# Patient Record
Sex: FEMALE | Race: WHITE | NOT HISPANIC OR LATINO | Employment: UNEMPLOYED | URBAN - NONMETROPOLITAN AREA
[De-identification: names, ages, dates, MRNs, and addresses within clinical notes are randomized per-mention and may not be internally consistent; named-entity substitution may affect disease eponyms.]

---

## 2017-10-17 ENCOUNTER — HOSPITAL ENCOUNTER (EMERGENCY)
Facility: HOSPITAL | Age: 77
Discharge: HOME | End: 2017-10-18
Attending: EMERGENCY MEDICINE | Admitting: EMERGENCY MEDICINE

## 2017-10-17 ENCOUNTER — APPOINTMENT (OUTPATIENT)
Dept: GENERAL RADIOLOGY | Facility: HOSPITAL | Age: 77
End: 2017-10-17

## 2017-10-17 DIAGNOSIS — R07.9 CHEST PAIN, UNSPECIFIED TYPE: Primary | ICD-10-CM

## 2017-10-17 DIAGNOSIS — B02.9 HERPES ZOSTER WITHOUT COMPLICATION: ICD-10-CM

## 2017-10-17 DIAGNOSIS — K21.9 GASTROESOPHAGEAL REFLUX DISEASE, ESOPHAGITIS PRESENCE NOT SPECIFIED: ICD-10-CM

## 2017-10-17 LAB
ALBUMIN SERPL-MCNC: 3.7 G/DL (ref 3.4–4.8)
ALBUMIN/GLOB SERPL: 1 G/DL (ref 1.5–2.5)
ALP SERPL-CCNC: 83 U/L (ref 35–104)
ALT SERPL W P-5'-P-CCNC: 27 U/L (ref 10–36)
ANION GAP SERPL CALCULATED.3IONS-SCNC: 9.6 MMOL/L (ref 3.6–11.2)
ANISOCYTOSIS BLD QL: ABNORMAL
APTT PPP: 30.6 SECONDS (ref 23.8–36.1)
AST SERPL-CCNC: 45 U/L (ref 10–30)
BILIRUB SERPL-MCNC: 0.2 MG/DL (ref 0.2–1.8)
BUN BLD-MCNC: 12 MG/DL (ref 7–21)
BUN/CREAT SERPL: 13.2 (ref 7–25)
CALCIUM SPEC-SCNC: 9.3 MG/DL (ref 7.7–10)
CHLORIDE SERPL-SCNC: 104 MMOL/L (ref 99–112)
CK MB SERPL-CCNC: 0.4 NG/ML (ref 0–5)
CK MB SERPL-CCNC: 1.11 NG/ML (ref 0–5)
CK MB SERPL-RTO: 1 % (ref 0–3)
CK MB SERPL-RTO: 2.1 % (ref 0–3)
CK SERPL-CCNC: 40 U/L (ref 24–173)
CK SERPL-CCNC: 54 U/L (ref 24–173)
CO2 SERPL-SCNC: 23.4 MMOL/L (ref 24.3–31.9)
CREAT BLD-MCNC: 0.91 MG/DL (ref 0.43–1.29)
DEPRECATED RDW RBC AUTO: 52 FL (ref 37–54)
EOSINOPHIL # BLD MANUAL: 0.1 10*3/MM3 (ref 0–0.7)
EOSINOPHIL NFR BLD MANUAL: 1 % (ref 0–7)
ERYTHROCYTE [DISTWIDTH] IN BLOOD BY AUTOMATED COUNT: 15.9 % (ref 11.5–14.5)
GFR SERPL CREATININE-BSD FRML MDRD: 60 ML/MIN/1.73
GLOBULIN UR ELPH-MCNC: 3.7 GM/DL
GLUCOSE BLD-MCNC: 170 MG/DL (ref 70–110)
HCT VFR BLD AUTO: 41.2 % (ref 37–47)
HGB BLD-MCNC: 12.6 G/DL (ref 12–16)
HYPOCHROMIA BLD QL: ABNORMAL
INR PPP: 1.04 (ref 0.9–1.1)
LIPASE SERPL-CCNC: 34 U/L (ref 13–60)
LYMPHOCYTES # BLD MANUAL: 6.45 10*3/MM3 (ref 1–3)
LYMPHOCYTES NFR BLD MANUAL: 62 % (ref 16–46)
LYMPHOCYTES NFR BLD MANUAL: 7 % (ref 0–12)
MCH RBC QN AUTO: 27.9 PG (ref 27–33)
MCHC RBC AUTO-ENTMCNC: 30.6 G/DL (ref 33–37)
MCV RBC AUTO: 91.2 FL (ref 80–94)
MONOCYTES # BLD AUTO: 0.73 10*3/MM3 (ref 0.1–0.9)
NEUTROPHILS # BLD AUTO: 3.12 10*3/MM3 (ref 1.4–6.5)
NEUTROPHILS NFR BLD MANUAL: 30 % (ref 40–75)
OSMOLALITY SERPL CALC.SUM OF ELEC: 277.6 MOSM/KG (ref 273–305)
PLAT MORPH BLD: NORMAL
PLATELET # BLD AUTO: 309 10*3/MM3 (ref 130–400)
PMV BLD AUTO: 9.4 FL (ref 6–10)
POTASSIUM BLD-SCNC: 4 MMOL/L (ref 3.5–5.3)
PROT SERPL-MCNC: 7.4 G/DL (ref 6–8)
PROTHROMBIN TIME: 13.8 SECONDS (ref 11–15.4)
RBC # BLD AUTO: 4.52 10*6/MM3 (ref 4.2–5.4)
SODIUM BLD-SCNC: 137 MMOL/L (ref 135–153)
TROPONIN I SERPL-MCNC: <0.006 NG/ML
TROPONIN I SERPL-MCNC: <0.006 NG/ML
WBC NRBC COR # BLD: 10.4 10*3/MM3 (ref 4.5–12.5)

## 2017-10-17 PROCEDURE — 82553 CREATINE MB FRACTION: CPT | Performed by: EMERGENCY MEDICINE

## 2017-10-17 PROCEDURE — 84484 ASSAY OF TROPONIN QUANT: CPT | Performed by: EMERGENCY MEDICINE

## 2017-10-17 PROCEDURE — 99284 EMERGENCY DEPT VISIT MOD MDM: CPT

## 2017-10-17 PROCEDURE — 85610 PROTHROMBIN TIME: CPT | Performed by: EMERGENCY MEDICINE

## 2017-10-17 PROCEDURE — 80053 COMPREHEN METABOLIC PANEL: CPT | Performed by: EMERGENCY MEDICINE

## 2017-10-17 PROCEDURE — 93010 ELECTROCARDIOGRAM REPORT: CPT | Performed by: INTERNAL MEDICINE

## 2017-10-17 PROCEDURE — 85007 BL SMEAR W/DIFF WBC COUNT: CPT | Performed by: EMERGENCY MEDICINE

## 2017-10-17 PROCEDURE — 85730 THROMBOPLASTIN TIME PARTIAL: CPT | Performed by: EMERGENCY MEDICINE

## 2017-10-17 PROCEDURE — 71010 HC CHEST PA OR AP: CPT

## 2017-10-17 PROCEDURE — 93005 ELECTROCARDIOGRAM TRACING: CPT | Performed by: EMERGENCY MEDICINE

## 2017-10-17 PROCEDURE — 83690 ASSAY OF LIPASE: CPT | Performed by: EMERGENCY MEDICINE

## 2017-10-17 PROCEDURE — 71010 XR CHEST 1 VW: CPT | Performed by: RADIOLOGY

## 2017-10-17 PROCEDURE — 82550 ASSAY OF CK (CPK): CPT | Performed by: EMERGENCY MEDICINE

## 2017-10-17 PROCEDURE — 85025 COMPLETE CBC W/AUTO DIFF WBC: CPT | Performed by: EMERGENCY MEDICINE

## 2017-10-17 RX ORDER — SODIUM CHLORIDE 0.9 % (FLUSH) 0.9 %
10 SYRINGE (ML) INJECTION AS NEEDED
Status: DISCONTINUED | OUTPATIENT
Start: 2017-10-17 | End: 2017-10-18 | Stop reason: HOSPADM

## 2017-10-17 RX ORDER — PROMETHAZINE HYDROCHLORIDE 25 MG/ML
12.5 INJECTION, SOLUTION INTRAMUSCULAR; INTRAVENOUS ONCE
Status: DISCONTINUED | OUTPATIENT
Start: 2017-10-17 | End: 2017-10-17

## 2017-10-17 RX ORDER — ASPIRIN 81 MG/1
324 TABLET, CHEWABLE ORAL ONCE
Status: COMPLETED | OUTPATIENT
Start: 2017-10-17 | End: 2017-10-17

## 2017-10-17 RX ADMIN — ASPIRIN 324 MG: 81 TABLET, CHEWABLE ORAL at 20:16

## 2017-10-18 VITALS
OXYGEN SATURATION: 99 % | HEIGHT: 57 IN | WEIGHT: 195 LBS | HEART RATE: 90 BPM | TEMPERATURE: 98 F | SYSTOLIC BLOOD PRESSURE: 110 MMHG | DIASTOLIC BLOOD PRESSURE: 70 MMHG | RESPIRATION RATE: 16 BRPM | BODY MASS INDEX: 42.07 KG/M2

## 2017-10-18 NOTE — ED NOTES
[facility] called for transport back to nursing home.      Mirela Pedersen  10/17/17 3903

## 2017-10-18 NOTE — ED PROVIDER NOTES
Subjective   HPI Comments: Pt brought from NH/Rehab for CP.  C/O left upper abdominal burning radiating under left breast.    Pt had CVA on 8-.  Very confused since.  Left sided deficits, contractures and speech difficulty since CVA.  Has intermittent A-fib    Patient is a 77 y.o. female presenting with chest pain.   History provided by:  Patient, relative and medical records  History limited by:  Dementia  Chest Pain   Pain location:  L chest  Pain quality: burning    Pain radiates to:  Does not radiate  Onset quality:  Unable to specify  Progression:  Partially resolved  Context: not breathing, not drug use, not eating, not intercourse, not lifting, not movement, not raising an arm, not at rest, not stress and not trauma    Relieved by:  Nothing  Worsened by:  Nothing  Ineffective treatments:  None tried  Associated symptoms: abdominal pain, anxiety and nausea    Associated symptoms: no AICD problem, no anorexia, no back pain, no claudication, no cough, no diaphoresis, no dizziness, no fatigue, no fever, no headache, no lower extremity edema, no near-syncope, no orthopnea, no palpitations, no PND, no shortness of breath, no syncope and no vomiting    Risk factors: coronary artery disease, high cholesterol and hypertension    Risk factors: no aortic disease, no birth control, no Renetta-Danlos syndrome, no immobilization, not male, no Marfan's syndrome, not pregnant, no smoking and no surgery        Review of Systems   Constitutional: Negative.  Negative for diaphoresis, fatigue and fever.   HENT: Negative.    Eyes: Negative.    Respiratory: Negative.  Negative for cough and shortness of breath.    Cardiovascular: Positive for chest pain. Negative for palpitations, orthopnea, claudication, syncope, PND and near-syncope.   Gastrointestinal: Positive for abdominal pain and nausea. Negative for abdominal distention, anorexia and vomiting.   Endocrine: Negative.    Genitourinary: Negative.    Musculoskeletal:  Negative.  Negative for back pain.   Skin: Negative.    Allergic/Immunologic: Negative.    Neurological: Negative for dizziness and headaches.   Hematological: Negative.    Psychiatric/Behavioral: Negative.    All other systems reviewed and are negative.      No past medical history on file.    Allergies   Allergen Reactions   • Bactrim [Sulfamethoxazole-Trimethoprim]    • Codeine    • Penicillins        No past surgical history on file.    No family history on file.    Social History     Social History   • Marital status: Single     Spouse name: N/A   • Number of children: N/A   • Years of education: N/A     Social History Main Topics   • Smoking status: Not on file   • Smokeless tobacco: Not on file   • Alcohol use Not on file   • Drug use: Not on file   • Sexual activity: Not on file     Other Topics Concern   • Not on file     Social History Narrative           Objective   Physical Exam   Constitutional: She is oriented to person, place, and time. She appears well-developed and well-nourished. No distress.   HENT:   Head: Normocephalic and atraumatic.   Nose: Nose normal.   Moist mucus membranes  Left sided facial droop   Eyes: Conjunctivae and EOM are normal. Pupils are equal, round, and reactive to light. Right eye exhibits no discharge. Left eye exhibits no discharge. No scleral icterus.   Neck: Neck supple. No tracheal deviation present.   Cardiovascular: Normal rate, regular rhythm, normal heart sounds and intact distal pulses.  Exam reveals no gallop and no friction rub.    No murmur heard.  Pulmonary/Chest: Effort normal. No stridor. No respiratory distress. She has no wheezes. She has no rales. She exhibits no tenderness.   Abdominal: Soft. She exhibits no distension and no mass. There is no tenderness. There is no guarding.   G-tube in place   Neurological: She is alert and oriented to person, place, and time.   Left sided weakness, left facial droop, LUE contracture   Skin: Skin is warm and dry.    Patch of partially crusted over varicella/shingles rash to left breast   Psychiatric: She has a normal mood and affect.   Nursing note and vitals reviewed.      Procedures         ED Course  ED Course   Value Comment By Time   ECG 12 Lead 0 12-lead EKG performed at 1926 hrs.  Interpreted by me at 1930 hrs.  Normal sinus rhythm.  Normal EKG.  Ventricular rate 99.  NM interval 186.  QRS duration 82.  .  QTc 441.  No pathologic blocks.  No sustained ectopy or dysrhythmia.  No acute ischemic ST segment deviation.  No criteria for STEMI. Gilmer Allan MD 10/17 2201    Patient hemodynamically stable.  No EKG or serial cardiac enzyme results consistent with ACS. Gilmer Allan MD 10/17 2310                HEART Score (for prediction of 6-week risk of major adverse cardiac event) reviewed and/or performed as part of the patient evaluation and treatment planning process.  The result associated with this review/performance is: 1     XR Chest 1 View   ED Interpretation   Single portable AP chest.   My read   No apparent acute infiltrate or injury.        Labs Reviewed   COMPREHENSIVE METABOLIC PANEL - Abnormal; Notable for the following:        Result Value    Glucose 170 (*)     CO2 23.4 (*)     AST (SGOT) 45 (*)     eGFR Non  Amer 60 (*)     A/G Ratio 1.0 (*)     All other components within normal limits    Narrative:     The MDRD GFR formula is only valid for adults with stable renal function between ages 18 and 70.   CBC WITH AUTO DIFFERENTIAL - Abnormal; Notable for the following:     MCHC 30.6 (*)     RDW 15.9 (*)     All other components within normal limits   MANUAL DIFFERENTIAL - Abnormal; Notable for the following:     Neutrophil % 30.0 (*)     Lymphocyte % 62.0 (*)     Lymphocytes Absolute 6.45 (*)     All other components within normal limits   PROTIME-INR - Normal    Narrative:     Suggested INR therapeutic range for stable oral anticoagulant therapy:    Low Intensity therapy:    1.5-2.0  Moderate Intensity therapy:   2.0-3.0  High Intensity therapy:   2.5-4.0   APTT - Normal    Narrative:     PTT Heparin Therapeutic Range:  59 - 95 seconds   LIPASE - Normal   CK - Normal   CK MB - Normal   TROPONIN (IN-HOUSE) - Normal    Narrative:     Ultra Troponin I Reference Range:         <=0.039 ng/mL: Negative    0.04-0.779 ng/mL: Indeterminate Range. Suspicious of MI.  Clinical correlation required.       >=0.78  ng/mL: Consistent with myocardial injury.  Clinical correlation required.   OSMOLALITY, CALCULATED - Normal   CKMB INDEX CALCULATION - Normal   CK - Normal   CK MB - Normal   TROPONIN (IN-HOUSE) - Normal    Narrative:     Ultra Troponin I Reference Range:         <=0.039 ng/mL: Negative    0.04-0.779 ng/mL: Indeterminate Range. Suspicious of MI.  Clinical correlation required.       >=0.78  ng/mL: Consistent with myocardial injury.  Clinical correlation required.   CKMB INDEX CALCULATION - Normal   URINALYSIS W/ CULTURE IF INDICATED   CBC AND DIFFERENTIAL    Narrative:     The following orders were created for panel order CBC & Differential.  Procedure                               Abnormality         Status                     ---------                               -----------         ------                     Manual Differential[[id number]]          Abnormal            Final result               Scan Slide[[id number]]                                                                  CBC Auto Differential[[id number]]        Abnormal            Final result                 Please view results for these tests on the individual orders.        Medication List      START taking these medications          lansoprazole 3 mg/mL in sodium bicarbonate injection 8.4%   Take 5 mL by mouth Daily. Administer by G-tube             MDM  Number of Diagnoses or Management Options  Chest pain, unspecified type: new and requires workup  Gastroesophageal reflux disease, esophagitis presence not specified: new  and requires workup     Amount and/or Complexity of Data Reviewed  Clinical lab tests: ordered and reviewed  Tests in the radiology section of CPT®: ordered and reviewed  Decide to obtain previous medical records or to obtain history from someone other than the patient: yes  Obtain history from someone other than the patient: yes  Independent visualization of images, tracings, or specimens: yes    Risk of Complications, Morbidity, and/or Mortality  Presenting problems: high  Diagnostic procedures: high  Management options: moderate    Patient Progress  Patient progress: stable      Final diagnoses:   Chest pain, unspecified type   Gastroesophageal reflux disease, esophagitis presence not specified   Herpes zoster without complication            Gilmer Allan MD  10/17/17 2551       Gilmer Allan MD  10/17/17 3892

## 2017-11-21 ENCOUNTER — LAB REQUISITION (OUTPATIENT)
Dept: LAB | Facility: HOSPITAL | Age: 77
End: 2017-11-21

## 2017-11-21 DIAGNOSIS — I69.322 DYSARTHRIA FOLLOWING CEREBRAL INFARCTION: ICD-10-CM

## 2017-11-21 DIAGNOSIS — R41.841 COGNITIVE COMMUNICATION DEFICIT: ICD-10-CM

## 2017-11-21 DIAGNOSIS — R13.12 DYSPHAGIA, OROPHARYNGEAL PHASE: ICD-10-CM

## 2017-11-21 DIAGNOSIS — D64.9 ANEMIA: ICD-10-CM

## 2017-11-21 DIAGNOSIS — M62.81 MUSCLE WEAKNESS (GENERALIZED): ICD-10-CM

## 2017-11-21 DIAGNOSIS — R26.9 ABNORMALITY OF GAIT AND MOBILITY: ICD-10-CM

## 2017-11-21 DIAGNOSIS — I69.354 HEMIPLEGIA AND HEMIPARESIS FOLLOWING CEREBRAL INFARCTION AFFECTING LEFT NON-DOMINANT SIDE (HCC): ICD-10-CM

## 2017-11-21 DIAGNOSIS — I63.9 CEREBRAL INFARCTION (HCC): ICD-10-CM

## 2017-11-21 PROCEDURE — 88184 FLOWCYTOMETRY/ TC 1 MARKER: CPT

## 2017-11-21 PROCEDURE — 88189 FLOWCYTOMETRY/READ 16 & >: CPT

## 2017-11-21 PROCEDURE — 88185 FLOWCYTOMETRY/TC ADD-ON: CPT

## 2017-11-28 ENCOUNTER — TRANSCRIBE ORDERS (OUTPATIENT)
Dept: ADMINISTRATIVE | Facility: HOSPITAL | Age: 77
End: 2017-11-28

## 2017-11-28 DIAGNOSIS — R13.10 DYSPHAGIA, UNSPECIFIED TYPE: Primary | ICD-10-CM

## 2017-11-28 LAB — LEUK/LYMPH PHENO: NORMAL

## 2017-12-04 ENCOUNTER — HOSPITAL ENCOUNTER (OUTPATIENT)
Dept: GENERAL RADIOLOGY | Facility: HOSPITAL | Age: 77
Discharge: HOME | End: 2017-12-04
Attending: INTERNAL MEDICINE | Admitting: INTERNAL MEDICINE

## 2017-12-04 DIAGNOSIS — R13.10 DYSPHAGIA, UNSPECIFIED TYPE: ICD-10-CM

## 2017-12-04 PROCEDURE — G8997 SWALLOW GOAL STATUS: HCPCS

## 2017-12-04 PROCEDURE — 92611 MOTION FLUOROSCOPY/SWALLOW: CPT

## 2017-12-04 PROCEDURE — G8998 SWALLOW D/C STATUS: HCPCS

## 2017-12-04 PROCEDURE — G8996 SWALLOW CURRENT STATUS: HCPCS

## 2017-12-04 PROCEDURE — 74230 X-RAY XM SWLNG FUNCJ C+: CPT | Performed by: RADIOLOGY

## 2017-12-04 PROCEDURE — 74230 X-RAY XM SWLNG FUNCJ C+: CPT

## 2017-12-04 NOTE — MBS/VFSS/FEES
Outpatient Speech Language Pathology   Adult Swallow Initial Evaluation   Tom   OUTPATIENT MODIFIED BARIUM SWALLOW STUDY     Patient Name: Jeanna Flower  : 1940  MRN: [id number]  Today's Date: 2017       Visit Date: 2017   There is no problem list on file for this patient.       No past medical history on file.     No past surgical history on file.     Jeanna Flower  presents to the radiology suite this am from [facility] to participate in an instrumental MBS to evaluate safety/efficacy of swallowing fnx, determine safest/least restrictive diet. She is accompanied by her son. Pt and family report current modified po diet of puree w/ honey thickened liquids 2/2 s/p CVA w/ h/o oropharyngeal dysphagia. Pt is eager for diet upgrade.      Chest xray is not available for review.     She is observed on ra w/o complications.     Risks and benefits of the procedure are explained w/ verbalizing understanding/agreement to participate. Proceed per protocol.     Ms. Flower is positioned upright and centered in a wheel chair to accept multiple po presentations of solid cracker, puree, honey thick, nectar thick, and thin liquids via spoon, cup and straw, along w/ whole placebo pill in puree. She is able to self feed.     All views are from the lateral plane.     Facial/oral structures reveal a mild-moderate L orofacial asymmetry w/ intact sensation. Mild L  lingual deviation upon protrusion. Oral mucosa are moist, pink and clean. Secretions are clear, thin and well controlled. OROM/ANDREA is mildly-moderately dysarthric w/ delay to imitate oral postures. Gag is not assessed. Volitional cough is adequate in intensity, clear in quality, nonproductive. Vocal quality is adequate in intensity, w/ slightly dysarthric speech,  Intelligible. She is a/a and cooperative to participate, oriented to person, place, and time, follows simple directives, and participates in simple conversational exchanges.      Upon po presentations, adequate bolus anticipation w/ L labial seal weakness. Adequate bolus clearance via spoon bowl, cup rim stability, and suction via straw w/o anterior loss noted.  Bolus formation, manipulation,  and control are mildl weak w/ delayed lingual initiation, increased oral prep time w/ rotary mastication pattern of solids. A-p transit is mildly delayed w/ piecemeal deglutition of solids. Tongue base retraction and linguavelar seal are weak w/ slighlt delay w/ premature spillage to vallecula, minimally over posterior epiglottic wall w/ thins, controlled. No laryngeal penetration or aspiration is evidenced before the swallow.     Pharyngeal swallow is timely w/ mildly weak hyolaryngeal elevation and slightly delayed epiglottic inversion. Pharyngeal contraction is adequate w/o significant residue. Laryngeal penetration of thin liquids via cup and straw presentations occurs during the swallow, clearing upon completion of the swallow w/o significant residue. Compensatory technique of L head turn is effective to fully alleviate laryngeal penetration. She is able to perform this technique independently. No other laryngeal penetration or aspiration evidenced during or after the swallow.     Partial esophageal sweep reveals no mucosal abnormalities. Motility is wfl w/o retrograde flow noted.      Impression: Ms. Flower presents w/a mild-moderate oral dysphagia, mild pharyngeal dysphagia w/ L orofacial dysarthria, increased oral prep time, weak bolus mastication, premature loss of thin liquids w/ laryngeal penetration of thin liquids during the swallow via cup and straw. Compensatory technique of L head turn is effective to fully alleviate laryngeal penetration. She is able to perform this technique independently. No other laryngeal penetration or aspiration evidenced during or after the swallow.     She will benefit from modified po diet of mechanical soft, chopped meats, thin liquids. She is able to  perform L head turn independently. She will benefit from continued formal dysphagia therapy per facility SLP POC.    Recommendations:   1. Mechanical soft diet, chopped meats, thin liquids. L head turn w/ liquids.    2. Meds whole in puree/thins.   3. Universal aspiration precautions.   4. SAGRARIO precautons.     D/w pt and her family  results and recommendations w/ verbal understanding and agreement. Educated on L head turn w/ pt demonstrating carry over of technique independently.     Thank you for allowing me to participate in the care of your patient-  Ivanna Kuo M.S., CCC/SLP    Visit Dx:     ICD-10-CM ICD-9-CM   1. Dysphagia, unspecified type R13.10 787.20             OP SLP Education       12/04/17 1051    Education    Barriers to Learning No barriers identified  -MIKA    Education Provided Described results of evaluation;Patient expressed understanding of evaluation;Family/caregivers expressed understanding of evaluation;Patient demonstrated recommended strategies  -MIKA    Assessed Learning motivation  -MIKA    Learning Motivation Strong  -MIKA    Learning Method Explanation;Demonstration  -MIKA    Teaching Response Verbalized understanding;Demonstrated understanding  -MIKA      User Key  (r) = Recorded By, (t) = Taken By, (c) = Cosigned By    Initials Name Effective Dates    MIKA Ivanna Kuo MS CCC-SLP 02/04/16 -           Time Calculation:        Therapy Charges for Today     Code Description Service Date Service Provider Modifiers Qty    00868200740 HC ST SWALLOWING CURRENT STATUS 12/4/2017 Ivanna Kuo MS CCC-SLP GN, CI 1    48400841122 HC ST SWALLOWING PROJECTED 12/4/2017 Ivanna Kuo MS CCC-SLP GN, CI 1    62755187081 HC ST SWALLOWING DISCHARGE 12/4/2017 Ivanna Kuo MS CCC-SLP GN, CI 1    67046692401 HC ST MOTION FLUORO EVAL SWALLOW 8 12/4/2017 Ivanna Kuo MS CCC-SLP GN 1          SLP G-Codes  SLP NOMS Used?: Yes  Functional Limitations: Swallowing  Swallow Current Status  (): At least 1 percent but less than 20 percent impaired, limited or restricted  Swallow Goal Status (): At least 1 percent but less than 20 percent impaired, limited or restricted  Swallow Discharge Status (): At least 1 percent but less than 20 percent impaired, limited or restricted        Ivanna Kuo MS CCC-SLP  12/4/2017

## 2017-12-06 ENCOUNTER — OFFICE VISIT (OUTPATIENT)
Dept: CARDIOLOGY | Facility: CLINIC | Age: 77
End: 2017-12-06

## 2017-12-06 VITALS — SYSTOLIC BLOOD PRESSURE: 116 MMHG | DIASTOLIC BLOOD PRESSURE: 68 MMHG | HEART RATE: 72 BPM | HEIGHT: 62 IN

## 2017-12-06 DIAGNOSIS — I48.0 PAROXYSMAL ATRIAL FIBRILLATION (HCC): ICD-10-CM

## 2017-12-06 DIAGNOSIS — I48.91 ATRIAL FIBRILLATION, UNSPECIFIED TYPE (HCC): Primary | ICD-10-CM

## 2017-12-06 DIAGNOSIS — E11.9 TYPE 2 DIABETES MELLITUS WITHOUT COMPLICATION, UNSPECIFIED LONG TERM INSULIN USE STATUS: ICD-10-CM

## 2017-12-06 DIAGNOSIS — I10 ESSENTIAL HYPERTENSION: ICD-10-CM

## 2017-12-06 DIAGNOSIS — I63.411 CEREBROVASCULAR ACCIDENT (CVA) DUE TO EMBOLISM OF RIGHT MIDDLE CEREBRAL ARTERY (HCC): ICD-10-CM

## 2017-12-06 PROBLEM — R06.00 DYSPNEA, UNSPECIFIED: Status: ACTIVE | Noted: 2017-12-06

## 2017-12-06 PROBLEM — I63.9 CVA (CEREBRAL VASCULAR ACCIDENT) (HCC): Status: ACTIVE | Noted: 2017-12-06

## 2017-12-06 PROBLEM — R60.9 EDEMA, UNSPECIFIED: Status: ACTIVE | Noted: 2017-12-06

## 2017-12-06 PROCEDURE — 93000 ELECTROCARDIOGRAM COMPLETE: CPT | Performed by: INTERNAL MEDICINE

## 2017-12-06 PROCEDURE — 99203 OFFICE O/P NEW LOW 30 MIN: CPT | Performed by: INTERNAL MEDICINE

## 2017-12-06 RX ORDER — CYCLOSPORINE 0.5 MG/ML
1 EMULSION OPHTHALMIC 2 TIMES DAILY
COMMUNITY

## 2017-12-06 RX ORDER — LOSARTAN POTASSIUM 100 MG/1
100 TABLET ORAL DAILY
COMMUNITY
End: 2019-01-11 | Stop reason: HOSPADM

## 2017-12-06 RX ORDER — DANTROLENE SODIUM 25 MG/1
25 CAPSULE ORAL 4 TIMES DAILY
COMMUNITY
End: 2018-04-30 | Stop reason: ALTCHOICE

## 2017-12-06 RX ORDER — ATORVASTATIN CALCIUM 80 MG/1
80 TABLET, FILM COATED ORAL DAILY
COMMUNITY
End: 2018-04-30 | Stop reason: SDUPTHER

## 2017-12-06 RX ORDER — MELATONIN 200 MCG
3 TABLET ORAL NIGHTLY
COMMUNITY
End: 2018-11-30 | Stop reason: ALTCHOICE

## 2017-12-06 RX ORDER — GLIPIZIDE 5 MG/1
5 TABLET ORAL
COMMUNITY
End: 2018-09-16 | Stop reason: DRUGHIGH

## 2017-12-06 RX ORDER — FAMOTIDINE 40 MG/5ML
20 POWDER, FOR SUSPENSION ORAL 2 TIMES DAILY
COMMUNITY
End: 2018-04-30 | Stop reason: ALTCHOICE

## 2017-12-06 RX ORDER — LANSOPRAZOLE 30 MG/1
30 CAPSULE, DELAYED RELEASE ORAL DAILY
COMMUNITY
End: 2018-04-30 | Stop reason: ALTCHOICE

## 2017-12-06 RX ORDER — ACETAMINOPHEN 500 MG
500 TABLET ORAL EVERY 6 HOURS PRN
COMMUNITY
End: 2018-09-16 | Stop reason: HOSPADM

## 2017-12-06 RX ORDER — MONTELUKAST SODIUM 10 MG/1
10 TABLET ORAL NIGHTLY
COMMUNITY

## 2017-12-06 RX ORDER — ARGININE/ASCORBATE SOD/VITE AC 4.5 G/9.2G
POWDER IN PACKET (EA) ORAL
COMMUNITY
End: 2018-09-16 | Stop reason: HOSPADM

## 2017-12-06 RX ORDER — LORATADINE 10 MG/1
10 TABLET ORAL DAILY
COMMUNITY
End: 2018-09-16 | Stop reason: HOSPADM

## 2017-12-06 RX ORDER — VENLAFAXINE 37.5 MG/1
37.5 TABLET ORAL 2 TIMES DAILY
COMMUNITY
End: 2018-04-30 | Stop reason: ALTCHOICE

## 2017-12-06 RX ORDER — TRAZODONE HYDROCHLORIDE 50 MG/1
50 TABLET ORAL NIGHTLY
COMMUNITY
End: 2019-01-07

## 2017-12-06 RX ORDER — METOPROLOL TARTRATE 50 MG/1
50 TABLET, FILM COATED ORAL 2 TIMES DAILY
COMMUNITY
End: 2019-01-07 | Stop reason: DRUGHIGH

## 2017-12-06 RX ORDER — LEVOTHYROXINE SODIUM 0.1 MG/1
100 TABLET ORAL DAILY
COMMUNITY

## 2017-12-06 RX ORDER — BUDESONIDE 0.5 MG/2ML
0.5 INHALANT ORAL
COMMUNITY
End: 2018-04-30 | Stop reason: ALTCHOICE

## 2017-12-06 NOTE — PROGRESS NOTES
Adela Mathew MD  Jeanna Flower  1940 12/06/2017    Patient Active Problem List   Diagnosis   • Dyspnea, unspecified   • Edema, unspecified   • Atrial fibrillation   • Essential hypertension   • Type 2 diabetes mellitus without complication       Dear Adela Mathew MD:    Subjective     Chief Complaint:Recently dxd atrial fibrillation and recent CVA with left hemiparesis.    History of Present Illness: Ms. Flower presents to office today to establish cardiac care. She was recently diagnosed with atrial fibrillation whe she presented with a stroke with left hemiparesis in August 2017.She was treated at [facility] with intracranial interventional procedure and was started on Elquis. She says her weakness in left arm and left leg is better.She denies any significant palpitations. She is currently in sinus rytthm. She denies any bleeding problems with Eliquis.  She notes having an episode of chest pain in August before her stroke.  She was treated at [facility] at that time  and was given a diagnosis of acid reflux. She has not had any further episodes of chest pain. She is a non smoker.     Cardiac risk factors:diabetes mellitus, hypertension, Sedentary life style and Age and postmenopausal status.    Allergies   Allergen Reactions   • Bactrim [Sulfamethoxazole-Trimethoprim]    • Codeine    • Penicillins    :      Current Outpatient Prescriptions:   •  acetaminophen (TYLENOL) 500 MG tablet, Take 500 mg by mouth Every 6 (Six) Hours As Needed for Mild Pain ., Disp: , Rfl:   •  amantadine (SYMMETREL) 50 MG/5ML solution, Take 50 mg by mouth Every 12 (Twelve) Hours., Disp: , Rfl:   •  apixaban (ELIQUIS) 5 MG tablet tablet, Take 5 mg by mouth 2 (Two) Times a Day., Disp: , Rfl:   •  atorvastatin (LIPITOR) 80 MG tablet, Take 80 mg by mouth Daily., Disp: , Rfl:   •  budesonide (PULMICORT) 0.5 MG/2ML nebulizer solution, Take 0.5 mg by nebulization Daily., Disp: , Rfl:   •  cycloSPORINE (RESTASIS) 0.05  % ophthalmic emulsion, 1 drop 2 (Two) Times a Day., Disp: , Rfl:   •  dantrolene (DANTRIUM) 25 MG capsule, Take 25 mg by mouth 4 (Four) Times a Day., Disp: , Rfl:   •  docusate sodium (COLACE) 50 MG capsule, Take  by mouth 2 (Two) Times a Day., Disp: , Rfl:   •  famotidine (PEPCID) 40 MG/5ML suspension, Take 20 mg by mouth 2 (Two) Times a Day., Disp: , Rfl:   •  fluticasone (FLOVENT DISKUS) 50 MCG/BLIST diskus inhaler, Inhale 1 puff 2 (Two) Times a Day., Disp: , Rfl:   •  fluticasone-salmeterol (ADVAIR DISKUS) 250-50 MCG/DOSE DISKUS, Inhale 2 (Two) Times a Day., Disp: , Rfl:   •  glipiZIDE (GLUCOTROL) 5 MG tablet, Take 5 mg by mouth 2 (Two) Times a Day Before Meals., Disp: , Rfl:   •  lansoprazole (PREVACID) 30 MG capsule, Take 30 mg by mouth Daily., Disp: , Rfl:   •  levothyroxine (SYNTHROID, LEVOTHROID) 100 MCG tablet, Take 100 mcg by mouth Daily., Disp: , Rfl:   •  loratadine (CLARITIN) 10 MG tablet, Take 10 mg by mouth Daily., Disp: , Rfl:   •  losartan (COZAAR) 100 MG tablet, Take 100 mg by mouth Daily., Disp: , Rfl:   •  Melatonin 3 MG tablet, Take  by mouth., Disp: , Rfl:   •  metoprolol tartrate (LOPRESSOR) 50 MG tablet, Take 50 mg by mouth 2 (Two) Times a Day., Disp: , Rfl:   •  montelukast (SINGULAIR) 10 MG tablet, Take 10 mg by mouth Every Night., Disp: , Rfl:   •  Nutritional Supplements (ARGINAID) pack, Take  by mouth., Disp: , Rfl:   •  traZODone (DESYREL) 50 MG tablet, Take 50 mg by mouth Every Night., Disp: , Rfl:   •  venlafaxine (EFFEXOR) 37.5 MG tablet, Take 37.5 mg by mouth 2 (Two) Times a Day., Disp: , Rfl:     Past Medical History:   Diagnosis Date   • Diabetes mellitus    • Heart disease    • Hyperlipidemia    • Hypertension      Past Surgical History:   Procedure Laterality Date   • APPENDECTOMY     • BLADDER SURGERY     • CARDIAC CATHETERIZATION     • CHOLECYSTECTOMY       Family History   Problem Relation Age of Onset   • No Known Problems Mother    • No Known Problems Father      Social  "History   Substance Use Topics   • Smoking status: Never Smoker   • Smokeless tobacco: Never Used   • Alcohol use No       Review of Systems   Constitution: Negative for chills, fever, weakness, malaise/fatigue, weight gain and weight loss.   HENT: Negative for congestion and nosebleeds.    Cardiovascular: Negative for chest pain, irregular heartbeat, leg swelling and orthopnea.   Respiratory: Positive for cough and shortness of breath. Negative for hemoptysis.    Hematologic/Lymphatic: Bruises/bleeds easily.   Musculoskeletal: Positive for back pain, joint pain, joint swelling, muscle cramps, muscle weakness, myalgias and stiffness.   Gastrointestinal: Negative for abdominal pain, change in bowel habit, hematemesis, melena and vomiting.   Genitourinary: Positive for frequency. Negative for dysuria and hematuria.   Neurological: Positive for dizziness. Negative for focal weakness, headaches and light-headedness.   Psychiatric/Behavioral: Positive for depression and memory loss. The patient has insomnia and is nervous/anxious.        Objective   Blood pressure 116/68, pulse 72, height 157.5 cm (62\").  There is no height or weight on file to calculate BMI.        Physical Exam   Constitutional: She is oriented to person, place, and time. She appears well-developed and well-nourished.   Sitting on wheelchair   HENT:   Head: Normocephalic.   Feeding tube in place    Facial palsy   Eyes: Conjunctivae and EOM are normal.   Neck: Normal range of motion. Neck supple. No JVD present. No tracheal deviation present. No thyromegaly present.   Cardiovascular: Normal rate, regular rhythm, S1 normal and S2 normal.  Exam reveals no gallop, no S3, no S4 and no friction rub.    No murmur heard.  Pulses:       Dorsalis pedis pulses are 1+ on the right side, and 1+ on the left side.        Posterior tibial pulses are 2+ on the right side, and 2+ on the left side.   Pulmonary/Chest: Breath sounds normal. No respiratory distress. She has " no wheezes. She has no rales.   Abdominal: Soft. Bowel sounds are normal. She exhibits no distension and no mass. There is no tenderness.   Musculoskeletal: She exhibits no edema.   Weakness in left upper and lower extremities.   Neurological: She is alert and oriented to person, place, and time. No cranial nerve deficit.   Left hemiparesis and left facial palsy.   Skin: Skin is warm and dry.   Psychiatric: She has a normal mood and affect.       Lab Results   Component Value Date     10/17/2017    K 4.0 10/17/2017     10/17/2017    CO2 23.4 (L) 10/17/2017    BUN 12 10/17/2017    CREATININE 0.91 10/17/2017    GLUCOSE 170 (H) 10/17/2017    CALCIUM 9.3 10/17/2017    AST 45 (H) 10/17/2017    ALT 27 10/17/2017    ALKPHOS 83 10/17/2017    LABIL2 1.0 (L) 10/17/2017     Lab Results   Component Value Date    CKTOTAL 40 10/17/2017     Lab Results   Component Value Date    WBC 10.40 10/17/2017    HGB 12.6 10/17/2017    HCT 41.2 10/17/2017     10/17/2017     Lab Results   Component Value Date    INR 1.04 10/17/2017     No results found for: MG  Lab Results   Component Value Date    CHLPL 124 03/24/2014    TRIG 192 (H) 03/24/2014    HDL 41 (L) 03/24/2014    LDL 45 03/24/2014      No results found for: BNP      ECG 12 Lead  Date/Time: 12/6/2017 12:58 PM  Performed by: SUE LUIS  Authorized by: SUE LUIS   Rhythm: sinus rhythm  Conduction: conduction normal  ST Segments: ST segments normal  Clinical impression: normal ECG            Assessment/Plan   Diagnoses and all orders for this visit:     1. Paroxysmal atrial fibrillation Atrial fibrillation, unspecified type  2. History of CVA with left hemiparesis   3. Essential hypertension, controlled  4. Type 2 diabetes mellitus without complication, unspecified long term insulin use status     Recommendations:    Recommended for her to continue Eliquis and advised her to watch for any bleeding issues and let us know.   Return in about 3 months (around  3/6/2018).    As always, I appreciate very much the opportunity to participate in the cardiovascular care of your patients.      With Best Regards,    Joe Weinstein MD, [facility]    Dragon disclaimer:  Much of this encounter note is an electronic transcription/translation of spoken language to printed text. The electronic translation of spoken language may permit erroneous, or at times, nonsensical words or phrases to be inadvertently transcribed; Although I have reviewed the note for such errors, some may still exist.

## 2018-04-30 ENCOUNTER — OFFICE VISIT (OUTPATIENT)
Dept: CARDIOLOGY | Facility: CLINIC | Age: 78
End: 2018-04-30

## 2018-04-30 VITALS
SYSTOLIC BLOOD PRESSURE: 122 MMHG | HEIGHT: 57 IN | DIASTOLIC BLOOD PRESSURE: 63 MMHG | BODY MASS INDEX: 40.99 KG/M2 | WEIGHT: 190 LBS | HEART RATE: 66 BPM

## 2018-04-30 DIAGNOSIS — I48.0 PAROXYSMAL ATRIAL FIBRILLATION (HCC): Primary | ICD-10-CM

## 2018-04-30 DIAGNOSIS — E11.9 TYPE 2 DIABETES MELLITUS WITHOUT COMPLICATION, WITHOUT LONG-TERM CURRENT USE OF INSULIN (HCC): ICD-10-CM

## 2018-04-30 DIAGNOSIS — I63.411 CEREBROVASCULAR ACCIDENT (CVA) DUE TO EMBOLISM OF RIGHT MIDDLE CEREBRAL ARTERY (HCC): ICD-10-CM

## 2018-04-30 PROCEDURE — 99213 OFFICE O/P EST LOW 20 MIN: CPT | Performed by: INTERNAL MEDICINE

## 2018-04-30 RX ORDER — ASPIRIN 81 MG/1
81 TABLET, CHEWABLE ORAL DAILY
COMMUNITY
End: 2018-09-16 | Stop reason: DRUGHIGH

## 2018-04-30 RX ORDER — QUETIAPINE FUMARATE 25 MG/1
25 TABLET, FILM COATED ORAL NIGHTLY
COMMUNITY
End: 2019-01-07

## 2018-04-30 RX ORDER — AMANTADINE HYDROCHLORIDE 100 MG/1
100 TABLET ORAL 2 TIMES DAILY
COMMUNITY
End: 2018-09-16 | Stop reason: HOSPADM

## 2018-04-30 RX ORDER — RANITIDINE 300 MG/1
300 CAPSULE ORAL EVERY EVENING
COMMUNITY
End: 2019-01-07

## 2018-04-30 RX ORDER — VENLAFAXINE HYDROCHLORIDE 75 MG/1
75 CAPSULE, EXTENDED RELEASE ORAL NIGHTLY
COMMUNITY
End: 2019-01-07 | Stop reason: DRUGHIGH

## 2018-04-30 RX ORDER — ALBUTEROL SULFATE 2.5 MG/3ML
2.5 SOLUTION RESPIRATORY (INHALATION) EVERY 4 HOURS PRN
COMMUNITY
End: 2018-09-16 | Stop reason: HOSPADM

## 2018-04-30 RX ORDER — ATORVASTATIN CALCIUM 80 MG/1
80 TABLET, FILM COATED ORAL DAILY
Qty: 30 TABLET | Refills: 5 | Status: SHIPPED | OUTPATIENT
Start: 2018-04-30 | End: 2018-09-16 | Stop reason: DRUGHIGH

## 2018-04-30 NOTE — PROGRESS NOTES
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       Dear Adela Mathew MD:    José Miguel Flower is a 77 y.o. female with the problems as listed above, presents    Chief complaint:    History of Present Illness:This Basil is a pleasant 77-year-old  female who has history of atrial fibrillation and had a CVA with left hemiparesis in August 2017 at which time she was treated [facility] with an interventional procedure and has been started on Eliquis.  She has had some improvement in the strength in her left upper and lower extremity since then.  She is here to have her regular cardiology follow-up.  She denies any complaints of significant palpitations.  She denies any bleeding problems with Eliquis.  She has some occasional sharp chest pains across her chest.      Allergies   Allergen Reactions   • Bactrim [Sulfamethoxazole-Trimethoprim]    • Codeine    • Penicillins    :      Current Outpatient Prescriptions:   •  albuterol (PROVENTIL) (2.5 MG/3ML) 0.083% nebulizer solution, Take 2.5 mg by nebulization Every 4 (Four) Hours As Needed for Wheezing., Disp: , Rfl:   •  amantadine (SYMMETREL) 100 MG tablet, Take 100 mg by mouth 2 (Two) Times a Day., Disp: , Rfl:   •  apixaban (ELIQUIS) 5 MG tablet tablet, Take 1 tablet by mouth Every 12 (Twelve) Hours., Disp: 60 tablet, Rfl: 5  •  aspirin 81 MG chewable tablet, Chew 81 mg Daily., Disp: , Rfl:   •  atorvastatin (LIPITOR) 80 MG tablet, Take 1 tablet by mouth Daily., Disp: 30 tablet, Rfl: 5  •  cycloSPORINE (RESTASIS) 0.05 % ophthalmic emulsion, 1 drop 2 (Two) Times a Day., Disp: , Rfl:   •  esomeprazole (nexIUM) 20 MG capsule, Take 20 mg by mouth Every Morning Before  "Breakfast., Disp: , Rfl:   •  fluticasone-salmeterol (ADVAIR DISKUS) 250-50 MCG/DOSE DISKUS, Inhale 2 (Two) Times a Day., Disp: , Rfl:   •  glipiZIDE (GLUCOTROL) 5 MG tablet, Take 5 mg by mouth 2 (Two) Times a Day Before Meals., Disp: , Rfl:   •  levothyroxine (SYNTHROID, LEVOTHROID) 100 MCG tablet, Take 100 mcg by mouth Daily., Disp: , Rfl:   •  loratadine (CLARITIN) 10 MG tablet, Take 10 mg by mouth Daily., Disp: , Rfl:   •  losartan (COZAAR) 100 MG tablet, Take 100 mg by mouth Daily., Disp: , Rfl:   •  Melatonin 3 MG tablet, Take  by mouth., Disp: , Rfl:   •  metoprolol tartrate (LOPRESSOR) 50 MG tablet, Take 50 mg by mouth 2 (Two) Times a Day., Disp: , Rfl:   •  MOMETASONE FUROATE IN, Inhale., Disp: , Rfl:   •  montelukast (SINGULAIR) 10 MG tablet, Take 10 mg by mouth Every Night., Disp: , Rfl:   •  Nutritional Supplements (ARGINAID) pack, Take  by mouth., Disp: , Rfl:   •  QUEtiapine (SEROquel) 25 MG tablet, Take 25 mg by mouth Every Night., Disp: , Rfl:   •  ranitidine (ZANTAC) 300 MG capsule, Take 300 mg by mouth Every Evening., Disp: , Rfl:   •  traZODone (DESYREL) 50 MG tablet, Take 50 mg by mouth Every Night., Disp: , Rfl:   •  venlafaxine XR (EFFEXOR XR) 75 MG 24 hr capsule, Take 75 mg by mouth Daily., Disp: , Rfl:   •  acetaminophen (TYLENOL) 500 MG tablet, Take 500 mg by mouth Every 6 (Six) Hours As Needed for Mild Pain ., Disp: , Rfl:       The following portions of the patient's history were reviewed and updated as appropriate: allergies, current medications, past family history, past medical history, past social history, past surgical history and problem list.    Social History   Substance Use Topics   • Smoking status: Never Smoker   • Smokeless tobacco: Never Used   • Alcohol use No       Review of Systems   Cardiovascular: Negative for leg swelling and palpitations. Chest pain: \"A LITTLE\" POSS COPD.   Neurological: Light-headedness: WITH STANDING.       Objective   Vitals:    04/30/18 1430   BP: " "122/63   BP Location: Right arm   Patient Position: Sitting   Cuff Size: Adult   Pulse: 66   Weight: 86.2 kg (190 lb)   Height: 144.8 cm (57\")     Body mass index is 41.12 kg/m².        Physical Exam   Constitutional: She is oriented to person, place, and time. She appears well-developed and well-nourished.   HENT:   Mouth/Throat: Oropharynx is clear and moist.   Eyes: EOM are normal. Pupils are equal, round, and reactive to light.   Neck: Neck supple. No JVD present. No tracheal deviation present. No thyromegaly present.   Cardiovascular: Normal rate, regular rhythm, S1 normal and S2 normal.  Exam reveals no gallop and no friction rub.    No murmur heard.  Pulmonary/Chest: Effort normal and breath sounds normal.   Abdominal: Soft. Bowel sounds are normal. She exhibits no mass. There is no tenderness.   Musculoskeletal: Normal range of motion. She exhibits no edema.   Lymphadenopathy:     She has no cervical adenopathy.   Neurological: She is alert and oriented to person, place, and time.   Weakness in her left upper and lower extremities with the grade 3/5 strength in the proximal and distal muscle groups.   Skin: Skin is warm and dry. No rash noted.   Psychiatric: She has a normal mood and affect.       Lab Results   Component Value Date     10/17/2017    K 4.0 10/17/2017     10/17/2017    CO2 23.4 (L) 10/17/2017    BUN 12 10/17/2017    CREATININE 0.91 10/17/2017    GLUCOSE 170 (H) 10/17/2017    CALCIUM 9.3 10/17/2017    AST 45 (H) 10/17/2017    ALT 27 10/17/2017    ALKPHOS 83 10/17/2017    LABIL2 1.0 (L) 10/17/2017     Lab Results   Component Value Date    CKTOTAL 40 10/17/2017     Lab Results   Component Value Date    WBC 10.40 10/17/2017    HGB 12.6 10/17/2017    HCT 41.2 10/17/2017     10/17/2017     Lab Results   Component Value Date    INR 1.04 10/17/2017     No results found for: MG  Lab Results   Component Value Date    CHLPL 124 03/24/2014    TRIG 192 (H) 03/24/2014    HDL 41 (L) " 03/24/2014    LDL 45 03/24/2014      No results found for: BNP  Echo   No results found for: ECHOEFEST  Procedures    Assessment/Plan    Diagnosis Plan   1. Paroxysmal atrial fibrillation     2. Cerebrovascular accident (CVA) due to embolism of right middle cerebral artery     3. Type 2 diabetes mellitus without complication, without long-term current use of insulin  Comprehensive Metabolic Panel          Recommendations:  1. Continue with Eliquis and atorvastatin at current doses.  2. Check CMP.  3. Follow-up in about 6 months.    Return in about 6 months (around 10/30/2018).    As always, I appreciate very much the opportunity to participate in the cardiovascular care of your patients.      With Best Regards,    Joe Weinstein MD, [facility]    Dragon disclaimer:  Much of this encounter note is an electronic transcription/translation of spoken language to printed text. The electronic translation of spoken language may permit erroneous, or at times, nonsensical words or phrases to be inadvertently transcribed; Although I have reviewed the note for such errors, some may still exist.

## 2018-09-15 ENCOUNTER — APPOINTMENT (OUTPATIENT)
Dept: GENERAL RADIOLOGY | Facility: HOSPITAL | Age: 78
End: 2018-09-15

## 2018-09-15 ENCOUNTER — HOSPITAL ENCOUNTER (EMERGENCY)
Facility: HOSPITAL | Age: 78
Discharge: ANOTHER HOSPITAL | End: 2018-09-16
Attending: EMERGENCY MEDICINE | Admitting: EMERGENCY MEDICINE

## 2018-09-15 DIAGNOSIS — F03.91 DEMENTIA WITH BEHAVIORAL DISTURBANCE, UNSPECIFIED DEMENTIA TYPE: Primary | ICD-10-CM

## 2018-09-15 LAB
ALBUMIN SERPL-MCNC: 3.8 G/DL (ref 3.4–4.8)
ALBUMIN/GLOB SERPL: 1.4 G/DL (ref 1.5–2.5)
ALP SERPL-CCNC: 73 U/L (ref 35–104)
ALT SERPL W P-5'-P-CCNC: 29 U/L (ref 10–36)
ANION GAP SERPL CALCULATED.3IONS-SCNC: 6.7 MMOL/L (ref 3.6–11.2)
APTT PPP: 33.9 SECONDS (ref 23.8–36.1)
AST SERPL-CCNC: 25 U/L (ref 10–30)
BASOPHILS # BLD AUTO: 0.06 10*3/MM3 (ref 0–0.3)
BASOPHILS NFR BLD AUTO: 0.7 % (ref 0–2)
BILIRUB SERPL-MCNC: 0.2 MG/DL (ref 0.2–1.8)
BILIRUB UR QL STRIP: NEGATIVE
BUN BLD-MCNC: 18 MG/DL (ref 7–21)
BUN/CREAT SERPL: 15.7 (ref 7–25)
CALCIUM SPEC-SCNC: 8.7 MG/DL (ref 7.7–10)
CHLORIDE SERPL-SCNC: 109 MMOL/L (ref 99–112)
CK MB SERPL-CCNC: 6.29 NG/ML (ref 0–5)
CK MB SERPL-RTO: 4.9 % (ref 0–3)
CK SERPL-CCNC: 129 U/L (ref 24–173)
CLARITY UR: CLEAR
CO2 SERPL-SCNC: 25.3 MMOL/L (ref 24.3–31.9)
COLOR UR: YELLOW
CREAT BLD-MCNC: 1.15 MG/DL (ref 0.43–1.29)
CRP SERPL-MCNC: <0.5 MG/DL (ref 0–0.99)
D-LACTATE SERPL-SCNC: 1.9 MMOL/L (ref 0.5–2)
DEPRECATED RDW RBC AUTO: 48.2 FL (ref 37–54)
EOSINOPHIL # BLD AUTO: 0.16 10*3/MM3 (ref 0–0.7)
EOSINOPHIL NFR BLD AUTO: 1.8 % (ref 0–7)
ERYTHROCYTE [DISTWIDTH] IN BLOOD BY AUTOMATED COUNT: 14.7 % (ref 11.5–14.5)
GFR SERPL CREATININE-BSD FRML MDRD: 46 ML/MIN/1.73
GLOBULIN UR ELPH-MCNC: 2.7 GM/DL
GLUCOSE BLD-MCNC: 103 MG/DL (ref 70–110)
GLUCOSE UR STRIP-MCNC: NEGATIVE MG/DL
HCT VFR BLD AUTO: 41 % (ref 37–47)
HGB BLD-MCNC: 12.7 G/DL (ref 12–16)
HGB UR QL STRIP.AUTO: NEGATIVE
IMM GRANULOCYTES # BLD: 0.02 10*3/MM3 (ref 0–0.03)
IMM GRANULOCYTES NFR BLD: 0.2 % (ref 0–0.5)
INR PPP: 1.29 (ref 0.9–1.1)
KETONES UR QL STRIP: NEGATIVE
LEUKOCYTE ESTERASE UR QL STRIP.AUTO: NEGATIVE
LYMPHOCYTES # BLD AUTO: 4.68 10*3/MM3 (ref 1–3)
LYMPHOCYTES NFR BLD AUTO: 53.3 % (ref 16–46)
MAGNESIUM SERPL-MCNC: 1.6 MG/DL (ref 1.7–2.6)
MCH RBC QN AUTO: 28.3 PG (ref 27–33)
MCHC RBC AUTO-ENTMCNC: 31 G/DL (ref 33–37)
MCV RBC AUTO: 91.3 FL (ref 80–94)
MONOCYTES # BLD AUTO: 0.83 10*3/MM3 (ref 0.1–0.9)
MONOCYTES NFR BLD AUTO: 9.5 % (ref 0–12)
NEUTROPHILS # BLD AUTO: 3.03 10*3/MM3 (ref 1.4–6.5)
NEUTROPHILS NFR BLD AUTO: 34.5 % (ref 40–75)
NITRITE UR QL STRIP: NEGATIVE
OSMOLALITY SERPL CALC.SUM OF ELEC: 283.4 MOSM/KG (ref 273–305)
PH UR STRIP.AUTO: 5.5 [PH] (ref 5–8)
PLATELET # BLD AUTO: 245 10*3/MM3 (ref 130–400)
PMV BLD AUTO: 9.8 FL (ref 6–10)
POTASSIUM BLD-SCNC: 3.8 MMOL/L (ref 3.5–5.3)
PROT SERPL-MCNC: 6.5 G/DL (ref 6–8)
PROT UR QL STRIP: NEGATIVE
PROTHROMBIN TIME: 16.3 SECONDS (ref 11–15.4)
RBC # BLD AUTO: 4.49 10*6/MM3 (ref 4.2–5.4)
SODIUM BLD-SCNC: 141 MMOL/L (ref 135–153)
SP GR UR STRIP: 1.02 (ref 1–1.03)
T4 SERPL-MCNC: 9.6 MCG/DL (ref 4.5–10.9)
TROPONIN I SERPL-MCNC: <0.006 NG/ML
TSH SERPL DL<=0.05 MIU/L-ACNC: 1.63 MIU/ML (ref 0.55–4.78)
UROBILINOGEN UR QL STRIP: NORMAL
WBC NRBC COR # BLD: 8.78 10*3/MM3 (ref 4.5–12.5)

## 2018-09-15 PROCEDURE — 82607 VITAMIN B-12: CPT | Performed by: EMERGENCY MEDICINE

## 2018-09-15 PROCEDURE — 99285 EMERGENCY DEPT VISIT HI MDM: CPT

## 2018-09-15 PROCEDURE — 82550 ASSAY OF CK (CPK): CPT | Performed by: EMERGENCY MEDICINE

## 2018-09-15 PROCEDURE — 71045 X-RAY EXAM CHEST 1 VIEW: CPT

## 2018-09-15 PROCEDURE — 84443 ASSAY THYROID STIM HORMONE: CPT | Performed by: EMERGENCY MEDICINE

## 2018-09-15 PROCEDURE — 83605 ASSAY OF LACTIC ACID: CPT | Performed by: EMERGENCY MEDICINE

## 2018-09-15 PROCEDURE — 96372 THER/PROPH/DIAG INJ SC/IM: CPT

## 2018-09-15 PROCEDURE — 80053 COMPREHEN METABOLIC PANEL: CPT | Performed by: EMERGENCY MEDICINE

## 2018-09-15 PROCEDURE — 85730 THROMBOPLASTIN TIME PARTIAL: CPT | Performed by: EMERGENCY MEDICINE

## 2018-09-15 PROCEDURE — 82553 CREATINE MB FRACTION: CPT | Performed by: EMERGENCY MEDICINE

## 2018-09-15 PROCEDURE — 25010000002 LORAZEPAM PER 2 MG: Performed by: EMERGENCY MEDICINE

## 2018-09-15 PROCEDURE — 84484 ASSAY OF TROPONIN QUANT: CPT | Performed by: EMERGENCY MEDICINE

## 2018-09-15 PROCEDURE — 85025 COMPLETE CBC W/AUTO DIFF WBC: CPT | Performed by: EMERGENCY MEDICINE

## 2018-09-15 PROCEDURE — 83735 ASSAY OF MAGNESIUM: CPT | Performed by: EMERGENCY MEDICINE

## 2018-09-15 PROCEDURE — 93010 ELECTROCARDIOGRAM REPORT: CPT | Performed by: INTERNAL MEDICINE

## 2018-09-15 PROCEDURE — 83880 ASSAY OF NATRIURETIC PEPTIDE: CPT | Performed by: EMERGENCY MEDICINE

## 2018-09-15 PROCEDURE — 71045 X-RAY EXAM CHEST 1 VIEW: CPT | Performed by: RADIOLOGY

## 2018-09-15 PROCEDURE — 81003 URINALYSIS AUTO W/O SCOPE: CPT | Performed by: EMERGENCY MEDICINE

## 2018-09-15 PROCEDURE — 86140 C-REACTIVE PROTEIN: CPT | Performed by: EMERGENCY MEDICINE

## 2018-09-15 PROCEDURE — 85610 PROTHROMBIN TIME: CPT | Performed by: EMERGENCY MEDICINE

## 2018-09-15 PROCEDURE — 25010000002 HALOPERIDOL LACTATE PER 5 MG: Performed by: EMERGENCY MEDICINE

## 2018-09-15 PROCEDURE — 93005 ELECTROCARDIOGRAM TRACING: CPT | Performed by: EMERGENCY MEDICINE

## 2018-09-15 PROCEDURE — 84436 ASSAY OF TOTAL THYROXINE: CPT | Performed by: EMERGENCY MEDICINE

## 2018-09-15 RX ORDER — HALOPERIDOL 5 MG/ML
10 INJECTION INTRAMUSCULAR ONCE
Status: COMPLETED | OUTPATIENT
Start: 2018-09-15 | End: 2018-09-15

## 2018-09-15 RX ORDER — LORAZEPAM 2 MG/ML
2 INJECTION INTRAMUSCULAR ONCE
Status: COMPLETED | OUTPATIENT
Start: 2018-09-15 | End: 2018-09-15

## 2018-09-15 RX ORDER — HALOPERIDOL 5 MG/ML
2 INJECTION INTRAMUSCULAR ONCE
Status: DISCONTINUED | OUTPATIENT
Start: 2018-09-15 | End: 2018-09-15

## 2018-09-15 RX ORDER — SODIUM CHLORIDE 0.9 % (FLUSH) 0.9 %
10 SYRINGE (ML) INJECTION AS NEEDED
Status: DISCONTINUED | OUTPATIENT
Start: 2018-09-15 | End: 2018-09-15

## 2018-09-15 RX ORDER — SODIUM CHLORIDE 9 MG/ML
125 INJECTION, SOLUTION INTRAVENOUS CONTINUOUS
Status: DISCONTINUED | OUTPATIENT
Start: 2018-09-15 | End: 2018-09-15

## 2018-09-15 RX ADMIN — LORAZEPAM 2 MG: 2 INJECTION INTRAMUSCULAR; INTRAVENOUS at 22:09

## 2018-09-15 RX ADMIN — HALOPERIDOL LACTATE 10 MG: 5 INJECTION, SOLUTION INTRAMUSCULAR at 21:07
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
## 2018-09-16 VITALS
TEMPERATURE: 97.2 F | HEIGHT: 62 IN | HEART RATE: 88 BPM | DIASTOLIC BLOOD PRESSURE: 64 MMHG | WEIGHT: 190 LBS | RESPIRATION RATE: 18 BRPM | BODY MASS INDEX: 34.96 KG/M2 | OXYGEN SATURATION: 99 % | SYSTOLIC BLOOD PRESSURE: 132 MMHG

## 2018-09-16 LAB
6-ACETYL MORPHINE: NEGATIVE
AMPHET+METHAMPHET UR QL: NEGATIVE
BARBITURATES UR QL SCN: NEGATIVE
BENZODIAZ UR QL SCN: NEGATIVE
BNP SERPL-MCNC: 113 PG/ML (ref 0–100)
BUPRENORPHINE SERPL-MCNC: NEGATIVE NG/ML
CANNABINOIDS SERPL QL: NEGATIVE
COCAINE UR QL: NEGATIVE
METHADONE UR QL SCN: NEGATIVE
OPIATES UR QL: NEGATIVE
OXYCODONE UR QL SCN: NEGATIVE
PCP UR QL SCN: NEGATIVE
VIT B12 BLD-MCNC: 281 PG/ML (ref 211–911)

## 2018-09-16 PROCEDURE — 80307 DRUG TEST PRSMV CHEM ANLYZR: CPT | Performed by: EMERGENCY MEDICINE

## 2018-09-16 PROCEDURE — 25010000002 ZIPRASIDONE MESYLATE PER 10 MG: Performed by: EMERGENCY MEDICINE

## 2018-09-16 PROCEDURE — 96372 THER/PROPH/DIAG INJ SC/IM: CPT

## 2018-09-16 RX ORDER — GLIPIZIDE 5 MG/1
5 TABLET, FILM COATED, EXTENDED RELEASE ORAL DAILY
COMMUNITY
End: 2019-01-07 | Stop reason: DRUGHIGH

## 2018-09-16 RX ORDER — SIMETHICONE 80 MG
80 TABLET,CHEWABLE ORAL EVERY 6 HOURS PRN
COMMUNITY
End: 2018-11-30 | Stop reason: ALTCHOICE

## 2018-09-16 RX ORDER — WATER 1000 ML/1000ML
INJECTION, SOLUTION INTRAVENOUS
Status: DISCONTINUED
Start: 2018-09-16 | End: 2018-09-16 | Stop reason: HOSPADM

## 2018-09-16 RX ORDER — ASPIRIN 81 MG/1
81 TABLET ORAL DAILY
COMMUNITY
End: 2019-01-07

## 2018-09-16 RX ORDER — DOCUSATE SODIUM 100 MG/1
100 CAPSULE, LIQUID FILLED ORAL
COMMUNITY
End: 2018-11-30 | Stop reason: ALTCHOICE

## 2018-09-16 RX ORDER — ZIPRASIDONE MESYLATE 20 MG/ML
INJECTION, POWDER, LYOPHILIZED, FOR SOLUTION INTRAMUSCULAR
Status: DISCONTINUED
Start: 2018-09-16 | End: 2018-09-16 | Stop reason: HOSPADM

## 2018-09-16 RX ORDER — ALBUTEROL SULFATE 90 UG/1
2 AEROSOL, METERED RESPIRATORY (INHALATION) EVERY 4 HOURS PRN
COMMUNITY
End: 2019-01-07

## 2018-09-16 RX ORDER — ACETAMINOPHEN 500 MG
1000 TABLET ORAL ONCE
Status: COMPLETED | OUTPATIENT
Start: 2018-09-16 | End: 2018-09-16

## 2018-09-16 RX ORDER — ATORVASTATIN CALCIUM 80 MG/1
80 TABLET, FILM COATED ORAL NIGHTLY
COMMUNITY
End: 2019-01-07 | Stop reason: DRUGHIGH

## 2018-09-16 RX ORDER — FLUTICASONE PROPIONATE 50 MCG
1 SPRAY, SUSPENSION (ML) NASAL DAILY
COMMUNITY
End: 2018-11-30 | Stop reason: ALTCHOICE

## 2018-09-16 RX ADMIN — ACETAMINOPHEN 1000 MG: 500 TABLET, FILM COATED ORAL at 01:19

## 2018-09-16 RX ADMIN — WATER 10 MG: 1 INJECTION INTRAMUSCULAR; INTRAVENOUS; SUBCUTANEOUS at 00:35

## 2018-09-16 NOTE — NURSING NOTE
Patient has been accepted to [facility] by Dr Carrero. [facility] notified and gives 1 hour pta for . Emtala and transport permissions signed by POA. Care transferred to alton HOOPER at this time.

## 2018-09-16 NOTE — NURSING NOTE
Intake assessment completed. The patient presents accompanied by her POA. Family reports the patient had stroke, then Dx with dementia shortly after. PT pts neurologist (Dr Leslie) has took her off Aricept as he thought it made her hallucinate. Since then the patient has became increasing confused, argumentative, and violent towards her caregivers. She lives with her granddaughter who is POA (Ernestina Chamberlain). She made threats to kill herself because of delusional complaints this evening. Her family would like to find placement as they feel she is dangerous to her self and other in current condition.

## 2018-09-16 NOTE — ED NOTES
Intake nurse at bedside informing family that we were going to attempt to get patient admitted to the geriatric psych facility at [place]. Family agreeable to plan of care.      Katharina Castro, RN  09/16/18 0123
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
## 2018-09-16 NOTE — ED NOTES
Two unsuccessful IV attempts made at this time; lead nurse-Reyna Cook, RUFUS made aware. To bedside to attempt.      Kahtarina Castro RN  09/15/18 2025

## 2018-09-16 NOTE — NURSING NOTE
Spoke to Dr Cosby about this patient. He is agreeable to plan for transfer to [facility]. Spoke to Rupert at this facility. They requested information to review with physician. Additional labs requested that will need resulted before we can get an answer.

## 2018-09-16 NOTE — ED NOTES
Lab present at bedside attempting to obtain ordered lab work     Katharina Castro, RN  09/15/18 0573

## 2018-09-16 NOTE — ED NOTES
After multiple unsuccessful attempts made at obtaining lab work and IV access; lab called, spoke with Carley who states she will send someone up to stick the patient.      Katharina Castro, RUFUS  09/15/18 2040

## 2018-10-31 ENCOUNTER — HOSPITAL ENCOUNTER (OUTPATIENT)
Dept: GENERAL RADIOLOGY | Facility: HOSPITAL | Age: 78
Discharge: HOME | End: 2018-10-31
Admitting: PHYSICIAN ASSISTANT

## 2018-10-31 ENCOUNTER — OFFICE VISIT (OUTPATIENT)
Dept: CARDIOLOGY | Facility: CLINIC | Age: 78
End: 2018-10-31

## 2018-10-31 VITALS
RESPIRATION RATE: 16 BRPM | DIASTOLIC BLOOD PRESSURE: 65 MMHG | HEART RATE: 116 BPM | BODY MASS INDEX: 35.33 KG/M2 | OXYGEN SATURATION: 88 % | HEIGHT: 62 IN | WEIGHT: 192 LBS | SYSTOLIC BLOOD PRESSURE: 134 MMHG

## 2018-10-31 DIAGNOSIS — E11.9 TYPE 2 DIABETES MELLITUS WITHOUT COMPLICATION, WITHOUT LONG-TERM CURRENT USE OF INSULIN (HCC): ICD-10-CM

## 2018-10-31 DIAGNOSIS — I63.411 CEREBROVASCULAR ACCIDENT (CVA) DUE TO EMBOLISM OF RIGHT MIDDLE CEREBRAL ARTERY (HCC): ICD-10-CM

## 2018-10-31 DIAGNOSIS — I10 ESSENTIAL HYPERTENSION: ICD-10-CM

## 2018-10-31 DIAGNOSIS — R06.00 DYSPNEA, UNSPECIFIED TYPE: ICD-10-CM

## 2018-10-31 DIAGNOSIS — I48.0 PAROXYSMAL ATRIAL FIBRILLATION (HCC): ICD-10-CM

## 2018-10-31 DIAGNOSIS — R06.00 DYSPNEA, UNSPECIFIED TYPE: Primary | ICD-10-CM

## 2018-10-31 PROCEDURE — 71046 X-RAY EXAM CHEST 2 VIEWS: CPT

## 2018-10-31 PROCEDURE — 99214 OFFICE O/P EST MOD 30 MIN: CPT | Performed by: PHYSICIAN ASSISTANT

## 2018-10-31 PROCEDURE — 71046 X-RAY EXAM CHEST 2 VIEWS: CPT | Performed by: RADIOLOGY

## 2018-10-31 RX ORDER — DIVALPROEX SODIUM 250 MG/1
TABLET, DELAYED RELEASE ORAL
COMMUNITY
Start: 2018-10-30 | End: 2019-01-07

## 2018-10-31 RX ORDER — RANITIDINE 300 MG/1
TABLET ORAL
COMMUNITY
Start: 2018-10-25 | End: 2019-01-07

## 2018-10-31 RX ORDER — GLIPIZIDE 5 MG/1
TABLET ORAL
COMMUNITY
Start: 2018-10-25 | End: 2018-11-30 | Stop reason: SDUPTHER

## 2018-10-31 RX ORDER — MOMETASONE FUROATE 50 UG/1
SPRAY, METERED NASAL
COMMUNITY
Start: 2018-10-25 | End: 2019-01-07

## 2018-10-31 RX ORDER — AMANTADINE HYDROCHLORIDE 100 MG/1
CAPSULE, GELATIN COATED ORAL
COMMUNITY
Start: 2018-10-25 | End: 2019-01-07

## 2018-11-02 LAB — BNP SERPL-MCNC: 84.6 PG/ML (ref 0–100)

## 2018-11-05 ENCOUNTER — RESULTS ENCOUNTER (OUTPATIENT)
Dept: CARDIOLOGY | Facility: CLINIC | Age: 78
End: 2018-11-05

## 2018-11-05 DIAGNOSIS — R06.00 DYSPNEA, UNSPECIFIED TYPE: ICD-10-CM

## 2018-11-11 ENCOUNTER — APPOINTMENT (OUTPATIENT)
Dept: GENERAL RADIOLOGY | Facility: HOSPITAL | Age: 78
End: 2018-11-11

## 2018-11-11 ENCOUNTER — APPOINTMENT (OUTPATIENT)
Dept: CT IMAGING | Facility: HOSPITAL | Age: 78
End: 2018-11-11

## 2018-11-11 ENCOUNTER — HOSPITAL ENCOUNTER (EMERGENCY)
Facility: HOSPITAL | Age: 78
Discharge: HOME | End: 2018-11-11
Attending: EMERGENCY MEDICINE | Admitting: EMERGENCY MEDICINE

## 2018-11-11 VITALS
HEIGHT: 57 IN | WEIGHT: 195 LBS | SYSTOLIC BLOOD PRESSURE: 109 MMHG | RESPIRATION RATE: 18 BRPM | HEART RATE: 75 BPM | DIASTOLIC BLOOD PRESSURE: 51 MMHG | TEMPERATURE: 98.4 F | BODY MASS INDEX: 42.07 KG/M2 | OXYGEN SATURATION: 92 %
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
## 2018-11-11 DIAGNOSIS — S70.02XA CONTUSION OF LEFT HIP, INITIAL ENCOUNTER: ICD-10-CM

## 2018-11-11 PROCEDURE — 72125 CT NECK SPINE W/O DYE: CPT | Performed by: RADIOLOGY

## 2018-11-11 PROCEDURE — 73030 X-RAY EXAM OF SHOULDER: CPT | Performed by: RADIOLOGY

## 2018-11-11 PROCEDURE — 73502 X-RAY EXAM HIP UNI 2-3 VIEWS: CPT

## 2018-11-11 PROCEDURE — 70450 CT HEAD/BRAIN W/O DYE: CPT | Performed by: RADIOLOGY

## 2018-11-11 PROCEDURE — 99283 EMERGENCY DEPT VISIT LOW MDM: CPT

## 2018-11-11 PROCEDURE — 73502 X-RAY EXAM HIP UNI 2-3 VIEWS: CPT | Performed by: RADIOLOGY

## 2018-11-11 PROCEDURE — 93010 ELECTROCARDIOGRAM REPORT: CPT | Performed by: INTERNAL MEDICINE

## 2018-11-11 PROCEDURE — 93005 ELECTROCARDIOGRAM TRACING: CPT | Performed by: EMERGENCY MEDICINE

## 2018-11-11 PROCEDURE — 70450 CT HEAD/BRAIN W/O DYE: CPT

## 2018-11-11 PROCEDURE — 73030 X-RAY EXAM OF SHOULDER: CPT

## 2018-11-11 PROCEDURE — 72125 CT NECK SPINE W/O DYE: CPT

## 2018-11-11 RX ORDER — HYDROCODONE BITARTRATE AND ACETAMINOPHEN 5; 325 MG/1; MG/1
1 TABLET ORAL ONCE
Status: COMPLETED | OUTPATIENT
Start: 2018-11-11 | End: 2018-11-11

## 2018-11-11 RX ADMIN — HYDROCODONE BITARTRATE AND ACETAMINOPHEN 1 TABLET: 5; 325 TABLET ORAL at 19:46

## 2018-11-12 NOTE — ED PROVIDER NOTES
Subjective   78-year-old female presents to the ED today after a fall.  The fall occurred about 24 hours ago.  She was sitting at her kitchen table and was only half way in a chair.  She states she reached down until 4 to pick something up and fell out of the chair.  She did hit the left side of her head and has been complaining of left shoulder and left hip pain.  She has been ambulatory since the fall.  She denies any loss of consciousness.  She is on chronic anticoagulation.        History provided by:  Patient  Fall   Mechanism of injury: fall    Injury location:  Head/neck, shoulder/arm and pelvis  Shoulder/arm injury location:  L shoulder  Pelvic injury location:  L hip  Incident location:  Home  Time since incident:  1 day  Arrived directly from scene: no    Fall:     Fall occurred: from a chair.    Impact surface:  Hard floor  Suspicion of alcohol use: no    Suspicion of drug use: no    Prior to arrival data:     Patient ambulatory at scene: yes      Blood loss:  None    Responsiveness at scene:  Alert    Orientation at scene:  Person, place, situation and time    Loss of consciousness: no      Amnesic to event: no    Associated symptoms: headaches    Associated symptoms: no abdominal pain, no back pain, no blindness, no chest pain, no difficulty breathing, no hearing loss, no loss of consciousness, no nausea, no neck pain, no seizures and no vomiting    Risk factors: anticoagulation therapy        Review of Systems   Constitutional: Negative.    HENT: Negative for hearing loss.    Eyes: Negative.  Negative for blindness.   Respiratory: Negative.    Cardiovascular: Negative for chest pain.   Gastrointestinal: Negative for abdominal pain, nausea and vomiting.   Genitourinary: Negative.    Musculoskeletal: Positive for arthralgias. Negative for back pain, joint swelling and neck pain.   Skin: Negative.    Neurological: Positive for headaches. Negative for seizures and loss of consciousness.    Psychiatric/Behavioral: Negative.    All other systems reviewed and are negative.      Past Medical History:   Diagnosis Date   • Diabetes mellitus (CMS/HCC)    • Heart disease    • Hyperlipidemia    • Hypertension    • Stroke (CMS/HCC)        Allergies   Allergen Reactions   • Bactrim [Sulfamethoxazole-Trimethoprim]    • Codeine    • Penicillins        Past Surgical History:   Procedure Laterality Date   • APPENDECTOMY     • BLADDER SURGERY     • CARDIAC CATHETERIZATION     • CHOLECYSTECTOMY         Family History   Problem Relation Age of Onset   • No Known Problems Mother    • No Known Problems Father        Social History     Socioeconomic History   • Marital status: Single     Spouse name: Not on file   • Number of children: Not on file   • Years of education: Not on file   • Highest education level: Not on file   Tobacco Use   • Smoking status: Never Smoker   • Smokeless tobacco: Never Used   Substance and Sexual Activity   • Alcohol use: No   • Drug use: No   • Sexual activity: Defer           Objective   Physical Exam   Constitutional: She is oriented to person, place, and time. She appears well-developed and well-nourished. No distress.   HENT:   Head: Normocephalic and atraumatic.   Right Ear: External ear normal.   Left Ear: External ear normal.   Nose: Nose normal.   Mouth/Throat: Oropharynx is clear and moist.   No scalp contusion/hematoma seen   Eyes: Conjunctivae and EOM are normal. Pupils are equal, round, and reactive to light.   Neck: Normal range of motion. Neck supple. Spinous process tenderness (mild to lower cervical spine) present.   Cardiovascular: Normal rate, regular rhythm, normal heart sounds and intact distal pulses.   Pulmonary/Chest: Effort normal and breath sounds normal. No stridor. No respiratory distress. She exhibits no tenderness.   Abdominal: Soft. Bowel sounds are normal. There is no tenderness.   Musculoskeletal: Normal range of motion. She exhibits tenderness. She exhibits  no deformity.   Tender to left lateral hip with palpation, has full ROM with no difficulty.  Mildly tender to left anterior shoulder, no difficulty with movement.   Neurological: She is alert and oriented to person, place, and time.   Skin: Skin is warm and dry. Capillary refill takes less than 2 seconds.   Psychiatric: She has a normal mood and affect. Her behavior is normal. Judgment and thought content normal.   Nursing note and vitals reviewed.      Procedures           ED Course  ED Course as of Nov 11 1950   Sun Nov 11, 2018 1937 CT head and cervical spine show no acute abnormalities, no acute findings on x-rays.    Plan is to discharge patient home at this time.  She will follow up outpatient as needed.  [AH]      ED Course User Index  [AH] Saadia Kessler PA                  [facility]  Number of Diagnoses or Management Options  Contusion of left hip, initial encounter:   Fall in home, initial encounter:      Amount and/or Complexity of Data Reviewed  Tests in the radiology section of CPT®: reviewed  Tests in the medicine section of CPT®: reviewed    Patient Progress  Patient progress: improved        Final diagnoses:   Fall in home, initial encounter   Contusion of left hip, initial encounter            Saadia Kessler PA  11/11/18 1955

## 2018-11-13 DIAGNOSIS — G47.39 OTHER SLEEP APNEA: Primary | ICD-10-CM

## 2018-11-14 DIAGNOSIS — R06.00 DYSPNEA, UNSPECIFIED TYPE: ICD-10-CM

## 2018-11-21 DIAGNOSIS — G47.39 OTHER SLEEP APNEA: Primary | ICD-10-CM

## 2018-11-30 ENCOUNTER — OFFICE VISIT (OUTPATIENT)
Dept: CARDIOLOGY | Facility: CLINIC | Age: 78
End: 2018-11-30

## 2018-11-30 VITALS
HEIGHT: 62 IN | DIASTOLIC BLOOD PRESSURE: 64 MMHG | WEIGHT: 186 LBS | RESPIRATION RATE: 16 BRPM | BODY MASS INDEX: 34.23 KG/M2 | HEART RATE: 74 BPM | SYSTOLIC BLOOD PRESSURE: 132 MMHG

## 2018-11-30 DIAGNOSIS — I63.411 CEREBROVASCULAR ACCIDENT (CVA) DUE TO EMBOLISM OF RIGHT MIDDLE CEREBRAL ARTERY (HCC): ICD-10-CM

## 2018-11-30 DIAGNOSIS — G47.39 OTHER SLEEP APNEA: ICD-10-CM

## 2018-11-30 DIAGNOSIS — E11.9 TYPE 2 DIABETES MELLITUS WITHOUT COMPLICATION, WITHOUT LONG-TERM CURRENT USE OF INSULIN (HCC): ICD-10-CM

## 2018-11-30 DIAGNOSIS — I10 ESSENTIAL HYPERTENSION: ICD-10-CM

## 2018-11-30 DIAGNOSIS — I48.0 PAROXYSMAL ATRIAL FIBRILLATION (HCC): Primary | ICD-10-CM

## 2018-11-30 PROCEDURE — 99213 OFFICE O/P EST LOW 20 MIN: CPT | Performed by: PHYSICIAN ASSISTANT

## 2018-11-30 RX ORDER — LORATADINE 10 MG/1
10 TABLET ORAL DAILY
COMMUNITY
End: 2019-01-11 | Stop reason: HOSPADM

## 2018-11-30 RX ORDER — DOCUSATE SODIUM 250 MG
250 CAPSULE ORAL DAILY
COMMUNITY
End: 2019-01-07 | Stop reason: DRUGHIGH

## 2018-11-30 NOTE — PROGRESS NOTES
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   • Other sleep apnea       Dear Adela Mathew MD:    Subjective       History of Present Illness:    Chief Complaint   Patient presents with   • Follow-up     med therapy, chest x ray, pulse ox, overnight   • Chest Pain     \"few\"   • Med Management     call pharmacy       Jeanna Flower is a pleasant 78 y.o. female with a past medical history significant for atrial fibrillation and had a CVA with left hemiparesis in August 2017 at which time she was treated [facility] with an interventional procedure and has been started on Eliquis.  Patient comes in for routine cardiology follow-up.    Patient states since starting her oxygen at night she does feel she is breathing slightly better.  Her son who is with her in the room today does state she often tries to take her oxygen off at night and is difficult to place her back on her.    She does still complain of the same chest pain she had at last visit which was a sharp pain that radiates from her old left breast  to right breast.  Her son states that she's been complaining of this for over a year and both her and she does not wish to have a stress test done at this time.        Allergies   Allergen Reactions   • Bactrim [Sulfamethoxazole-Trimethoprim]    • Codeine    • Penicillins    :      Current Outpatient Medications:   •  albuterol (PROVENTIL HFA;VENTOLIN HFA) 108 (90 Base) MCG/ACT inhaler, Inhale 2 puffs Every 4 (Four) Hours As Needed for Wheezing., Disp: , Rfl:   •  Albuterol Sulfate (PROAIR HFA IN), Inhale., Disp: , Rfl:   •  amantadine (SYMMETREL) 100 MG capsule, , Disp: , Rfl:   •  apixaban (ELIQUIS) 5 MG tablet " tablet, Take 1 tablet by mouth Every 12 (Twelve) Hours., Disp: 60 tablet, Rfl: 5  •  aspirin 81 MG EC tablet, Take 81 mg by mouth Daily., Disp: , Rfl:   •  atorvastatin (LIPITOR) 80 MG tablet, Take 80 mg by mouth Every Night., Disp: , Rfl:   •  cycloSPORINE (RESTASIS) 0.05 % ophthalmic emulsion, 1 drop 2 (Two) Times a Day., Disp: , Rfl:   •  divalproex (DEPAKOTE) 250 MG DR tablet, , Disp: , Rfl:   •  docusate sodium (STOOL SOFTENER) 250 MG capsule, Take 250 mg by mouth Daily., Disp: , Rfl:   •  esomeprazole (nexIUM) 20 MG capsule, Take 20 mg by mouth Every Night., Disp: , Rfl:   •  fluticasone-salmeterol (ADVAIR DISKUS) 250-50 MCG/DOSE DISKUS, Inhale 1 puff 2 (Two) Times a Day., Disp: , Rfl:   •  glipiZIDE (GLUCOTROL XL) 5 MG ER tablet, Take 5 mg by mouth Daily., Disp: , Rfl:   •  levothyroxine (SYNTHROID, LEVOTHROID) 100 MCG tablet, Take 100 mcg by mouth Daily., Disp: , Rfl:   •  loratadine (CLARITIN) 10 MG tablet, Take 10 mg by mouth Daily., Disp: , Rfl:   •  losartan (COZAAR) 100 MG tablet, Take 100 mg by mouth Daily., Disp: , Rfl:   •  metoprolol tartrate (LOPRESSOR) 50 MG tablet, Take 50 mg by mouth 2 (Two) Times a Day., Disp: , Rfl:   •  mometasone (NASONEX) 50 MCG/ACT nasal spray, , Disp: , Rfl:   •  montelukast (SINGULAIR) 10 MG tablet, Take 10 mg by mouth Every Night., Disp: , Rfl:   •  QUEtiapine (SEROquel) 25 MG tablet, Take 25 mg by mouth Every Night., Disp: , Rfl:   •  ranitidine (ZANTAC) 300 MG capsule, Take 300 mg by mouth Every Evening., Disp: , Rfl:   •  raNITIdine (ZANTAC) 300 MG tablet, , Disp: , Rfl:   •  traZODone (DESYREL) 50 MG tablet, Take 50 mg by mouth Every Night., Disp: , Rfl:   •  venlafaxine XR (EFFEXOR XR) 75 MG 24 hr capsule, Take 75 mg by mouth Every Night., Disp: , Rfl:       The following portions of the patient's history were reviewed and updated as appropriate: allergies, current medications, past family history, past medical history, past social history, past surgical history  "and problem list.    Social History     Tobacco Use   • Smoking status: Never Smoker   • Smokeless tobacco: Never Used   Substance Use Topics   • Alcohol use: No   • Drug use: No       Review of Systems   Constitution: Negative for weakness and malaise/fatigue.   Cardiovascular: Positive for chest pain. Negative for dyspnea on exertion, irregular heartbeat, leg swelling and palpitations.   Respiratory: Negative for cough and shortness of breath.    Hematologic/Lymphatic: Negative for bleeding problem. Does not bruise/bleed easily.   Gastrointestinal: Negative for nausea and vomiting.       Objective   Vitals:    11/30/18 1153   BP: 132/64   Pulse: 74   Resp: 16   Weight: 84.4 kg (186 lb)   Height: 157.5 cm (62\")     Body mass index is 34.02 kg/m².        Physical Exam   Constitutional: She is oriented to person, place, and time. She appears well-developed and well-nourished. No distress.   HENT:   Head: Normocephalic and atraumatic.   Cardiovascular: Normal rate, regular rhythm, normal heart sounds and intact distal pulses.   Pulmonary/Chest: Effort normal and breath sounds normal. No respiratory distress.   Musculoskeletal: She exhibits no edema.   Left arm weakness 2/2 CVA   Neurological: She is alert and oriented to person, place, and time.   Skin: She is not diaphoretic.       Lab Results   Component Value Date     09/15/2018    K 3.8 09/15/2018     09/15/2018    CO2 25.3 09/15/2018    BUN 18 09/15/2018    CREATININE 1.15 09/15/2018    GLUCOSE 103 09/15/2018    CALCIUM 8.7 09/15/2018    AST 25 09/15/2018    ALT 29 09/15/2018    ALKPHOS 73 09/15/2018    LABIL2 1.5 03/24/2014     Lab Results   Component Value Date    CKTOTAL 129 09/15/2018     Lab Results   Component Value Date    WBC 8.78 09/15/2018    HGB 12.7 09/15/2018    HCT 41.0 09/15/2018     09/15/2018     Lab Results   Component Value Date    INR 1.29 (H) 09/15/2018    INR 1.04 10/17/2017     Lab Results   Component Value Date    MG 1.6 " (L) 09/15/2018     Lab Results   Component Value Date    TSH 1.626 09/15/2018    CHLPL 124 03/24/2014    TRIG 192 (H) 03/24/2014    HDL 41 (L) 03/24/2014    LDL 45 03/24/2014      Lab Results   Component Value Date    BNP 84.6 11/01/2018       During this visit the following were done:  Labs Reviewed [x]    Labs Ordered []    Radiology Reports Reviewed [x]    Radiology Ordered []    PCP Records Reviewed []    Referring Provider Records Reviewed []    ER Records Reviewed []    Hospital Records Reviewed []    History Obtained From Family []    Radiology Images Reviewed []    Other Reviewed []    Records Requested []       Procedures      Assessment/Plan    Diagnosis Plan   1. Paroxysmal atrial fibrillation (CMS/HCC)     2. 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 Essential hypertension     4. Other sleep apnea     5. Type 2 diabetes mellitus without complication, without long-term current use of insulin (CMS/[facility])                  Recommendations:  1. For the patient's chest pain due to for the possibility of having a Lexiscan sestamibi study to rule out possible underlying ischemia however both her and her son think we can hold off on this for now as she has been complaining of this chronically and has not changed gotten worse.  There also unsure on if the stress test is abnormal if they would want to proceed further with a left heart catheterization.  2. Did encourage her to try to wear the oxygen more night her oxygen is better today in the office is 97% when I checked it myself.  3. Continue Eliquis patient denies any bleeding problems, aspirin, Lipitor, metoprolol.  4. Follow-up in 3 months.    Return in about 3 months (around 2/28/2019).    As always, I appreciate very much the opportunity to participate in the cardiovascular care of your patients.      With Best Regards,    DOUG Leigh disclaimer:  Much of this encounter note is an  electronic transcription/translation of spoken language to printed text. The electronic translation of spoken language may permit erroneous, or at times, nonsensical words or phrases to be inadvertently transcribed; Although I have reviewed the note for such errors, some may still exist.

## 2018-12-05 ENCOUNTER — APPOINTMENT (OUTPATIENT)
Dept: GENERAL RADIOLOGY | Facility: HOSPITAL | Age: 78
End: 2018-12-05

## 2018-12-05 ENCOUNTER — HOSPITAL ENCOUNTER (EMERGENCY)
Facility: HOSPITAL | Age: 78
Discharge: HOME | End: 2018-12-05
Attending: EMERGENCY MEDICINE | Admitting: EMERGENCY MEDICINE

## 2018-12-05 ENCOUNTER — APPOINTMENT (OUTPATIENT)
Dept: CT IMAGING | Facility: HOSPITAL | Age: 78
End: 2018-12-05

## 2018-12-05 VITALS
SYSTOLIC BLOOD PRESSURE: 126 MMHG | HEIGHT: 60 IN | BODY MASS INDEX: 37.69 KG/M2 | WEIGHT: 192 LBS | RESPIRATION RATE: 20 BRPM | TEMPERATURE: 98 F | HEART RATE: 66 BPM | DIASTOLIC BLOOD PRESSURE: 67 MMHG | OXYGEN SATURATION: 98 %

## 2018-12-05 DIAGNOSIS — E86.0 DEHYDRATION: ICD-10-CM

## 2018-12-05 DIAGNOSIS — N39.0 ACUTE UTI: Primary | ICD-10-CM

## 2018-12-05 LAB
ALBUMIN SERPL-MCNC: 3.2 G/DL (ref 3.4–4.8)
ALBUMIN/GLOB SERPL: 1 G/DL (ref 1.5–2.5)
ALP SERPL-CCNC: 310 U/L (ref 35–104)
ALT SERPL W P-5'-P-CCNC: 121 U/L (ref 10–36)
ANION GAP SERPL CALCULATED.3IONS-SCNC: 6.3 MMOL/L (ref 3.6–11.2)
APTT PPP: 30.7 SECONDS (ref 23.8–36.1)
AST SERPL-CCNC: 79 U/L (ref 10–30)
BACTERIA UR QL AUTO: ABNORMAL /HPF
BASOPHILS # BLD AUTO: 0.03 10*3/MM3 (ref 0–0.3)
BASOPHILS NFR BLD AUTO: 0.3 % (ref 0–2)
BILIRUB SERPL-MCNC: 0.9 MG/DL (ref 0.2–1.8)
BILIRUB UR QL STRIP: ABNORMAL
BNP SERPL-MCNC: 128 PG/ML (ref 0–100)
BUN BLD-MCNC: 24 MG/DL (ref 7–21)
BUN/CREAT SERPL: 25.3 (ref 7–25)
CALCIUM SPEC-SCNC: 8.6 MG/DL (ref 7.7–10)
CHLORIDE SERPL-SCNC: 110 MMOL/L (ref 99–112)
CLARITY UR: ABNORMAL
CO2 SERPL-SCNC: 25.7 MMOL/L (ref 24.3–31.9)
COLOR UR: ABNORMAL
CREAT BLD-MCNC: 0.95 MG/DL (ref 0.43–1.29)
DEPRECATED RDW RBC AUTO: 55.8 FL (ref 37–54)
EOSINOPHIL # BLD AUTO: 0.1 10*3/MM3 (ref 0–0.7)
EOSINOPHIL NFR BLD AUTO: 1.1 % (ref 0–7)
ERYTHROCYTE [DISTWIDTH] IN BLOOD BY AUTOMATED COUNT: 17.7 % (ref 11.5–14.5)
GFR SERPL CREATININE-BSD FRML MDRD: 57 ML/MIN/1.73
GLOBULIN UR ELPH-MCNC: 3.3 GM/DL
GLUCOSE BLD-MCNC: 93 MG/DL (ref 70–110)
GLUCOSE BLDC GLUCOMTR-MCNC: 76 MG/DL (ref 70–130)
GLUCOSE UR STRIP-MCNC: NEGATIVE MG/DL
HCT VFR BLD AUTO: 37.7 % (ref 37–47)
HGB BLD-MCNC: 11.8 G/DL (ref 12–16)
HGB UR QL STRIP.AUTO: ABNORMAL
HYALINE CASTS UR QL AUTO: ABNORMAL /LPF
IMM GRANULOCYTES # BLD: 0.04 10*3/MM3 (ref 0–0.03)
IMM GRANULOCYTES NFR BLD: 0.4 % (ref 0–0.5)
INR PPP: 1.45 (ref 0.9–1.1)
KETONES UR QL STRIP: NEGATIVE
LEUKOCYTE ESTERASE UR QL STRIP.AUTO: ABNORMAL
LIPASE SERPL-CCNC: 81 U/L (ref 13–60)
LYMPHOCYTES # BLD AUTO: 2.12 10*3/MM3 (ref 1–3)
LYMPHOCYTES NFR BLD AUTO: 22.5 % (ref 16–46)
MAGNESIUM SERPL-MCNC: 1.6 MG/DL (ref 1.7–2.6)
MCH RBC QN AUTO: 27.9 PG (ref 27–33)
MCHC RBC AUTO-ENTMCNC: 31.3 G/DL (ref 33–37)
MCV RBC AUTO: 89.1 FL (ref 80–94)
MONOCYTES # BLD AUTO: 0.71 10*3/MM3 (ref 0.1–0.9)
MONOCYTES NFR BLD AUTO: 7.5 % (ref 0–12)
NEUTROPHILS # BLD AUTO: 6.42 10*3/MM3 (ref 1.4–6.5)
NEUTROPHILS NFR BLD AUTO: 68.2 % (ref 40–75)
NITRITE UR QL STRIP: POSITIVE
OSMOLALITY SERPL CALC.SUM OF ELEC: 286.9 MOSM/KG (ref 273–305)
PH UR STRIP.AUTO: 7 [PH] (ref 5–8)
PLATELET # BLD AUTO: 175 10*3/MM3 (ref 130–400)
PMV BLD AUTO: 10.6 FL (ref 6–10)
POTASSIUM BLD-SCNC: 3.6 MMOL/L (ref 3.5–5.3)
PROT SERPL-MCNC: 6.5 G/DL (ref 6–8)
PROT UR QL STRIP: ABNORMAL
PROTHROMBIN TIME: 17.9 SECONDS (ref 11–15.4)
RBC # BLD AUTO: 4.23 10*6/MM3 (ref 4.2–5.4)
RBC # UR: ABNORMAL /HPF
REF LAB TEST METHOD: ABNORMAL
SODIUM BLD-SCNC: 142 MMOL/L (ref 135–153)
SP GR UR STRIP: 1.02 (ref 1–1.03)
SQUAMOUS #/AREA URNS HPF: ABNORMAL /HPF
TROPONIN I SERPL-MCNC: 0.01 NG/ML
UROBILINOGEN UR QL STRIP: ABNORMAL
VALPROATE SERPL-MCNC: 44.8 MCG/ML (ref 50–100)
WBC NRBC COR # BLD: 9.42 10*3/MM3 (ref 4.5–12.5)
WBC UR QL AUTO: ABNORMAL /HPF

## 2018-12-05 PROCEDURE — 96361 HYDRATE IV INFUSION ADD-ON: CPT

## 2018-12-05 PROCEDURE — 96365 THER/PROPH/DIAG IV INF INIT: CPT

## 2018-12-05 PROCEDURE — 71045 X-RAY EXAM CHEST 1 VIEW: CPT | Performed by: RADIOLOGY

## 2018-12-05 PROCEDURE — 99285 EMERGENCY DEPT VISIT HI MDM: CPT

## 2018-12-05 PROCEDURE — 25010000002 CEFTRIAXONE: Performed by: EMERGENCY MEDICINE

## 2018-12-05 PROCEDURE — 80053 COMPREHEN METABOLIC PANEL: CPT | Performed by: EMERGENCY MEDICINE

## 2018-12-05 PROCEDURE — 83690 ASSAY OF LIPASE: CPT | Performed by: EMERGENCY MEDICINE

## 2018-12-05 PROCEDURE — 83735 ASSAY OF MAGNESIUM: CPT | Performed by: EMERGENCY MEDICINE

## 2018-12-05 PROCEDURE — 93010 ELECTROCARDIOGRAM REPORT: CPT | Performed by: INTERNAL MEDICINE

## 2018-12-05 PROCEDURE — 85730 THROMBOPLASTIN TIME PARTIAL: CPT | Performed by: EMERGENCY MEDICINE

## 2018-12-05 PROCEDURE — 85610 PROTHROMBIN TIME: CPT | Performed by: EMERGENCY MEDICINE

## 2018-12-05 PROCEDURE — 70450 CT HEAD/BRAIN W/O DYE: CPT | Performed by: RADIOLOGY

## 2018-12-05 PROCEDURE — 71045 X-RAY EXAM CHEST 1 VIEW: CPT

## 2018-12-05 PROCEDURE — 84484 ASSAY OF TROPONIN QUANT: CPT | Performed by: EMERGENCY MEDICINE

## 2018-12-05 PROCEDURE — 82962 GLUCOSE BLOOD TEST: CPT

## 2018-12-05 PROCEDURE — 80164 ASSAY DIPROPYLACETIC ACD TOT: CPT | Performed by: EMERGENCY MEDICINE

## 2018-12-05 PROCEDURE — 85025 COMPLETE CBC W/AUTO DIFF WBC: CPT | Performed by: EMERGENCY MEDICINE

## 2018-12-05 PROCEDURE — 70450 CT HEAD/BRAIN W/O DYE: CPT

## 2018-12-05 PROCEDURE — 93005 ELECTROCARDIOGRAM TRACING: CPT | Performed by: EMERGENCY MEDICINE

## 2018-12-05 PROCEDURE — 81001 URINALYSIS AUTO W/SCOPE: CPT | Performed by: EMERGENCY MEDICINE

## 2018-12-05 PROCEDURE — 83880 ASSAY OF NATRIURETIC PEPTIDE: CPT | Performed by: EMERGENCY MEDICINE

## 2018-12-05 RX ORDER — CEPHALEXIN 250 MG/1
250 CAPSULE ORAL 3 TIMES DAILY
Qty: 21 CAPSULE | Refills: 0 | Status: SHIPPED | OUTPATIENT
Start: 2018-12-05 | End: 2019-01-07

## 2018-12-05 RX ORDER — SODIUM CHLORIDE 0.9 % (FLUSH) 0.9 %
10 SYRINGE (ML) INJECTION AS NEEDED
Status: DISCONTINUED | OUTPATIENT
Start: 2018-12-05 | End: 2018-12-06 | Stop reason: HOSPADM

## 2018-12-05 RX ADMIN — SODIUM CHLORIDE 1000 ML: 9 INJECTION, SOLUTION INTRAVENOUS at 19:53

## 2018-12-05 RX ADMIN — CEFTRIAXONE 1 G: 1 INJECTION, POWDER, FOR SOLUTION INTRAMUSCULAR; INTRAVENOUS at 22:00

## 2018-12-05 NOTE — ED NOTES
Patient family reports patient had a stroke x 1 year ago, and states she was doing very well and then about 2 weeks ago she started declining. Patient daughter states she was up walking and eating and doing well 2 weeks ago and has since became bed fast, cannot eat or drink anything, and continues to get confused. Daughter also reports bed sores on patient bottom, and they have been told to wait for home health to come evaluate. Daughter states she wants her mother to be evaluated for nursing home placement, but cannot wait for long.   Patient resting quietly on stretcher with no complaints. Pt AA No respiratory distress noted. Respirations even and unlabored. Pt denies any needs at this time. Skin PWD. Pt family at bedside. Will continue to monitor and follow plan of care. Bed rails up x 2, bed in lowest position, call light within reach.      Radha Mcdermott, RN  12/05/18 [id number]

## 2018-12-06 NOTE — ED PROVIDER NOTES
Subjective   Patient brought to ER for altered mental status and generalized weakness.        Altered Mental Status   Presenting symptoms: behavior changes and lethargy    Severity:  Moderate  Most recent episode:  Yesterday  Episode history:  Multiple  Progression:  Worsening  Chronicity:  Recurrent  Context: not taking medications as prescribed, recent change in medication and recent illness    Associated symptoms: weakness        Review of Systems   Constitutional: Positive for activity change and fatigue.   HENT: Negative.    Eyes: Negative.    Respiratory: Negative.    Cardiovascular: Negative.    Gastrointestinal: Negative.    Endocrine: Negative.    Genitourinary: Positive for dysuria.   Musculoskeletal: Negative.    Allergic/Immunologic: Negative.    Neurological: Positive for weakness.   Hematological: Negative.        Past Medical History:   Diagnosis Date   • Diabetes mellitus (CMS/HCC)    • Heart disease    • Hyperlipidemia    • Hypertension    • Stroke (CMS/HCC)        Allergies   Allergen Reactions   • Bactrim [Sulfamethoxazole-Trimethoprim]    • Codeine    • Penicillins        Past Surgical History:   Procedure Laterality Date   • APPENDECTOMY     • BLADDER SURGERY     • CARDIAC CATHETERIZATION     • CHOLECYSTECTOMY         Family History   Problem Relation Age of Onset   • Heart disease Mother    • Heart attack Mother    • Heart disease Father    • Heart attack Father    • Heart attack Brother    • Heart attack Brother        Social History     Socioeconomic History   • Marital status: Single     Spouse name: Not on file   • Number of children: Not on file   • Years of education: Not on file   • Highest education level: Not on file   Tobacco Use   • Smoking status: Never Smoker   • Smokeless tobacco: Never Used   Substance and Sexual Activity   • Alcohol use: No   • Drug use: No   • Sexual activity: Defer           Objective   Physical Exam   Constitutional: She appears well-developed and  well-nourished. She appears lethargic.   HENT:   Head: Normocephalic.   Mouth/Throat: Oropharynx is clear and moist.   Eyes: EOM are normal. Pupils are equal, round, and reactive to light.   Neck: Normal range of motion.   Cardiovascular: Normal rate.   Pulmonary/Chest: Effort normal.   Abdominal: Soft.   Musculoskeletal: Normal range of motion.   Neurological: She appears lethargic.   Weakness left side, consistent with and resulting from old CVA   Skin: Skin is warm.   Psychiatric: She has a normal mood and affect.   Nursing note and vitals reviewed.      Procedures           ED Course                  MDM      Final diagnoses:   Acute UTI   Dehydration            Jere Schmitz MD  12/06/18 [id number]

## 2019-01-06 ENCOUNTER — HOSPITAL ENCOUNTER (INPATIENT)
Facility: HOSPITAL | Age: 79
LOS: 4 days | Discharge: NURSING, REHAB OR LONG-TERM CARE | End: 2019-01-11
Attending: EMERGENCY MEDICINE | Admitting: INTERNAL MEDICINE

## 2019-01-06 ENCOUNTER — APPOINTMENT (OUTPATIENT)
Dept: CT IMAGING | Facility: HOSPITAL | Age: 79
End: 2019-01-06

## 2019-01-06 DIAGNOSIS — A41.9 SEPSIS, DUE TO UNSPECIFIED ORGANISM: Primary | ICD-10-CM

## 2019-01-06 DIAGNOSIS — N39.0 URINARY TRACT INFECTION WITHOUT HEMATURIA, SITE UNSPECIFIED: ICD-10-CM

## 2019-01-06 DIAGNOSIS — K11.21 ACUTE PAROTITIS: ICD-10-CM

## 2019-01-06 LAB
ALBUMIN SERPL-MCNC: 3.6 G/DL (ref 3.4–4.8)
ALBUMIN/GLOB SERPL: 1.1 G/DL (ref 1.5–2.5)
ALP SERPL-CCNC: 99 U/L (ref 35–104)
ALT SERPL W P-5'-P-CCNC: 32 U/L (ref 10–36)
ANION GAP SERPL CALCULATED.3IONS-SCNC: 13.2 MMOL/L (ref 3.6–11.2)
APTT PPP: 27.1 SECONDS (ref 23.8–36.1)
AST SERPL-CCNC: 29 U/L (ref 10–30)
BACTERIA UR QL AUTO: ABNORMAL /HPF
BASOPHILS # BLD AUTO: 0.06 10*3/MM3 (ref 0–0.3)
BASOPHILS NFR BLD AUTO: 0.3 % (ref 0–2)
BILIRUB SERPL-MCNC: 0.8 MG/DL (ref 0.2–1.8)
BILIRUB UR QL STRIP: ABNORMAL
BUN BLD-MCNC: 25 MG/DL (ref 7–21)
BUN/CREAT SERPL: 21.2 (ref 7–25)
CALCIUM SPEC-SCNC: 8.9 MG/DL (ref 7.7–10)
CHLORIDE SERPL-SCNC: 103 MMOL/L (ref 99–112)
CLARITY UR: ABNORMAL
CO2 SERPL-SCNC: 20.8 MMOL/L (ref 24.3–31.9)
COLOR UR: YELLOW
CREAT BLD-MCNC: 1.18 MG/DL (ref 0.43–1.29)
CRP SERPL-MCNC: 9.85 MG/DL (ref 0–0.99)
D-LACTATE SERPL-SCNC: 2.5 MMOL/L (ref 0.5–2)
DEPRECATED RDW RBC AUTO: 62.5 FL (ref 37–54)
EOSINOPHIL # BLD AUTO: 0.05 10*3/MM3 (ref 0–0.7)
EOSINOPHIL NFR BLD AUTO: 0.3 % (ref 0–7)
ERYTHROCYTE [DISTWIDTH] IN BLOOD BY AUTOMATED COUNT: 18.8 % (ref 11.5–14.5)
GFR SERPL CREATININE-BSD FRML MDRD: 44 ML/MIN/1.73
GLOBULIN UR ELPH-MCNC: 3.3 GM/DL
GLUCOSE BLD-MCNC: 157 MG/DL (ref 70–110)
GLUCOSE UR STRIP-MCNC: NEGATIVE MG/DL
HCT VFR BLD AUTO: 43.9 % (ref 37–47)
HGB BLD-MCNC: 13.2 G/DL (ref 12–16)
HGB UR QL STRIP.AUTO: ABNORMAL
HYALINE CASTS UR QL AUTO: ABNORMAL /LPF
IMM GRANULOCYTES # BLD AUTO: 0.07 10*3/MM3 (ref 0–0.03)
IMM GRANULOCYTES NFR BLD AUTO: 0.4 % (ref 0–0.5)
INR PPP: 1.36 (ref 0.9–1.1)
KETONES UR QL STRIP: NEGATIVE
LEUKOCYTE ESTERASE UR QL STRIP.AUTO: ABNORMAL
LYMPHOCYTES # BLD AUTO: 3.92 10*3/MM3 (ref 1–3)
LYMPHOCYTES NFR BLD AUTO: 21.6 % (ref 16–46)
MCH RBC QN AUTO: 28 PG (ref 27–33)
MCHC RBC AUTO-ENTMCNC: 30.1 G/DL (ref 33–37)
MCV RBC AUTO: 93 FL (ref 80–94)
MONOCYTES # BLD AUTO: 1.7 10*3/MM3 (ref 0.1–0.9)
MONOCYTES NFR BLD AUTO: 9.4 % (ref 0–12)
NEUTROPHILS # BLD AUTO: 12.37 10*3/MM3 (ref 1.4–6.5)
NEUTROPHILS NFR BLD AUTO: 68 % (ref 40–75)
NITRITE UR QL STRIP: POSITIVE
OSMOLALITY SERPL CALC.SUM OF ELEC: 281.5 MOSM/KG (ref 273–305)
PH UR STRIP.AUTO: 7 [PH] (ref 5–8)
PLATELET # BLD AUTO: 238 10*3/MM3 (ref 130–400)
PMV BLD AUTO: 10.7 FL (ref 6–10)
POTASSIUM BLD-SCNC: 4.6 MMOL/L (ref 3.5–5.3)
PROT SERPL-MCNC: 6.9 G/DL (ref 6–8)
PROT UR QL STRIP: ABNORMAL
PROTHROMBIN TIME: 17 SECONDS (ref 11–15.4)
RBC # BLD AUTO: 4.72 10*6/MM3 (ref 4.2–5.4)
RBC # UR: ABNORMAL /HPF
REF LAB TEST METHOD: ABNORMAL
SODIUM BLD-SCNC: 137 MMOL/L (ref 135–153)
SP GR UR STRIP: 1.02 (ref 1–1.03)
SQUAMOUS #/AREA URNS HPF: ABNORMAL /HPF
UROBILINOGEN UR QL STRIP: ABNORMAL
VALPROATE SERPL-MCNC: 45.7 MCG/ML (ref 50–100)
WBC NRBC COR # BLD: 18.17 10*3/MM3 (ref 4.5–12.5)
WBC UR QL AUTO: ABNORMAL /HPF

## 2019-01-06 PROCEDURE — 85610 PROTHROMBIN TIME: CPT | Performed by: EMERGENCY MEDICINE

## 2019-01-06 PROCEDURE — 87186 SC STD MICRODIL/AGAR DIL: CPT | Performed by: EMERGENCY MEDICINE

## 2019-01-06 PROCEDURE — 87077 CULTURE AEROBIC IDENTIFY: CPT | Performed by: EMERGENCY MEDICINE

## 2019-01-06 PROCEDURE — 87150 DNA/RNA AMPLIFIED PROBE: CPT | Performed by: EMERGENCY MEDICINE

## 2019-01-06 PROCEDURE — 99285 EMERGENCY DEPT VISIT HI MDM: CPT

## 2019-01-06 PROCEDURE — 86140 C-REACTIVE PROTEIN: CPT | Performed by: EMERGENCY MEDICINE

## 2019-01-06 PROCEDURE — 80053 COMPREHEN METABOLIC PANEL: CPT | Performed by: EMERGENCY MEDICINE

## 2019-01-06 PROCEDURE — 87040 BLOOD CULTURE FOR BACTERIA: CPT | Performed by: EMERGENCY MEDICINE

## 2019-01-06 PROCEDURE — 70491 CT SOFT TISSUE NECK W/DYE: CPT | Performed by: RADIOLOGY

## 2019-01-06 PROCEDURE — 85730 THROMBOPLASTIN TIME PARTIAL: CPT | Performed by: EMERGENCY MEDICINE

## 2019-01-06 PROCEDURE — 70491 CT SOFT TISSUE NECK W/DYE: CPT

## 2019-01-06 PROCEDURE — 80164 ASSAY DIPROPYLACETIC ACD TOT: CPT | Performed by: EMERGENCY MEDICINE

## 2019-01-06 PROCEDURE — 87147 CULTURE TYPE IMMUNOLOGIC: CPT | Performed by: EMERGENCY MEDICINE

## 2019-01-06 PROCEDURE — 85025 COMPLETE CBC W/AUTO DIFF WBC: CPT | Performed by: EMERGENCY MEDICINE

## 2019-01-06 PROCEDURE — 25010000002 VANCOMYCIN 5 G RECONSTITUTED SOLUTION 5,000 MG VIAL: Performed by: EMERGENCY MEDICINE

## 2019-01-06 PROCEDURE — 83605 ASSAY OF LACTIC ACID: CPT | Performed by: EMERGENCY MEDICINE

## 2019-01-06 PROCEDURE — 81001 URINALYSIS AUTO W/SCOPE: CPT | Performed by: EMERGENCY MEDICINE

## 2019-01-06 RX ORDER — SODIUM CHLORIDE 0.9 % (FLUSH) 0.9 %
10 SYRINGE (ML) INJECTION AS NEEDED
Status: DISCONTINUED | OUTPATIENT
Start: 2019-01-06 | End: 2019-01-11 | Stop reason: HOSPADM

## 2019-01-06 RX ORDER — SODIUM CHLORIDE 9 MG/ML
125 INJECTION, SOLUTION INTRAVENOUS CONTINUOUS
Status: DISCONTINUED | OUTPATIENT
Start: 2019-01-06 | End: 2019-01-06

## 2019-01-06 RX ORDER — CLINDAMYCIN PHOSPHATE 600 MG/50ML
600 INJECTION INTRAVENOUS ONCE
Status: COMPLETED | OUTPATIENT
Start: 2019-01-06 | End: 2019-01-06

## 2019-01-06 RX ADMIN — CLINDAMYCIN PHOSPHATE 600 MG: 600 INJECTION, SOLUTION INTRAVENOUS at 22:37

## 2019-01-06 RX ADMIN — SODIUM CHLORIDE 1434 ML: 9 INJECTION, SOLUTION INTRAVENOUS at 23:30

## 2019-01-06 RX ADMIN — SODIUM CHLORIDE 1434 ML: 9 INJECTION, SOLUTION INTRAVENOUS at 22:35

## 2019-01-06 RX ADMIN — SODIUM CHLORIDE 125 ML/HR: 9 INJECTION, SOLUTION INTRAVENOUS at 21:17

## 2019-01-06 RX ADMIN — VANCOMYCIN HYDROCHLORIDE 1750 MG: 5 INJECTION, POWDER, LYOPHILIZED, FOR SOLUTION INTRAVENOUS at 22:36

## 2019-01-07 PROBLEM — A41.9 SEPSIS (HCC): Status: ACTIVE | Noted: 2019-01-07

## 2019-01-07 PROBLEM — R06.00 DYSPNEA, UNSPECIFIED: Status: RESOLVED | Noted: 2017-12-06 | Resolved: 2019-01-07

## 2019-01-07 PROBLEM — R60.9 EDEMA, UNSPECIFIED: Status: RESOLVED | Noted: 2017-12-06 | Resolved: 2019-01-07

## 2019-01-07 LAB
BACTERIA BLD CULT: ABNORMAL
D-LACTATE SERPL-SCNC: 1.2 MMOL/L (ref 0.5–2)
HOLD SPECIMEN: NORMAL

## 2019-01-07 PROCEDURE — 97163 PT EVAL HIGH COMPLEX 45 MIN: CPT

## 2019-01-07 PROCEDURE — 97530 THERAPEUTIC ACTIVITIES: CPT

## 2019-01-07 PROCEDURE — 92610 EVALUATE SWALLOWING FUNCTION: CPT

## 2019-01-07 PROCEDURE — 87077 CULTURE AEROBIC IDENTIFY: CPT | Performed by: INTERNAL MEDICINE

## 2019-01-07 PROCEDURE — 25010000002 IOPAMIDOL 61 % SOLUTION: Performed by: EMERGENCY MEDICINE

## 2019-01-07 PROCEDURE — 25010000002 VANCOMYCIN 5 G RECONSTITUTED SOLUTION 5,000 MG VIAL

## 2019-01-07 PROCEDURE — 94640 AIRWAY INHALATION TREATMENT: CPT

## 2019-01-07 PROCEDURE — 94799 UNLISTED PULMONARY SVC/PX: CPT

## 2019-01-07 PROCEDURE — 87086 URINE CULTURE/COLONY COUNT: CPT | Performed by: INTERNAL MEDICINE

## 2019-01-07 PROCEDURE — 93010 ELECTROCARDIOGRAM REPORT: CPT | Performed by: INTERNAL MEDICINE

## 2019-01-07 PROCEDURE — 83605 ASSAY OF LACTIC ACID: CPT | Performed by: EMERGENCY MEDICINE

## 2019-01-07 PROCEDURE — 97167 OT EVAL HIGH COMPLEX 60 MIN: CPT

## 2019-01-07 PROCEDURE — 87186 SC STD MICRODIL/AGAR DIL: CPT | Performed by: INTERNAL MEDICINE

## 2019-01-07 PROCEDURE — 86735 MUMPS ANTIBODY: CPT | Performed by: EMERGENCY MEDICINE

## 2019-01-07 PROCEDURE — 99223 1ST HOSP IP/OBS HIGH 75: CPT | Performed by: INTERNAL MEDICINE

## 2019-01-07 PROCEDURE — 93005 ELECTROCARDIOGRAM TRACING: CPT | Performed by: INTERNAL MEDICINE

## 2019-01-07 RX ORDER — ALBUTEROL SULFATE 90 UG/1
2 AEROSOL, METERED RESPIRATORY (INHALATION) EVERY 4 HOURS PRN
COMMUNITY

## 2019-01-07 RX ORDER — DOCUSATE SODIUM 50 MG/5 ML
100 LIQUID (ML) ORAL 2 TIMES DAILY
Status: DISCONTINUED | OUTPATIENT
Start: 2019-01-07 | End: 2019-01-08 | Stop reason: CLARIF

## 2019-01-07 RX ORDER — SODIUM CHLORIDE 0.9 % (FLUSH) 0.9 %
3-10 SYRINGE (ML) INJECTION AS NEEDED
Status: DISCONTINUED | OUTPATIENT
Start: 2019-01-07 | End: 2019-01-11 | Stop reason: HOSPADM

## 2019-01-07 RX ORDER — GLIPIZIDE 5 MG/1
1.25 TABLET ORAL
Status: CANCELLED | OUTPATIENT
Start: 2019-01-07

## 2019-01-07 RX ORDER — ATORVASTATIN CALCIUM 40 MG/1
40 TABLET, FILM COATED ORAL NIGHTLY
COMMUNITY
End: 2019-01-11 | Stop reason: HOSPADM

## 2019-01-07 RX ORDER — MONTELUKAST SODIUM 10 MG/1
10 TABLET ORAL NIGHTLY
Status: CANCELLED | OUTPATIENT
Start: 2019-01-07

## 2019-01-07 RX ORDER — FLUTICASONE PROPIONATE 50 MCG
2 SPRAY, SUSPENSION (ML) NASAL DAILY
COMMUNITY

## 2019-01-07 RX ORDER — ATORVASTATIN CALCIUM 40 MG/1
40 TABLET, FILM COATED ORAL NIGHTLY
Status: DISCONTINUED | OUTPATIENT
Start: 2019-01-07 | End: 2019-01-08

## 2019-01-07 RX ORDER — CHOLECALCIFEROL (VITAMIN D3) 125 MCG
5 CAPSULE ORAL NIGHTLY
Status: CANCELLED | OUTPATIENT
Start: 2019-01-07

## 2019-01-07 RX ORDER — SODIUM CHLORIDE 0.9 % (FLUSH) 0.9 %
3 SYRINGE (ML) INJECTION EVERY 12 HOURS SCHEDULED
Status: DISCONTINUED | OUTPATIENT
Start: 2019-01-07 | End: 2019-01-11 | Stop reason: HOSPADM

## 2019-01-07 RX ORDER — GLIPIZIDE 2.5 MG/1
2.5 TABLET, EXTENDED RELEASE ORAL DAILY
COMMUNITY
End: 2019-01-11 | Stop reason: HOSPADM

## 2019-01-07 RX ORDER — BUDESONIDE 0.5 MG/2ML
0.5 INHALANT ORAL EVERY 12 HOURS
COMMUNITY

## 2019-01-07 RX ORDER — LOSARTAN POTASSIUM 50 MG/1
100 TABLET ORAL DAILY
Status: CANCELLED | OUTPATIENT
Start: 2019-01-07

## 2019-01-07 RX ORDER — FAMOTIDINE 20 MG/1
20 TABLET, FILM COATED ORAL 2 TIMES DAILY
Status: DISCONTINUED | OUTPATIENT
Start: 2019-01-07 | End: 2019-01-10

## 2019-01-07 RX ORDER — HYDROCODONE BITARTRATE AND ACETAMINOPHEN 5; 325 MG/1; MG/1
1 TABLET ORAL EVERY 6 HOURS PRN
Status: DISCONTINUED | OUTPATIENT
Start: 2019-01-07 | End: 2019-01-11 | Stop reason: HOSPADM

## 2019-01-07 RX ORDER — BUDESONIDE AND FORMOTEROL FUMARATE DIHYDRATE 160; 4.5 UG/1; UG/1
2 AEROSOL RESPIRATORY (INHALATION)
Status: CANCELLED | OUTPATIENT
Start: 2019-01-07

## 2019-01-07 RX ORDER — LEVOTHYROXINE SODIUM 0.1 MG/1
100 TABLET ORAL DAILY
Status: DISCONTINUED | OUTPATIENT
Start: 2019-01-07 | End: 2019-01-11 | Stop reason: HOSPADM

## 2019-01-07 RX ORDER — DOCUSATE SODIUM 50 MG/5 ML
100 LIQUID (ML) ORAL 2 TIMES DAILY
Status: ON HOLD | COMMUNITY
End: 2019-01-15

## 2019-01-07 RX ORDER — BUDESONIDE 0.5 MG/2ML
0.5 INHALANT ORAL
Status: DISCONTINUED | OUTPATIENT
Start: 2019-01-07 | End: 2019-01-11 | Stop reason: HOSPADM

## 2019-01-07 RX ORDER — VENLAFAXINE 37.5 MG/1
37.5 TABLET ORAL 2 TIMES DAILY
COMMUNITY

## 2019-01-07 RX ORDER — VENLAFAXINE 37.5 MG/1
37.5 TABLET ORAL 2 TIMES DAILY WITH MEALS
Status: DISCONTINUED | OUTPATIENT
Start: 2019-01-07 | End: 2019-01-10

## 2019-01-07 RX ORDER — LANOLIN ALCOHOL/MO/W.PET/CERES
3 CREAM (GRAM) TOPICAL NIGHTLY
COMMUNITY
End: 2019-01-11 | Stop reason: HOSPADM

## 2019-01-07 RX ORDER — FLUTICASONE PROPIONATE 50 MCG
2 SPRAY, SUSPENSION (ML) NASAL DAILY
Status: DISCONTINUED | OUTPATIENT
Start: 2019-01-07 | End: 2019-01-11 | Stop reason: HOSPADM

## 2019-01-07 RX ORDER — DANTROLENE SODIUM 25 MG/1
25 CAPSULE ORAL NIGHTLY
Status: DISCONTINUED | OUTPATIENT
Start: 2019-01-07 | End: 2019-01-08

## 2019-01-07 RX ORDER — BUDESONIDE 0.5 MG/2ML
0.5 INHALANT ORAL
Status: CANCELLED | OUTPATIENT
Start: 2019-01-07

## 2019-01-07 RX ORDER — CYCLOSPORINE 0.5 MG/ML
1 EMULSION OPHTHALMIC 2 TIMES DAILY
Status: DISCONTINUED | OUTPATIENT
Start: 2019-01-07 | End: 2019-01-11 | Stop reason: HOSPADM

## 2019-01-07 RX ORDER — CASTOR OIL AND BALSAM, PERU 788; 87 MG/G; MG/G
1 OINTMENT TOPICAL 2 TIMES DAILY
Status: ON HOLD | COMMUNITY
End: 2019-01-15

## 2019-01-07 RX ORDER — CASTOR OIL AND BALSAM, PERU 788; 87 MG/G; MG/G
1 OINTMENT TOPICAL EVERY 12 HOURS SCHEDULED
Status: DISCONTINUED | OUTPATIENT
Start: 2019-01-07 | End: 2019-01-11 | Stop reason: HOSPADM

## 2019-01-07 RX ORDER — ALBUTEROL SULFATE 2.5 MG/3ML
2.5 SOLUTION RESPIRATORY (INHALATION) EVERY 4 HOURS PRN
Status: DISCONTINUED | OUTPATIENT
Start: 2019-01-07 | End: 2019-01-11 | Stop reason: HOSPADM

## 2019-01-07 RX ORDER — CETIRIZINE HYDROCHLORIDE 10 MG/1
5 TABLET ORAL DAILY
Status: CANCELLED | OUTPATIENT
Start: 2019-01-07

## 2019-01-07 RX ORDER — DANTROLENE SODIUM 25 MG/1
25 CAPSULE ORAL NIGHTLY
COMMUNITY
End: 2019-01-11 | Stop reason: HOSPADM

## 2019-01-07 RX ORDER — FAMOTIDINE 20 MG/1
20 TABLET, FILM COATED ORAL 2 TIMES DAILY
COMMUNITY

## 2019-01-07 RX ADMIN — AZTREONAM 2 G: 2 INJECTION, POWDER, FOR SOLUTION INTRAMUSCULAR; INTRAVENOUS at 08:14

## 2019-01-07 RX ADMIN — VENLAFAXINE HYDROCHLORIDE 37.5 MG: 37.5 TABLET ORAL at 08:14

## 2019-01-07 RX ADMIN — IOPAMIDOL 90 ML: 612 INJECTION, SOLUTION INTRAVENOUS at 00:14

## 2019-01-07 RX ADMIN — FAMOTIDINE 20 MG: 20 TABLET, FILM COATED ORAL at 08:14

## 2019-01-07 RX ADMIN — ATORVASTATIN CALCIUM 40 MG: 40 TABLET, FILM COATED ORAL at 20:34

## 2019-01-07 RX ADMIN — VANCOMYCIN HYDROCHLORIDE 1000 MG: 5 INJECTION, POWDER, LYOPHILIZED, FOR SOLUTION INTRAVENOUS at 23:53

## 2019-01-07 RX ADMIN — VENLAFAXINE HYDROCHLORIDE 37.5 MG: 37.5 TABLET ORAL at 16:56

## 2019-01-07 RX ADMIN — SODIUM CHLORIDE, PRESERVATIVE FREE 3 ML: 5 INJECTION INTRAVENOUS at 08:15

## 2019-01-07 RX ADMIN — CASTOR OIL AND BALSAM, PERU 5 G: 788; 87 OINTMENT TOPICAL at 08:14

## 2019-01-07 RX ADMIN — DANTROLENE SODIUM 25 MG: 25 CAPSULE ORAL at 20:35

## 2019-01-07 RX ADMIN — FLUTICASONE PROPIONATE 2 SPRAY: 50 SPRAY, METERED NASAL at 08:14

## 2019-01-07 RX ADMIN — LEVOTHYROXINE SODIUM 100 MCG: 100 TABLET ORAL at 08:14

## 2019-01-07 RX ADMIN — ALBUTEROL SULFATE 2.5 MG: 2.5 SOLUTION RESPIRATORY (INHALATION) at 06:41

## 2019-01-07 RX ADMIN — CASTOR OIL AND BALSAM, PERU 5 G: 788; 87 OINTMENT TOPICAL at 20:36

## 2019-01-07 RX ADMIN — AZTREONAM 2 G: 2 INJECTION, POWDER, FOR SOLUTION INTRAMUSCULAR; INTRAVENOUS at 00:02

## 2019-01-07 RX ADMIN — APIXABAN 5 MG: 5 TABLET, FILM COATED ORAL at 08:14

## 2019-01-07 RX ADMIN — FAMOTIDINE 20 MG: 20 TABLET, FILM COATED ORAL at 20:34

## 2019-01-07 RX ADMIN — AZTREONAM 2 G: 2 INJECTION, POWDER, FOR SOLUTION INTRAMUSCULAR; INTRAVENOUS at 15:34

## 2019-01-07 RX ADMIN — SODIUM CHLORIDE, PRESERVATIVE FREE 3 ML: 5 INJECTION INTRAVENOUS at 20:35

## 2019-01-07 RX ADMIN — BUDESONIDE 0.5 MG: 0.5 SUSPENSION RESPIRATORY (INHALATION) at 06:47

## 2019-01-07 RX ADMIN — APIXABAN 5 MG: 5 TABLET, FILM COATED ORAL at 20:34

## 2019-01-07 NOTE — PLAN OF CARE
Problem: Diabetes, Type 2 (Adult)  Goal: Signs and Symptoms of Listed Potential Problems Will be Absent, Minimized or Managed (Diabetes, Type 2)  Outcome: Ongoing (interventions implemented as appropriate)   01/07/19 2368   Goal/Outcome Evaluation   Problems Assessed (Type 2 Diabetes) all   Problems Present (Type 2 Diabetes) (none\)

## 2019-01-07 NOTE — PROGRESS NOTES
Pt seen and examined with RUFUS Solomon. I have reviewed Dr. Medel' H&P and discussed pt's care with her personally. Pt admitted overnight with sepsis 2/2 right-sided parotitis and UTI. Currently on Vanc and Azactam. Pt oriented to person during my encounter but not very interactive and does not follow commands. RN reports pt was more alert this AM and was cooperative with taking PO medications. PT/OT and SLP consulted. Pt's family requesting SNF placement and SS now following to assist with placement.       Chalino Quach,   01/07/19  1:14 PM

## 2019-01-07 NOTE — THERAPY EVALUATION
Acute Care - Speech Language Pathology   Swallow Initial Evaluation  Tom   CLINICAL DYSPHAGIA ASSESSMENT     Patient Name: Jeanna Flower  : 1940  MRN: [id number]  Today's Date: 2019             Admit Date: 2019  Jeanna Flower  is seen at bedside this pm on 3N to assess safety/efficacy of swallowing fnx, determine safest/least restrictive diet tolerance. She was admitted 19 from her home 2/2 new onset R neck edema/erythema. Diagnosis of sepsis upon admit w/ UTI and R parotitis. She has a h/o CVA w/ pre-existing wishes for no alternative method of nutrition/hydration or resuscitation, per chart review per family report.     She is assessed to r/o aspiration, determine least restrictive diet.     Social History     Socioeconomic History   • Marital status: Single     Spouse name: Not on file   • Number of children: Not on file   • Years of education: Not on file   • Highest education level: Not on file   Social Needs   • Financial resource strain: Not on file   • Food insecurity - worry: Not on file   • Food insecurity - inability: Not on file   • Transportation needs - medical: Not on file   • Transportation needs - non-medical: Not on file   Occupational History   • Not on file   Tobacco Use   • Smoking status: Never Smoker   • Smokeless tobacco: Never Used   Substance and Sexual Activity   • Alcohol use: No   • Drug use: No   • Sexual activity: Defer   Other Topics Concern   • Not on file   Social History Narrative   • Not on file      Chest xray not available for review. CT soft tissues of the neck reveal parotid gland inflammation.     Diet Orders (active) (From admission, onward)    Start     Ordered    19 1800  Dietary Nutrition Supplements Boost Plus (Ensure Enlive, Ensure Plus)  Daily With Breakfast, Lunch & Dinner      19 1219    19 0527  Diet Regular  Diet Effective Now      19 0527        Observed on O2 via NC2L.     Ms. Flower is positioned upright and  centered in bed to accept multiple po presentations of ice chips,puree and thin liquids via spoon, cup and straw.  She does not self feed 2/2 ams and generalized fatigue.     Facial/oral structures are symmetrical upon observation. Lingual protrusion CNA as could not elicit. Per limited views, oral mucosa are moist, pink, and clean. Secretions are clear, thin, and controlled. OROM/ANDREA is moderately weak w/limited informal observations, as could not elicit imitation of oral postures. Gag is not assessed. Volitional cough CNA as could not elicit. Voice is weak in intensity, clear in quality w/ intelligible speech per limited voerbalizations, non sensical beyond yes and no. She is disoriented, unable to follow commands, does not attempt to participate in conversational exchanges. She does answer y/n, but inappropriately.     Upon po presentations, poor bolus anticipation and acceptance w/ generally weak labial seal for bolus clearance via spoon bowl, cup rim stability and suction via straw. Bolus formation, manipulation and control are moderately generally weak w/ delayed manipulation and transit. No oral residue. No overt s/s aspiration before the swallow.      Pharyngeal swallow is timely w/ adequate hyolaryngeal elevation per palpation. No overt s/s aspiration evidenced across this evaluation. No silent aspiration suspected as pt is w/o changes in vocal quality, respirations or secretions post po presentations. She is noted w/ obvious facial grminacing/wincing w/ active swallows, felt to be most likley indiciative of odynophagia related to moderate R neck edema/erythema.     Visit Dx:     ICD-10-CM ICD-9-CM   1. Sepsis, due to unspecified organism (CMS/HCC) A41.9 038.9     995.91   2. Urinary tract infection without hematuria, site unspecified N39.0 599.0   3. Acute parotitis K11.21 527.2     Patient Active Problem List   Diagnosis   • Paroxysmal atrial fibrillation (CMS/HCC)   • Essential hypertension   • Type 2  diabetes mellitus without complication (CMS/HCC)   • CVA (cerebral vascular accident) with left hemiparesis.   • Other sleep apnea   • Sepsis (CMS/HCC)     Past Medical History:   Diagnosis Date   • Anemia    • Closed wedge compression fracture of first lumbar vertebra (CMS/HCC)    • COPD (chronic obstructive pulmonary disease) (CMS/HCC)    • CVA (cerebral vascular accident) with left hemiparesis. 12/6/2017   • Dementia    • Diabetes mellitus (CMS/HCC)    • Dysarthria following cerebral infarction    • Dysphagia, oropharyngeal phase    • Dysphagia, pharyngoesophageal phase    • GERD (gastroesophageal reflux disease)    • Heart disease    • Hyperlipidemia    • Hypertension    • Hypothyroidism    • Insomnia    • Obesity    • Other sleep apnea 11/13/2018   • Paroxysmal atrial fibrillation (CMS/HCC) 12/6/2017     Past Surgical History:   Procedure Laterality Date   • APPENDECTOMY     • BLADDER SURGERY     • CARDIAC CATHETERIZATION     • CHOLECYSTECTOMY       EDUCATION  The patient has been educated in the following areas:   Dysphagia (Swallowing Impairment) Oral Care/Hydration Modified Diet Instruction.    Impression: Ms. Flower presents w/ a mild-moderate oral dysphagia w/ poor bolus anticipation, weak and delayed bolus formation /w delayed transit w/ puree. Solids are not attempted 2/2 observed oral dysphagia. WFL pharyngeal swallow w/o overt s/s aspiration evidenced across this evaluation. No silent aspiration suspected as pt is w/o changes in vocal quality, respirations or secretions post po presentations. She is noted w/ obvious facial grminacing/wincing w/ active swallows, felt to be most likley indiciative of odynophagia related to moderate R neck edema/erythema.     She is felt to most benefit from po diet modification of puree consistency, thin liquids. She will require 1:1 assistance w/ po intake. Puree modification as a compensatory attempt to relieve discomfort of oral manipulation of bolus, decrease fatigue  effects w/ po intake, encourage increased po acceptance/intake.     SLP Recommendation and Plan    1. Puree diet, thin liquids.  1:1 feeds.   2. Medications whole in puree/thins. Single pill presentations only, Please. Crush PRN.   3. Upright and centered for all po intake  4. SAGRARIO precautions.  5. Oral care protocol.  SLP to f/u for diet tolerance.     D/w pt results and recommendations w/ verbal agreement.    D/w RN, Neha,  results and recommendations w/ verbal agreement.    Thank you for allowing me to participate in the care of your patient-  Ivanna Kuo M.S., CCC/SLP                                       Plan of Care Reviewed With: patient  Plan of Care Review  Plan of Care Reviewed With: patient  Outcome Summary: Clinical dysphagia assessment w/o overt s/s aspiration. No silent aspiration suspected as pt w/o significant changes in secretions, respirations, vocal quality w/ verbalizations. Poor po intake w/ current R parotitis. Wincing w/ swallow, therefore suggest downgrade to puree, thin liquids. 1:1 assist w/ po intake . SLP to f/u to assess improved parotitis, odynophagia w/ diet upgrade pending.   Time Calculation:     Therapy Charges for Today     Code Description Service Date Service Provider Modifiers Qty    05344579109 HC ST SWALLOWING CURRENT STATUS 1/7/2019 Ivanna Kuo MS CCC-SLP  1    79302247555 HC ST SWALLOWING PROJECTED 1/7/2019 Ivanna Kuo MS CCC-SLP  1    [id number] HC ST EVAL ORAL PHARYNG SWALLOW 3 1/7/2019 Ivanna Kuo MS CCC-SLP GN 1        SLP G-Codes  Functional Limitations: Swallowing  Swallow Current Status (): At least 1 percent but less than 20 percent impaired, limited or restricted  Swallow Goal Status (): 0 percent impaired, limited or restricted    Ivanna Kuo MS CCC-SLP  1/7/2019

## 2019-01-07 NOTE — ED PROVIDER NOTES
Subjective   Pt brought in for redness and swelling to right side of neck.  Noticed today.  Pt is bed bound, essentially non-verbal.  Severe dementia since CVA.  Family noticed redness and swelling to right side of neck this afternoon.  Had similar on left side during hospitalization at .  Family unclear on if or what cause was identified  No fever        History provided by:  Relative and medical records  History limited by:  Patient nonverbal and dementia  Illness   Location:  Right neck  Quality:  Red, swollen, warm  Severity:  Moderate  Onset quality:  Sudden  Timing:  Constant  Progression:  Unchanged  Chronicity:  New  Context:  Unidentified  Relieved by:  Nothing tried  Worsened by:  Nothing  Ineffective treatments:  Nothing tried  Associated symptoms: congestion, rash and rhinorrhea    Associated symptoms: no abdominal pain, no chest pain, no cough, no diarrhea, no ear pain, no fatigue, no fever, no headaches, no loss of consciousness, no myalgias, no nausea, no shortness of breath, no sore throat, no vomiting and no wheezing        Review of Systems   Unable to perform ROS: Dementia   Constitutional: Positive for activity change. Negative for fatigue and fever.   HENT: Positive for congestion, postnasal drip, rhinorrhea and trouble swallowing. Negative for ear pain, nosebleeds and sore throat.    Eyes: Negative for discharge and redness.   Respiratory: Negative for cough, shortness of breath and wheezing.    Cardiovascular: Negative for chest pain and leg swelling.   Gastrointestinal: Negative for abdominal pain, diarrhea, nausea and vomiting.   Musculoskeletal: Negative for myalgias.   Skin: Positive for rash.   Neurological: Negative for loss of consciousness and headaches.       Past Medical History:   Diagnosis Date   • Diabetes mellitus (CMS/HCC)    • Heart disease    • Hyperlipidemia    • Hypertension    • Stroke (CMS/HCC)        Allergies   Allergen Reactions   • Bactrim  [Sulfamethoxazole-Trimethoprim]    • Codeine    • Penicillins        Past Surgical History:   Procedure Laterality Date   • APPENDECTOMY     • BLADDER SURGERY     • CARDIAC CATHETERIZATION     • CHOLECYSTECTOMY         Family History   Problem Relation Age of Onset   • Heart disease Mother    • Heart attack Mother    • Heart disease Father    • Heart attack Father    • Heart attack Brother    • Heart attack Brother        Social History     Socioeconomic History   • Marital status: Single     Spouse name: Not on file   • Number of children: Not on file   • Years of education: Not on file   • Highest education level: Not on file   Tobacco Use   • Smoking status: Never Smoker   • Smokeless tobacco: Never Used   Substance and Sexual Activity   • Alcohol use: No   • Drug use: No   • Sexual activity: Defer           Objective   Physical Exam   Constitutional: She appears well-developed and well-nourished. No distress.   HENT:   Head: Normocephalic and atraumatic.   Moist mucus membranes   Eyes: Conjunctivae and EOM are normal. Pupils are equal, round, and reactive to light. Right eye exhibits no discharge. Left eye exhibits no discharge. No scleral icterus.   Neck: Neck supple. No tracheal deviation present. No thyromegaly present.   Right sub mandibular and right scm erythema, tactile fever and slight induration.  No palpable fluctuance   Cardiovascular: Normal rate, regular rhythm, normal heart sounds and intact distal pulses. Exam reveals no gallop and no friction rub.   No murmur heard.  Pulmonary/Chest: Effort normal. No stridor. No respiratory distress. She has no wheezes.   Abdominal: Soft. She exhibits no distension. There is no tenderness. There is no guarding.   Musculoskeletal: She exhibits no deformity.   Lymphadenopathy:     She has no cervical adenopathy.   Neurological: She is alert.   Non-verbal   Skin: Skin is warm and dry. Capillary refill takes less than 2 seconds. She is not diaphoretic. There is  erythema.   Psychiatric:   Unable to assess   Nursing note and vitals reviewed.      Procedures           ED Course      CT Soft Tissue Neck With Contrast   ED Interpretation   CT neck with IV contrast   Faxed report from [facility]   Findings:   Submandibular/parotid glands.  The right sided parotid gland is slightly hyperdense and prominent compared to the left suspicious for right-sided parotitis.  No obvious discrete parotid lesion is identified.   Impression: The right sided parotid gland is slightly hyperdense and prominent compared with the left suspicious for right-sided parotitis.  No obvious discrete parotid lesion is identified.   Signed Shun Main M.D.        Labs Reviewed   COMPREHENSIVE METABOLIC PANEL - Abnormal; Notable for the following components:       Result Value    Glucose 157 (*)     BUN 25 (*)     CO2 20.8 (*)     eGFR Non  Amer 44 (*)     A/G Ratio 1.1 (*)     Anion Gap 13.2 (*)     All other components within normal limits    Narrative:     The MDRD GFR formula is only valid for adults with stable renal function between ages 18 and 70.   PROTIME-INR - Abnormal; Notable for the following components:    Protime 17.0 (*)     INR 1.36 (*)     All other components within normal limits    Narrative:     Suggested INR therapeutic range for stable oral anticoagulant therapy:    Low Intensity therapy:   1.5-2.0  Moderate Intensity therapy:   2.0-3.0  High Intensity therapy:   2.5-4.0   URINALYSIS W/ MICROSCOPIC IF INDICATED (NO CULTURE) - Abnormal; Notable for the following components:    Appearance, UA Turbid (*)     Bilirubin, UA Small (1+) (*)     Blood, UA Moderate (2+) (*)     Protein, UA >=300 mg/dL (3+) (*)     Leuk Esterase, UA Moderate (2+) (*)     Nitrite, UA Positive (*)     All other components within normal limits   LACTIC ACID, PLASMA - Abnormal; Notable for the following components:    Lactate 2.5 (*)     All other components within normal limits   C-REACTIVE PROTEIN -  Abnormal; Notable for the following components:    C-Reactive Protein 9.85 (*)     All other components within normal limits   VALPROIC ACID LEVEL, TOTAL - Abnormal; Notable for the following components:    Valproic Acid 45.7 (*)     All other components within normal limits   CBC WITH AUTO DIFFERENTIAL - Abnormal; Notable for the following components:    WBC 18.17 (*)     MCHC 30.1 (*)     RDW 18.8 (*)     RDW-SD 62.5 (*)     MPV 10.7 (*)     Neutrophils, Absolute 12.37 (*)     Lymphocytes, Absolute 3.92 (*)     Monocytes, Absolute 1.70 (*)     Immature Grans, Absolute 0.07 (*)     All other components within normal limits   URINALYSIS, MICROSCOPIC ONLY - Abnormal; Notable for the following components:    RBC, UA 6-12 (*)     WBC, UA Too Numerous to Count (*)     Bacteria, UA 4+ (*)     All other components within normal limits   APTT - Normal    Narrative:     PTT Heparin Therapeutic Range:  59 - 95 seconds   OSMOLALITY, CALCULATED - Normal   LACTIC ACID, REFLEX - Normal   BLOOD CULTURE   BLOOD CULTURE   LACTIC ACID REFLEX TIMER   MUMPS(IGG/M)   CBC AND DIFFERENTIAL    Narrative:     The following orders were created for panel order CBC & Differential.  Procedure                               Abnormality         Status                     ---------                               -----------         ------                     CBC Auto Differential[[id number]]        Abnormal            Final result                 Please view results for these tests on the individual orders.        Medication List      ASK your doctor about these medications    ADVAIR DISKUS 250-50 MCG/DOSE DISKUS  Generic drug:  fluticasone-salmeterol     * albuterol sulfate  (90 Base) MCG/ACT inhaler  Commonly known as:  PROVENTIL HFA;VENTOLIN HFA;PROAIR HFA     * PROAIR HFA IN     amantadine 100 MG capsule  Commonly known as:  SYMMETREL     apixaban 5 MG tablet tablet  Commonly known as:  ELIQUIS  Take 1 tablet by mouth Every 12 (Twelve)  Hours.     aspirin 81 MG EC tablet     atorvastatin 80 MG tablet  Commonly known as:  LIPITOR     cephalexin 250 MG capsule  Commonly known as:  KEFLEX  Take 1 capsule by mouth 3 (Three) Times a Day.     cycloSPORINE 0.05 % ophthalmic emulsion  Commonly known as:  RESTASIS     divalproex 250 MG DR tablet  Commonly known as:  DEPAKOTE     EFFEXOR XR 75 MG 24 hr capsule  Generic drug:  venlafaxine XR     esomeprazole 20 MG capsule  Commonly known as:  nexIUM     glipiZIDE 5 MG ER tablet  Commonly known as:  GLUCOTROL XL     levothyroxine 100 MCG tablet  Commonly known as:  SYNTHROID, LEVOTHROID     loratadine 10 MG tablet  Commonly known as:  CLARITIN     losartan 100 MG tablet  Commonly known as:  COZAAR     metoprolol tartrate 50 MG tablet  Commonly known as:  LOPRESSOR     mometasone 50 MCG/ACT nasal spray  Commonly known as:  NASONEX     montelukast 10 MG tablet  Commonly known as:  SINGULAIR     QUEtiapine 25 MG tablet  Commonly known as:  SEROquel     * ranitidine 300 MG capsule  Commonly known as:  ZANTAC     * raNITIdine 300 MG tablet  Commonly known as:  ZANTAC     STOOL SOFTENER 250 MG capsule  Generic drug:  docusate sodium     traZODone 50 MG tablet  Commonly known as:  DESYREL         * This list has 4 medication(s) that are the same as other medications   prescribed for you. Read the directions carefully, and ask your doctor or   other care provider to review them with you.                        MDM  Number of Diagnoses or Management Options  Acute parotitis: new and requires workup  Sepsis, due to unspecified organism (CMS/HCC): new and requires workup  Urinary tract infection without hematuria, site unspecified: new and requires workup     Amount and/or Complexity of Data Reviewed  Clinical lab tests: ordered and reviewed  Tests in the radiology section of CPT®: ordered and reviewed  Obtain history from someone other than the patient: yes  Independent visualization of images, tracings, or specimens:  yes    Risk of Complications, Morbidity, and/or Mortality  Presenting problems: high  Diagnostic procedures: high  Management options: high    Patient Progress  Patient progress: (Fair)        Final diagnoses:   Sepsis, due to unspecified organism (CMS/[facility])   Urinary tract infection without hematuria, site unspecified   Acute parotitis            Gilmer Allan MD  01/07/19 021

## 2019-01-07 NOTE — PHARMACY PATIENT ASSISTANCE
Pharmacy checked on the cost of patient's home medication Eliquis. Per pharmacy, patient has $0 copay. No further issue noted at this time.    Thanks,    Joelle Alcazar, PharmD candidate 2019    09:14 AM  1/7/2019

## 2019-01-07 NOTE — NURSING NOTE
Stage 2 noted to left and right gluteals  Treatment ordered     01/07/19 0950   Wound 01/07/19 0401 Right posterior gluteal pressure injury   Date first assessed/Time first assessed: 01/07/19 0401   Present On Admission : yes  Side: Right  Orientation: posterior  Location: gluteal  Type: pressure injury  Stage, Pressure Injury: Stage 2   Dressing Appearance open to air   Base pink;moist;clean   Wound Length (cm) 0.5 cm   Wound Width (cm) 0.5 cm   Drainage Characteristics/Odor clear   Drainage Amount scant   Wound 01/07/19 0403 Left lower gluteal pressure injury   Date first assessed/Time first assessed: 01/07/19 0403   Side: Left  Orientation: lower  Location: gluteal  Type: pressure injury  Stage, Pressure Injury: Stage 2   Dressing Appearance open to air   Base pink;moist;clean   Wound Length (cm) 0.4 cm   Wound Width (cm) 0.4 cm   Drainage Characteristics/Odor clear   Drainage Amount scant

## 2019-01-07 NOTE — PLAN OF CARE
Problem: Patient Care Overview  Goal: Plan of Care Review  Outcome: Ongoing (interventions implemented as appropriate)   01/07/19 1343   Coping/Psychosocial   Plan of Care Reviewed With patient   Plan of Care Review   Progress no change       Problem: Fall Risk (Adult)  Goal: Absence of Fall  Outcome: Ongoing (interventions implemented as appropriate)   01/07/19 1343   Fall Risk (Adult)   Absence of Fall making progress toward outcome       Problem: Skin Injury Risk (Adult)  Goal: Skin Health and Integrity  Outcome: Ongoing (interventions implemented as appropriate)   01/07/19 1343   Skin Injury Risk (Adult)   Skin Health and Integrity making progress toward outcome       Problem: Wound (Includes Pressure Injury) (Adult)  Goal: Signs and Symptoms of Listed Potential Problems Will be Absent, Minimized or Managed (Wound)  Outcome: Ongoing (interventions implemented as appropriate)   01/07/19 1343   Goal/Outcome Evaluation   Problems Assessed (Wound) all   Problems Present (Wound) pain;delayed wound healing       Problem: Cognitive Impairment (Dementia Signs/Symptoms) (Adult)  Goal: Optimized Cognitive Function (Dementia Signs/Symptoms)  Outcome: Ongoing (interventions implemented as appropriate)   01/07/19 1343   Optimized Cognitive Function (Dementia Signs/Symptoms)   Optimized Cognitive Ability Action Step (STG) Outcome making progress toward outcome       Problem: Self-Care Deficit (Adult,Obstetrics,Pediatric)  Goal: Improved Ability to Perform BADL and IADL  Outcome: Ongoing (interventions implemented as appropriate)   01/07/19 1343   Self-Care Deficit (Adult,Obstetrics,Pediatric)   Improved Ability to Perform BADL and IADL making progress toward outcome       Problem: Diabetes, Type 2 (Adult)  Goal: Signs and Symptoms of Listed Potential Problems Will be Absent, Minimized or Managed (Diabetes, Type 2)  Outcome: Ongoing (interventions implemented as appropriate)   01/07/19 1343   Goal/Outcome Evaluation   Problems  Assessed (Type 2 Diabetes) all   Problems Present (Type 2 Diabetes) none       Problem: Dysphagia (Adult)  Goal: Functional/Safe Swallow  Outcome: Ongoing (interventions implemented as appropriate)   01/07/19 6813   Dysphagia (Adult)   Functional/Safe Swallow making progress toward outcome

## 2019-01-07 NOTE — H&P
[facility] HOSPITALIST HISTORY AND PHYSICAL    Patient Identification:  Name:  Jeanna Flower  Age:  78 y.o.  Sex:  female  :  1940  MRN:  [id number]   Visit Number:  [id number]  Room number:  3327/1P  Primary Care Physician:  Adela Mathew MD     Subjective     Chief complaint:    Chief Complaint   Patient presents with   • Fatigue   • Neck Swelling     History of presenting illness:  78 y.o. female who presented to [facility] from home with redness and swelling of the right side of the neck.  The patient has dementia and as a result I am not able to obtain any history from her.  The POA, which is also the patient's granddaughter, is at bedside.  Also at bedside is a great grandson.  The area in question seemed to appear overnight.  She has not been on antibiotics for this yet.  The family has not noticed a fever.  They suspect that the patient's memory problems are worsening and that this is why she is not wanting to eat; she is not even drinking water that much.  The patient no longer knows the names of the family in the room with her currently.    The patient had told the granddaughter when she had recovered from her stroke that she did not want resuscitation and did not want a feeding tube.  The family has been trying since May of 2018 to get the patient into a nursing home and no one had a bed.  They have also talked to hospice but this was not enough to help the family as the granddaughter has to work.  Home health is set up but despite this the patient continues to have so much healthcare needs that it is difficult for the family to keep up with all of it and work full time.  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 12/6/2017   • Diabetes mellitus (CMS/[facility])    • Heart disease    • Hyperlipidemia    • Hypertension    • Other sleep apnea 11/13/2018   • Paroxysmal atrial fibrillation (CMS/[facility]) 12/6/2017     Past Surgical History:   Procedure Laterality Date   • APPENDECTOMY     • BLADDER SURGERY     • CARDIAC CATHETERIZATION     • CHOLECYSTECTOMY       Family History   Problem Relation Age of Onset   • Heart disease Mother    • Heart attack Mother    • Heart disease Father    • Heart attack Father    • Heart attack Brother    • Heart attack Brother      Socioeconomic History   • Marital status: Single   Tobacco Use   • Smoking status: Never Smoker   • Smokeless tobacco: Never Used   Substance and Sexual Activity   • Alcohol use: No   • Drug use: No   • Sexual activity: Defer   ---------------------------------------------------------------------------------------------------------------------   Allergies:  Bactrim [sulfamethoxazole-trimethoprim]; Codeine; and Penicillins  ---------------------------------------------------------------------------------------------------------------------   Medications below are reported home medications pulling from within the system; at this time, these medications have not been reconciled unless otherwise specified and are in the verification process for further verifcation as current home medications.    Prior to Admission Medications     Prescriptions Last Dose Informant Patient Reported? Taking?    albuterol sulfate  (90 Base) MCG/ACT inhaler 1/6/2019 Care Giver Yes Yes    Inhale 2 puffs Every 4 (Four) Hours As Needed for Wheezing or Shortness of Air.    amantadine (SYMMETREL) 50 MG/5ML solution 1/6/2019 Care Giver Yes Yes    100 mg by Per G Tube route Every 12 (Twelve) Hours.    apixaban (ELIQUIS) 5 MG tablet tablet 1/6/2019 Care Giver No Yes    Take 1 tablet by mouth Every 12 (Twelve) Hours.     atorvastatin (LIPITOR) 40 MG tablet 1/5/2019 Care Giver Yes Yes    40 mg by Per G Tube route Every Night.    budesonide (PULMICORT) 0.5 MG/2ML nebulizer solution 1/6/2019 Care Giver Yes Yes    Take 0.5 mg by nebulization 2 (Two) Times a Day.    castor oil-balsam peru (VENELEX) ointment 1/6/2019 Care Giver Yes Yes    Apply 1 each topically to the appropriate area as directed 2 (Two) Times a Day.    cycloSPORINE (RESTASIS) 0.05 % ophthalmic emulsion 1/6/2019 Care Giver Yes Yes    1 drop 2 (Two) Times a Day.    dantrolene (DANTRIUM) 25 MG capsule 1/5/2019 Care Giver Yes Yes    25 mg by Per G Tube route Every Night.    docusate sodium (COLACE) 150 MG/15ML liquid 1/6/2019 Care Giver Yes Yes    100 mg by Per G Tube route 2 (Two) Times a Day.    famotidine (PEPCID) 20 MG tablet 1/6/2019 Care Giver Yes Yes    20 mg by Per G Tube route 2 (Two) Times a Day.    fluticasone (FLONASE) 50 MCG/ACT nasal spray 1/6/2019 Care Giver Yes Yes    2 sprays into the nostril(s) as directed by provider Daily.    fluticasone-salmeterol (ADVAIR DISKUS) 250-50 MCG/DOSE DISKUS 1/6/2019 Care Giver Yes Yes    Inhale 1 puff 2 (Two) Times a Day.    glipiZIDE (GLUCOTROL XL) 2.5 MG 24 hr tablet 1/6/2019 Care Giver Yes Yes    Take 2.5 mg by mouth Daily.    levothyroxine (SYNTHROID, LEVOTHROID) 100 MCG tablet 1/6/2019 Care Giver Yes Yes    Take 100 mcg by mouth Daily.    loratadine (CLARITIN) 10 MG tablet 1/6/2019 Care Giver Yes Yes    10 mg by Per G Tube route Daily.    losartan (COZAAR) 100 MG tablet 1/6/2019 Care Giver Yes Yes    100 mg by Per J Tube route Daily.    melatonin 3 MG tablet 1/5/2019 Care Giver Yes Yes    3 mg by Per G Tube route Every Night.    metoprolol tartrate (LOPRESSOR) 25 MG tablet 1/6/2019 Care Giver Yes Yes    25 mg by Per G Tube route Every 6 (Six) Hours.    miconazole (MICOTIN) 2 % powder 1/6/2019 Care Giver Yes Yes    Apply 1 application topically to the appropriate area as directed 3 (Three) Times a Day As Needed for  Itching (skin folds).    montelukast (SINGULAIR) 10 MG tablet 1/5/2019 Care Giver Yes Yes    Take 10 mg by mouth Every Night.    venlafaxine (EFFEXOR) 37.5 MG tablet 1/6/2019 Care Giver Yes Yes    37.5 mg by Per G Tube route 2 (Two) Times a Day.     Objective     Vital Signs:  Temp:  [97.6 °F (36.4 °C)-99.2 °F (37.3 °C)] 99.2 °F (37.3 °C)  Heart Rate:  [80-93] 92  Resp:  [18] 18  BP: ()/(41-82) 146/56    Mean Arterial Pressure (Non-Invasive) for the past 24 hrs (Last 3 readings):   Noninvasive MAP (mmHg)   01/07/19 0231 92   01/07/19 0116 75   01/07/19 0046 71     SpO2:  [78 %-96 %] 90 %  on  Flow (L/min):  [2.5] 2.5;   Device (Oxygen Therapy): nasal cannula  Body mass index is 30.02 kg/m².    Wt Readings from Last 3 Encounters:   01/07/19 76.8 kg (169 lb 6.4 oz)   12/05/18 87.1 kg (192 lb)   11/30/18 84.4 kg (186 lb)               ---------------------------------------------------------------------------------------------------------------------   Physical Exam:  Constitutional:  Well-developed and well-nourished.  No respiratory distress.  Confused, looking blankly at the ceiling.      HENT:  Head: Normocephalic and atraumatic.  Mouth:  Moist mucous membranes.  Right jaw line with edema and erythema and warmth without any skin lesions around the area.  Eyes:  Conjunctivae and EOM are normal.  Pupils are equal, round, and reactive to light.  No scleral icterus.  Neck:  Neck supple.  No JVD present.    Cardiovascular:  Normal rate, regular rhythm and normal heart sounds with no murmur.  Pulmonary/Chest:  No respiratory distress, no wheezes, no crackles, with normal breath sounds and good air movement.  Abdominal:  Soft.  Bowel sounds are normal.  No distension and no tenderness.   Musculoskeletal:  No edema, no tenderness, and contracted left arm and hand.  No red or swollen joints anywhere.    Neurological:  Alert but not able to answer orientation questions; she seems to talk incoherently at times with some  words that are understandable.  She has contracture of the left arm and hand.  She can move the right arm and leg.  Skin:  Skin is warm and dry.  No rash noted.  No pallor.   Peripheral vascular:  No edema and strong pulses on all 4 extremities.  Genitourinary:  No olivas catheter in place.  ---------------------------------------------------------------------------------------------------------------------  EKG:  NS with heart rate 92, QTc of 413 ms, flipped T waves in V1-V5 and the inferior leads.  Telemetry:  None  I have personally looked at both the EKG and the telemetry strips.  --------------------------------------------------------------------------------------------------------------------    Results from last 7 days   Lab Units  01/07/19   0155  01/06/19   2156   CRP mg/dL   --   9.85*   LACTATE mmol/L  1.2  2.5*   WBC 10*3/mm3   --   18.17*   HEMOGLOBIN g/dL   --   13.2   HEMATOCRIT %   --   43.9   MCV fL   --   93.0   MCHC g/dL   --   30.1*   PLATELETS 10*3/mm3   --   238   INR    --   1.36*     Results from last 7 days   Lab Units  01/06/19   2156   SODIUM mmol/L  137   POTASSIUM mmol/L  4.6   CHLORIDE mmol/L  103   CO2 mmol/L  20.8*   BUN mg/dL  25*   CREATININE mg/dL  1.18   EGFR IF NONAFRICN AM mL/min/1.73  44*   CALCIUM mg/dL  8.9   GLUCOSE mg/dL  157*   ALBUMIN g/dL  3.60   BILIRUBIN mg/dL  0.8   ALK PHOS U/L  99   AST (SGOT) U/L  29   ALT (SGPT) U/L  32   Estimated Creatinine Clearance: 39.2 mL/min (by C-G formula based on SCr of 1.18 mg/dL).        I have personally looked at the labs and they are summarized above.  ----------------------------------------------------------------------------------------------------------------------  Imaging Results (last 24 hours)     Procedure Component Value Units Date/Time    CT Soft Tissue Neck With Contrast [[id number]] Resulted:  01/07/19 0112       Updated:  01/07/19 0113        I have personally reviewed the radiology images and read the available  preliminary radiology report.    Assessment & Plan    -Sepsis that was present on admission (heart rate 93, white blood cell count 18,170, CRP 9.85, LA 2.5) due to a urinary tract infection +/- Suppurative parotitis   -Vascular dementia with metabolic encephalopathy due to the above  -Hypothyroidism  -Chronic kidney disease stage III Baseline creatinine 0.9-1.15  -Paroxysmal atrial fibrillation, currently normal sinus rhythm  -Type 2 diabetes mellitus  -Essential hypertension  -Prior CVA with left sided hemiplegia   -Functional quadriplegia    Per the sepsis protocol, we will start the patient on vancomycin and Zosyn for the urinary tract infection and possible suppurative parotitis.  We have asked that the urine obtained in the emergency department be sent for culture.  I will consult palliative care to discuss goals of care with the POA.  I will consult social work as the family needs help with patient's medical care.  I have consulted speech therapy, PT, and OT for now in case the patient is able to go to a nursing home.  The patient does not desire a feeding tube so I'm not sure if she will take her medication; I have tried to stop a lot of her home medication so that she is less to take.  I will stop all diabetes medications as she is not eating and do random glucose measurements.  I will hold all antihypertensives that she is not eating.  I have continued her home Eliquis for the A. fib and if she is unable to take this when May asked the family if we need to change this over to IV heparin or subcutaneous Lovenox.    Enoc Medel MD  Hospital Medicine Team  01/07/19  4:10 AM

## 2019-01-07 NOTE — PLAN OF CARE
Problem: Patient Care Overview  Goal: Plan of Care Review  Outcome: Ongoing (interventions implemented as appropriate)   01/07/19 0424   Coping/Psychosocial   Plan of Care Reviewed With patient   Plan of Care Review   Progress no change       Problem: Fall Risk (Adult)  Goal: Identify Related Risk Factors and Signs and Symptoms  Outcome: Ongoing (interventions implemented as appropriate)   01/07/19 0424   Fall Risk (Adult)   Related Risk Factors (Fall Risk) age-related changes;bladder function altered;confusion/agitation;history of falls;gait/mobility problems;environment unfamiliar   Signs and Symptoms (Fall Risk) presence of risk factors     Goal: Absence of Fall  Outcome: Ongoing (interventions implemented as appropriate)   01/07/19 0424   Fall Risk (Adult)   Absence of Fall making progress toward outcome       Problem: Skin Injury Risk (Adult)  Goal: Identify Related Risk Factors and Signs and Symptoms  Outcome: Ongoing (interventions implemented as appropriate)   01/07/19 0424   Skin Injury Risk (Adult)   Related Risk Factors (Skin Injury Risk) advanced age;cognitive impairment;mobility impaired;moisture;nutritional deficiencies     Goal: Skin Health and Integrity  Outcome: Ongoing (interventions implemented as appropriate)   01/07/19 0424   Skin Injury Risk (Adult)   Skin Health and Integrity making progress toward outcome       Problem: Wound (Includes Pressure Injury) (Adult)  Goal: Signs and Symptoms of Listed Potential Problems Will be Absent, Minimized or Managed (Wound)  Outcome: Ongoing (interventions implemented as appropriate)   01/07/19 0424   Goal/Outcome Evaluation   Problems Assessed (Wound) all   Problems Present (Wound) situational response       Problem: Cognitive Impairment (Dementia Signs/Symptoms) (Adult)  Goal: Optimized Cognitive Function (Dementia Signs/Symptoms)  Outcome: Ongoing (interventions implemented as appropriate)   01/07/19 0424   Optimized Cognitive Function (Dementia  Signs/Symptoms)   Optimized Cognitive Ability Action Step (STG) Outcome making progress toward outcome       Problem: Self-Care Deficit (Adult,Obstetrics,Pediatric)  Goal: Identify Related Risk Factors and Signs and Symptoms  Outcome: Ongoing (interventions implemented as appropriate)   01/07/19 0424   Self-Care Deficit (Adult,Obstetrics,Pediatric)   Related Risk Factors (Self-Care Deficit) activity intolerance;cognitive impairment;immobility   Signs and Symptoms (Self-Care Deficit) cognitive changes;decreased functional activity tolerance;range of motion decreased;inability/unwillingness to perform self-care;unable to perform bathing/hygiene tasks;unable to perform feeding tasks;unable to perform toileting/toilet hygiene tasks     Goal: Improved Ability to Perform BADL and IADL  Outcome: Ongoing (interventions implemented as appropriate)   01/07/19 0424   Self-Care Deficit (Adult,Obstetrics,Pediatric)   Improved Ability to Perform BADL and IADL unable to achieve outcome

## 2019-01-07 NOTE — THERAPY EVALUATION
Acute Care - Physical Therapy Initial Evaluation   Tom     Patient Name: Jeanna Flower  : 1940  MRN: [id number]  Today's Date: 2019   Onset of Illness/Injury or Date of Surgery: 19  Date of Referral to PT: 19  Referring Physician: Dr. Medel      Admit Date: 2019    Visit Dx:     ICD-10-CM ICD-9-CM   1. Sepsis, due to unspecified organism (CMS/Formerly [facility]) A41.9 038.9     995.91   2. Urinary tract infection without hematuria, site unspecified N39.0 599.0   3. Acute parotitis K11.21 527.2     Patient Active Problem List   Diagnosis   • Paroxysmal atrial fibrillation (CMS/Formerly [facility])   • Essential hypertension   • Type 2 diabetes mellitus without complication (CMS/Formerly [facility])   • CVA (cerebral vascular accident) with left hemiparesis.   • Other sleep apnea   • Sepsis (CMS/Formerly [facility])     Past Medical History:   Diagnosis Date   • Anemia    • Closed wedge compression fracture of first lumbar vertebra (CMS/Formerly [facility])    • COPD (chronic obstructive pulmonary disease) (CMS/Formerly [facility])    • CVA (cerebral vascular accident) with left hemiparesis. 2017   • Dementia    • Diabetes mellitus (CMS/Formerly [facility])    • Dysarthria following cerebral infarction    • Dysphagia, oropharyngeal phase    • Dysphagia, pharyngoesophageal phase    • GERD (gastroesophageal reflux disease)    • Heart disease    • Hyperlipidemia    • Hypertension    • Hypothyroidism    • Insomnia    • Obesity    • Other sleep apnea 2018   • Paroxysmal atrial fibrillation (CMS/HCC) 2017     Past Surgical History:   Procedure Laterality Date   • APPENDECTOMY     • BLADDER SURGERY     • CARDIAC CATHETERIZATION     • CHOLECYSTECTOMY          PT ASSESSMENT (last 12 hours)      Physical Therapy Evaluation     Row Name 19 1626          PT Evaluation Time/Intention    Subjective Information  no complaints  -AG     Document Type  evaluation  -AG     Mode of Treatment  individual therapy;physical therapy  -AG     Patient Effort  fair  -AG     Symptoms Noted  During/After Treatment  fatigue  -AG     Row Name 01/07/19 1626          General Information    Patient Profile Reviewed?  yes  -AG     Onset of Illness/Injury or Date of Surgery  01/06/19  -AG     Referring Physician  Dr. Medel  -     Patient Observations  alert;cooperative  -AG     Patient/Family Observations  pt. supine; awake, confused; patient is speaking incoherently.   -AG     Prior Level of Function  -- per chart, lives with granddaughter; uses RW for mobility  -AG     Equipment Currently Used at Home  bath bench;hospital bed;walker, rolling  -AG     Pertinent History of Current Functional Problem  pt. presents with UTI, sepsis; hx dementia; family requests SNF placement  -AG     Existing Precautions/Restrictions  fall  -AG     Limitations/Impairments  safety/cognitive  -AG     Equipment Issued to Patient  gait belt  -AG     Risks Reviewed  patient:;LOB;dizziness;increased discomfort;change in vital signs  -AG     Benefits Reviewed  patient:;improve function;increase independence;increase strength;increase balance;decrease risk of DVT;increase knowledge  -AG     Barriers to Rehab  cognitive status  -AG     Row Name 01/07/19 1626          Relationship/Environment    Lives With  grandchild(corrina)  -AG     Row Name 01/07/19 1626          Resource/Environmental Concerns    Current Living Arrangements  home/apartment/condo  -AG     Row Name 01/07/19 1626          Cognitive Assessment/Intervention- PT/OT    Orientation Status (Cognition)  oriented to;person  -AG     Follows Commands (Cognition)  follows one step commands;50-74% accuracy;physical/tactile prompts required;repetition of directions required;verbal cues/prompting required  -AG     Safety Deficit (Cognitive)  moderate deficit  -AG     Row Name 01/07/19 1626          Safety Issues, Functional Mobility    Safety Issues Affecting Function (Mobility)  ability to follow commands;awareness of need for assistance;insight into deficits/self awareness;safety  precaution awareness;safety precautions follow-through/compliance  -     Impairments Affecting Function (Mobility)  balance;cognition;coordination;endurance/activity tolerance;postural/trunk control;strength  -AG     Row Name 01/07/19 1626          Mobility Assessment/Treatment    Extremity Weight-bearing Status  left lower extremity;right lower extremity  -AG     Left Lower Extremity (Weight-bearing Status)  weight-bearing as tolerated (WBAT)  -AG     Right Lower Extremity (Weight-bearing Status)  weight-bearing as tolerated (WBAT)  -AG     Row Name 01/07/19 1626          Bed Mobility Assessment/Treatment    Bed Mobility Assessment/Treatment  scooting/bridging;supine-sit;sit-supine  -AG     Scooting/Bridging [place] (Bed Mobility)  verbal cues;nonverbal cues (demo/gesture);maximum assist (25% patient effort);2 person assist  -AG     Supine-Sit [place] (Bed Mobility)  verbal cues;nonverbal cues (demo/gesture);maximum assist (25% patient effort);2 person assist  -AG     Sit-Supine [place] (Bed Mobility)  verbal cues;nonverbal cues (demo/gesture);maximum assist (25% patient effort);2 person assist  -AG     Bed Mobility, Safety Issues  cognitive deficits limit understanding;decreased use of arms for pushing/pulling;decreased use of legs for bridging/pushing  -     Assistive Device (Bed Mobility)  bed rails;draw sheet  -     Row Name 01/07/19 1626          Transfer Assessment/Treatment    Transfer Assessment/Treatment  sit-stand transfer;stand-sit transfer  -     Sit-Stand [place] (Transfers)  verbal cues;nonverbal cues (demo/gesture);moderate assist (50% patient effort);2 person assist  -AG     Stand-Sit [place] (Transfers)  verbal cues;nonverbal cues (demo/gesture);moderate assist (50% patient effort);2 person assist  -AG     Row Name 01/07/19 1626          Sit-Stand Transfer    Assistive Device (Sit-Stand Transfers)  walker, front-wheeled  -     Row Name 01/07/19 1626           Stand-Sit Transfer    Assistive Device (Stand-Sit Transfers)  walker, front-wheeled  -AG     Row Name 01/07/19 1626          Gait/Stairs Assessment/Training    [place] Level (Gait)  unable to assess  -AG     Row Name 01/07/19 1626          General ROM    GENERAL ROM COMMENTS  R LE AAROM/ PROM WFL; L side hemiparetic  -AG     Row Name 01/07/19 1626          MMT (Manual Muscle Testing)    General MMT Comments  unable to formally assess  -AG     Row Name 01/07/19 1626          Motor Assessment/Intervention    Additional Documentation  Balance (Group)  -AG     Row Name 01/07/19 1626          Balance    Balance  static sitting balance;static standing balance  -AG     Row Name 01/07/19 1626          Static Sitting Balance    Level of [place] (Unsupported Sitting, Static Balance)  minimal assist, 75% patient effort  -AG     Time Able to Maintain Position (Unsupported Sitting, Static Balance)  more than 5 minutes  -AG     Row Name 01/07/19 1626          Static Standing Balance    Level of [place] (Supported Standing, Static Balance)  moderate assist, 50 to 74% patient effort;other (see comments) x  2  -AG     Time Able to Maintain Position (Supported Standing, Static Balance)  15 to 30 seconds x 3 trials  -AG     Assistive Device Utilized (Supported Standing, Static Balance)  rolling walker  -AG     Comment (Supported Standing, Static Balance)  attempts to cross feet in standing  -AG     Row Name 01/07/19 1626          Sensory Assessment/Intervention    Sensory General Assessment  -- unable to formally assess  -AG     Row Name 01/07/19 1626          Vision Assessment/Intervention    Visual Impairment/Limitations  unable/difficult to assess  -AG     Row Name 01/07/19 1626          Pain Assessment    Additional Documentation  Pain Scale: FACES Pre/Post-Treatment (Group)  -AG     Row Name 01/07/19 1626          Pain Scale: FACES Pre/Post-Treatment    Pain: FACES Scale, Pretreatment  0-->no hurt  -AG     Pain:  FACES Scale, Post-Treatment  0-->no hurt  -AG     Row Name             Wound 01/07/19 0401 Right posterior gluteal pressure injury    Wound - Properties Group Date first assessed: 01/07/19  -CC Time first assessed: 0401  -CC Present On Admission : yes  -CC Side: Right  -CC Orientation: posterior  -CC Location: gluteal  -CC Type: pressure injury  -CC Stage, Pressure Injury: Stage 2  -CC    Row Name             Wound 01/07/19 0403 Left lower gluteal pressure injury    Wound - Properties Group Date first assessed: 01/07/19  -CC Time first assessed: 0403  -CC Side: Left  -CC Orientation: lower  -CC Location: gluteal  -CC Type: pressure injury  -CC Stage, Pressure Injury: Stage 2  -CC    Row Name 01/07/19 1626          Coping    Observed Emotional State  accepting  -AG     Verbalized Emotional State  acceptance  -AG     Row Name 01/07/19 1626          Plan of Care Review    Plan of Care Reviewed With  patient  -AG     Row Name 01/07/19 1626          Physical Therapy Clinical Impression    Date of Referral to PT  01/07/19  -AG     PT Diagnosis (PT Clinical Impression)  decr functional mobility; decr balance  -AG     Prognosis (PT Clinical Impression)  fair  -AG     Functional Level at Time of Evaluation (PT Clinical Impression)  Mod A x 2/ Max A x 2 bed mobility   -AG     Patient/Family Goals Statement (PT Clinical Impression)  pt unable to formulate goal  -AG     Criteria for Skilled Interventions Met (PT Clinical Impression)  yes;treatment indicated  -AG     Pathology/Pathophysiology Noted (Describe Specifically for Each System)  musculoskeletal;neuromuscular  -AG     Impairments Found (describe specific impairments)  aerobic capacity/endurance;arousal, attention, and cognition;ergonomics and body mechanics;gait, locomotion, and balance;joint integrity and mobility;motor function;ROM  -AG     Functional Limitations in Following Categories (Describe Specific Limitations)  self-care;community/leisure  -AG     Rehab  Potential (PT Clinical Summary)  fair, will monitor progress closely  -AG     Predicted Duration of Therapy (PT)  3-5 days  -AG     Care Plan Review (PT)  evaluation/treatment results reviewed;care plan/treatment goals reviewed;risks/benefits reviewed;current/potential barriers reviewed  -AG     Care Plan Review, Other Participant (PT Clinical Impression)  -- no family or visitors present at time of evaluation  -AG     Row Name 01/07/19 1626          Physical Therapy Goals    Bed Mobility Goal Selection (PT)  bed mobility, PT goal 1  -AG     Transfer Goal Selection (PT)  transfer, PT goal 1;transfer, PT goal 2  -AG     Row Name 01/07/19 1626          Bed Mobility Goal 1 (PT)    Activity/Assistive Device (Bed Mobility Goal 1, PT)  sit to supine;supine to sit  -AG     [place] Level/Cues Needed (Bed Mobility Goal 1, PT)  moderate assist (50-74% patient effort)  -AG     Time Frame (Bed Mobility Goal 1, PT)  by discharge  -AG     Row Name 01/07/19 1626          Transfer Goal 1 (PT)    Activity/Assistive Device (Transfer Goal 1, PT)  sit-to-stand/stand-to-sit;walker, rolling  -AG     [place] Level/Cues Needed (Transfer Goal 1, PT)  minimum assist (75% or more patient effort)  -AG     Time Frame (Transfer Goal 1, PT)  by discharge  -AG     Row Name 01/07/19 1626          Transfer Goal 2 (PT)    Activity/Assistive Device (Transfer Goal 2, PT)  bed-to-chair/chair-to-bed;walker, rolling  -AG     [place] Level/Cues Needed (Transfer Goal 2, PT)  moderate assist (50-74% patient effort)  -AG     Time Frame (Transfer Goal 2, PT)  by discharge  -AG     Row Name 01/07/19 1626          Positioning and Restraints    Pre-Treatment Position  in bed  -AG     Post Treatment Position  bed  -AG     In Bed  notified nsg;supine;call light within reach;encouraged to call for assist;exit alarm on;side rails up x3  -AG     Row Name 01/07/19 1626          Living Environment    Home Accessibility  -- unknown  -AG       User Key  (r)  = Recorded By, (t) = Taken By, (c) = Cosigned By    Initials Name Provider Type    CC Charlene Lockwood, RN Registered Nurse    Saadia Cunningham, PT Physical Therapist        Physical Therapy Education     Title: PT OT SLP Therapies (In Progress)     Topic: Physical Therapy (In Progress)     Point: Mobility training (In Progress)     Learning Progress Summary           Patient Acceptance, E,D, NR by  at 1/7/2019  4:41 PM                   Point: Home exercise program (In Progress)     Learning Progress Summary           Patient Acceptance, E,D, NR by  at 1/7/2019  4:41 PM                   Point: Body mechanics (In Progress)     Learning Progress Summary           Patient Acceptance, E,D, NR by  at 1/7/2019  4:41 PM                   Point: Precautions (In Progress)     Learning Progress Summary           Patient Acceptance, E,D, NR by  at 1/7/2019  4:41 PM                               User Key     Initials Effective Dates Name Provider Type Discipline     04/03/18 -  Saadia Kc, PT Physical Therapist PT              PT Recommendation and Plan  Anticipated Discharge Disposition (PT): skilled nursing facility  Planned Therapy Interventions (PT Eval): balance training, bed mobility training, gait training, home exercise program, manual therapy techniques, neuromuscular re-education, patient/family education, postural re-education, ROM (range of motion), strengthening, transfer training  Therapy Frequency (PT Clinical Impression): other (see comments)(3-5 times/ week per priority policy)  Outcome Summary/Treatment Plan (PT)  Anticipated Equipment Needs at Discharge (PT): (TBD)  Anticipated Discharge Disposition (PT): skilled nursing facility  Plan of Care Reviewed With: patient  Outcome Measures     Row Name 01/07/19 1600             How much help from another person do you currently need...    Turning from your back to your side while in flat bed without using bedrails?  2  -AG      Moving from lying on  back to sitting on the side of a flat bed without bedrails?  2  -AG      Moving to and from a bed to a chair (including a wheelchair)?  1  -AG      Standing up from a chair using your arms (e.g., wheelchair, bedside chair)?  2  -AG      Climbing 3-5 steps with a railing?  1  -AG      To walk in hospital room?  1  -AG      AM-PAC 6 Clicks Score  9  -AG         Functional Assessment    Outcome Measure Options  AM-PAC 6 Clicks Basic Mobility (PT)  -AG        User Key  (r) = Recorded By, (t) = Taken By, (c) = Cosigned By    Initials Name Provider Type    AG Saadia Kc, PT Physical Therapist         Time Calculation:   PT Charges     Row Name 01/07/19 1642             Time Calculation    PT Received On  01/07/19  -      PT - Next Appointment  01/08/19  -      PT Goal Re-Cert Due Date  01/21/19  -         Time Calculation- PT    TCU Minutes- PT  25 min  -AG        User Key  (r) = Recorded By, (t) = Taken By, (c) = Cosigned By    Initials Name Provider Type    Saadia Cunningham, PT Physical Therapist        Therapy Suggested Charges     Code   Minutes Charges    None           Therapy Charges for Today     Code Description Service Date Service Provider Modifiers Qty    30387346044 HC PT MOBILITY PROJECTED 1/7/2019 Saadia Kc, PT  1    22485432796 HC PT THER SUPP EA 15 MIN 1/7/2019 Saadia Kc, PT GP 2    02231410774 HC PT THERAPEUTIC ACT EA 15 MIN 1/7/2019 Saadia Kc, PT GP 1    75434220921 HC PT EVAL HIGH COMPLEXITY 1 1/7/2019 Saadia Kc, PT GP 1          PT G-Codes  Outcome Measure Options: AM-PAC 6 Clicks Basic Mobility (PT)  AM-PAC 6 Clicks Score: 9  Functional Limitation: Mobility: Walking and moving around  Mobility: Walking and Moving Around Goal Status (): At least 40 percent but less than 60 percent impaired, limited or restricted      Saadia Kc PT  1/7/2019

## 2019-01-07 NOTE — CONSULTS
Adela Mathew MD  Consulting physician: Dr. Medel   Reason for referral Goals of Care, ACP, Hospice referral, Support for family and patient   Chief Complaint   Patient presents with   • Fatigue   • Neck Swelling       HPI: 78 year old patient from home, presented to the ED with right sided neck swelling.  Patient graddaughter, Ernestina Chamberlain, is supposed to be POA.  No papers on chart- RN April from palliative team to contact Ernestina today via telephone because no family at bedside.  After ED work up, patient found to be septic 2/2 to UTI vs. Suppurative right side parotiditis.  Patient has advanced dementia and family reported a decline in PO intake and increased debility.  They have been attempting to get patient into NH since May 2018 without any beds being available.  Patient has PMH of hypothyroidism, CKD III, Paroxysmal atrial fib, DM II, HTN, prior CVA with left hemiplegia- functional quadriplegia.     Symptoms:   Patient mumbles incoherently.  She does grab her right side neck and tells me her neck hurts.  This is the only thing I could make out of patient.  She could not answer questions.  She was noted to be tender on right side neck, adenopathy noted. Patient in isolation.  No family at bedside.      Advance care planning discussed: Yes via phone with palliative RN  Code Status:   Current Code Status     Date Active Code Status Order ID Comments User Context       1/7/2019 05:27 No CPR [id number]  Enoc Medel MD Inpatient       Questions for Current Code Status     Question Answer Comment    Code Status No CPR     Medical Interventions (Level of Support Prior to Arrest) Limited     Limited Support to NOT Include Artificial Nutrition      Blood Products      Cardioversion/Defibrillation      Dialysis      Intubation         Advance Directive: None on chart   Surrogate decision maker:  Ernestina Chamberlain - MAGGI, asked to bring in papers   Past Medical History:   Diagnosis Date   • Anemia    • Closed wedge  compression fracture of first lumbar vertebra (CMS/[facility])    • COPD (chronic obstructive pulmonary disease) (CMS/[facility])    • CVA (cerebral vascular accident) with left hemiparesis. 12/6/2017   • Dementia    • Diabetes mellitus (CMS/[facility])    • Dysarthria following cerebral infarction    • Dysphagia, oropharyngeal phase    • Dysphagia, pharyngoesophageal phase    • GERD (gastroesophageal reflux disease)    • Heart disease    • Hyperlipidemia    • Hypertension    • Hypothyroidism    • Insomnia    • Obesity    • Other sleep apnea 11/13/2018   • Paroxysmal atrial fibrillation (CMS/[facility]) 12/6/2017     Past Surgical History:   Procedure Laterality Date   • APPENDECTOMY     • BLADDER SURGERY     • CARDIAC CATHETERIZATION     • CHOLECYSTECTOMY         Reviewed current scheduled and prn medications for route, type, dose and frequency.    Current Facility-Administered Medications   Medication Dose Route Frequency Provider Last Rate Last Dose   • albuterol (PROVENTIL) nebulizer solution 0.083% 2.5 mg/3mL  2.5 mg Nebulization Q4H PRN Enoc Medel MD   2.5 mg at 01/07/19 0641   • apixaban (ELIQUIS) tablet 5 mg  5 mg Oral Q12H Enoc Medel MD   5 mg at 01/07/19 0814   • atorvastatin (LIPITOR) tablet 40 mg  40 mg Per G Tube Nightly Enoc Medel MD       • aztreonam (AZACTAM) 2 g/100 mL 0.9% NS (mbp)  2 g Intravenous Q8H Enoc Medel MD   2 g at 01/07/19 0814   • budesonide (PULMICORT) nebulizer solution 0.5 mg  0.5 mg Nebulization BID - RT Enoc Medel MD   0.5 mg at 01/07/19 0647   • castor oil-balsam peru (VENELEX) ointment 5 g  1 each Topical Q12H Enoc Medel MD   5 g at 01/07/19 0814   • cycloSPORINE (RESTASIS) 0.05 % ophthalmic emulsion 1 drop  1 drop Both Eyes BID Enoc Medel MD       • dantrolene (DANTRIUM) capsule 25 mg  25 mg Per G Tube Nightly Enoc Medel MD       • docusate sodium (COLACE) liquid 100 mg  100 mg Per G Tube BID Enoc Medel MD       • famotidine (PEPCID)  tablet 20 mg  20 mg Per G Tube BID Enoc Medel MD   20 mg at 01/07/19 0814   • fluticasone (FLONASE) 50 MCG/ACT nasal spray 2 spray  2 spray Nasal Daily Enoc Medel MD   2 spray at 01/07/19 0814   • HYDROcodone-acetaminophen (NORCO) 5-325 MG per tablet 1 tablet  1 tablet Oral Q6H PRN Maria L Khan APRN       • levothyroxine (SYNTHROID, LEVOTHROID) tablet 100 mcg  100 mcg Oral Daily Enoc Medel MD   100 mcg at 01/07/19 0814   • sodium chloride 0.9 % flush 10 mL  10 mL Intravenous PRN Gilmer Allan MD       • sodium chloride 0.9 % flush 3 mL  3 mL Intravenous Q12H Enoc Medel MD   3 mL at 01/07/19 0815   • sodium chloride 0.9 % flush 3-10 mL  3-10 mL Intravenous PRN Enoc Medel MD       • vancomycin (VANCOCIN) 1,000 mg in Sodium chloride 0.9 % 250 mL IVPB  1,000 mg Intravenous Q24H Carley Clark [facility]       • venlafaxine (EFFEXOR) tablet 37.5 mg  37.5 mg Per G Tube BID With Meals Enoc Medel MD   37.5 mg at 01/07/19 0814        •  albuterol  •  HYDROcodone-acetaminophen  •  [COMPLETED] Insert peripheral IV **AND** sodium chloride  •  sodium chloride  Allergies   Allergen Reactions   • Bactrim [Sulfamethoxazole-Trimethoprim]    • Codeine    • Penicillins      Family History   Problem Relation Age of Onset   • Heart disease Mother    • Heart attack Mother    • Heart disease Father    • Heart attack Father    • Heart attack Brother    • Heart attack Brother      Social History     Socioeconomic History   • Marital status: Single     Spouse name: Not on file   • Number of children: Not on file   • Years of education: Not on file   • Highest education level: Not on file   Social Needs   • Financial resource strain: Not on file   • Food insecurity - worry: Not on file   • Food insecurity - inability: Not on file   • Transportation needs - medical: Not on file   • Transportation needs - non-medical: Not on file   Occupational History   • Not on file   Tobacco  "Use   • Smoking status: Never Smoker   • Smokeless tobacco: Never Used   Substance and Sexual Activity   • Alcohol use: No   • Drug use: No   • Sexual activity: Defer   Other Topics Concern   • Not on file   Social History Narrative   • Not on file       Review of Systems - +/- per HPI and symptom review.      PPS: 30  /60 (BP Location: Right arm, Patient Position: Lying)   Pulse 87   Temp 97.9 °F (36.6 °C) (Axillary)   Resp 18   Ht 160 cm (62.99\")   Wt 76.8 kg (169 lb 6.4 oz)   SpO2 97%   BMI 30.02 kg/m²   76.8 kg (169 lb 6.4 oz) Body mass index is 30.02 kg/m².  Intake & Output (last day)       01/06 0701 - 01/07 0700 01/07 0701 - 01/08 0700    P.O.  180    I.V. (mL/kg) 2000 (26)     IV Piggyback 7901     Total Intake(mL/kg) 9901 (128.9) 180 (2.3)    Net +9901 +180          Urine Unmeasured Occurrence 1 x 2 x          Physical Exam:  General Appearance: chronically ill appearing. Elderly   Head: Normocephalic without obvious abnormality, atraumatic  Eyes: Conjunctivae and sclerae normal, no icterus, MERRY  Throat: No oral lesions, no thrush, oral mucosa moist  Neck: right side adenopathy, edematous with erythema noted   Lungs: Clear to auscultation, respirations regular, even and non-labored  Heart: Regular rhythm and normal rate, normal S1  Abdomen: Normal bowel sounds, soft, non-distended, non-tender  Extremities: Moves all extremities, no redness, no cyanosis, no edema  Pulses: Distal pulses palpable and equal bilaterally  Skin: Warm and dry   Neurological: eyes closed mainly, does mumble, tells me her neck hurts, but mainly incoherent. Moves all extremities, squeezed hands when asked, otherwise could not follow any commands.  Could not answer orientation questions.   No family present     Reviewed labs and diagnostic results.  Lab Results   Component Value Date    HGBA1C 6.2 (H) 03/24/2014     Results from last 7 days   Lab Units  01/06/19   2156   WBC 10*3/mm3  18.17*   HEMOGLOBIN g/dL  13.2 "   HEMATOCRIT %  43.9   PLATELETS 10*3/mm3  238     Results from last 7 days   Lab Units  01/06/19   2156   SODIUM mmol/L  137   POTASSIUM mmol/L  4.6   CHLORIDE mmol/L  103   CO2 mmol/L  20.8*   BUN mg/dL  25*   CREATININE mg/dL  1.18   CALCIUM mg/dL  8.9   BILIRUBIN mg/dL  0.8   ALK PHOS U/L  99   ALT (SGPT) U/L  32   AST (SGOT) U/L  29   GLUCOSE mg/dL  157*     Results from last 7 days   Lab Units  01/06/19   2156   SODIUM mmol/L  137   POTASSIUM mmol/L  4.6   CHLORIDE mmol/L  103   CO2 mmol/L  20.8*   BUN mg/dL  25*   CREATININE mg/dL  1.18   GLUCOSE mg/dL  157*   CALCIUM mg/dL  8.9     Imaging Results (last 72 hours)     Procedure Component Value Units Date/Time    CT Soft Tissue Neck With Contrast [[id number]] Collected:  01/07/19 0724     Updated:  01/07/19 0739    Narrative:       EXAMINATION: CT SOFT TISSUE NECK W CONTRAST-      Technique: multiple CT axial images obtained through the neck, following  IV contrast administration. Coronal and sagittal reformatted images  obtained from the original axial data set to facilitate diagnosis and  surgical planning.     Radiation dose reduction techniques were utilized per ALARA protocol.  Automated exposure control was initiated through either or [employer] or  [employer] software packages by  protocol.       225.34 mGy.cm     CLINICAL INDICATION:     Neck mass, nonpulsatile, solitary      COMPARISON:    None.     FINDINGS:      There is motion artifact. The right-sided parotid gland is slightly more  hyperdense some prominent in compared to the left side that may  represent parotid gland inflammation..  No definite lymphadenopathy. Incidentally imaged blood vessels are  unremarkable. Bony structures are unremarkable.    Impression:       There is motion artifact. The right-sided parotid gland is  slightly more hyperdense some prominent in compared to the left side  that may represent parotid gland inflammation..     This report was finalized on 1/7/2019  7:37 AM by Dr. Giovanni Martin MD.           Impression: 78 y.o. female with advanced dementia, admitted for sepsis 2/2 right side suppurative parotiditis and UTI.      Plan:     1.  Goals of Care   - Code states currently is No CPR.  Palliative RN speaking with GD via phone today and encouraged to bring papers of POA in to hospital.  Patient needs safe discharge plan.  She would qualify for hospice, however, felt this likely would need to facilitate in the nursing home from just reviewing her chart.  We will hope to meet with POA this week and lay out POC and GOC as to what is important to patient and her family, and aid to facilitate this wherever the patient may be.     2.  Neck pain/inflammation/adenopathy   - Spoke with Dr. Quach today about possibly starting a steroid for inflammation and her one known complaint of neck pain.  He suggested trying low dose opioid first, therefore I ordered a Norco 5mg for pain.  We will check on patient tomorrow and see how she is feeling and neck is appearing.  If not contraindicated and no improvement, possibly consider steroid later.     3.  Altered Mental status   - 2/2 advance dementia, CVA with left hemiplegia, and metabolic encephalopathy d/t sepsis.  Continue to reorient.  Once family is present we can discuss how quickly she has declined and her baseline at home.      We appreciate to consult and will continue to follow along and provide as much support to patient and family as we can during this time of change.     Maria L Khan, APRN  [phone]  01/07/19  1:36 PM      Time:40 minutes spent reviewing medical and medication records, assessing and examining patient, discussing with patient and consulting provider, answering questions, formulating a plan and documentation of care. > 50% time spent face to face

## 2019-01-07 NOTE — PLAN OF CARE
Problem: Patient Care Overview  Goal: Plan of Care Review  Outcome: Ongoing (interventions implemented as appropriate)   01/07/19 8696   Coping/Psychosocial   Plan of Care Reviewed With patient   OTHER   Outcome Summary Clinical dysphagia assessment w/o overt s/s aspiration. No silent aspiration suspected as pt w/o significant changes in secretions, respirations, vocal quality w/ verbalizations. Poor po intake w/ current R parotitis. Wincing w/ swallow, therefore suggest downgrade to puree, thin liquids. 1:1 assist w/ po intake . SLP to f/u to assess improved parotitis, odynophagia w/ diet upgrade pending.

## 2019-01-08 ENCOUNTER — APPOINTMENT (OUTPATIENT)
Dept: GENERAL RADIOLOGY | Facility: HOSPITAL | Age: 79
End: 2019-01-08

## 2019-01-08 ENCOUNTER — APPOINTMENT (OUTPATIENT)
Dept: ULTRASOUND IMAGING | Facility: HOSPITAL | Age: 79
End: 2019-01-08

## 2019-01-08 ENCOUNTER — APPOINTMENT (OUTPATIENT)
Dept: CARDIOLOGY | Facility: HOSPITAL | Age: 79
End: 2019-01-08
Attending: INTERNAL MEDICINE

## 2019-01-08 ENCOUNTER — APPOINTMENT (OUTPATIENT)
Dept: ULTRASOUND IMAGING | Facility: HOSPITAL | Age: 79
End: 2019-01-08
Attending: INTERNAL MEDICINE

## 2019-01-08 PROBLEM — N39.0 E. COLI UTI: Status: ACTIVE | Noted: 2019-01-08

## 2019-01-08 PROBLEM — B95.61 MSSA BACTEREMIA: Status: ACTIVE | Noted: 2019-01-08

## 2019-01-08 PROBLEM — R65.20 SEVERE SEPSIS (HCC): Status: ACTIVE | Noted: 2019-01-07

## 2019-01-08 PROBLEM — B96.20 E. COLI UTI: Status: ACTIVE | Noted: 2019-01-08

## 2019-01-08 PROBLEM — R78.81 MSSA BACTEREMIA: Status: ACTIVE | Noted: 2019-01-08

## 2019-01-08 LAB
A-A DO2: 14.6 MMHG (ref 0–300)
ALBUMIN SERPL-MCNC: 3.4 G/DL (ref 3.4–4.8)
ALBUMIN/GLOB SERPL: 0.9 G/DL (ref 1.5–2.5)
ALP SERPL-CCNC: 441 U/L (ref 35–104)
ALT SERPL W P-5'-P-CCNC: 360 U/L (ref 10–36)
AMMONIA BLD-SCNC: 28 UMOL/L (ref 11–51)
ANION GAP SERPL CALCULATED.3IONS-SCNC: 8.5 MMOL/L (ref 3.6–11.2)
ARTERIAL PATENCY WRIST A: ABNORMAL
AST SERPL-CCNC: 500 U/L (ref 10–30)
ATMOSPHERIC PRESS: 728 MMHG
BASE EXCESS BLDA CALC-SCNC: -5.9 MMOL/L
BASOPHILS # BLD AUTO: 0.02 10*3/MM3 (ref 0–0.3)
BASOPHILS NFR BLD AUTO: 0.2 % (ref 0–2)
BDY SITE: ABNORMAL
BH CV ECHO MEAS - ACS: 1.8 CM
BH CV ECHO MEAS - AO ROOT AREA (BSA CORRECTED): 1.5
BH CV ECHO MEAS - AO ROOT AREA: 5.5 CM^2
BH CV ECHO MEAS - AO ROOT DIAM: 2.6 CM
BH CV ECHO MEAS - BSA(HAYCOCK): 1.9 M^2
BH CV ECHO MEAS - BSA: 1.8 M^2
BH CV ECHO MEAS - BZI_BMI: 30.9 KILOGRAMS/M^2
BH CV ECHO MEAS - BZI_METRIC_HEIGHT: 157.5 CM
BH CV ECHO MEAS - BZI_METRIC_WEIGHT: 76.7 KG
BH CV ECHO MEAS - LA DIMENSION: 3.8 CM
BH CV ECHO MEAS - LA/AO: 1.4
BH CV ECHO MEAS - PA ACC SLOPE: 1703 CM/SEC^2
BH CV ECHO MEAS - PA ACC TIME: 0.08 SEC
BH CV ECHO MEAS - PA PR(ACCEL): 41 MMHG
BILIRUB SERPL-MCNC: 1.7 MG/DL (ref 0.2–1.8)
BODY TEMPERATURE: 98.6 C
BUN BLD-MCNC: 15 MG/DL (ref 7–21)
BUN/CREAT SERPL: 20 (ref 7–25)
CALCIUM SPEC-SCNC: 8.8 MG/DL (ref 7.7–10)
CHLORIDE SERPL-SCNC: 114 MMOL/L (ref 99–112)
CO2 SERPL-SCNC: 19.5 MMOL/L (ref 24.3–31.9)
COHGB MFR BLD: 0.2 % (ref 0–5)
CREAT BLD-MCNC: 0.75 MG/DL (ref 0.43–1.29)
CRP SERPL-MCNC: 16.97 MG/DL (ref 0–0.99)
DEPRECATED RDW RBC AUTO: 62.7 FL (ref 37–54)
EOSINOPHIL # BLD AUTO: 0.2 10*3/MM3 (ref 0–0.7)
EOSINOPHIL NFR BLD AUTO: 2 % (ref 0–7)
ERYTHROCYTE [DISTWIDTH] IN BLOOD BY AUTOMATED COUNT: 18.9 % (ref 11.5–14.5)
GFR SERPL CREATININE-BSD FRML MDRD: 75 ML/MIN/1.73
GLOBULIN UR ELPH-MCNC: 3.7 GM/DL
GLUCOSE BLD-MCNC: 116 MG/DL (ref 70–110)
HAV IGM SERPL QL IA: NORMAL
HBV CORE IGM SERPL QL IA: NORMAL
HBV SURFACE AG SERPL QL IA: NORMAL
HCO3 BLDA-SCNC: 15.6 MMOL/L (ref 22–26)
HCT VFR BLD AUTO: 38.2 % (ref 37–47)
HCT VFR BLD CALC: 32 % (ref 37–47)
HCV AB SER DONR QL: NORMAL
HGB BLD-MCNC: 11.8 G/DL (ref 12–16)
HGB BLDA-MCNC: 11 G/DL (ref 12–16)
HOROWITZ INDEX BLD+IHG-RTO: 21 %
IMM GRANULOCYTES # BLD AUTO: 0.06 10*3/MM3 (ref 0–0.03)
IMM GRANULOCYTES NFR BLD AUTO: 0.6 % (ref 0–0.5)
LYMPHOCYTES # BLD AUTO: 2.67 10*3/MM3 (ref 1–3)
LYMPHOCYTES NFR BLD AUTO: 27.2 % (ref 16–46)
MAGNESIUM SERPL-MCNC: 1.3 MG/DL (ref 1.7–2.6)
MAXIMAL PREDICTED HEART RATE: 143 BPM
MCH RBC QN AUTO: 28 PG (ref 27–33)
MCHC RBC AUTO-ENTMCNC: 30.9 G/DL (ref 33–37)
MCV RBC AUTO: 90.5 FL (ref 80–94)
METHGB BLD QL: 0.4 % (ref 0–3)
MODALITY: ABNORMAL
MONOCYTES # BLD AUTO: 0.76 10*3/MM3 (ref 0.1–0.9)
MONOCYTES NFR BLD AUTO: 7.7 % (ref 0–12)
NEUTROPHILS # BLD AUTO: 6.12 10*3/MM3 (ref 1.4–6.5)
NEUTROPHILS NFR BLD AUTO: 62.3 % (ref 40–75)
OSMOLALITY SERPL CALC.SUM OF ELEC: 284.9 MOSM/KG (ref 273–305)
OXYHGB MFR BLDV: 97 % (ref 85–100)
PCO2 BLDA: 20.7 MM HG (ref 35–45)
PH BLDA: 7.5 PH UNITS (ref 7.35–7.45)
PHOSPHATE SERPL-MCNC: 2.1 MG/DL (ref 2.7–4.5)
PLATELET # BLD AUTO: 186 10*3/MM3 (ref 130–400)
PMV BLD AUTO: 10.6 FL (ref 6–10)
PO2 BLDA: 103.6 MM HG (ref 80–100)
POTASSIUM BLD-SCNC: 3.8 MMOL/L (ref 3.5–5.3)
PROT SERPL-MCNC: 7.1 G/DL (ref 6–8)
RBC # BLD AUTO: 4.22 10*6/MM3 (ref 4.2–5.4)
SAO2 % BLDCOA: 97.6 % (ref 90–100)
SODIUM BLD-SCNC: 142 MMOL/L (ref 135–153)
STRESS TARGET HR: 122 BPM
WBC NRBC COR # BLD: 9.83 10*3/MM3 (ref 4.5–12.5)

## 2019-01-08 PROCEDURE — 93005 ELECTROCARDIOGRAM TRACING: CPT | Performed by: INTERNAL MEDICINE

## 2019-01-08 PROCEDURE — 76705 ECHO EXAM OF ABDOMEN: CPT | Performed by: RADIOLOGY

## 2019-01-08 PROCEDURE — 71045 X-RAY EXAM CHEST 1 VIEW: CPT | Performed by: RADIOLOGY

## 2019-01-08 PROCEDURE — 86140 C-REACTIVE PROTEIN: CPT | Performed by: INTERNAL MEDICINE

## 2019-01-08 PROCEDURE — 93970 EXTREMITY STUDY: CPT | Performed by: RADIOLOGY

## 2019-01-08 PROCEDURE — 93010 ELECTROCARDIOGRAM REPORT: CPT | Performed by: INTERNAL MEDICINE

## 2019-01-08 PROCEDURE — 76705 ECHO EXAM OF ABDOMEN: CPT

## 2019-01-08 PROCEDURE — 80074 ACUTE HEPATITIS PANEL: CPT | Performed by: INTERNAL MEDICINE

## 2019-01-08 PROCEDURE — 93970 EXTREMITY STUDY: CPT

## 2019-01-08 PROCEDURE — 82805 BLOOD GASES W/O2 SATURATION: CPT | Performed by: INTERNAL MEDICINE

## 2019-01-08 PROCEDURE — 25010000003 CEFAZOLIN PER 500 MG

## 2019-01-08 PROCEDURE — 84100 ASSAY OF PHOSPHORUS: CPT | Performed by: INTERNAL MEDICINE

## 2019-01-08 PROCEDURE — 36600 WITHDRAWAL OF ARTERIAL BLOOD: CPT | Performed by: INTERNAL MEDICINE

## 2019-01-08 PROCEDURE — 71045 X-RAY EXAM CHEST 1 VIEW: CPT

## 2019-01-08 PROCEDURE — 85025 COMPLETE CBC W/AUTO DIFF WBC: CPT | Performed by: INTERNAL MEDICINE

## 2019-01-08 PROCEDURE — C1751 CATH, INF, PER/CENT/MIDLINE: HCPCS

## 2019-01-08 PROCEDURE — 83050 HGB METHEMOGLOBIN QUAN: CPT | Performed by: INTERNAL MEDICINE

## 2019-01-08 PROCEDURE — 93306 TTE W/DOPPLER COMPLETE: CPT | Performed by: INTERNAL MEDICINE

## 2019-01-08 PROCEDURE — 82375 ASSAY CARBOXYHB QUANT: CPT | Performed by: INTERNAL MEDICINE

## 2019-01-08 PROCEDURE — 25010000002 MAGNESIUM SULFATE 2 GM/50ML SOLUTION: Performed by: PHYSICIAN ASSISTANT

## 2019-01-08 PROCEDURE — 25010000002 GENTAMICIN PER 80 MG: Performed by: INTERNAL MEDICINE

## 2019-01-08 PROCEDURE — 87040 BLOOD CULTURE FOR BACTERIA: CPT | Performed by: INTERNAL MEDICINE

## 2019-01-08 PROCEDURE — 82140 ASSAY OF AMMONIA: CPT | Performed by: INTERNAL MEDICINE

## 2019-01-08 PROCEDURE — 94799 UNLISTED PULMONARY SVC/PX: CPT

## 2019-01-08 PROCEDURE — 83735 ASSAY OF MAGNESIUM: CPT | Performed by: INTERNAL MEDICINE

## 2019-01-08 PROCEDURE — 25010000003 CEFAZOLIN PER 500 MG: Performed by: INTERNAL MEDICINE

## 2019-01-08 PROCEDURE — 93306 TTE W/DOPPLER COMPLETE: CPT

## 2019-01-08 PROCEDURE — 99233 SBSQ HOSP IP/OBS HIGH 50: CPT | Performed by: INTERNAL MEDICINE

## 2019-01-08 PROCEDURE — 36415 COLL VENOUS BLD VENIPUNCTURE: CPT | Performed by: INTERNAL MEDICINE

## 2019-01-08 PROCEDURE — 80053 COMPREHEN METABOLIC PANEL: CPT | Performed by: INTERNAL MEDICINE

## 2019-01-08 PROCEDURE — 36410 VNPNXR 3YR/> PHY/QHP DX/THER: CPT

## 2019-01-08 RX ORDER — MAGNESIUM SULFATE HEPTAHYDRATE 40 MG/ML
2 INJECTION, SOLUTION INTRAVENOUS
Status: COMPLETED | OUTPATIENT
Start: 2019-01-08 | End: 2019-01-08

## 2019-01-08 RX ORDER — POTASSIUM CHLORIDE 7.45 MG/ML
10 INJECTION INTRAVENOUS
Status: DISCONTINUED | OUTPATIENT
Start: 2019-01-08 | End: 2019-01-11 | Stop reason: HOSPADM

## 2019-01-08 RX ORDER — CEFAZOLIN SODIUM 2 G/50ML
2 SOLUTION INTRAVENOUS EVERY 8 HOURS
Status: DISCONTINUED | OUTPATIENT
Start: 2019-01-09 | End: 2019-01-11 | Stop reason: HOSPADM

## 2019-01-08 RX ORDER — POTASSIUM CHLORIDE 20 MEQ/1
40 TABLET, EXTENDED RELEASE ORAL AS NEEDED
Status: DISCONTINUED | OUTPATIENT
Start: 2019-01-08 | End: 2019-01-11 | Stop reason: HOSPADM

## 2019-01-08 RX ORDER — MAGNESIUM SULFATE HEPTAHYDRATE 40 MG/ML
4 INJECTION, SOLUTION INTRAVENOUS AS NEEDED
Status: DISCONTINUED | OUTPATIENT
Start: 2019-01-08 | End: 2019-01-11 | Stop reason: HOSPADM

## 2019-01-08 RX ORDER — MAGNESIUM SULFATE HEPTAHYDRATE 40 MG/ML
2 INJECTION, SOLUTION INTRAVENOUS AS NEEDED
Status: DISCONTINUED | OUTPATIENT
Start: 2019-01-08 | End: 2019-01-11 | Stop reason: HOSPADM

## 2019-01-08 RX ORDER — SODIUM CHLORIDE 0.9 % (FLUSH) 0.9 %
10 SYRINGE (ML) INJECTION EVERY 12 HOURS SCHEDULED
Status: DISCONTINUED | OUTPATIENT
Start: 2019-01-08 | End: 2019-01-11 | Stop reason: HOSPADM

## 2019-01-08 RX ORDER — SODIUM CHLORIDE 0.9 % (FLUSH) 0.9 %
10 SYRINGE (ML) INJECTION AS NEEDED
Status: DISCONTINUED | OUTPATIENT
Start: 2019-01-08 | End: 2019-01-11 | Stop reason: HOSPADM

## 2019-01-08 RX ORDER — SENNA AND DOCUSATE SODIUM 50; 8.6 MG/1; MG/1
2 TABLET, FILM COATED ORAL 2 TIMES DAILY
Status: DISCONTINUED | OUTPATIENT
Start: 2019-01-08 | End: 2019-01-10

## 2019-01-08 RX ORDER — CEFAZOLIN SODIUM 2 G/50ML
2 SOLUTION INTRAVENOUS EVERY 8 HOURS SCHEDULED
Status: DISCONTINUED | OUTPATIENT
Start: 2019-01-09 | End: 2019-01-08

## 2019-01-08 RX ORDER — POTASSIUM CHLORIDE 1.5 G/1.77G
40 POWDER, FOR SOLUTION ORAL AS NEEDED
Status: DISCONTINUED | OUTPATIENT
Start: 2019-01-08 | End: 2019-01-11 | Stop reason: HOSPADM

## 2019-01-08 RX ADMIN — FAMOTIDINE 20 MG: 20 TABLET, FILM COATED ORAL at 09:19

## 2019-01-08 RX ADMIN — BUDESONIDE 0.5 MG: 0.5 SUSPENSION RESPIRATORY (INHALATION) at 06:53

## 2019-01-08 RX ADMIN — MAGNESIUM SULFATE IN WATER 2 G: 40 INJECTION, SOLUTION INTRAVENOUS at 13:45

## 2019-01-08 RX ADMIN — CASTOR OIL AND BALSAM, PERU 5 G: 788; 87 OINTMENT TOPICAL at 20:17

## 2019-01-08 RX ADMIN — AZTREONAM 2 G: 2 INJECTION, POWDER, FOR SOLUTION INTRAMUSCULAR; INTRAVENOUS at 09:20

## 2019-01-08 RX ADMIN — FAMOTIDINE 20 MG: 20 TABLET, FILM COATED ORAL at 20:17

## 2019-01-08 RX ADMIN — FLUTICASONE PROPIONATE 2 SPRAY: 50 SPRAY, METERED NASAL at 09:14

## 2019-01-08 RX ADMIN — MAGNESIUM SULFATE IN WATER 2 G: 40 INJECTION, SOLUTION INTRAVENOUS at 10:23

## 2019-01-08 RX ADMIN — SODIUM CHLORIDE, PRESERVATIVE FREE 3 ML: 5 INJECTION INTRAVENOUS at 20:22

## 2019-01-08 RX ADMIN — AZTREONAM 2 G: 2 INJECTION, POWDER, FOR SOLUTION INTRAMUSCULAR; INTRAVENOUS at 03:10

## 2019-01-08 RX ADMIN — CEFAZOLIN 100 MG: 1 INJECTION, POWDER, FOR SOLUTION INTRAMUSCULAR; INTRAVENOUS; PARENTERAL at 18:20

## 2019-01-08 RX ADMIN — BUDESONIDE 0.5 MG: 0.5 SUSPENSION RESPIRATORY (INHALATION) at 19:46

## 2019-01-08 RX ADMIN — CEFAZOLIN 889 MG: 1 INJECTION, POWDER, FOR SOLUTION INTRAMUSCULAR; INTRAVENOUS; PARENTERAL at 18:57

## 2019-01-08 RX ADMIN — LEVOTHYROXINE SODIUM 100 MCG: 100 TABLET ORAL at 09:19

## 2019-01-08 RX ADMIN — APIXABAN 5 MG: 5 TABLET, FILM COATED ORAL at 09:19

## 2019-01-08 RX ADMIN — GENTAMICIN SULFATE 190 MG: 40 INJECTION, SOLUTION INTRAMUSCULAR; INTRAVENOUS at 15:10

## 2019-01-08 RX ADMIN — HYDROCODONE BITARTRATE AND ACETAMINOPHEN 1 TABLET: 5; 325 TABLET ORAL at 09:28

## 2019-01-08 RX ADMIN — ALBUTEROL SULFATE 2.5 MG: 2.5 SOLUTION RESPIRATORY (INHALATION) at 06:52

## 2019-01-08 RX ADMIN — MAGNESIUM SULFATE IN WATER 2 G: 40 INJECTION, SOLUTION INTRAVENOUS at 11:52

## 2019-01-08 RX ADMIN — POTASSIUM PHOSPHATE, MONOBASIC AND POTASSIUM PHOSPHATE, DIBASIC 15 MMOL: 224; 236 INJECTION, SOLUTION INTRAVENOUS at 15:11

## 2019-01-08 RX ADMIN — APIXABAN 5 MG: 5 TABLET, FILM COATED ORAL at 20:17

## 2019-01-08 RX ADMIN — ALBUTEROL SULFATE 2.5 MG: 2.5 SOLUTION RESPIRATORY (INHALATION) at 19:46

## 2019-01-08 RX ADMIN — CEFAZOLIN 1 MG: 1 INJECTION, POWDER, FOR SOLUTION INTRAMUSCULAR; INTRAVENOUS; PARENTERAL at 15:11

## 2019-01-08 RX ADMIN — CEFAZOLIN 10 MG: 1 INJECTION, POWDER, FOR SOLUTION INTRAMUSCULAR; INTRAVENOUS; PARENTERAL at 17:43

## 2019-01-08 RX ADMIN — HYDROCODONE BITARTRATE AND ACETAMINOPHEN 1 TABLET: 5; 325 TABLET ORAL at 22:38

## 2019-01-08 RX ADMIN — SODIUM CHLORIDE, PRESERVATIVE FREE 10 ML: 5 INJECTION INTRAVENOUS at 20:21

## 2019-01-08 RX ADMIN — CASTOR OIL AND BALSAM, PERU 5 G: 788; 87 OINTMENT TOPICAL at 09:18

## 2019-01-08 RX ADMIN — VENLAFAXINE HYDROCHLORIDE 37.5 MG: 37.5 TABLET ORAL at 09:18

## 2019-01-08 RX ADMIN — SENNOSIDES AND DOCUSATE SODIUM 2 TABLET: 8.6; 5 TABLET ORAL at 20:17

## 2019-01-08 NOTE — SIGNIFICANT NOTE
01/08/19 1353   Rehab Treatment   Discipline physical therapy assistant   Reason Treatment Not Performed other (see comments)  (Pt. was unable to follow commands and presented with decreased arousal. PT to follow up tomorrow.)   Recommendation   PT - Next Appointment 01/09/19

## 2019-01-08 NOTE — SIGNIFICANT NOTE
Pt presents with increased confusion and decreased alertness. Unable to adequately participate in therapy today. OT will resume as tolerated.

## 2019-01-08 NOTE — DISCHARGE PLACEMENT REQUEST
"Jeanna Flower (78 y.o. Female)     Date of Birth Social Security Number Address Home Phone MRN    1940  [id number] [facility] [address] [phone] [id number]    Hindu Marital Status          Non-Anabaptist Single       Admission Date Admission Type Admitting Provider Attending Provider Department, Room/Bed    1/6/19 Emergency Enoc Medel MD Troxell, Christopher A, DO [address], 3327/1P    Discharge Date Discharge Disposition Discharge Destination                       Attending Provider:  Chalino Quach DO    Allergies:  Bactrim [Sulfamethoxazole-trimethoprim], Codeine, Penicillins    Isolation:  None   Infection:  None   Code Status:  No CPR    Ht:  160 cm (62.99\")   Wt:  76.8 kg (169 lb 6.4 oz)    Admission Cmt:  None   Principal Problem:  Sepsis (CMS/[facility]) [A41.9]                 Active Insurance as of 1/6/2019     Primary Coverage     Payor Plan Insurance Group Employer/Plan Group    MEDICARE MEDICARE A & B      Payor Plan Address Payor Plan Phone Number Payor Plan Fax Number Effective Dates    [address] [phone]  1/1/2006 - None Entered    [facility] [address]       Subscriber Name Subscriber Birth Date Member ID       JEANNA FLOWER 1940 [id number]           Secondary Coverage     Payor Plan Insurance Group Employer/Plan Group    [place]      Payor Plan Address Payor Plan Phone Number Payor Plan Fax Number Effective Dates    [address] [phone]  10/17/2017 - None Entered    [facility] [address]       Subscriber Name Subscriber Birth Date Member ID       JEANNA FLOWER 1940 [id number]                 Emergency Contacts      (Rel.) Home Phone Work Phone Mobile Phone    Ernestina Chamberlain (Power of ) [phone] -- --            Emergency Contact Information     Name Relation Home Work Mobile    Ernestina Chamberlain Power of  [phone]            Insurance Information                " MEDICARE/MEDICARE A & B Phone: [phone]    Subscriber: Jeanna Flower Subscriber#: [id number]    Group#:  Precert#:         [place] Phone: [phone]    Subscriber: Jeanna Flower Subscriber#: [id number]    Group#:  Precert#:              History & Physical      Enoc Medel MD at 2019  4:10 AM              [facility] HOSPITALIST HISTORY AND PHYSICAL    Patient Identification:  Name:  Jeanna Flower  Age:  78 y.o.  Sex:  female  :  1940  MRN:  [id number]   Visit Number:  [id number]  Room number:  3327/1P  Primary Care Physician:  Adela Mathew MD     Subjective     Chief complaint:    Chief Complaint   Patient presents with   • Fatigue   • Neck Swelling     History of presenting illness:  78 y.o. female who presented to [facility] from home with redness and swelling of the right side of the neck.  The patient has dementia and as a result I am not able to obtain any history from her.  The POA, which is also the patient's granddaughter, is at bedside.  Also at bedside is a great grandson.  The area in question seemed to appear overnight.  She has not been on antibiotics for this yet.  The family has not noticed a fever.  They suspect that the patient's memory problems are worsening and that this is why she is not wanting to eat; she is not even drinking water that much.  The patient no longer knows the names of the family in the room with her currently.    The patient had told the granddaughter when she had recovered from her stroke that she did not want resuscitation and did not want a feeding tube.  The family has been trying since May of 2018 to get the patient into a nursing home and no one had a bed.  They have also talked to hospice but this was not enough to help the family as the granddaughter has to work.  Home health is set up but despite this the patient continues to have so much healthcare needs that it is difficult for the  family to keep up with all of it and work full time.  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 12/6/2017   • Diabetes mellitus (CMS/[facility])    • Heart disease    • Hyperlipidemia    • Hypertension    • Other sleep apnea 11/13/2018   • Paroxysmal atrial fibrillation (CMS/[facility]) 12/6/2017     Past Surgical History:   Procedure Laterality Date   • APPENDECTOMY     • BLADDER SURGERY     • CARDIAC CATHETERIZATION     • CHOLECYSTECTOMY       Family History   Problem Relation Age of Onset   • Heart disease Mother    • Heart attack Mother    • Heart disease Father    • Heart attack Father    • Heart attack Brother    • Heart attack Brother      Socioeconomic History   • Marital status: Single   Tobacco Use   • Smoking status: Never Smoker   • Smokeless tobacco: Never Used   Substance and Sexual Activity   • Alcohol use: No   • Drug use: No   • Sexual activity: Defer   ---------------------------------------------------------------------------------------------------------------------   Allergies:  Bactrim [sulfamethoxazole-trimethoprim]; Codeine; and Penicillins  ---------------------------------------------------------------------------------------------------------------------   Medications below are reported home medications pulling from within the system; at this time, these medications have not been reconciled unless otherwise specified and are in the verification process for further verifcation as current home medications.    Prior to Admission Medications     Prescriptions Last Dose Informant Patient Reported? Taking?    albuterol sulfate  (90 Base) MCG/ACT inhaler  1/6/2019 Care Giver Yes Yes    Inhale 2 puffs Every 4 (Four) Hours As Needed for Wheezing or Shortness of Air.    amantadine (SYMMETREL) 50 MG/5ML solution 1/6/2019 Care Giver Yes Yes    100 mg by Per G Tube route Every 12 (Twelve) Hours.    apixaban (ELIQUIS) 5 MG tablet tablet 1/6/2019 Care Giver No Yes    Take 1 tablet by mouth Every 12 (Twelve) Hours.    atorvastatin (LIPITOR) 40 MG tablet 1/5/2019 Care Giver Yes Yes    40 mg by Per G Tube route Every Night.    budesonide (PULMICORT) 0.5 MG/2ML nebulizer solution 1/6/2019 Care Giver Yes Yes    Take 0.5 mg by nebulization 2 (Two) Times a Day.    castor oil-balsam peru (VENELEX) ointment 1/6/2019 Care Giver Yes Yes    Apply 1 each topically to the appropriate area as directed 2 (Two) Times a Day.    cycloSPORINE (RESTASIS) 0.05 % ophthalmic emulsion 1/6/2019 Care Giver Yes Yes    1 drop 2 (Two) Times a Day.    dantrolene (DANTRIUM) 25 MG capsule 1/5/2019 Care Giver Yes Yes    25 mg by Per G Tube route Every Night.    docusate sodium (COLACE) 150 MG/15ML liquid 1/6/2019 Care Giver Yes Yes    100 mg by Per G Tube route 2 (Two) Times a Day.    famotidine (PEPCID) 20 MG tablet 1/6/2019 Care Giver Yes Yes    20 mg by Per G Tube route 2 (Two) Times a Day.    fluticasone (FLONASE) 50 MCG/ACT nasal spray 1/6/2019 Care Giver Yes Yes    2 sprays into the nostril(s) as directed by provider Daily.    fluticasone-salmeterol (ADVAIR DISKUS) 250-50 MCG/DOSE DISKUS 1/6/2019 Care Giver Yes Yes    Inhale 1 puff 2 (Two) Times a Day.    glipiZIDE (GLUCOTROL XL) 2.5 MG 24 hr tablet 1/6/2019 Care Giver Yes Yes    Take 2.5 mg by mouth Daily.    levothyroxine (SYNTHROID, LEVOTHROID) 100 MCG tablet 1/6/2019 Care Giver Yes Yes    Take 100 mcg by mouth Daily.    loratadine (CLARITIN) 10 MG tablet 1/6/2019 Care Giver Yes Yes    10 mg by Per G Tube route Daily.    losartan (COZAAR) 100 MG tablet 1/6/2019 Care Giver Yes Yes    100 mg by Per J Tube route Daily.    melatonin 3 MG tablet 1/5/2019  Care Giver Yes Yes    3 mg by Per G Tube route Every Night.    metoprolol tartrate (LOPRESSOR) 25 MG tablet 1/6/2019 Care Giver Yes Yes    25 mg by Per G Tube route Every 6 (Six) Hours.    miconazole (MICOTIN) 2 % powder 1/6/2019 Care Giver Yes Yes    Apply 1 application topically to the appropriate area as directed 3 (Three) Times a Day As Needed for Itching (skin folds).    montelukast (SINGULAIR) 10 MG tablet 1/5/2019 Care Giver Yes Yes    Take 10 mg by mouth Every Night.    venlafaxine (EFFEXOR) 37.5 MG tablet 1/6/2019 Care Giver Yes Yes    37.5 mg by Per G Tube route 2 (Two) Times a Day.     Objective     Vital Signs:  Temp:  [97.6 °F (36.4 °C)-99.2 °F (37.3 °C)] 99.2 °F (37.3 °C)  Heart Rate:  [80-93] 92  Resp:  [18] 18  BP: ()/(41-82) 146/56    Mean Arterial Pressure (Non-Invasive) for the past 24 hrs (Last 3 readings):   Noninvasive MAP (mmHg)   01/07/19 0231 92   01/07/19 0116 75   01/07/19 0046 71     SpO2:  [78 %-96 %] 90 %  on  Flow (L/min):  [2.5] 2.5;   Device (Oxygen Therapy): nasal cannula  Body mass index is 30.02 kg/m².    Wt Readings from Last 3 Encounters:   01/07/19 76.8 kg (169 lb 6.4 oz)   12/05/18 87.1 kg (192 lb)   11/30/18 84.4 kg (186 lb)               ---------------------------------------------------------------------------------------------------------------------   Physical Exam:  Constitutional:  Well-developed and well-nourished.  No respiratory distress.  Confused, looking blankly at the ceiling.      HENT:  Head: Normocephalic and atraumatic.  Mouth:  Moist mucous membranes.  Right jaw line with edema and erythema and warmth without any skin lesions around the area.  Eyes:  Conjunctivae and EOM are normal.  Pupils are equal, round, and reactive to light.  No scleral icterus.  Neck:  Neck supple.  No JVD present.    Cardiovascular:  Normal rate, regular rhythm and normal heart sounds with no murmur.  Pulmonary/Chest:  No respiratory distress, no wheezes, no crackles, with  normal breath sounds and good air movement.  Abdominal:  Soft.  Bowel sounds are normal.  No distension and no tenderness.   Musculoskeletal:  No edema, no tenderness, and contracted left arm and hand.  No red or swollen joints anywhere.    Neurological:  Alert but not able to answer orientation questions; she seems to talk incoherently at times with some words that are understandable.  She has contracture of the left arm and hand.  She can move the right arm and leg.  Skin:  Skin is warm and dry.  No rash noted.  No pallor.   Peripheral vascular:  No edema and strong pulses on all 4 extremities.  Genitourinary:  No olivas catheter in place.  ---------------------------------------------------------------------------------------------------------------------  EKG:  NS with heart rate 92, QTc of 413 ms, flipped T waves in V1-V5 and the inferior leads.  Telemetry:  None  I have personally looked at both the EKG and the telemetry strips.  --------------------------------------------------------------------------------------------------------------------    Results from last 7 days   Lab Units  01/07/19   0155  01/06/19   2156   CRP mg/dL   --   9.85*   LACTATE mmol/L  1.2  2.5*   WBC 10*3/mm3   --   18.17*   HEMOGLOBIN g/dL   --   13.2   HEMATOCRIT %   --   43.9   MCV fL   --   93.0   MCHC g/dL   --   30.1*   PLATELETS 10*3/mm3   --   238   INR    --   1.36*     Results from last 7 days   Lab Units  01/06/19   2156   SODIUM mmol/L  137   POTASSIUM mmol/L  4.6   CHLORIDE mmol/L  103   CO2 mmol/L  20.8*   BUN mg/dL  25*   CREATININE mg/dL  1.18   EGFR IF NONAFRICN AM mL/min/1.73  44*   CALCIUM mg/dL  8.9   GLUCOSE mg/dL  157*   ALBUMIN g/dL  3.60   BILIRUBIN mg/dL  0.8   ALK PHOS U/L  99   AST (SGOT) U/L  29   ALT (SGPT) U/L  32   Estimated Creatinine Clearance: 39.2 mL/min (by C-G formula based on SCr of 1.18 mg/dL).        I have personally looked at the labs and they are summarized  above.  ----------------------------------------------------------------------------------------------------------------------  Imaging Results (last 24 hours)     Procedure Component Value Units Date/Time    CT Soft Tissue Neck With Contrast [[id number]] Resulted:  01/07/19 0112       Updated:  01/07/19 0113        I have personally reviewed the radiology images and read the available preliminary radiology report.    Assessment & Plan     -Sepsis that was present on admission (heart rate 93, white blood cell count 18,170, CRP 9.85, LA 2.5) due to a urinary tract infection +/- Suppurative parotitis   -Vascular dementia with metabolic encephalopathy due to the above  -Hypothyroidism  -Chronic kidney disease stage III Baseline creatinine 0.9-1.15  -Paroxysmal atrial fibrillation, currently normal sinus rhythm  -Type 2 diabetes mellitus  -Essential hypertension  -Prior CVA with left sided hemiplegia   -Functional quadriplegia    Per the sepsis protocol, we will start the patient on vancomycin and Zosyn for the urinary tract infection and possible suppurative parotitis.  We have asked that the urine obtained in the emergency department be sent for culture.  I will consult palliative care to discuss goals of care with the POA.  I will consult social work as the family needs help with patient's medical care.  I have consulted speech therapy, PT, and OT for now in case the patient is able to go to a nursing home.  The patient does not desire a feeding tube so I'm not sure if she will take her medication; I have tried to stop a lot of her home medication so that she is less to take.  I will stop all diabetes medications as she is not eating and do random glucose measurements.  I will hold all antihypertensives that she is not eating.  I have continued her home Eliquis for the A. fib and if she is unable to take this when May asked the family if we need to change this over to IV heparin or subcutaneous Lovenox.    Enoc ALMANZAR  MD Gianfranco  [facility] Medicine Team  01/07/19  4:10 AM      Electronically signed by Enoc Medel MD at 1/7/2019  7:43 AM       Hospital Medications (active)       Dose Frequency Start End    albuterol (PROVENTIL) nebulizer solution 0.083% 2.5 mg/3mL 2.5 mg Every 4 Hours PRN 1/7/2019     Sig - Route: Take 2.5 mg by nebulization Every 4 (Four) Hours As Needed for Wheezing or Shortness of Air. - Nebulization    apixaban (ELIQUIS) tablet 5 mg 5 mg Every 12 Hours Scheduled 1/7/2019     Sig - Route: Take 1 tablet by mouth Every 12 (Twelve) Hours. - Oral    budesonide (PULMICORT) nebulizer solution 0.5 mg 0.5 mg 2 Times Daily - RT 1/7/2019     Sig - Route: Take 2 mL by nebulization 2 (Two) Times a Day. - Nebulization    castor oil-balsam peru (VENELEX) ointment 5 g 1 each Every 12 Hours Scheduled 1/7/2019     Sig - Route: Apply 5 g topically to the appropriate area as directed Every 12 (Twelve) Hours. - Topical    ceFAZolin (ANCEF) 1 g/100 mL 0.9% NS IVPB (mbp) 1 g Once 1/8/2019     Sig - Route: Infuse 100 mL into a venous catheter 1 (One) Time. - Intravenous    ceFAZolin (ANCEF) 1 mg in Sodium chloride 0.9 % 50 mL IVPB 1 mg Once 1/8/2019     Sig - Route: Infuse 1 mg into a venous catheter 1 (One) Time. - Intravenous    ceFAZolin (ANCEF) 10 mg in Sodium chloride 0.9 % 50 mL IVPB 10 mg Once 1/8/2019     Sig - Route: Infuse 10 mg into a venous catheter 1 (One) Time. - Intravenous    ceFAZolin (ANCEF) 100 mg in Sodium chloride 0.9 % 50 mL IVPB 100 mg Once 1/8/2019     Sig - Route: Infuse 100 mg into a venous catheter 1 (One) Time. - Intravenous    ceFAZolin (ANCEF) 889 mg in Sodium chloride 0.9 % 100 mL IVPB 889 mg Once 1/8/2019     Sig - Route: Infuse 889 mg into a venous catheter 1 (One) Time. - Intravenous    CeFAZolin Sodium-Dextrose (ANCEF) IVPB (duplex) 2 g 2 g Every 8 Hours Scheduled 1/9/2019 1/22/2019    Sig - Route: Infuse 2,000 mg into a venous catheter Every 8 (Eight) Hours. - Intravenous    cycloSPORINE  "(RESTASIS) 0.05 % ophthalmic emulsion 1 drop 1 drop 2 Times Daily 1/7/2019     Sig - Route: Administer 1 drop to both eyes 2 (Two) Times a Day. - Both Eyes    famotidine (PEPCID) tablet 20 mg 20 mg 2 Times Daily 1/7/2019     Sig - Route: 1 tablet by Per G Tube route 2 (Two) Times a Day. - Per G Tube    fluticasone (FLONASE) 50 MCG/ACT nasal spray 2 spray 2 spray Daily 1/7/2019     Sig - Route: 2 sprays into the nostril(s) as directed by provider Daily. - Nasal    gentamicin (GARAMYCIN) 190 mg in Sodium chloride 0.9 % 100 mL IVPB 3 mg/kg × 62.2 kg (Adjusted) Once 1/8/2019     Sig - Route: Infuse 190 mg into a venous catheter 1 (One) Time. - Intravenous    HYDROcodone-acetaminophen (NORCO) 5-325 MG per tablet 1 tablet 1 tablet Every 6 Hours PRN 1/7/2019 1/17/2019    Sig - Route: Take 1 tablet by mouth Every 6 (Six) Hours As Needed for Moderate Pain . - Oral    levothyroxine (SYNTHROID, LEVOTHROID) tablet 100 mcg 100 mcg Daily 1/7/2019     Sig - Route: Take 1 tablet by mouth Daily. - Oral    Magnesium Sulfate 2 gram / 50mL Infusion (GIVE X 3 BAGS TO EQUAL 6GM TOTAL DOSE) - Mg 1.1 - 1.5 mg/dl 2 g As Needed 1/8/2019     Sig - Route: Infuse 50 mL into a venous catheter As Needed (See Administration Instructions). - Intravenous    Cosign for Ordering: Accepted by Chalino Quach DO on 1/8/2019  8:04 AM    Linked Group 1:  \"Or\" Linked Group Details        Magnesium Sulfate 2 gram / 50mL Infusion (GIVE X 3 BAGS TO EQUAL 6GM TOTAL DOSE) - Mg 1.1 - 1.5 mg/dl 2 g Every 2 Hours 1/8/2019 1/8/2019    Sig - Route: Infuse 50 mL into a venous catheter Every 2 (Two) Hours. - Intravenous    Magnesium Sulfate 2 gram Bolus, followed by 8 gram infusion (total Mg dose 10 grams)- Mg less than or equal to 1mg/dL 2 g As Needed 1/8/2019     Sig - Route: Infuse 50 mL into a venous catheter As Needed (See Administration Instructions). - Intravenous    Cosign for Ordering: Accepted by Chalino Quach DO on 1/8/2019  8:04 AM    " "Linked Group 1:  \"Or\" Linked Group Details        Magnesium Sulfate 4 gram infusion- Mg 1.6-1.9 mg/dL 4 g As Needed 1/8/2019     Sig - Route: Infuse 100 mL into a venous catheter As Needed (See Administration Instructions). - Intravenous    Cosign for Ordering: Accepted by Chalino Quach DO on 1/8/2019  8:04 AM    Linked Group 1:  \"Or\" Linked Group Details        Pharmacy Consult  Continuous PRN 1/8/2019 1/22/2019    Sig - Route: Continuous As Needed for Consult. - Does not apply    potassium chloride (K-DUR,KLOR-CON) CR tablet 40 mEq 40 mEq As Needed 1/8/2019     Sig - Route: Take 2 tablets by mouth As Needed (potassium replacement.  see admin instructions). - Oral    Linked Group 2:  \"Or\" Linked Group Details        potassium chloride (KLOR-CON) packet 40 mEq 40 mEq As Needed 1/8/2019     Sig - Route: Take 40 mEq by mouth As Needed (potassium replacement, see admin instructions). - Oral    Linked Group 2:  \"Or\" Linked Group Details        potassium chloride 10 mEq in 100 mL IVPB 10 mEq Every 1 Hour PRN 1/8/2019     Sig - Route: Infuse 100 mL into a venous catheter Every 1 (One) Hour As Needed (potassium protocol PERIPHERAL - see admin instructions). - Intravenous    Linked Group 2:  \"Or\" Linked Group Details        potassium phosphate 15 mmol in Sodium chloride 0.9 % 100 mL infusion 15 mmol As Needed 1/8/2019     Sig - Route: Infuse 15 mmol into a venous catheter As Needed (Peripheral IV - Phosphorus 1.8 - 2.5 mg/dL). - Intravenous    Linked Group 3:  \"Or\" Linked Group Details        potassium phosphate 15 mmol in Sodium chloride 0.9 % 100 mL infusion 15 mmol Once 1/8/2019     Sig - Route: Infuse 15 mmol into a venous catheter 1 (One) Time. - Intravenous    potassium phosphate 30 mmol in Sodium chloride 0.9 % 250 mL infusion 30 mmol As Needed 1/8/2019     Sig - Route: Infuse 30 mmol into a venous catheter As Needed (Peripheral IV - Phosphorus 1.3 - 1.7 mg/dL). - Intravenous    Linked Group 3:  \"Or\" Linked " "Group Details        potassium phosphate 45 mmol in Sodium chloride 0.9 % 500 mL infusion 45 mmol As Needed 1/8/2019     Sig - Route: Infuse 45 mmol into a venous catheter As Needed (Peripheral IV - Phosphorus Less Than 1.3 mg/dL). - Intravenous    Linked Group 3:  \"Or\" Linked Group Details        sennosides-docusate sodium (SENOKOT-S) 8.6-50 MG tablet 2 tablet 2 tablet 2 Times Daily 1/8/2019     Sig - Route: 2 tablets by Per G Tube route 2 (Two) Times a Day. - Per G Tube    sodium chloride 0.9 % flush 10 mL 10 mL As Needed 1/6/2019     Sig - Route: Infuse 10 mL into a venous catheter As Needed for Line Care. - Intravenous    Linked Group 4:  \"And\" Linked Group Details        sodium chloride 0.9 % flush 3 mL 3 mL Every 12 Hours Scheduled 1/7/2019     Sig - Route: Infuse 3 mL into a venous catheter Every 12 (Twelve) Hours. - Intravenous    sodium chloride 0.9 % flush 3-10 mL 3-10 mL As Needed 1/7/2019     Sig - Route: Infuse 3-10 mL into a venous catheter As Needed for Line Care. - Intravenous    sodium phosphates 15 mmol in Sodium chloride 0.9 % 250 mL IVPB 15 mmol As Needed 1/8/2019     Sig - Route: Infuse 15 mmol into a venous catheter As Needed (Peripheral IV - Phosphorus 1.8 - 2.5 mg/dL & Potassium Greater Than 4). - Intravenous    Linked Group 3:  \"Or\" Linked Group Details        sodium phosphates 30 mmol in Sodium chloride 0.9 % 250 mL IVPB 30 mmol As Needed 1/8/2019     Sig - Route: Infuse 30 mmol into a venous catheter As Needed (Peripheral IV - Phosphorus 1.3-1.7 mg/dL & Potassium Greater Than 4). - Intravenous    Linked Group 3:  \"Or\" Linked Group Details        sodium phosphates 45 mmol in Sodium chloride 0.9 % 500 mL IVPB 45 mmol As Needed 1/8/2019     Sig - Route: Infuse 45 mmol into a venous catheter As Needed (Peripheral IV - Phosphorus Less Than 1.3 mg/dL & Potassium Greater Than 4). - Intravenous    Linked Group 3:  \"Or\" Linked Group Details        venlafaxine (EFFEXOR) tablet 37.5 mg 37.5 mg 2 " Times Daily With Meals 1/7/2019     Sig - Route: 1 tablet by Per G Tube route 2 (Two) Times a Day With Meals. - Per G Tube    atorvastatin (LIPITOR) tablet 40 mg (Discontinued) 40 mg Nightly 1/7/2019 1/8/2019    Sig - Route: 1 tablet by Per G Tube route Every Night. - Per G Tube    aztreonam (AZACTAM) 2 g/100 mL 0.9% NS (mbp) (Discontinued) 2 g Every 8 Hours 1/7/2019 1/8/2019    Sig - Route: Infuse 100 mL into a venous catheter Every 8 (Eight) Hours. - Intravenous    ceFAZolin (ANCEF) 889 mg in Sodium chloride 0.9 % 100 mL IVPB (Discontinued) 889 mg Once 1/8/2019 1/8/2019    Sig - Route: Infuse 889 mg into a venous catheter 1 (One) Time. - Intravenous    ceFAZolin (ANCEF) 889 mg in Sodium chloride 0.9 % 50 mL IVPB (Discontinued) 889 mg Once 1/8/2019 1/8/2019    Sig - Route: Infuse 889 mg into a venous catheter 1 (One) Time. - Intravenous    dantrolene (DANTRIUM) capsule 25 mg (Discontinued) 25 mg Nightly 1/7/2019 1/8/2019    Sig - Route: 1 capsule by Per G Tube route Every Night. - Per G Tube    docusate sodium (COLACE) liquid 100 mg (Discontinued) 100 mg 2 Times Daily 1/7/2019 1/8/2019    Sig - Route: 10 mL by Per G Tube route 2 (Two) Times a Day. - Per G Tube    Reason for Discontinue: Formulary change    Non-formulary Exception Code: Patient supplied medication    vancomycin (VANCOCIN) 1,000 mg in Sodium chloride 0.9 % 250 mL IVPB (Discontinued) 1,000 mg Every 24 Hours 1/7/2019 1/8/2019    Sig - Route: Infuse 1,000 mg into a venous catheter Daily. - Intravenous            Lab Results (last 24 hours)     Procedure Component Value Units Date/Time    Hepatitis Panel, Acute [[id number]]  (Normal) Collected:  01/08/19 1046    Specimen:  Blood Updated:  01/08/19 1226     Hepatitis B Surface Ag Non-Reactive     Hep A IgM Non-Reactive     Hep B C IgM Non-Reactive     Hepatitis C Ab Non-Reactive    Ammonia [[id number]]  (Normal) Collected:  01/08/19 1046    Specimen:  Blood Updated:  01/08/19 1129     Ammonia 28 umol/L      Blood Culture - Blood, Arm, Right [[id number]] Collected:  01/08/19 1046    Specimen:  Blood from Arm, Right Updated:  01/08/19 1114    Blood Culture - Blood, Arm, Left [[id number]] Collected:  01/08/19 1055    Specimen:  Blood from Arm, Left Updated:  01/08/19 1102    Blood Gas, Arterial [[id number]]  (Abnormal) Collected:  01/08/19 1052    Specimen:  Arterial Blood Updated:  01/08/19 1055     Site Arterial: right brachial     Sagar's Test N/A     pH, Arterial 7.496 pH units      pCO2, Arterial 20.7 mm Hg      pO2, Arterial 103.6 mm Hg      HCO3, Arterial 15.6 mmol/L      Base Excess, Arterial -5.9 mmol/L      O2 Saturation, Arterial 97.6 %      Hemoglobin, Blood Gas 11.0 g/dL      Hematocrit, Blood Gas 32.0 %      Oxyhemoglobin 97.0 %      Methemoglobin 0.40 %      Carboxyhemoglobin 0.2 %      A-a Gradiant 14.6 mmHg      Temperature 98.6 C      Barometric Pressure for Blood Gas 728 mmHg      Modality Room Air     FIO2 21 %     Magnesium [[id number]]  (Abnormal) Collected:  01/08/19 0651    Specimen:  Blood Updated:  01/08/19 0755     Magnesium 1.3 mg/dL     Blood Culture - Blood, Arm, Left [[id number]]  (Abnormal) Collected:  01/06/19 2156    Specimen:  Blood from Arm, Left Updated:  01/08/19 0755     Blood Culture Gram positive cocci, consistent with Staph spp     Isolated from --     Gram Stain Gram positive cocci in pairs and clusters    Osmolality, Calculated [[id number]]  (Normal) Collected:  01/08/19 0651    Specimen:  Blood Updated:  01/08/19 0741     Osmolality Calc 284.9 mOsm/kg     Comprehensive Metabolic Panel [[id number]]  (Abnormal) Collected:  01/08/19 0651    Specimen:  Blood Updated:  01/08/19 0741     Glucose 116 mg/dL      BUN 15 mg/dL      Creatinine 0.75 mg/dL      Sodium 142 mmol/L      Potassium 3.8 mmol/L      Comment: 1+ Icteric         Chloride 114 mmol/L      CO2 19.5 mmol/L      Calcium 8.8 mg/dL      Total Protein 7.1 g/dL      Albumin 3.40 g/dL      ALT (SGPT) 360 U/L      AST (SGOT) 500 U/L       Alkaline Phosphatase 441 U/L      Comment: Note New Reference Ranges        Total Bilirubin 1.7 mg/dL      eGFR Non African Amer 75 mL/min/1.73      Globulin 3.7 gm/dL      A/G Ratio 0.9 g/dL      BUN/Creatinine Ratio 20.0     Anion Gap 8.5 mmol/L     Narrative:       The MDRD GFR formula is only valid for adults with stable renal function between ages 18 and 70.    C-reactive Protein [[id number]]  (Abnormal) Collected:  01/08/19 0651    Specimen:  Blood Updated:  01/08/19 0741     C-Reactive Protein 16.97 mg/dL     Phosphorus [[id number]]  (Abnormal) Collected:  01/08/19 0651    Specimen:  Blood Updated:  01/08/19 0730     Phosphorus 2.1 mg/dL     CBC & Differential [[id number]] Collected:  01/08/19 0651    Specimen:  Blood Updated:  01/08/19 0728    Narrative:       The following orders were created for panel order CBC & Differential.  Procedure                               Abnormality         Status                     ---------                               -----------         ------                     CBC Auto Differential[[id number]]        Abnormal            Final result                 Please view results for these tests on the individual orders.    CBC Auto Differential [[id number]]  (Abnormal) Collected:  01/08/19 0651    Specimen:  Blood Updated:  01/08/19 0728     WBC 9.83 10*3/mm3      RBC 4.22 10*6/mm3      Hemoglobin 11.8 g/dL      Hematocrit 38.2 %      MCV 90.5 fL      MCH 28.0 pg      MCHC 30.9 g/dL      RDW 18.9 %      RDW-SD 62.7 fl      MPV 10.6 fL      Platelets 186 10*3/mm3      Neutrophil % 62.3 %      Lymphocyte % 27.2 %      Monocyte % 7.7 %      Eosinophil % 2.0 %      Basophil % 0.2 %      Immature Grans % 0.6 %      Neutrophils, Absolute 6.12 10*3/mm3      Lymphocytes, Absolute 2.67 10*3/mm3      Monocytes, Absolute 0.76 10*3/mm3      Eosinophils, Absolute 0.20 10*3/mm3      Basophils, Absolute 0.02 10*3/mm3      Immature Grans, Absolute 0.06 10*3/mm3     Urine Culture - Urine, Urine,  Clean Catch [[id number]]  (Abnormal) Collected:  01/07/19 0631    Specimen:  Urine, Clean Catch Updated:  01/08/19 0726     Urine Culture >100,000 CFU/mL Gram Negative Bacilli, Morphology consistent with Escherichia / Citrobacter    Blood Culture - Blood, Arm, Right [[id number]] Collected:  01/06/19 2156    Specimen:  Blood from Arm, Right Updated:  01/07/19 2245     Blood Culture No growth at 24 hours    Blood Culture ID, PCR - Blood, Arm, Left [[id number]]  (Abnormal) Collected:  01/06/19 2156    Specimen:  Blood from Arm, Left Updated:  01/07/19 1908     BCID, PCR Staphylococcus aureus, not MRSA. Identification by BCID PCR.        Imaging Results (last 24 hours)     Procedure Component Value Units Date/Time    US Venous Doppler Upper Extremity Bilateral (duplex) [[id number]] Collected:  01/08/19 1033     Updated:  01/08/19 1036    Narrative:       EXAMINATION: US VENOUS DOPPLER UPPER EXTREMITY BILATERAL (DUPLEX)-      CLINICAL INDICATION:  left jugular septic phelibtis; A41.9-Sepsis,  unspecified organism; N39.0-Urinary tract infection, site not specified;  K11.21-Acute sialoadenitis     TECHNIQUE: Multiplanar gray scale and Doppler vascular sonographic  imaging of the deep veins of BILATERAL lower extremity, without and with  compression.      COMPARISON: NONE      FINDINGS:   The visualized deep veins demonstrate flow and are compressible. No  evidence of deep venous thrombosis.             Impression:       No DVT.     This report was finalized on 1/8/2019 10:34 AM by Dr. Antony Moffett MD.       XR Chest AP [[id number]] Collected:  01/08/19 0826     Updated:  01/08/19 0828    Narrative:       EXAMINATION: XR CHEST AP-      CLINICAL INDICATION:     SEDATION IN INTUBATED PATIENTS     TECHNIQUE: Single frontal view of chest.      COMPARISON: NONE      FINDINGS:   There is bibasilar atelectasis.  Lungs are otherwise aerated.  Heart and mediastinal contours are unremarkable.   No pneumothorax.   No pleural effusion.    Bony and soft tissue structures are unremarkable.        Impression:       No radiographic evidence of acute cardiac or      This report was finalized on 1/8/2019 8:26 AM by Dr. Giovanni Martin MD.           ECG/EMG Results (last 24 hours)     Procedure Component Value Units Date/Time    Adult Transthoracic Echo Complete W/ Cont if Necessary Per Protocol [[id number]] Collected:  01/08/19 1006     Updated:  01/08/19 1030     BSA 1.8 m^2      Ao root diam 2.6 cm      Ao root area 5.5 cm^2      ACS 1.8 cm      LA dimension 3.8 cm      LA/Ao 1.4     Ao root area (BSA corrected) 1.5     PA acc slope 1,703 cm/sec^2      PA acc time 0.08 sec      PA pr(Accel) 41.0 mmHg       CV ECHO TAMY - BZI_BMI 30.9 kilograms/m^2       CV ECHO TAMY - BSA(HAYCOCK) 1.9 m^2       CV ECHO TAMY - BZI_METRIC_WEIGHT 76.7 kg       CV ECHO TAMY - BZI_METRIC_HEIGHT 157.5 cm      Target HR (85%) 122 bpm      Max. Pred. HR (100%) 143 bpm              Physician Progress Notes (last 24 hours) (Notes from 1/7/2019  2:45 PM through 1/8/2019  2:45 PM)      Chalino Quach DO at 1/8/2019  9:22 AM          Subjective     History:   Jeanna Flower is a 78 y.o. female admitted on 1/6/2019 secondary to Sepsis (CMS/[facility])     Procedures: None    CC: Follow up sepsis     Patient seen and examined with RUFUS Solomon. Awake but appears much more confused today. Unable to answer any questions or follow any commands. RN reports patient became increasingly confused throughout the day yesterday. RN reports multiple large BM's overnight. Pt attempted to get out of bed during that time.      History taken from: chart, and RN.      Objective     Vital Signs  Temp:  [97.1 °F (36.2 °C)-98.6 °F (37 °C)] 98.6 °F (37 °C)  Heart Rate:  [] 83  Resp:  [17-18] 18  BP: (140-146)/(60-88) 140/60    Intake/Output Summary (Last 24 hours) at 1/8/2019 0923  Last data filed at 1/8/2019 0520  Gross per 24 hour   Intake 300 ml   Output --   Net 300 ml         Physical  Exam:  General:    Awake, confused, does not answer any questions or follow commands, chronically ill appearing, right-sided erythema near parotid gland appears improved today   Heart:      Normal S1 and S2. Regular rate and rhythm. No significant murmur, rubs or gallops appreciated.   Lungs:     Respirations regular, even and unlabored. Lungs clear to auscultation B/L. No wheezes, rales or rhonchi.   Abdomen:   Soft and nontender. No guarding, rebound tenderness or  organomegaly noted. Bowel sounds present x 4.   Extremities:  No clubbing, cyanosis or edema noted. Contracture of LUE.      Results Review:    Results from last 7 days   Lab Units  01/08/19   0651  01/06/19   2156   WBC 10*3/mm3  9.83  18.17*   HEMOGLOBIN g/dL  11.8*  13.2   PLATELETS 10*3/mm3  186  238     Results from last 7 days   Lab Units  01/08/19   0651  01/06/19   2156   SODIUM mmol/L  142  137   POTASSIUM mmol/L  3.8  4.6   CHLORIDE mmol/L  114*  103   CO2 mmol/L  19.5*  20.8*   BUN mg/dL  15  25*   CREATININE mg/dL  0.75  1.18   CALCIUM mg/dL  8.8  8.9   GLUCOSE mg/dL  116*  157*     Results from last 7 days   Lab Units  01/08/19   0651  01/06/19   2156   BILIRUBIN mg/dL  1.7  0.8   ALK PHOS U/L  441*  99   AST (SGOT) U/L  500*  29   ALT (SGPT) U/L  360*  32     Results from last 7 days   Lab Units  01/08/19   0651   MAGNESIUM mg/dL  1.3*     Results from last 7 days   Lab Units  01/06/19   2156   INR   1.36*           Imaging Results (last 24 hours)     Procedure Component Value Units Date/Time    XR Chest AP [[id number]] Collected:  01/08/19 0826     Updated:  01/08/19 0828    Narrative:       EXAMINATION: XR CHEST AP-      CLINICAL INDICATION:     SEDATION IN INTUBATED PATIENTS     TECHNIQUE: Single frontal view of chest.      COMPARISON: NONE      FINDINGS:   There is bibasilar atelectasis.  Lungs are otherwise aerated.  Heart and mediastinal contours are unremarkable.   No pneumothorax.   No pleural effusion.   Bony and soft tissue  structures are unremarkable.        Impression:       No radiographic evidence of acute cardiac or      This report was finalized on 1/8/2019 8:26 AM by Dr. Giovanni Martin MD.               Medications:    apixaban 5 mg Oral Q12H   aztreonam 2 g Intravenous Q8H   budesonide 0.5 mg Nebulization BID - RT   castor oil-balsam peru 1 each Topical Q12H   cycloSPORINE 1 drop Both Eyes BID   docusate sodium 100 mg Per G Tube BID   famotidine 20 mg Per G Tube BID   fluticasone 2 spray Nasal Daily   levothyroxine 100 mcg Oral Daily   magnesium sulfate 2 g Intravenous Q2H   potassium phosphate 15 mmol Intravenous Once   sodium chloride 3 mL Intravenous Q12H   vancomycin 1,000 mg Intravenous Q24H   venlafaxine 37.5 mg Per G Tube BID With Meals              Assessment/Plan   Severe sepsis with metabolic encephalopathy and lactic acid>2: Likely 2/2 right-sided parotitis, UTI and bacteremia. Afebrile and hemodynamically stable. WBC improved but CRP has risen today. Lactic acid has normalized. 1/2 initial blood cultures revealing Staph aureus, not MRSA. Urine culture revealing growth of GNR consistent with Escherichia/Citrobacter. Currently on Vanc and Azactam. ID consulted per protocol. Follow cultures and repeat labs in the AM.     Right-sided parotitis: Appears improved with less erythema on exam today. Cont antibiotics as above.     GNR UTI: Cont antibiotics as above pending final culture result.    Staph bacteremia: 1/2 initial cultures revealing growth of Staph aureus, not MRSA. ID consulted per protocol. Repeat blood cultures. Order echo.     Metabolic encephalopathy superimposed on baseline dementia: Possibly 2/2 above. RN reports worsening confusion since upon admission. Pt not lying still enough for CT head at this time. Order STAT ABG. Order ammonia level. Cont supportive treatment in addition to treatment as outlined above.     Elevated liver enzymes: Stop statin and dantrolene. Order acute hepatitis panel and liver US.  Repeat CMP in the AM.     Electrolyte abnormalities: Mg and PO4 low today. Replete per protocol.    PAF: Currently NSR. Cont Eliquis for stroke prevention.     DM II, non-insulin dependent: Holding home PO meds. BG levels currently stable.     Essential HTN: BP currently overall stable. Cont to monitor.     Hx of CVA with left-sided hemiplegia: Cont supportive treatment.     DVT PPX: Eliquis    Disposition: Pt's POA requesting SNF placement with possible hospice. Palliative care following with input appreciated.     Pt is at high risk 2/2 severe sepsis, parotitis, UTI, bacteremia, metabolic encephalopathy superimposed on baseline dementia, electrolyte abnormalities, PAF and hx of CVA.       Chalino Quach DO  19  9:23 AM    Electronically signed by Chalino Quach DO at 2019 11:43 AM       Medical Student Notes (last 24 hours) (Notes from 2019  2:45 PM through 2019  2:45 PM)     No notes of this type exist for this encounter.           Consult Notes (last 24 hours) (Notes from 2019  2:45 PM through 2019  2:45 PM)      Sanam Archer APRN at 2019 10:38 AM      Consult Orders    1. Inpatient Infectious Diseases Consult [[id number]] ordered by Chalino Quach DO at 19 2100                        INFECTIOUS DISEASE CONSULTATION REPORT        Patient Identification:  Name:  Jeanna Flower  Age:  78 y.o.  Sex:  female  :  1940  MRN:  [id number]   Visit Number:  [id number]  Primary Care Physician:  Adela Mathew MD         Subjective       Subjective     History of present illness:      Thank you Dr. Quach for allowing us to participate in the care of your patient.  As you well know, Ms. Jeanna Flower is a 78 y.o. female with past medical history significant for CVA, diabetes, A. fib, hypertension, dementia, who presented to [facility] Emergency Department on 2019 for redness, swelling of neck.  WBC normal.  CRP 16.97.  Lactic acid  2.5 on admission.  CT of neck revealed right-sided parotid gland more hyperdense compared to left side that may represent inflammation.  Chest x-ray from 1/8/19 unremarkable.  Ultrasound of bilateral upper extremities reveals no DVT.  Urine culture preliminary reveals greater than 100,000 colonies of gram-negative bacilli consistent with Escherichia coli.  Blood cultures one out of 2 sets from 1/6/19 reveals MSSA.      Infectious Disease consultation was requested for antimicrobial management.      ---------------------------------------------------------------------------------------------------------------------     Review Of Systems:    Unable to obtain due to mental status.   ---------------------------------------------------------------------------------------------------------------------     Past Medical History    Past Medical History:   Diagnosis Date   • Anemia    • Closed wedge compression fracture of first lumbar vertebra (CMS/[facility])    • COPD (chronic obstructive pulmonary disease) (CMS/[facility])    • CVA (cerebral vascular accident) with left hemiparesis. 12/6/2017   • Dementia    • Diabetes mellitus (CMS/[facility])    • Dysarthria following cerebral infarction    • Dysphagia, oropharyngeal phase    • Dysphagia, pharyngoesophageal phase    • GERD (gastroesophageal reflux disease)    • Heart disease    • Hyperlipidemia    • Hypertension    • Hypothyroidism    • Insomnia    • Obesity    • Other sleep apnea 11/13/2018   • Paroxysmal atrial fibrillation (CMS/[facility]) 12/6/2017       Past Surgical History    Past Surgical History:   Procedure Laterality Date   • APPENDECTOMY     • BLADDER SURGERY     • CARDIAC CATHETERIZATION     • CHOLECYSTECTOMY         Family History    Family History   Problem Relation Age of Onset   • Heart disease Mother    • Heart attack Mother    • Heart disease Father    • Heart attack Father    • Heart attack Brother    • Heart attack Brother            Social History    Social History      Tobacco Use   • Smoking status: Never Smoker   • Smokeless tobacco: Never Used   Substance Use Topics   • Alcohol use: No   • Drug use: No       Allergies    Bactrim [sulfamethoxazole-trimethoprim]; Codeine; and Penicillins  ---------------------------------------------------------------------------------------------------------------------     Home Medications:    Prior to Admission Medications     Prescriptions Last Dose Informant Patient Reported? Taking?    albuterol sulfate  (90 Base) MCG/ACT inhaler 1/6/2019 Care Giver Yes Yes    Inhale 2 puffs Every 4 (Four) Hours As Needed for Wheezing or Shortness of Air.    amantadine (SYMMETREL) 50 MG/5ML solution 1/6/2019 Care Giver Yes Yes    100 mg by Per G Tube route Every 12 (Twelve) Hours.    apixaban (ELIQUIS) 5 MG tablet tablet 1/6/2019 Care Giver No Yes    Take 1 tablet by mouth Every 12 (Twelve) Hours.    atorvastatin (LIPITOR) 40 MG tablet 1/5/2019 Care Giver Yes Yes    40 mg by Per G Tube route Every Night.    budesonide (PULMICORT) 0.5 MG/2ML nebulizer solution 1/6/2019 Care Giver Yes Yes    Take 0.5 mg by nebulization 2 (Two) Times a Day.    castor oil-balsam peru (VENELEX) ointment 1/6/2019 Care Giver Yes Yes    Apply 1 each topically to the appropriate area as directed 2 (Two) Times a Day.    cycloSPORINE (RESTASIS) 0.05 % ophthalmic emulsion 1/6/2019 Care Giver Yes Yes    1 drop 2 (Two) Times a Day.    dantrolene (DANTRIUM) 25 MG capsule 1/5/2019 Care Giver Yes Yes    25 mg by Per G Tube route Every Night.    docusate sodium (COLACE) 150 MG/15ML liquid 1/6/2019 Care Giver Yes Yes    100 mg by Per G Tube route 2 (Two) Times a Day.    famotidine (PEPCID) 20 MG tablet 1/6/2019 Care Giver Yes Yes    20 mg by Per G Tube route 2 (Two) Times a Day.    fluticasone (FLONASE) 50 MCG/ACT nasal spray 1/6/2019 Care Giver Yes Yes    2 sprays into the nostril(s) as directed by provider Daily.    fluticasone-salmeterol (ADVAIR DISKUS) 250-50 MCG/DOSE DISKUS  1/6/2019 Care Giver Yes Yes    Inhale 1 puff 2 (Two) Times a Day.    glipiZIDE (GLUCOTROL XL) 2.5 MG 24 hr tablet 1/6/2019 Care Giver Yes Yes    Take 2.5 mg by mouth Daily.    levothyroxine (SYNTHROID, LEVOTHROID) 100 MCG tablet 1/6/2019 Care Giver Yes Yes    Take 100 mcg by mouth Daily.    loratadine (CLARITIN) 10 MG tablet 1/6/2019 Care Giver Yes Yes    10 mg by Per G Tube route Daily.    losartan (COZAAR) 100 MG tablet 1/6/2019 Care Giver Yes Yes    100 mg by Per J Tube route Daily.    melatonin 3 MG tablet 1/5/2019 Care Giver Yes Yes    3 mg by Per G Tube route Every Night.    metoprolol tartrate (LOPRESSOR) 25 MG tablet 1/6/2019 Care Giver Yes Yes    25 mg by Per G Tube route Every 6 (Six) Hours.    miconazole (MICOTIN) 2 % powder 1/6/2019 Care Giver Yes Yes    Apply 1 application topically to the appropriate area as directed 3 (Three) Times a Day As Needed for Itching (skin folds).    montelukast (SINGULAIR) 10 MG tablet 1/5/2019 Care Giver Yes Yes    Take 10 mg by mouth Every Night.    venlafaxine (EFFEXOR) 37.5 MG tablet 1/6/2019 Care Giver Yes Yes    37.5 mg by Per G Tube route 2 (Two) Times a Day.        ---------------------------------------------------------------------------------------------------------------------    Objective       Objective     Hospital Scheduled Meds:    apixaban 5 mg Oral Q12H   budesonide 0.5 mg Nebulization BID - RT   castor oil-balsam peru 1 each Topical Q12H   ceFAZolin 1 g Intravenous Once   ceFAZolin 1 mg Intravenous Once   ceFAZolin 10 mg Intravenous Once   ceFAZolin 100 mg Intravenous Once   ceFAZolin 889 mg Intravenous Once   cycloSPORINE 1 drop Both Eyes BID   docusate sodium 100 mg Per G Tube BID   famotidine 20 mg Per G Tube BID   fluticasone 2 spray Nasal Daily   gentamicin 3 mg/kg (Adjusted) Intravenous Once   levothyroxine 100 mcg Oral Daily   magnesium sulfate 2 g Intravenous Q2H   potassium phosphate 15 mmol Intravenous Once   sodium chloride 3 mL Intravenous  Q12H   venlafaxine 37.5 mg Per G Tube BID With Meals       Pharmacy Consult      ---------------------------------------------------------------------------------------------------------------------   Vital Signs:  Temp:  [97.1 °F (36.2 °C)-98.6 °F (37 °C)] 98.6 °F (37 °C)  Heart Rate:  [] 83  Resp:  [17-18] 18  BP: (140-146)/(60-88) 140/60  No data found.  SpO2 Percentage    01/07/19 2340 01/08/19 0610 01/08/19 0652   SpO2: 97% 97% 91%     SpO2:  [91 %-98 %] 91 %  on  Flow (L/min):  [2-20] 2;   Device (Oxygen Therapy): nasal cannula    Body mass index is 30.02 kg/m².  Wt Readings from Last 3 Encounters:   01/07/19 76.8 kg (169 lb 6.4 oz)   12/05/18 87.1 kg (192 lb)   11/30/18 84.4 kg (186 lb)     ---------------------------------------------------------------------------------------------------------------------     Physical Exam:    Constitutional:  Well-developed and well-nourished.  No respiratory distress.      HENT:  Head: Normocephalic and atraumatic.  Mouth:  Moist mucous membranes.    Eyes:  Conjunctivae and EOM are normal.  No scleral icterus.  Neck:  Neck supple.  No JVD present.    Cardiovascular:  Normal rate, regular rhythm and normal heart sounds with no murmur. No edema.  Pulmonary/Chest:  No respiratory distress, no wheezes, no crackles, with normal breath sounds and good air movement.  Abdominal:  Soft.  Bowel sounds are normal.  No distension and no tenderness.   Musculoskeletal:  No edema, no tenderness, and no deformity.  No swelling or redness of joints.  Neurological:  Confused.    Skin:  Skin is warm and dry.  No rash noted.  No pallor.   Psychiatric:  Confused.    ---------------------------------------------------------------------------------------------------------------------              Results from last 7 days   Lab Units  01/08/19   0651  01/07/19   0155  01/06/19   2156   CRP mg/dL  16.97*   --   9.85*   LACTATE mmol/L   --   1.2  2.5*   WBC 10*3/mm3  9.83   --   18.17*    HEMOGLOBIN g/dL  11.8*   --   13.2   HEMATOCRIT %  38.2   --   43.9   MCV fL  90.5   --   93.0   MCHC g/dL  30.9*   --   30.1*   PLATELETS 10*3/mm3  186   --   238   INR    --    --   1.36*     Results from last 7 days   Lab Units  01/08/19   0651  01/06/19   2156   SODIUM mmol/L  142  137   POTASSIUM mmol/L  3.8  4.6   MAGNESIUM mg/dL  1.3*   --    CHLORIDE mmol/L  114*  103   CO2 mmol/L  19.5*  20.8*   BUN mg/dL  15  25*   CREATININE mg/dL  0.75  1.18   EGFR IF NONAFRICN AM mL/min/1.73  75  44*   CALCIUM mg/dL  8.8  8.9   GLUCOSE mg/dL  116*  157*   ALBUMIN g/dL  3.40  3.60   BILIRUBIN mg/dL  1.7  0.8   ALK PHOS U/L  441*  99   AST (SGOT) U/L  500*  29   ALT (SGPT) U/L  360*  32   Estimated Creatinine Clearance: 57.8 mL/min (by C-G formula based on SCr of 0.75 mg/dL).  No results found for: AMMONIA    No results found for: HGBA1C, POCGLU  Lab Results   Component Value Date    HGBA1C 6.2 (H) 03/24/2014     Lab Results   Component Value Date    TSH 1.626 09/15/2018       Blood Culture   Date Value Ref Range Status   01/06/2019 No growth at 24 hours  Preliminary   01/06/2019 Gram positive cocci, consistent with Staph spp (A)  Preliminary     Urine Culture   Date Value Ref Range Status   01/07/2019 (A)  Preliminary    >100,000 CFU/mL Gram Negative Bacilli, Morphology consistent with Escherichia / Citrobacter              Pain Management Panel     Pain Management Panel Latest Ref Rng & Units 9/16/2018    AMPHETAMINES SCREEN, URINE Negative Negative    BARBITURATES SCREEN Negative Negative    BENZODIAZEPINE SCREEN, URINE Negative Negative    BUPRENORPHINE Negative Negative    COCAINE SCREEN, URINE Negative Negative    METHADONE SCREEN, URINE Negative Negative        I have personally reviewed the above laboratory results.   ---------------------------------------------------------------------------------------------------------------------  Imaging Results (last 7 days)     Procedure Component Value Units Date/Time     US Venous Doppler Upper Extremity Bilateral (duplex) [[id number]] Collected:  01/08/19 1033     Updated:  01/08/19 1036    Narrative:       EXAMINATION: US VENOUS DOPPLER UPPER EXTREMITY BILATERAL (DUPLEX)-      CLINICAL INDICATION:  left jugular septic phelibtis; A41.9-Sepsis,  unspecified organism; N39.0-Urinary tract infection, site not specified;  K11.21-Acute sialoadenitis     TECHNIQUE: Multiplanar gray scale and Doppler vascular sonographic  imaging of the deep veins of BILATERAL lower extremity, without and with  compression.      COMPARISON: NONE      FINDINGS:   The visualized deep veins demonstrate flow and are compressible. No  evidence of deep venous thrombosis.             Impression:       No DVT.     This report was finalized on 1/8/2019 10:34 AM by Dr. Antony Moffett MD.       XR Chest AP [[id number]] Collected:  01/08/19 0826     Updated:  01/08/19 0828    Narrative:       EXAMINATION: XR CHEST AP-      CLINICAL INDICATION:     SEDATION IN INTUBATED PATIENTS     TECHNIQUE: Single frontal view of chest.      COMPARISON: NONE      FINDINGS:   There is bibasilar atelectasis.  Lungs are otherwise aerated.  Heart and mediastinal contours are unremarkable.   No pneumothorax.   No pleural effusion.   Bony and soft tissue structures are unremarkable.        Impression:       No radiographic evidence of acute cardiac or      This report was finalized on 1/8/2019 8:26 AM by Dr. Giovanni Martin MD.       CT Soft Tissue Neck With Contrast [[id number]] Collected:  01/07/19 0724     Updated:  01/07/19 0739    Narrative:       EXAMINATION: CT SOFT TISSUE NECK W CONTRAST-      Technique: multiple CT axial images obtained through the neck, following  IV contrast administration. Coronal and sagittal reformatted images  obtained from the original axial data set to facilitate diagnosis and  surgical planning.     Radiation dose reduction techniques were utilized per ALARA protocol.  Automated exposure control was initiated  through either or [employer] or  [employer] software packages by  protocol.       225.34 mGy.cm     CLINICAL INDICATION:     Neck mass, nonpulsatile, solitary      COMPARISON:    None.     FINDINGS:      There is motion artifact. The right-sided parotid gland is slightly more  hyperdense some prominent in compared to the left side that may  represent parotid gland inflammation..  No definite lymphadenopathy. Incidentally imaged blood vessels are  unremarkable. Bony structures are unremarkable.    Impression:       There is motion artifact. The right-sided parotid gland is  slightly more hyperdense some prominent in compared to the left side  that may represent parotid gland inflammation..     This report was finalized on 1/7/2019 7:37 AM by Dr. Giovanni Martin MD.           I have personally reviewed the above radiology results.   ---------------------------------------------------------------------------------------------------------------------      Assessment & Plan        Assessment/Plan       ASSESSMENT:    1. Severe sepsis with lactic acid greater than 2 on admission  2. MSSA bacteremia  3. E. Coli UTI    PLAN:    Patient presents with redness, swelling of neck.  WBC normal.  CRP 16.97.  Lactic acid 2.5 on admission.  CT of neck revealed right-sided parotid gland more hyperdense compared to left side that may represent inflammation.  Chest x-ray from 1/8/19 unremarkable.  Ultrasound of bilateral upper extremities reveals no DVT.  Urine culture preliminary reveals greater than 100,000 colonies of gram-negative bacilli consistent with Escherichia coli.  Blood cultures one out of 2 sets from 1/6/19 reveals MSSA.    At this time we are very concerned for septic phlebitis of the jugular vein. Venous duplex was ordered to rule out septic DVT, no DVT found. Highly recommend JAYE to rule out vegetation. At this time antibiotic coverage was changed to Cefazolin 2gm IV Q8H graded challenge and Gentamicin x1 dose  for antibiotic synergy and UTI coverage, with possibility to repeat in AM. Repeat blood cultures x2 ordered. CRP ordered for AM.     Current Antimicrobials:  Cefazolin 2gm IV Q8H graded challenge  Gentamicin x1 dose on 19        Code Status:   Code Status and Medical Interventions:   Ordered at: 19 0527     Limited Support to NOT Include:    Artificial Nutrition    Blood Products    Cardioversion/Defibrillation    Dialysis    Intubation     Code Status:    No CPR     Medical Interventions (Level of Support Prior to Arrest):    Limited           AMELIE Reis  19  10:38 AM    Electronically signed by Sanam Archer APRN at 2019 11:18 AM       Nutrition Notes (last 24 hours) (Notes from 2019  2:45 PM through 2019  2:45 PM)     No notes of this type exist for this encounter.           Physical Therapy Notes (last 24 hours) (Notes from 2019  2:45 PM through 2019  2:45 PM)      Saadia Kc, PT at 2019  4:43 PM  Version 1 of 1         Acute Care - Physical Therapy Initial Evaluation   Tom     Patient Name: Jeanna Flower  : 1940  MRN: [id number]  Today's Date: 2019   Onset of Illness/Injury or Date of Surgery: 19  Date of Referral to PT: 19  Referring Physician: Dr. Medel      Admit Date: 2019    Visit Dx:     ICD-10-CM ICD-9-CM   1. Sepsis, due to unspecified organism (CMS/[facility]) A41.9 038.9     995.91   2. Urinary tract infection without hematuria, site unspecified N39.0 599.0   3. Acute parotitis K11.21 527.2     Patient Active Problem List   Diagnosis   • Paroxysmal atrial fibrillation (CMS/HCC)   • Essential hypertension   • Type 2 diabetes mellitus without complication (CMS/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   • Other sleep apnea   • Sepsis (CMS/HCC)     Past Medical History:   Diagnosis Date   • Anemia    • Closed wedge compression fracture of first lumbar vertebra (CMS/HCC)    • COPD (chronic  obstructive pulmonary disease) (CMS/[facility])    • CVA (cerebral vascular accident) with left hemiparesis. 12/6/2017   • Dementia    • Diabetes mellitus (CMS/[facility])    • Dysarthria following cerebral infarction    • Dysphagia, oropharyngeal phase    • Dysphagia, pharyngoesophageal phase    • GERD (gastroesophageal reflux disease)    • Heart disease    • Hyperlipidemia    • Hypertension    • Hypothyroidism    • Insomnia    • Obesity    • Other sleep apnea 11/13/2018   • Paroxysmal atrial fibrillation (CMS/[facility]) 12/6/2017     Past Surgical History:   Procedure Laterality Date   • APPENDECTOMY     • BLADDER SURGERY     • CARDIAC CATHETERIZATION     • CHOLECYSTECTOMY          PT ASSESSMENT (last 12 hours)      Physical Therapy Evaluation     Row Name 01/07/19 1626          PT Evaluation Time/Intention    Subjective Information  no complaints  -AG     Document Type  evaluation  -AG     Mode of Treatment  individual therapy;physical therapy  -AG     Patient Effort  fair  -AG     Symptoms Noted During/After Treatment  fatigue  -AG     Row Name 01/07/19 1626          General Information    Patient Profile Reviewed?  yes  -AG     Onset of Illness/Injury or Date of Surgery  01/06/19  -AG     Referring Physician  Dr. Medel  -AG     Patient Observations  alert;cooperative  -AG     Patient/Family Observations  pt. supine; awake, confused; patient is speaking incoherently.   -AG     Prior Level of Function  -- per chart, lives with granddaughter; uses RW for mobility  -AG     Equipment Currently Used at Home  bath bench;hospital bed;walker, rolling  -AG     Pertinent History of Current Functional Problem  pt. presents with UTI, sepsis; hx dementia; family requests SNF placement  -AG     Existing Precautions/Restrictions  fall  -AG     Limitations/Impairments  safety/cognitive  -AG     Equipment Issued to Patient  gait belt  -AG     Risks Reviewed  patient:;LOB;dizziness;increased discomfort;change in vital signs  -AG     Benefits  Reviewed  patient:;improve function;increase independence;increase strength;increase balance;decrease risk of DVT;increase knowledge  -AG     Barriers to Rehab  cognitive status  -AG     Row Name 01/07/19 1626          Relationship/Environment    Lives With  grandchild(corrina)  -AG     Row Name 01/07/19 1626          Resource/Environmental Concerns    Current Living Arrangements  home/apartment/condo  -AG     Row Name 01/07/19 1626          Cognitive Assessment/Intervention- PT/OT    Orientation Status (Cognition)  oriented to;person  -AG     Follows Commands (Cognition)  follows one step commands;50-74% accuracy;physical/tactile prompts required;repetition of directions required;verbal cues/prompting required  -AG     Safety Deficit (Cognitive)  moderate deficit  -AG     Row Name 01/07/19 1626          Safety Issues, Functional Mobility    Safety Issues Affecting Function (Mobility)  ability to follow commands;awareness of need for assistance;insight into deficits/self awareness;safety precaution awareness;safety precautions follow-through/compliance  -AG     Impairments Affecting Function (Mobility)  balance;cognition;coordination;endurance/activity tolerance;postural/trunk control;strength  -AG     Row Name 01/07/19 1626          Mobility Assessment/Treatment    Extremity Weight-bearing Status  left lower extremity;right lower extremity  -AG     Left Lower Extremity (Weight-bearing Status)  weight-bearing as tolerated (WBAT)  -AG     Right Lower Extremity (Weight-bearing Status)  weight-bearing as tolerated (WBAT)  -AG     Row Name 01/07/19 1626          Bed Mobility Assessment/Treatment    Bed Mobility Assessment/Treatment  scooting/bridging;supine-sit;sit-supine  -AG     Scooting/Bridging [place] (Bed Mobility)  verbal cues;nonverbal cues (demo/gesture);maximum assist (25% patient effort);2 person assist  -AG     Supine-Sit [place] (Bed Mobility)  verbal cues;nonverbal cues (demo/gesture);maximum assist  (25% patient effort);2 person assist  -AG     Sit-Supine [place] (Bed Mobility)  verbal cues;nonverbal cues (demo/gesture);maximum assist (25% patient effort);2 person assist  -AG     Bed Mobility, Safety Issues  cognitive deficits limit understanding;decreased use of arms for pushing/pulling;decreased use of legs for bridging/pushing  -AG     Assistive Device (Bed Mobility)  bed rails;draw sheet  -     Row Name 01/07/19 1626          Transfer Assessment/Treatment    Transfer Assessment/Treatment  sit-stand transfer;stand-sit transfer  -AG     Sit-Stand [place] (Transfers)  verbal cues;nonverbal cues (demo/gesture);moderate assist (50% patient effort);2 person assist  -AG     Stand-Sit [place] (Transfers)  verbal cues;nonverbal cues (demo/gesture);moderate assist (50% patient effort);2 person assist  -AG     Row Name 01/07/19 1626          Sit-Stand Transfer    Assistive Device (Sit-Stand Transfers)  walker, front-wheeled  -     Row Name 01/07/19 1626          Stand-Sit Transfer    Assistive Device (Stand-Sit Transfers)  walker, front-wheeled  -     Row Name 01/07/19 1626          Gait/Stairs Assessment/Training    [place] Level (Gait)  unable to assess  -[facility] Name 01/07/19 1626          General ROM    GENERAL ROM COMMENTS  R LE AAROM/ PROM WFL; L side hemiparetic  -[facility] Name 01/07/19 1626          MMT (Manual Muscle Testing)    General MMT Comments  unable to formally assess  -[facility] Name 01/07/19 1626          Motor Assessment/Intervention    Additional Documentation  Balance (Group)  -     Row Name 01/07/19 1626          Balance    Balance  static sitting balance;static standing balance  -[facility] Name 01/07/19 1626          Static Sitting Balance    Level of [place] (Unsupported Sitting, Static Balance)  minimal assist, 75% patient effort  -AG     Time Able to Maintain Position (Unsupported Sitting, Static Balance)  more than 5 minutes  -[facility] Name 01/07/19  1626          Static Standing Balance    Level of [place] (Supported Standing, Static Balance)  moderate assist, 50 to 74% patient effort;other (see comments) x  2  -AG     Time Able to Maintain Position (Supported Standing, Static Balance)  15 to 30 seconds x 3 trials  -AG     Assistive Device Utilized (Supported Standing, Static Balance)  rolling walker  -AG     Comment (Supported Standing, Static Balance)  attempts to cross feet in standing  -AG     Row Name 01/07/19 1626          Sensory Assessment/Intervention    Sensory General Assessment  -- unable to formally assess  -AG     Row Name 01/07/19 1626          Vision Assessment/Intervention    Visual Impairment/Limitations  unable/difficult to assess  -AG     Row Name 01/07/19 1626          Pain Assessment    Additional Documentation  Pain Scale: FACES Pre/Post-Treatment (Group)  -AG     Row Name 01/07/19 1626          Pain Scale: FACES Pre/Post-Treatment    Pain: FACES Scale, Pretreatment  0-->no hurt  -AG     Pain: FACES Scale, Post-Treatment  0-->no hurt  -AG     Row Name             Wound 01/07/19 0401 Right posterior gluteal pressure injury    Wound - Properties Group Date first assessed: 01/07/19  -CC Time first assessed: 0401  -CC Present On Admission : yes  -CC Side: Right  -CC Orientation: posterior  -CC Location: gluteal  -CC Type: pressure injury  -CC Stage, Pressure Injury: Stage 2  -CC    Row Name             Wound 01/07/19 0403 Left lower gluteal pressure injury    Wound - Properties Group Date first assessed: 01/07/19  -CC Time first assessed: 0403  -CC Side: Left  -CC Orientation: lower  -CC Location: gluteal  -CC Type: pressure injury  -CC Stage, Pressure Injury: Stage 2  -CC    Row Name 01/07/19 1626          Coping    Observed Emotional State  accepting  -     Verbalized Emotional State  acceptance  -AG     Row Name 01/07/19 1626          Plan of Care Review    Plan of Care Reviewed With  patient  -AG     [facility] Name 01/07/19 1626           Physical Therapy Clinical Impression    Date of Referral to PT  01/07/19  -AG     PT Diagnosis (PT Clinical Impression)  decr functional mobility; decr balance  -AG     Prognosis (PT Clinical Impression)  fair  -AG     Functional Level at Time of Evaluation (PT Clinical Impression)  Mod A x 2/ Max A x 2 bed mobility   -AG     Patient/Family Goals Statement (PT Clinical Impression)  pt unable to formulate goal  -AG     Criteria for Skilled Interventions Met (PT Clinical Impression)  yes;treatment indicated  -AG     Pathology/Pathophysiology Noted (Describe Specifically for Each System)  musculoskeletal;neuromuscular  -AG     Impairments Found (describe specific impairments)  aerobic capacity/endurance;arousal, attention, and cognition;ergonomics and body mechanics;gait, locomotion, and balance;joint integrity and mobility;motor function;ROM  -AG     Functional Limitations in Following Categories (Describe Specific Limitations)  self-care;community/leisure  -AG     Rehab Potential (PT Clinical Summary)  fair, will monitor progress closely  -AG     Predicted Duration of Therapy (PT)  3-5 days  -AG     Care Plan Review (PT)  evaluation/treatment results reviewed;care plan/treatment goals reviewed;risks/benefits reviewed;current/potential barriers reviewed  -AG     Care Plan Review, Other Participant (PT Clinical Impression)  -- no family or visitors present at time of evaluation  -AG     Row Name 01/07/19 1626          Physical Therapy Goals    Bed Mobility Goal Selection (PT)  bed mobility, PT goal 1  -AG     Transfer Goal Selection (PT)  transfer, PT goal 1;transfer, PT goal 2  -AG     Row Name 01/07/19 1626          Bed Mobility Goal 1 (PT)    Activity/Assistive Device (Bed Mobility Goal 1, PT)  sit to supine;supine to sit  -AG     [place] Level/Cues Needed (Bed Mobility Goal 1, PT)  moderate assist (50-74% patient effort)  -AG     Time Frame (Bed Mobility Goal 1, PT)  by discharge  -AG     Row Name 01/07/19  1626          Transfer Goal 1 (PT)    Activity/Assistive Device (Transfer Goal 1, PT)  sit-to-stand/stand-to-sit;walker, rolling  -AG     [place] Level/Cues Needed (Transfer Goal 1, PT)  minimum assist (75% or more patient effort)  -AG     Time Frame (Transfer Goal 1, PT)  by discharge  -AG     Row Name 01/07/19 1626          Transfer Goal 2 (PT)    Activity/Assistive Device (Transfer Goal 2, PT)  bed-to-chair/chair-to-bed;walker, rolling  -AG     [place] Level/Cues Needed (Transfer Goal 2, PT)  moderate assist (50-74% patient effort)  -AG     Time Frame (Transfer Goal 2, PT)  by discharge  -AG     Row Name 01/07/19 1626          Positioning and Restraints    Pre-Treatment Position  in bed  -AG     Post Treatment Position  bed  -AG     In Bed  notified nsg;supine;call light within reach;encouraged to call for assist;exit alarm on;side rails up x3  -AG     Row Name 01/07/19 1626          Living Environment    Home Accessibility  -- unknown  -AG       User Key  (r) = Recorded By, (t) = Taken By, (c) = Cosigned By    Initials Name Provider Type    Charlene Siegel, RN Registered Nurse    Saadia Cunningham, BETSY Physical Therapist        Physical Therapy Education     Title: PT OT SLP Therapies (In Progress)     Topic: Physical Therapy (In Progress)     Point: Mobility training (In Progress)     Learning Progress Summary           Patient Acceptance, E,D, NR by  at 1/7/2019  4:41 PM                   Point: Home exercise program (In Progress)     Learning Progress Summary           Patient Acceptance, E,D, NR by  at 1/7/2019  4:41 PM                   Point: Body mechanics (In Progress)     Learning Progress Summary           Patient Acceptance, E,D, NR by  at 1/7/2019  4:41 PM                   Point: Precautions (In Progress)     Learning Progress Summary           Patient Acceptance, E,D, NR by  at 1/7/2019  4:41 PM                               User Key     Initials Effective Dates Name Provider  Type Discipline    AG 04/03/18 -  Saadia Kc, PT Physical Therapist PT              PT Recommendation and Plan  Anticipated Discharge Disposition (PT): skilled nursing facility  Planned Therapy Interventions (PT Eval): balance training, bed mobility training, gait training, home exercise program, manual therapy techniques, neuromuscular re-education, patient/family education, postural re-education, ROM (range of motion), strengthening, transfer training  Therapy Frequency (PT Clinical Impression): other (see comments)(3-5 times/ week per priority policy)  Outcome Summary/Treatment Plan (PT)  Anticipated Equipment Needs at Discharge (PT): (TBD)  Anticipated Discharge Disposition (PT): skilled nursing facility  Plan of Care Reviewed With: patient  Outcome Measures     Row Name 01/07/19 1600             How much help from another person do you currently need...    Turning from your back to your side while in flat bed without using bedrails?  2  -AG      Moving from lying on back to sitting on the side of a flat bed without bedrails?  2  -AG      Moving to and from a bed to a chair (including a wheelchair)?  1  -AG      Standing up from a chair using your arms (e.g., wheelchair, bedside chair)?  2  -AG      Climbing 3-5 steps with a railing?  1  -AG      To walk in hospital room?  1  -AG      AM-PAC 6 Clicks Score  9  -AG         Functional Assessment    Outcome Measure Options  AM-PAC 6 Clicks Basic Mobility (PT)  -AG        User Key  (r) = Recorded By, (t) = Taken By, (c) = Cosigned By    Initials Name Provider Type    AG Saadia Kc, PT Physical Therapist         Time Calculation:   PT Charges     Row Name 01/07/19 1642             Time Calculation    PT Received On  01/07/19  -      PT - Next Appointment  01/08/19  -      PT Goal Re-Cert Due Date  01/21/19  -         Time Calculation- PT    TCU Minutes- PT  25 min  -AG        User Key  (r) = Recorded By, (t) = Taken By, (c) = Cosigned By    Initials  Name Provider Type     Saadia Kc PT Physical Therapist        Therapy Suggested Charges     Code   Minutes Charges    None           Therapy Charges for Today     Code Description Service Date Service Provider Modifiers Qty    48645169306 HC PT MOBILITY PROJECTED 1/7/2019 Saadia Kc, PT  1    49080108399 HC PT THER SUPP EA 15 MIN 1/7/2019 Saadia Kc, PT GP 2    77853639621 HC PT THERAPEUTIC ACT EA 15 MIN 1/7/2019 Saadia Kc, PT GP 1    49080301967 HC PT EVAL HIGH COMPLEXITY 1 1/7/2019 Saadia Kc, PT GP 1          PT G-Codes  Outcome Measure Options: AM-PAC 6 Clicks Basic Mobility (PT)  AM-PAC 6 Clicks Score: 9  Functional Limitation: Mobility: Walking and moving around  Mobility: Walking and Moving Around Goal Status (): At least 40 percent but less than 60 percent impaired, limited or restricted      Saadia Kc PT  1/7/2019         Electronically signed by Saadia Kc PT at 1/7/2019  4:43 PM     Rosana Urrutia PTA at 1/8/2019  1:55 PM  Version 1 of 1            01/08/19 1353   Rehab Treatment   Discipline physical therapy assistant   Reason Treatment Not Performed other (see comments)  (Pt. was unable to follow commands and presented with decreased arousal. PT to follow up tomorrow.)   Recommendation   PT - Next Appointment 01/09/19       Electronically signed by Rosana Urrutia PTA at 1/8/2019  1:55 PM          Occupational Therapy Notes (last 24 hours) (Notes from 1/7/2019  2:45 PM through 1/8/2019  2:45 PM)      Antony Solano OT at 1/8/2019  2:18 PM        Pt presents with increased confusion and decreased alertness. Unable to adequately participate in therapy today. OT will resume as tolerated.     Electronically signed by Antony Solano OT at 1/8/2019  2:19 PM          Speech Language Pathology Notes (last 24 hours) (Notes from 1/7/2019  2:45 PM through 1/8/2019  2:45 PM)      Ivanna Kuo, MS CCC-SLP at 1/8/2019 11:51 AM        Ms. Flower was seen at bedside  "this am x2 for attempted diet tolerance assessment f/u. Early am, she was receiving evaluation at bedside w/ non invasive. Later this am, attempted f/u deferred per family request as pt \"has been agitated after all of the tests and lab draws this morning. She is finally resting.\"     Per this status and request to defer f/u today, SLP to f/u 1/9/19. Chart review indicates WBC decreaed to 9.83. Chest xray 1/8/19 revealed on radiographic evidence of acute cardiopulmonary active disease, lungs well aerated. This is consistent w/ clinical dysphagia assessment 1/7/19 which indicated no aspiration.     Please continue current modified po diet puree/thins, as tolerated, when fully a/a to accept.     D/w family recent dietary modifications w/ understanding and agreement w/ these modifications.     SLP to f/u 1/9/19.     Thank you-  Ivanna Kuo, M.S., CCC/SLP    Electronically signed by Ivanna Kuo, MS CCC-SLP at 1/8/2019 11:54 AM       Respiratory Therapy Notes (last 24 hours) (Notes from 1/7/2019  2:45 PM through 1/8/2019  2:45 PM)     No notes of this type exist for this encounter.        "

## 2019-01-08 NOTE — SIGNIFICANT NOTE
"Ms. Flower was seen at bedside this am x2 for attempted diet tolerance assessment f/u. Early am, she was receiving evaluation at bedside w/ non invasive. Later this am, attempted f/u deferred per family request as pt \"has been agitated after all of the tests and lab draws this morning. She is finally resting.\"     Per this status and request to defer f/u today, SLP to f/u 1/9/19. Chart review indicates WBC decreaed to 9.83. Chest xray 1/8/19 revealed on radiographic evidence of acute cardiopulmonary active disease, lungs well aerated. This is consistent w/ clinical dysphagia assessment 1/7/19 which indicated no aspiration.     Please continue current modified po diet puree/thins, as tolerated, when fully a/a to accept.     D/w family recent dietary modifications w/ understanding and agreement w/ these modifications.     SLP to f/u 1/9/19.     Thank you-  Ivanna Kuo M.S., CCC/SLP  "

## 2019-01-08 NOTE — PLAN OF CARE
Problem: Fall Risk (Adult)  Goal: Absence of Fall  Outcome: Ongoing (interventions implemented as appropriate)   01/08/19 0232   Fall Risk (Adult)   Absence of Fall making progress toward outcome       Problem: Wound (Includes Pressure Injury) (Adult)  Goal: Signs and Symptoms of Listed Potential Problems Will be Absent, Minimized or Managed (Wound)  Outcome: Ongoing (interventions implemented as appropriate)   01/07/19 1343   Goal/Outcome Evaluation   Problems Assessed (Wound) all   Problems Present (Wound) pain;delayed wound healing       Problem: Cognitive Impairment (Dementia Signs/Symptoms) (Adult)  Goal: Optimized Cognitive Function (Dementia Signs/Symptoms)  Outcome: Ongoing (interventions implemented as appropriate)   01/07/19 1343   Optimized Cognitive Function (Dementia Signs/Symptoms)   Optimized Cognitive Ability Action Step (STG) Outcome making progress toward outcome       Problem: Diabetes, Type 2 (Adult)  Goal: Signs and Symptoms of Listed Potential Problems Will be Absent, Minimized or Managed (Diabetes, Type 2)  Outcome: Ongoing (interventions implemented as appropriate)      Problem: Dysphagia (Adult)  Goal: Functional/Safe Swallow  Outcome: Ongoing (interventions implemented as appropriate)   01/08/19 0232   Dysphagia (Adult)   Functional/Safe Swallow making progress toward outcome

## 2019-01-08 NOTE — THERAPY EVALUATION
Acute Care - Occupational Therapy Initial Evaluation   Tom     Patient Name: Jeanna Flower  : 1940  MRN: [id number]  Today's Date: 2019  Onset of Illness/Injury or Date of Surgery: 19     Referring Physician: Dr. Medel    Admit Date: 2019       ICD-10-CM ICD-9-CM   1. Sepsis, due to unspecified organism (CMS/[facility]) A41.9 038.9     995.91   2. Urinary tract infection without hematuria, site unspecified N39.0 599.0   3. Acute parotitis K11.21 527.2     Patient Active Problem List   Diagnosis   • Paroxysmal atrial fibrillation (CMS/[facility])   • Essential hypertension   • Type 2 diabetes mellitus without complication (CMS/[facility])   • CVA (cerebral vascular accident) with left hemiparesis.   • Other sleep apnea   • Sepsis (CMS/[facility])     Past Medical History:   Diagnosis Date   • Anemia    • Closed wedge compression fracture of first lumbar vertebra (CMS/[facility])    • COPD (chronic obstructive pulmonary disease) (CMS/[facility])    • CVA (cerebral vascular accident) with left hemiparesis. 2017   • Dementia    • Diabetes mellitus (CMS/[facility])    • Dysarthria following cerebral infarction    • Dysphagia, oropharyngeal phase    • Dysphagia, pharyngoesophageal phase    • GERD (gastroesophageal reflux disease)    • Heart disease    • Hyperlipidemia    • Hypertension    • Hypothyroidism    • Insomnia    • Obesity    • Other sleep apnea 2018   • Paroxysmal atrial fibrillation (CMS/HCC) 2017     Past Surgical History:   Procedure Laterality Date   • APPENDECTOMY     • BLADDER SURGERY     • CARDIAC CATHETERIZATION     • CHOLECYSTECTOMY            OT ASSESSMENT FLOWSHEET (last 72 hours)      Occupational Therapy Evaluation     Row Name 19 1402                   OT Evaluation Time/Intention    Document Type  evaluation  -KR        Mode of Treatment  occupational therapy  -KR        Patient Effort  fair  -KR           Cognitive Assessment/Intervention- PT/OT    Orientation Status (Cognition)  oriented  to;person  -KR        Follows Commands (Cognition)  verbal cues/prompting required  -KR           ADL Assessment/Intervention    BADL Assessment/Intervention  upper body dressing;lower body dressing;grooming;feeding;toileting  -KR           Upper Body Dressing Assessment/Training    Upper Body Dressing [place] Level  upper body dressing skills;maximum assist (25% patient effort)  -KR           Lower Body Dressing Assessment/Training    Lower Body Dressing [place] Level  lower body dressing skills;dependent (less than 25% patient effort)  -KR           Grooming Assessment/Training    [place] Level (Grooming)  grooming skills;moderate assist (50% patient effort)  -KR           Self-Feeding Assessment/Training    [place] Level (Feeding)  feeding skills;moderate assist (50% patient effort)  -KR           Toileting Assessment/Training    [place] Level (Toileting)  toileting skills;dependent (less than 25% patient effort)  -KR           General ROM    GENERAL ROM COMMENTS  BUE 3/5  -KR           MMT (Manual Muscle Testing)    General MMT Comments  3-/5  -KR           Wound 01/07/19 0401 Right posterior gluteal pressure injury    Wound - Properties Group Date first assessed: 01/07/19  -CC Time first assessed: 0401  -CC Present On Admission : yes  -CC Side: Right  -CC Orientation: posterior  -CC Location: gluteal  -CC Type: pressure injury  -CC Stage, Pressure Injury: Stage 2  -CC       Wound 01/07/19 0403 Left lower gluteal pressure injury    Wound - Properties Group Date first assessed: 01/07/19  -CC Time first assessed: 0403  -CC Side: Left  -CC Orientation: lower  -CC Location: gluteal  -CC Type: pressure injury  -CC Stage, Pressure Injury: Stage 2  -CC       OT Goals    Self-Feeding Goal Selection (OT)  self feeding, OT goal 1  -KR        ROM Goal Selection (OT)  ROM, OT goal 1  -KR        Additional Documentation  ROM Goal Selection (OT) (Row);Self-Feeding Goal Selection (OT) (Row)  -KR            Self-Feeding Goal 1 (OT)    Activity/Assistive Device (Self-Feeding Goal 1, OT)  self-feeding skills, all  -KR        [place] Level/Cues Needed (Self-Feeding Goal 1, OT)  set-up required  -KR        Time Frame (Self-Feeding Goal 1, OT)  by discharge  -KR           ROM Goal 1 (OT)    ROM Goal 1 (OT)  BUE AROM increase x 1 to enhance self care performance  -KR        Time Frame (ROM Goal 1, OT)  by discharge  -KR          User Key  (r) = Recorded By, (t) = Taken By, (c) = Cosigned By    Initials Name Effective Dates    CC Charlene Lockwood RN 06/16/16 -     KR Antony Solano OT 04/03/18 -                OT Recommendation and Plan          Outcome Measures     Row Name 01/07/19 1600             How much help from another person do you currently need...    Turning from your back to your side while in flat bed without using bedrails?  2  -AG      Moving from lying on back to sitting on the side of a flat bed without bedrails?  2  -AG      Moving to and from a bed to a chair (including a wheelchair)?  1  -AG      Standing up from a chair using your arms (e.g., wheelchair, bedside chair)?  2  -AG      Climbing 3-5 steps with a railing?  1  -AG      To walk in hospital room?  1  -AG      AM-PAC 6 Clicks Score  9  -AG         Functional Assessment    Outcome Measure Options  AM-PAC 6 Clicks Basic Mobility (PT)  -AG        User Key  (r) = Recorded By, (t) = Taken By, (c) = Cosigned By    Initials Name Provider Type    AG Saadia Kc, PT Physical Therapist          Time Calculation:     Therapy Suggested Charges     Code   Minutes Charges    None           Therapy Charges for Today     Code Description Service Date Service Provider Modifiers Qty    68663472301 HC OT SELFCARE CURRENT 1/7/2019 Antony Solano OT  1    07453393138 HC OT SELFCARE PROJECTED 1/7/2019 Antony Solano OT  1    98665122838 HC OT EVAL HIGH COMPLEXITY 2 1/7/2019 Antony Solano OT GO 1          OT G-codes  Functional Limitation: Self care  Self  Care Current Status (): At least 60 percent but less than 80 percent impaired, limited or restricted  Self Care Goal Status (): At least 20 percent but less than 40 percent impaired, limited or restricted    Antony Solano OT  1/8/2019

## 2019-01-08 NOTE — CONSULTS
INFECTIOUS DISEASE CONSULTATION REPORT        Patient Identification:  Name:  Jeanna Flower  Age:  78 y.o.  Sex:  female  :  1940  MRN:  [id number]   Visit Number:  [id number]  Primary Care Physician:  Adela Mathew MD         Subjective       Subjective     History of present illness:      Thank you Dr. Quach for allowing us to participate in the care of your patient.  As you well know, Ms. Jeanna Flower is a 78 y.o. female with past medical history significant for CVA, diabetes, A. fib, hypertension, dementia, who presented to [facility] on 2019 for redness, swelling of neck.  WBC normal.  CRP 16.97.  Lactic acid 2.5 on admission.  CT of neck revealed right-sided parotid gland more hyperdense compared to left side that may represent inflammation.  Chest x-ray from 19 unremarkable.  Ultrasound of bilateral upper extremities reveals no DVT.  Urine culture preliminary reveals greater than 100,000 colonies of gram-negative bacilli consistent with Escherichia coli.  Blood cultures one out of 2 sets from 19 reveals MSSA.      Infectious Disease consultation was requested for antimicrobial management.      ---------------------------------------------------------------------------------------------------------------------     Review Of Systems:    Unable to obtain due to mental status.   ---------------------------------------------------------------------------------------------------------------------     Past Medical History    Past Medical History:   Diagnosis Date   • Anemia    • Closed wedge compression fracture of first lumbar vertebra (CMS/HCC)    • COPD (chronic obstructive pulmonary disease) (CMS/HCC)    • CVA (cerebral vascular accident) with left hemiparesis. 2017   • Dementia    • Diabetes mellitus (CMS/HCC)    • Dysarthria following cerebral infarction    • Dysphagia, oropharyngeal phase    • Dysphagia, pharyngoesophageal phase    •  GERD (gastroesophageal reflux disease)    • Heart disease    • Hyperlipidemia    • Hypertension    • Hypothyroidism    • Insomnia    • Obesity    • Other sleep apnea 11/13/2018   • Paroxysmal atrial fibrillation (CMS/HCC) 12/6/2017       Past Surgical History    Past Surgical History:   Procedure Laterality Date   • APPENDECTOMY     • BLADDER SURGERY     • CARDIAC CATHETERIZATION     • CHOLECYSTECTOMY         Family History    Family History   Problem Relation Age of Onset   • Heart disease Mother    • Heart attack Mother    • Heart disease Father    • Heart attack Father    • Heart attack Brother    • Heart attack Brother            Social History    Social History     Tobacco Use   • Smoking status: Never Smoker   • Smokeless tobacco: Never Used   Substance Use Topics   • Alcohol use: No   • Drug use: No       Allergies    Bactrim [sulfamethoxazole-trimethoprim]; Codeine; and Penicillins  ---------------------------------------------------------------------------------------------------------------------     Home Medications:    Prior to Admission Medications     Prescriptions Last Dose Informant Patient Reported? Taking?    albuterol sulfate  (90 Base) MCG/ACT inhaler 1/6/2019 Care Giver Yes Yes    Inhale 2 puffs Every 4 (Four) Hours As Needed for Wheezing or Shortness of Air.    amantadine (SYMMETREL) 50 MG/5ML solution 1/6/2019 Care Giver Yes Yes    100 mg by Per G Tube route Every 12 (Twelve) Hours.    apixaban (ELIQUIS) 5 MG tablet tablet 1/6/2019 Care Giver No Yes    Take 1 tablet by mouth Every 12 (Twelve) Hours.    atorvastatin (LIPITOR) 40 MG tablet 1/5/2019 Care Giver Yes Yes    40 mg by Per G Tube route Every Night.    budesonide (PULMICORT) 0.5 MG/2ML nebulizer solution 1/6/2019 Care Giver Yes Yes    Take 0.5 mg by nebulization 2 (Two) Times a Day.    castor oil-balsam peru (VENELEX) ointment 1/6/2019 Care Giver Yes Yes    Apply 1 each topically to the appropriate area as directed 2 (Two) Times  a Day.    cycloSPORINE (RESTASIS) 0.05 % ophthalmic emulsion 1/6/2019 Care Giver Yes Yes    1 drop 2 (Two) Times a Day.    dantrolene (DANTRIUM) 25 MG capsule 1/5/2019 Care Giver Yes Yes    25 mg by Per G Tube route Every Night.    docusate sodium (COLACE) 150 MG/15ML liquid 1/6/2019 Care Giver Yes Yes    100 mg by Per G Tube route 2 (Two) Times a Day.    famotidine (PEPCID) 20 MG tablet 1/6/2019 Care Giver Yes Yes    20 mg by Per G Tube route 2 (Two) Times a Day.    fluticasone (FLONASE) 50 MCG/ACT nasal spray 1/6/2019 Care Giver Yes Yes    2 sprays into the nostril(s) as directed by provider Daily.    fluticasone-salmeterol (ADVAIR DISKUS) 250-50 MCG/DOSE DISKUS 1/6/2019 Care Giver Yes Yes    Inhale 1 puff 2 (Two) Times a Day.    glipiZIDE (GLUCOTROL XL) 2.5 MG 24 hr tablet 1/6/2019 Care Giver Yes Yes    Take 2.5 mg by mouth Daily.    levothyroxine (SYNTHROID, LEVOTHROID) 100 MCG tablet 1/6/2019 Care Giver Yes Yes    Take 100 mcg by mouth Daily.    loratadine (CLARITIN) 10 MG tablet 1/6/2019 Care Giver Yes Yes    10 mg by Per G Tube route Daily.    losartan (COZAAR) 100 MG tablet 1/6/2019 Care Giver Yes Yes    100 mg by Per J Tube route Daily.    melatonin 3 MG tablet 1/5/2019 Care Giver Yes Yes    3 mg by Per G Tube route Every Night.    metoprolol tartrate (LOPRESSOR) 25 MG tablet 1/6/2019 Care Giver Yes Yes    25 mg by Per G Tube route Every 6 (Six) Hours.    miconazole (MICOTIN) 2 % powder 1/6/2019 Care Giver Yes Yes    Apply 1 application topically to the appropriate area as directed 3 (Three) Times a Day As Needed for Itching (skin folds).    montelukast (SINGULAIR) 10 MG tablet 1/5/2019 Care Giver Yes Yes    Take 10 mg by mouth Every Night.    venlafaxine (EFFEXOR) 37.5 MG tablet 1/6/2019 Care Giver Yes Yes    37.5 mg by Per G Tube route 2 (Two) Times a Day.        ---------------------------------------------------------------------------------------------------------------------    Objective        Objective     Hospital Scheduled Meds:    apixaban 5 mg Oral Q12H   budesonide 0.5 mg Nebulization BID - RT   castor oil-balsam peru 1 each Topical Q12H   ceFAZolin 1 g Intravenous Once   ceFAZolin 1 mg Intravenous Once   ceFAZolin 10 mg Intravenous Once   ceFAZolin 100 mg Intravenous Once   ceFAZolin 889 mg Intravenous Once   cycloSPORINE 1 drop Both Eyes BID   docusate sodium 100 mg Per G Tube BID   famotidine 20 mg Per G Tube BID   fluticasone 2 spray Nasal Daily   gentamicin 3 mg/kg (Adjusted) Intravenous Once   levothyroxine 100 mcg Oral Daily   magnesium sulfate 2 g Intravenous Q2H   potassium phosphate 15 mmol Intravenous Once   sodium chloride 3 mL Intravenous Q12H   venlafaxine 37.5 mg Per G Tube BID With Meals       Pharmacy Consult      ---------------------------------------------------------------------------------------------------------------------   Vital Signs:  Temp:  [97.1 °F (36.2 °C)-98.6 °F (37 °C)] 98.6 °F (37 °C)  Heart Rate:  [] 83  Resp:  [17-18] 18  BP: (140-146)/(60-88) 140/60  No data found.  SpO2 Percentage    01/07/19 2340 01/08/19 0610 01/08/19 0652   SpO2: 97% 97% 91%     SpO2:  [91 %-98 %] 91 %  on  Flow (L/min):  [2-20] 2;   Device (Oxygen Therapy): nasal cannula    Body mass index is 30.02 kg/m².  Wt Readings from Last 3 Encounters:   01/07/19 76.8 kg (169 lb 6.4 oz)   12/05/18 87.1 kg (192 lb)   11/30/18 84.4 kg (186 lb)     ---------------------------------------------------------------------------------------------------------------------     Physical Exam:    Constitutional:  Well-developed and well-nourished.  No respiratory distress.      HENT:  Head: Normocephalic and atraumatic.  Mouth:  Moist mucous membranes.    Eyes:  Conjunctivae and EOM are normal.  No scleral icterus.  Neck:  Neck supple.  No JVD present.    Cardiovascular:  Normal rate, regular rhythm and normal heart sounds with no murmur. No edema.  Pulmonary/Chest:  No respiratory distress, no  wheezes, no crackles, with normal breath sounds and good air movement.  Abdominal:  Soft.  Bowel sounds are normal.  No distension and no tenderness.   Musculoskeletal:  No edema, no tenderness, and no deformity.  No swelling or redness of joints.  Neurological:  Confused.    Skin:  Skin is warm and dry.  No rash noted.  No pallor.   Psychiatric:  Confused.    ---------------------------------------------------------------------------------------------------------------------              Results from last 7 days   Lab Units  01/08/19   0651  01/07/19   0155  01/06/19   2156   CRP mg/dL  16.97*   --   9.85*   LACTATE mmol/L   --   1.2  2.5*   WBC 10*3/mm3  9.83   --   18.17*   HEMOGLOBIN g/dL  11.8*   --   13.2   HEMATOCRIT %  38.2   --   43.9   MCV fL  90.5   --   93.0   MCHC g/dL  30.9*   --   30.1*   PLATELETS 10*3/mm3  186   --   238   INR    --    --   1.36*     Results from last 7 days   Lab Units  01/08/19   0651  01/06/19   2156   SODIUM mmol/L  142  137   POTASSIUM mmol/L  3.8  4.6   MAGNESIUM mg/dL  1.3*   --    CHLORIDE mmol/L  114*  103   CO2 mmol/L  19.5*  20.8*   BUN mg/dL  15  25*   CREATININE mg/dL  0.75  1.18   EGFR IF NONAFRICN AM mL/min/1.73  75  44*   CALCIUM mg/dL  8.8  8.9   GLUCOSE mg/dL  116*  157*   ALBUMIN g/dL  3.40  3.60   BILIRUBIN mg/dL  1.7  0.8   ALK PHOS U/L  441*  99   AST (SGOT) U/L  500*  29   ALT (SGPT) U/L  360*  32   Estimated Creatinine Clearance: 57.8 mL/min (by C-G formula based on SCr of 0.75 mg/dL).  No results found for: AMMONIA    No results found for: HGBA1C, POCGLU  Lab Results   Component Value Date    HGBA1C 6.2 (H) 03/24/2014     Lab Results   Component Value Date    TSH 1.626 09/15/2018       Blood Culture   Date Value Ref Range Status   01/06/2019 No growth at 24 hours  Preliminary   01/06/2019 Gram positive cocci, consistent with Staph spp (A)  Preliminary     Urine Culture   Date Value Ref Range Status   01/07/2019 (A)  Preliminary    >100,000 CFU/mL Gram  Negative Bacilli, Morphology consistent with Escherichia / Citrobacter              Pain Management Panel     Pain Management Panel Latest Ref Rng & Units 9/16/2018    AMPHETAMINES SCREEN, URINE Negative Negative    BARBITURATES SCREEN Negative Negative    BENZODIAZEPINE SCREEN, URINE Negative Negative    BUPRENORPHINE Negative Negative    COCAINE SCREEN, URINE Negative Negative    METHADONE SCREEN, URINE Negative Negative        I have personally reviewed the above laboratory results.   ---------------------------------------------------------------------------------------------------------------------  Imaging Results (last 7 days)     Procedure Component Value Units Date/Time    US Venous Doppler Upper Extremity Bilateral (duplex) [[id number]] Collected:  01/08/19 1033     Updated:  01/08/19 1036    Narrative:       EXAMINATION: US VENOUS DOPPLER UPPER EXTREMITY BILATERAL (DUPLEX)-      CLINICAL INDICATION:  left jugular septic phelibtis; A41.9-Sepsis,  unspecified organism; N39.0-Urinary tract infection, site not specified;  K11.21-Acute sialoadenitis     TECHNIQUE: Multiplanar gray scale and Doppler vascular sonographic  imaging of the deep veins of BILATERAL lower extremity, without and with  compression.      COMPARISON: NONE      FINDINGS:   The visualized deep veins demonstrate flow and are compressible. No  evidence of deep venous thrombosis.             Impression:       No DVT.     This report was finalized on 1/8/2019 10:34 AM by Dr. Antony Moffett MD.       XR Chest AP [[id number]] Collected:  01/08/19 0826     Updated:  01/08/19 0828    Narrative:       EXAMINATION: XR CHEST AP-      CLINICAL INDICATION:     SEDATION IN INTUBATED PATIENTS     TECHNIQUE: Single frontal view of chest.      COMPARISON: NONE      FINDINGS:   There is bibasilar atelectasis.  Lungs are otherwise aerated.  Heart and mediastinal contours are unremarkable.   No pneumothorax.   No pleural effusion.   Bony and soft tissue  structures are unremarkable.        Impression:       No radiographic evidence of acute cardiac or      This report was finalized on 1/8/2019 8:26 AM by Dr. Giovanni Martin MD.       CT Soft Tissue Neck With Contrast [[id number]] Collected:  01/07/19 0724     Updated:  01/07/19 0739    Narrative:       EXAMINATION: CT SOFT TISSUE NECK W CONTRAST-      Technique: multiple CT axial images obtained through the neck, following  IV contrast administration. Coronal and sagittal reformatted images  obtained from the original axial data set to facilitate diagnosis and  surgical planning.     Radiation dose reduction techniques were utilized per ALARA protocol.  Automated exposure control was initiated through either or [employer] or  DoseRight software packages by  protocol.       225.34 mGy.cm     CLINICAL INDICATION:     Neck mass, nonpulsatile, solitary      COMPARISON:    None.     FINDINGS:      There is motion artifact. The right-sided parotid gland is slightly more  hyperdense some prominent in compared to the left side that may  represent parotid gland inflammation..  No definite lymphadenopathy. Incidentally imaged blood vessels are  unremarkable. Bony structures are unremarkable.    Impression:       There is motion artifact. The right-sided parotid gland is  slightly more hyperdense some prominent in compared to the left side  that may represent parotid gland inflammation..     This report was finalized on 1/7/2019 7:37 AM by Dr. Giovanni Martin MD.           I have personally reviewed the above radiology results.   ---------------------------------------------------------------------------------------------------------------------      Assessment & Plan        Assessment/Plan       ASSESSMENT:    1. Severe sepsis with lactic acid greater than 2 on admission  2. MSSA bacteremia  3. E. Coli UTI    PLAN:    Patient presents with redness, swelling of neck.  WBC normal.  CRP 16.97.  Lactic acid 2.5 on admission.   CT of neck revealed right-sided parotid gland more hyperdense compared to left side that may represent inflammation.  Chest x-ray from 1/8/19 unremarkable.  Ultrasound of bilateral upper extremities reveals no DVT.  Urine culture preliminary reveals greater than 100,000 colonies of gram-negative bacilli consistent with Escherichia coli.  Blood cultures one out of 2 sets from 1/6/19 reveals MSSA.    At this time we are very concerned for septic phlebitis of the jugular vein. Venous duplex was ordered to rule out septic DVT, no DVT found. Highly recommend JAYE to rule out vegetation. At this time antibiotic coverage was changed to Cefazolin 2gm IV Q8H graded challenge and Gentamicin x1 dose for antibiotic synergy and UTI coverage, with possibility to repeat in AM. Repeat blood cultures x2 ordered. CRP ordered for AM.     Current Antimicrobials:  Cefazolin 2gm IV Q8H graded challenge  Gentamicin x1 dose on 1/8/19        Code Status:   Code Status and Medical Interventions:   Ordered at: 01/07/19 0527     Limited Support to NOT Include:    Artificial Nutrition    Blood Products    Cardioversion/Defibrillation    Dialysis    Intubation     Code Status:    No CPR     Medical Interventions (Level of Support Prior to Arrest):    Limited           AMELIE Reis  01/08/19  10:38 AM

## 2019-01-08 NOTE — PROGRESS NOTES
Subjective     History:   Jeanna Flower is a 78 y.o. female admitted on 1/6/2019 secondary to Sepsis (CMS/Formerly [facility])     Procedures: None    CC: Follow up sepsis     Patient seen and examined with RUFUS Solomon. Awake but appears much more confused today. Unable to answer any questions or follow any commands. RN reports patient became increasingly confused throughout the day yesterday. RN reports multiple large BM's overnight. Pt attempted to get out of bed during that time.      History taken from: chart, and RN.      Objective     Vital Signs  Temp:  [97.1 °F (36.2 °C)-98.6 °F (37 °C)] 98.6 °F (37 °C)  Heart Rate:  [] 83  Resp:  [17-18] 18  BP: (140-146)/(60-88) 140/60    Intake/Output Summary (Last 24 hours) at 1/8/2019 0923  Last data filed at 1/8/2019 0520  Gross per 24 hour   Intake 300 ml   Output --   Net 300 ml         Physical Exam:  General:    Awake, confused, does not answer any questions or follow commands, chronically ill appearing, right-sided erythema near parotid gland appears improved today   Heart:      Normal S1 and S2. Regular rate and rhythm. No significant murmur, rubs or gallops appreciated.   Lungs:     Respirations regular, even and unlabored. Lungs clear to auscultation B/L. No wheezes, rales or rhonchi.   Abdomen:   Soft and nontender. No guarding, rebound tenderness or  organomegaly noted. Bowel sounds present x 4.   Extremities:  No clubbing, cyanosis or edema noted. Contracture of LUE.      Results Review:    Results from last 7 days   Lab Units  01/08/19   0651  01/06/19   2156   WBC 10*3/mm3  9.83  18.17*   HEMOGLOBIN g/dL  11.8*  13.2   PLATELETS 10*3/mm3  186  238     Results from last 7 days   Lab Units  01/08/19   0651  01/06/19   2156   SODIUM mmol/L  142  137   POTASSIUM mmol/L  3.8  4.6   CHLORIDE mmol/L  114*  103   CO2 mmol/L  19.5*  20.8*   BUN mg/dL  15  25*   CREATININE mg/dL  0.75  1.18   CALCIUM mg/dL  8.8  8.9   GLUCOSE mg/dL  116*  157*     Results from last 7 days    Lab Units  01/08/19   0651  01/06/19   2156   BILIRUBIN mg/dL  1.7  0.8   ALK PHOS U/L  441*  99   AST (SGOT) U/L  500*  29   ALT (SGPT) U/L  360*  32     Results from last 7 days   Lab Units  01/08/19   0651   MAGNESIUM mg/dL  1.3*     Results from last 7 days   Lab Units  01/06/19   2156   INR   1.36*           Imaging Results (last 24 hours)     Procedure Component Value Units Date/Time    XR Chest AP [[id number]] Collected:  01/08/19 0826     Updated:  01/08/19 0828    Narrative:       EXAMINATION: XR CHEST AP-      CLINICAL INDICATION:     SEDATION IN INTUBATED PATIENTS     TECHNIQUE: Single frontal view of chest.      COMPARISON: NONE      FINDINGS:   There is bibasilar atelectasis.  Lungs are otherwise aerated.  Heart and mediastinal contours are unremarkable.   No pneumothorax.   No pleural effusion.   Bony and soft tissue structures are unremarkable.        Impression:       No radiographic evidence of acute cardiac or      This report was finalized on 1/8/2019 8:26 AM by Dr. Giovanni Martin MD.               Medications:    apixaban 5 mg Oral Q12H   aztreonam 2 g Intravenous Q8H   budesonide 0.5 mg Nebulization BID - RT   castor oil-balsam peru 1 each Topical Q12H   cycloSPORINE 1 drop Both Eyes BID   docusate sodium 100 mg Per G Tube BID   famotidine 20 mg Per G Tube BID   fluticasone 2 spray Nasal Daily   levothyroxine 100 mcg Oral Daily   magnesium sulfate 2 g Intravenous Q2H   potassium phosphate 15 mmol Intravenous Once   sodium chloride 3 mL Intravenous Q12H   vancomycin 1,000 mg Intravenous Q24H   venlafaxine 37.5 mg Per G Tube BID With Meals              Assessment/Plan   Severe sepsis with metabolic encephalopathy and lactic acid>2: Likely 2/2 right-sided parotitis, UTI and bacteremia. Afebrile and hemodynamically stable. WBC improved but CRP has risen today. Lactic acid has normalized. 1/2 initial blood cultures revealing Staph aureus, not MRSA. Urine culture revealing growth of GNR consistent  with Escherichia/Citrobacter. Currently on Vanc and Azactam. ID consulted per protocol. Follow cultures and repeat labs in the AM.     Right-sided parotitis: Appears improved with less erythema on exam today. Cont antibiotics as above.     GNR UTI: Cont antibiotics as above pending final culture result.    Staph bacteremia: 1/2 initial cultures revealing growth of Staph aureus, not MRSA. ID consulted per protocol. Repeat blood cultures. Order echo.     Metabolic encephalopathy superimposed on baseline dementia: Possibly 2/2 above. RN reports worsening confusion since upon admission. Pt not lying still enough for CT head at this time. Order STAT ABG. Order ammonia level. Cont supportive treatment in addition to treatment as outlined above.     Elevated liver enzymes: Stop statin and dantrolene. Order acute hepatitis panel and liver US. Repeat CMP in the AM.     Electrolyte abnormalities: Mg and PO4 low today. Replete per protocol.    PAF: Currently NSR. Cont Eliquis for stroke prevention.     DM II, non-insulin dependent: Holding home PO meds. BG levels currently stable.     Essential HTN: BP currently overall stable. Cont to monitor.     Hx of CVA with left-sided hemiplegia: Cont supportive treatment.     DVT PPX: Eliquis    Disposition: Pt's POA requesting SNF placement with possible hospice. Palliative care following with input appreciated.     Pt is at high risk 2/2 severe sepsis, parotitis, UTI, bacteremia, metabolic encephalopathy superimposed on baseline dementia, electrolyte abnormalities, PAF and hx of CVA.       Chalino Quach,   01/08/19  9:23 AM

## 2019-01-08 NOTE — PLAN OF CARE
Problem: Patient Care Overview  Goal: Plan of Care Review  Outcome: Ongoing (interventions implemented as appropriate)   01/08/19 1622   Coping/Psychosocial   Plan of Care Reviewed With patient   Plan of Care Review   Progress no change       Problem: Fall Risk (Adult)  Goal: Absence of Fall  Outcome: Ongoing (interventions implemented as appropriate)   01/08/19 1622   Fall Risk (Adult)   Absence of Fall making progress toward outcome       Problem: Skin Injury Risk (Adult)  Goal: Skin Health and Integrity  Outcome: Ongoing (interventions implemented as appropriate)   01/08/19 1622   Skin Injury Risk (Adult)   Skin Health and Integrity making progress toward outcome       Problem: Wound (Includes Pressure Injury) (Adult)  Goal: Signs and Symptoms of Listed Potential Problems Will be Absent, Minimized or Managed (Wound)  Outcome: Ongoing (interventions implemented as appropriate)   01/08/19 1622   Goal/Outcome Evaluation   Problems Assessed (Wound) all   Problems Present (Wound) delayed wound healing       Problem: Cognitive Impairment (Dementia Signs/Symptoms) (Adult)  Goal: Optimized Cognitive Function (Dementia Signs/Symptoms)  Outcome: Ongoing (interventions implemented as appropriate)   01/08/19 1622   Optimized Cognitive Function (Dementia Signs/Symptoms)   Optimized Cognitive Ability Action Step (STG) Outcome making progress toward outcome       Problem: Self-Care Deficit (Adult,Obstetrics,Pediatric)  Goal: Identify Related Risk Factors and Signs and Symptoms  Outcome: Ongoing (interventions implemented as appropriate)   01/08/19 1622   Self-Care Deficit (Adult,Obstetrics,Pediatric)   Related Risk Factors (Self-Care Deficit) activity intolerance       Problem: Diabetes, Type 2 (Adult)  Goal: Signs and Symptoms of Listed Potential Problems Will be Absent, Minimized or Managed (Diabetes, Type 2)  Outcome: Ongoing (interventions implemented as appropriate)   01/08/19 1622   Goal/Outcome Evaluation   Problems  Assessed (Type 2 Diabetes) all   Problems Present (Type 2 Diabetes) none       Problem: Dysphagia (Adult)  Goal: Identify Related Risk Factors and Signs and Symptoms  Outcome: Ongoing (interventions implemented as appropriate)

## 2019-01-08 NOTE — PROGRESS NOTES
I called pt's MAGGI Chamberlain to discuss ID recs for JAYE in setting of MSSA bacteremia. After further discussion, while she wants to focus more on conservative management with plans for D/C to SNF with hospice upon discharge she wants to proceed with JAYE as it may change the duration of antibiotic treatment. Will consult cardiology to evaluate for possible JAYE.       Chalino Quach,   01/08/19  5:18 PM

## 2019-01-09 ENCOUNTER — ANESTHESIA EVENT (OUTPATIENT)
Dept: CARDIOLOGY | Facility: HOSPITAL | Age: 79
End: 2019-01-09

## 2019-01-09 ENCOUNTER — APPOINTMENT (OUTPATIENT)
Dept: CARDIOLOGY | Facility: HOSPITAL | Age: 79
End: 2019-01-09
Attending: INTERNAL MEDICINE

## 2019-01-09 ENCOUNTER — ANESTHESIA (OUTPATIENT)
Dept: CARDIOLOGY | Facility: HOSPITAL | Age: 79
End: 2019-01-09

## 2019-01-09 LAB
ALBUMIN SERPL-MCNC: 2.8 G/DL (ref 3.4–4.8)
ALBUMIN/GLOB SERPL: 0.9 G/DL (ref 1.5–2.5)
ALP SERPL-CCNC: 294 U/L (ref 35–104)
ALT SERPL W P-5'-P-CCNC: 192 U/L (ref 10–36)
ANION GAP SERPL CALCULATED.3IONS-SCNC: 7.4 MMOL/L (ref 3.6–11.2)
AST SERPL-CCNC: 149 U/L (ref 10–30)
BACTERIA SPEC AEROBE CULT: ABNORMAL
BACTERIA SPEC AEROBE CULT: ABNORMAL
BASOPHILS # BLD AUTO: 0.04 10*3/MM3 (ref 0–0.3)
BASOPHILS NFR BLD AUTO: 0.5 % (ref 0–2)
BILIRUB SERPL-MCNC: 0.5 MG/DL (ref 0.2–1.8)
BUN BLD-MCNC: 12 MG/DL (ref 7–21)
BUN/CREAT SERPL: 16.2 (ref 7–25)
CALCIUM SPEC-SCNC: 8.2 MG/DL (ref 7.7–10)
CHLORIDE SERPL-SCNC: 112 MMOL/L (ref 99–112)
CHROMATIN AB SERPL-ACNC: 32 IU/ML (ref 0–14)
CO2 SERPL-SCNC: 22.6 MMOL/L (ref 24.3–31.9)
CREAT BLD-MCNC: 0.74 MG/DL (ref 0.43–1.29)
CRP SERPL-MCNC: 15 MG/DL (ref 0–0.99)
DEPRECATED RDW RBC AUTO: 63.4 FL (ref 37–54)
EOSINOPHIL # BLD AUTO: 0.23 10*3/MM3 (ref 0–0.7)
EOSINOPHIL NFR BLD AUTO: 2.6 % (ref 0–7)
ERYTHROCYTE [DISTWIDTH] IN BLOOD BY AUTOMATED COUNT: 19.3 % (ref 11.5–14.5)
GFR SERPL CREATININE-BSD FRML MDRD: 76 ML/MIN/1.73
GLOBULIN UR ELPH-MCNC: 3 GM/DL
GLUCOSE BLD-MCNC: 80 MG/DL (ref 70–110)
GRAM STN SPEC: ABNORMAL
HCT VFR BLD AUTO: 30.5 % (ref 37–47)
HGB BLD-MCNC: 9.5 G/DL (ref 12–16)
IMM GRANULOCYTES # BLD AUTO: 0.03 10*3/MM3 (ref 0–0.03)
IMM GRANULOCYTES NFR BLD AUTO: 0.3 % (ref 0–0.5)
ISOLATED FROM: ABNORMAL
LYMPHOCYTES # BLD AUTO: 3.04 10*3/MM3 (ref 1–3)
LYMPHOCYTES NFR BLD AUTO: 34.5 % (ref 16–46)
MAGNESIUM SERPL-MCNC: 2.2 MG/DL (ref 1.7–2.6)
MCH RBC QN AUTO: 28.1 PG (ref 27–33)
MCHC RBC AUTO-ENTMCNC: 31.1 G/DL (ref 33–37)
MCV RBC AUTO: 90.2 FL (ref 80–94)
MONOCYTES # BLD AUTO: 1.09 10*3/MM3 (ref 0.1–0.9)
MONOCYTES NFR BLD AUTO: 12.4 % (ref 0–12)
MUV IGG SER IA-ACNC: 13.3 AU/ML
MUV IGM SER QL: <0.8 AU (ref 0–0.79)
NEUTROPHILS # BLD AUTO: 4.38 10*3/MM3 (ref 1.4–6.5)
NEUTROPHILS NFR BLD AUTO: 49.7 % (ref 40–75)
OSMOLALITY SERPL CALC.SUM OF ELEC: 281.9 MOSM/KG (ref 273–305)
PHOSPHATE SERPL-MCNC: 3.1 MG/DL (ref 2.7–4.5)
PLATELET # BLD AUTO: 197 10*3/MM3 (ref 130–400)
PMV BLD AUTO: 11.1 FL (ref 6–10)
POTASSIUM BLD-SCNC: 3.7 MMOL/L (ref 3.5–5.3)
PROT SERPL-MCNC: 5.8 G/DL (ref 6–8)
RBC # BLD AUTO: 3.38 10*6/MM3 (ref 4.2–5.4)
SODIUM BLD-SCNC: 142 MMOL/L (ref 135–153)
WBC NRBC COR # BLD: 8.81 10*3/MM3 (ref 4.5–12.5)

## 2019-01-09 PROCEDURE — 83735 ASSAY OF MAGNESIUM: CPT | Performed by: INTERNAL MEDICINE

## 2019-01-09 PROCEDURE — 99233 SBSQ HOSP IP/OBS HIGH 50: CPT | Performed by: INTERNAL MEDICINE

## 2019-01-09 PROCEDURE — 93312 ECHO TRANSESOPHAGEAL: CPT | Performed by: INTERNAL MEDICINE

## 2019-01-09 PROCEDURE — 94799 UNLISTED PULMONARY SVC/PX: CPT

## 2019-01-09 PROCEDURE — B24BZZ4 ULTRASONOGRAPHY OF HEART WITH AORTA, TRANSESOPHAGEAL: ICD-10-PCS | Performed by: INTERNAL MEDICINE

## 2019-01-09 PROCEDURE — 25010000002 PHENYLEPHRINE PER 1 ML: Performed by: NURSE ANESTHETIST, CERTIFIED REGISTERED

## 2019-01-09 PROCEDURE — 93325 DOPPLER ECHO COLOR FLOW MAPG: CPT | Performed by: INTERNAL MEDICINE

## 2019-01-09 PROCEDURE — 97110 THERAPEUTIC EXERCISES: CPT

## 2019-01-09 PROCEDURE — 25010000002 PROPOFOL 10 MG/ML EMULSION: Performed by: NURSE ANESTHETIST, CERTIFIED REGISTERED

## 2019-01-09 PROCEDURE — 86140 C-REACTIVE PROTEIN: CPT | Performed by: INTERNAL MEDICINE

## 2019-01-09 PROCEDURE — 86431 RHEUMATOID FACTOR QUANT: CPT | Performed by: INTERNAL MEDICINE

## 2019-01-09 PROCEDURE — 84100 ASSAY OF PHOSPHORUS: CPT | Performed by: INTERNAL MEDICINE

## 2019-01-09 PROCEDURE — 85025 COMPLETE CBC W/AUTO DIFF WBC: CPT | Performed by: INTERNAL MEDICINE

## 2019-01-09 PROCEDURE — 93325 DOPPLER ECHO COLOR FLOW MAPG: CPT

## 2019-01-09 PROCEDURE — 80053 COMPREHEN METABOLIC PANEL: CPT | Performed by: INTERNAL MEDICINE

## 2019-01-09 PROCEDURE — 93312 ECHO TRANSESOPHAGEAL: CPT

## 2019-01-09 PROCEDURE — 25010000003 CEFAZOLIN SODIUM-DEXTROSE 2-3 GM-%(50ML) RECONSTITUTED SOLUTION

## 2019-01-09 RX ORDER — PROPOFOL 10 MG/ML
VIAL (ML) INTRAVENOUS AS NEEDED
Status: DISCONTINUED | OUTPATIENT
Start: 2019-01-09 | End: 2019-01-09 | Stop reason: SURG

## 2019-01-09 RX ORDER — SODIUM CHLORIDE 9 MG/ML
INJECTION, SOLUTION INTRAVENOUS CONTINUOUS PRN
Status: DISCONTINUED | OUTPATIENT
Start: 2019-01-09 | End: 2019-01-09 | Stop reason: SURG

## 2019-01-09 RX ORDER — LIDOCAINE HYDROCHLORIDE 20 MG/ML
INJECTION, SOLUTION INFILTRATION; PERINEURAL AS NEEDED
Status: DISCONTINUED | OUTPATIENT
Start: 2019-01-09 | End: 2019-01-09 | Stop reason: SURG

## 2019-01-09 RX ADMIN — PROPOFOL 50 MG: 10 INJECTION, EMULSION INTRAVENOUS at 12:34

## 2019-01-09 RX ADMIN — VENLAFAXINE HYDROCHLORIDE 37.5 MG: 37.5 TABLET ORAL at 09:49

## 2019-01-09 RX ADMIN — FAMOTIDINE 20 MG: 20 TABLET, FILM COATED ORAL at 20:16

## 2019-01-09 RX ADMIN — PHENYLEPHRINE HYDROCHLORIDE 50 MCG: 10 INJECTION INTRAVENOUS at 12:49

## 2019-01-09 RX ADMIN — CEFAZOLIN SODIUM 2 G: 2 SOLUTION INTRAVENOUS at 20:17

## 2019-01-09 RX ADMIN — BUDESONIDE 0.5 MG: 0.5 SUSPENSION RESPIRATORY (INHALATION) at 06:58

## 2019-01-09 RX ADMIN — CYCLOSPORINE 1 DROP: 0.5 EMULSION OPHTHALMIC at 20:17

## 2019-01-09 RX ADMIN — BUDESONIDE 0.5 MG: 0.5 SUSPENSION RESPIRATORY (INHALATION) at 18:53

## 2019-01-09 RX ADMIN — CEFAZOLIN SODIUM 2 G: 2 SOLUTION INTRAVENOUS at 03:39

## 2019-01-09 RX ADMIN — SODIUM CHLORIDE, PRESERVATIVE FREE 10 ML: 5 INJECTION INTRAVENOUS at 20:17

## 2019-01-09 RX ADMIN — PROPOFOL 50 MG: 10 INJECTION, EMULSION INTRAVENOUS at 12:44

## 2019-01-09 RX ADMIN — PROPOFOL 30 MG: 10 INJECTION, EMULSION INTRAVENOUS at 12:41

## 2019-01-09 RX ADMIN — ALBUTEROL SULFATE 2.5 MG: 2.5 SOLUTION RESPIRATORY (INHALATION) at 06:58

## 2019-01-09 RX ADMIN — PROPOFOL 30 MG: 10 INJECTION, EMULSION INTRAVENOUS at 12:36

## 2019-01-09 RX ADMIN — SODIUM CHLORIDE, PRESERVATIVE FREE 10 ML: 5 INJECTION INTRAVENOUS at 09:38

## 2019-01-09 RX ADMIN — VENLAFAXINE HYDROCHLORIDE 37.5 MG: 37.5 TABLET ORAL at 17:03

## 2019-01-09 RX ADMIN — CASTOR OIL AND BALSAM, PERU 5 G: 788; 87 OINTMENT TOPICAL at 20:16

## 2019-01-09 RX ADMIN — SODIUM CHLORIDE, PRESERVATIVE FREE 3 ML: 5 INJECTION INTRAVENOUS at 09:37

## 2019-01-09 RX ADMIN — HYDROCODONE BITARTRATE AND ACETAMINOPHEN 1 TABLET: 5; 325 TABLET ORAL at 23:33

## 2019-01-09 RX ADMIN — CASTOR OIL AND BALSAM, PERU 5 G: 788; 87 OINTMENT TOPICAL at 09:33

## 2019-01-09 RX ADMIN — PHENYLEPHRINE HYDROCHLORIDE 50 MCG: 10 INJECTION INTRAVENOUS at 12:46

## 2019-01-09 RX ADMIN — APIXABAN 5 MG: 5 TABLET, FILM COATED ORAL at 20:16

## 2019-01-09 RX ADMIN — LIDOCAINE HYDROCHLORIDE 80 MG: 20 INJECTION, SOLUTION INFILTRATION; PERINEURAL at 12:34

## 2019-01-09 RX ADMIN — FLUTICASONE PROPIONATE 2 SPRAY: 50 SPRAY, METERED NASAL at 09:38

## 2019-01-09 RX ADMIN — SENNOSIDES AND DOCUSATE SODIUM 2 TABLET: 8.6; 5 TABLET ORAL at 20:16

## 2019-01-09 RX ADMIN — CEFAZOLIN SODIUM 2 G: 2 SOLUTION INTRAVENOUS at 11:05

## 2019-01-09 RX ADMIN — SODIUM CHLORIDE: 9 INJECTION, SOLUTION INTRAVENOUS at 12:29

## 2019-01-09 RX ADMIN — PROPOFOL 50 MG: 10 INJECTION, EMULSION INTRAVENOUS at 12:39

## 2019-01-09 RX ADMIN — HYDROCODONE BITARTRATE AND ACETAMINOPHEN 1 TABLET: 5; 325 TABLET ORAL at 17:09

## 2019-01-09 NOTE — PROGRESS NOTES
PROGRESS NOTE         Patient Identification:  Name:  Jeanna Flower  Age:  78 y.o.  Sex:  female  :  1940  MRN:  [id number]  Visit Number:  [id number]  Primary Care Provider:  Adela Mathew MD      ----------------------------------------------------------------------------------------------------------------------  Subjective       Chief Complaints:    Fatigue and Neck Swelling        Interval History:      Patient comfortable this morning. No issues or complaints. Son at bedside. No fever or diarrhea. WBC normal. CRP slightly improved at 15.00. JAYE planned for today. Urine culture finalized as adair susceptible E. Coli. Rheumatoid factor elevated at 32.    Review of Systems:    Unable to obtain. Confused.  ----------------------------------------------------------------------------------------------------------------------      Objective       Current Hospital Meds:    apixaban 5 mg Oral Q12H   budesonide 0.5 mg Nebulization BID - RT   castor oil-balsam peru 1 each Topical Q12H   ceFAZolin 1 g Intravenous Once   CeFAZolin Sodium-Dextrose 2 g Intravenous Q8H   cycloSPORINE 1 drop Both Eyes BID   famotidine 20 mg Per G Tube BID   fluticasone 2 spray Nasal Daily   levothyroxine 100 mcg Oral Daily   sennosides-docusate sodium 2 tablet Per G Tube BID   sodium chloride 10 mL Intravenous Q12H   sodium chloride 3 mL Intravenous Q12H   venlafaxine 37.5 mg Per G Tube BID With Meals        ----------------------------------------------------------------------------------------------------------------------    Vital Signs:  Temp:  [97.2 °F (36.2 °C)-98.6 °F (37 °C)] 97.2 °F (36.2 °C)  Heart Rate:  [] 92  Resp:  [16-20] 20  BP: ()/(43-79) 121/43  No data found.  SpO2 Percentage    19 1307 19 1313 19 1316   SpO2: 100% 100% 100%     SpO2:  [92 %-100 %] 100 %  on  Flow (L/min):  [2] 2;   Device (Oxygen Therapy): nasal cannula    Body mass index is 30.02 kg/m².  Wt Readings from  Last 3 Encounters:   01/07/19 76.8 kg (169 lb 6.4 oz)   12/05/18 87.1 kg (192 lb)   11/30/18 84.4 kg (186 lb)        Intake/Output Summary (Last 24 hours) at 1/9/2019 1557  Last data filed at 1/9/2019 1245  Gross per 24 hour   Intake 320 ml   Output --   Net 320 ml     NPO Diet  ----------------------------------------------------------------------------------------------------------------------    Physical exam:    Constitutional:  Well-developed and well-nourished.  No respiratory distress.      HENT:  Head: Normocephalic and atraumatic.  Mouth:  Moist mucous membranes.    Eyes:  Conjunctivae and EOM are normal.  No scleral icterus.  Neck:  Neck supple.  No JVD present.    Cardiovascular:  Normal rate, regular rhythm and normal heart sounds with no murmur. No edema.  Pulmonary/Chest:  No respiratory distress, no wheezes, no crackles, with normal breath sounds and good air movement.  Abdominal:  Soft.  Bowel sounds are normal.  No distension and no tenderness.   Musculoskeletal:  No edema, no tenderness, and no deformity.  No swelling or redness of joints.  Neurological:  Confused.    Skin:  Skin is warm and dry.  No rash noted.  No pallor.   Psychiatric:  Confused.    ----------------------------------------------------------------------------------------------------------------------    ----------------------------------------------------------------------------------------------------------------------          Results from last 7 days   Lab Units  01/08/19   1052   PH, ARTERIAL pH units  7.496*   PO2 ART mm Hg  103.6*   PCO2, ARTERIAL mm Hg  20.7*   HCO3 ART mmol/L  15.6*     Results from last 7 days   Lab Units  01/09/19   0638  01/09/19   0637  01/08/19   0651  01/07/19   0155  01/06/19   2156   CRP mg/dL   --   15.00*  16.97*   --   9.85*   LACTATE mmol/L   --    --    --   1.2  2.5*   WBC 10*3/mm3  8.81   --   9.83   --   18.17*   HEMOGLOBIN g/dL  9.5*   --   11.8*   --   13.2   HEMATOCRIT %  30.5*   --    38.2   --   43.9   MCV fL  90.2   --   90.5   --   93.0   MCHC g/dL  31.1*   --   30.9*   --   30.1*   PLATELETS 10*3/mm3  197   --   186   --   238   INR    --    --    --    --   1.36*     Results from last 7 days   Lab Units  01/09/19   0637  01/08/19   0651  01/06/19   2156   SODIUM mmol/L  142  142  137   POTASSIUM mmol/L  3.7  3.8  4.6   MAGNESIUM mg/dL  2.2  1.3*   --    CHLORIDE mmol/L  112  114*  103   CO2 mmol/L  22.6*  19.5*  20.8*   BUN mg/dL  12  15  25*   CREATININE mg/dL  0.74  0.75  1.18   EGFR IF NONAFRICN AM mL/min/1.73  76  75  44*   CALCIUM mg/dL  8.2  8.8  8.9   GLUCOSE mg/dL  80  116*  157*   ALBUMIN g/dL  2.80*  3.40  3.60   BILIRUBIN mg/dL  0.5  1.7  0.8   ALK PHOS U/L  294*  441*  99   AST (SGOT) U/L  149*  500*  29   ALT (SGPT) U/L  192*  360*  32   Estimated Creatinine Clearance: 57.8 mL/min (by C-G formula based on SCr of 0.74 mg/dL).  Ammonia   Date Value Ref Range Status   01/08/2019 28 11 - 51 umol/L Final       No results found for: HGBA1C, POCGLU  Lab Results   Component Value Date    HGBA1C 6.2 (H) 03/24/2014     Lab Results   Component Value Date    TSH 1.626 09/15/2018       Blood Culture   Date Value Ref Range Status   01/08/2019 No growth at 24 hours  Preliminary   01/08/2019 No growth at 24 hours  Preliminary   01/06/2019 No growth at 2 days  Preliminary   01/06/2019 Staphylococcus aureus (A)  Final     Comment:     This isolate is presumed to be clindamyin resistant based on detection of inducible clindamycin resistance. Clindamycin may still be effective in some patients.  Consider infectious disease consult to rule out distant focus of infection.     Urine Culture   Date Value Ref Range Status   01/07/2019 >100,000 CFU/mL Escherichia coli (A)  Final              Pain Management Panel     Pain Management Panel Latest Ref Rng & Units 9/16/2018    AMPHETAMINES SCREEN, URINE Negative Negative    BARBITURATES SCREEN Negative Negative    BENZODIAZEPINE SCREEN, URINE Negative  Negative    BUPRENORPHINE Negative Negative    COCAINE SCREEN, URINE Negative Negative    METHADONE SCREEN, URINE Negative Negative          I have personally reviewed the above laboratory results.   ----------------------------------------------------------------------------------------------------------------------  Imaging Results (last 24 hours)     Procedure Component Value Units Date/Time    US Liver [[id number]] Collected:  01/08/19 1557     Updated:  01/08/19 1559    Narrative:       EXAMINATION: US LIVER-         CLINICAL INDICATION:     Elevated liver enzymes; A41.9-Sepsis,  unspecified organism; N39.0-Urinary tract infection, site not specified;  K11.21-Acute sialoadenitis     TECHNIQUE: Multiplanar gray scale ultrasound of the abdomen.     COMPARISON: NONE      FINDINGS:   Visualized pancreas is unremarkable.         The CBD measures 4.0897122032 mm    .  The liver demonstrates normal echogenicity without focal lesion.    No ascites demonstrated.             Impression:       Appearance of liver is unremarkable.     This report was finalized on 1/8/2019 3:57 PM by Dr. Giovanni Martin MD.           I have personally reviewed the above radiology results.   ----------------------------------------------------------------------------------------------------------------------    Assessment/Plan       Assessment/Plan     ASSESSMENT:    1. Severe sepsis with lactic acid greater than 2 on admission  2. MSSA bacteremia  3. E. Coli UTI    PLAN:    Patient comfortable this morning. No issues or complaints. Son at bedside. No fever or diarrhea. WBC normal. CRP slightly improved at 15.00. JAYE planned for today. Urine culture finalized as adair susceptible E. Coli. Rheumatoid factor elevated at 32. Repeat blood cultures from 1/8/19 reveal no growth at this time.    CT of neck revealed right-sided parotid gland more hyperdense compared to left side that may represent inflammation.  Chest x-ray from 1/8/19 unremarkable.   Ultrasound of bilateral upper extremities reveals no DVT.  Urine culture preliminary reveals greater than 100,000 colonies of gram-negative bacilli consistent with Escherichia coli.  Blood cultures one out of 2 sets from 1/6/19 reveals MSSA.     At this time we are very concerned for septic phlebitis of the jugular vein. Venous duplex was ordered to rule out septic DVT, no DVT found. Highly recommend JAYE to rule out vegetation.     We recommend to continue Cefazolin 2gm IV Q8H. CRP ordered for AM.      Current Antimicrobials:  Cefazolin 2gm IV Q8H graded challenge  Gentamicin x1 dose on 1/8/19          Code Status:   Code Status and Medical Interventions:   Ordered at: 01/07/19 0527     Limited Support to NOT Include:    Artificial Nutrition    Blood Products    Cardioversion/Defibrillation    Dialysis    Intubation     Code Status:    No CPR     Medical Interventions (Level of Support Prior to Arrest):    Limited       AMELIE Reis  01/09/19  3:57 PM

## 2019-01-09 NOTE — PLAN OF CARE
Problem: Patient Care Overview  Goal: Plan of Care Review  Outcome: Ongoing (interventions implemented as appropriate)   01/08/19 1622 01/09/19 0930   Coping/Psychosocial   Plan of Care Reviewed With --  patient   Plan of Care Review   Progress no change --        Problem: Fall Risk (Adult)  Goal: Identify Related Risk Factors and Signs and Symptoms  Outcome: Ongoing (interventions implemented as appropriate)   01/07/19 0424   Fall Risk (Adult)   Related Risk Factors (Fall Risk) age-related changes;bladder function altered;confusion/agitation;history of falls;gait/mobility problems;environment unfamiliar   Signs and Symptoms (Fall Risk) presence of risk factors     Goal: Absence of Fall  Outcome: Ongoing (interventions implemented as appropriate)   01/09/19 1013   Fall Risk (Adult)   Absence of Fall making progress toward outcome       Problem: Skin Injury Risk (Adult)  Goal: Identify Related Risk Factors and Signs and Symptoms  Outcome: Ongoing (interventions implemented as appropriate)   01/07/19 0424   Skin Injury Risk (Adult)   Related Risk Factors (Skin Injury Risk) advanced age;cognitive impairment;mobility impaired;moisture;nutritional deficiencies     Goal: Skin Health and Integrity  Outcome: Ongoing (interventions implemented as appropriate)   01/09/19 1013   Skin Injury Risk (Adult)   Skin Health and Integrity making progress toward outcome       Problem: Wound (Includes Pressure Injury) (Adult)  Goal: Signs and Symptoms of Listed Potential Problems Will be Absent, Minimized or Managed (Wound)  Outcome: Ongoing (interventions implemented as appropriate)   01/08/19 1622   Goal/Outcome Evaluation   Problems Assessed (Wound) all   Problems Present (Wound) delayed wound healing       Problem: Self-Care Deficit (Adult,Obstetrics,Pediatric)  Goal: Identify Related Risk Factors and Signs and Symptoms  Outcome: Ongoing (interventions implemented as appropriate)   01/07/19 0424 01/08/19 1622   Self-Care Deficit  (Adult,Obstetrics,Pediatric)   Related Risk Factors (Self-Care Deficit) --  activity intolerance   Signs and Symptoms (Self-Care Deficit) cognitive changes;decreased functional activity tolerance;range of motion decreased;inability/unwillingness to perform self-care;unable to perform bathing/hygiene tasks;unable to perform feeding tasks;unable to perform toileting/toilet hygiene tasks --      Goal: Improved Ability to Perform BADL and IADL  Outcome: Ongoing (interventions implemented as appropriate)   01/09/19 1013   Self-Care Deficit (Adult,Obstetrics,Pediatric)   Improved Ability to Perform BADL and IADL making progress toward outcome       Problem: Dysphagia (Adult)  Goal: Functional/Safe Swallow  Outcome: Ongoing (interventions implemented as appropriate)   01/09/19 1013   Dysphagia (Adult)   Functional/Safe Swallow making progress toward outcome

## 2019-01-09 NOTE — PROGRESS NOTES
Subjective     History:   Jeanna Flower is a 78 y.o. female admitted on 1/6/2019 secondary to Severe sepsis (CMS/[facility]ly [facility])     Procedures: None    CC: Follow up sepsis     Patient seen and examined with RUFUS Breen. Appears more awake and alert today. Remains confused but oriented to person and knows she's in a hospital. Reports generalized pain. States she is thirsty. Unable to provide any history. Sitter present at bedside. No acute events overnight per RN.     History taken from: chart, and RN.      Objective     Vital Signs  Temp:  [97.6 °F (36.4 °C)-98.6 °F (37 °C)] 97.6 °F (36.4 °C)  Heart Rate:  [] 99  Resp:  [16-20] 18  BP: (119-138)/(52-79) 138/79    Intake/Output Summary (Last 24 hours) at 1/9/2019 1250  Last data filed at 1/9/2019 1245  Gross per 24 hour   Intake 320 ml   Output --   Net 320 ml         Physical Exam:  General:    Appears more awake and alert today, oriented to person and knows she's in a hospital, able to follow a few simple commands, chronically ill appearing, right-sided erythema near parotid gland appears improved again today   Heart:      Normal S1 and S2. Regular rate and rhythm. No significant murmur, rubs or gallops appreciated.   Lungs:     Respirations regular, even and unlabored. Lungs clear to auscultation B/L. No wheezes, rales or rhonchi.   Abdomen:   Soft and nontender. No guarding, rebound tenderness or  organomegaly noted. Bowel sounds present x 4.   Extremities:  No clubbing, cyanosis or edema noted. Contracture of LUE.      Results Review:    Results from last 7 days   Lab Units  01/09/19   0638  01/08/19   0651  01/06/19   2156   WBC 10*3/mm3  8.81  9.83  18.17*   HEMOGLOBIN g/dL  9.5*  11.8*  13.2   PLATELETS 10*3/mm3  197  186  238     Results from last 7 days   Lab Units  01/09/19   0637  01/08/19   0651  01/06/19   2156   SODIUM mmol/L  142  142  137   POTASSIUM mmol/L  3.7  3.8  4.6   CHLORIDE mmol/L  112  114*  103   CO2 mmol/L  22.6*  19.5*  20.8*   BUN mg/dL   12  15  25*   CREATININE mg/dL  0.74  0.75  1.18   CALCIUM mg/dL  8.2  8.8  8.9   GLUCOSE mg/dL  80  116*  157*     Results from last 7 days   Lab Units  01/09/19   0637  01/08/19   0651  01/06/19   2156   BILIRUBIN mg/dL  0.5  1.7  0.8   ALK PHOS U/L  294*  441*  99   AST (SGOT) U/L  149*  500*  29   ALT (SGPT) U/L  192*  360*  32     Results from last 7 days   Lab Units  01/09/19   0637  01/08/19   0651   MAGNESIUM mg/dL  2.2  1.3*     Results from last 7 days   Lab Units  01/06/19   2156   INR   1.36*           Imaging Results (last 24 hours)     Procedure Component Value Units Date/Time    US Liver [[id number]] Collected:  01/08/19 1557     Updated:  01/08/19 1559    Narrative:       EXAMINATION: US LIVER-         CLINICAL INDICATION:     Elevated liver enzymes; A41.9-Sepsis,  unspecified organism; N39.0-Urinary tract infection, site not specified;  K11.21-Acute sialoadenitis     TECHNIQUE: Multiplanar gray scale ultrasound of the abdomen.     COMPARISON: NONE      FINDINGS:   Visualized pancreas is unremarkable.         The CBD measures 4.5319055142 mm    .  The liver demonstrates normal echogenicity without focal lesion.    No ascites demonstrated.             Impression:       Appearance of liver is unremarkable.     This report was finalized on 1/8/2019 3:57 PM by Dr. Giovanni Martin MD.               Medications:    apixaban 5 mg Oral Q12H   budesonide 0.5 mg Nebulization BID - RT   castor oil-balsam peru 1 each Topical Q12H   ceFAZolin 1 g Intravenous Once   CeFAZolin Sodium-Dextrose 2 g Intravenous Q8H   cycloSPORINE 1 drop Both Eyes BID   famotidine 20 mg Per G Tube BID   fluticasone 2 spray Nasal Daily   levothyroxine 100 mcg Oral Daily   sennosides-docusate sodium 2 tablet Per G Tube BID   sodium chloride 10 mL Intravenous Q12H   sodium chloride 3 mL Intravenous Q12H   venlafaxine 37.5 mg Per G Tube BID With Meals       Pharmacy Consult            Assessment/Plan   Severe sepsis with metabolic  encephalopathy and lactic acid>2: Likely 2/2 right-sided parotitis, UTI and bacteremia. Afebrile and hemodynamically stable. Leukocytosis has resolved. CRP improved today. Lactic acid has normalized. 1/2 initial blood cultures revealing Staph aureus, not MRSA. Urine culture revealing growth of E coli. Repeat blood cultures with NGTD. Currently on Ancef per ID. She received a dose of gentamicin yesterday as well. Follow cultures and repeat labs in the AM. ID input appreciated.     Right-sided parotitis: Appears improved with less erythema on exam again today. Cont antibiotics as above.     E coli UTI: Cont antibiotics as above.    MSSA bacteremia: 1/2 initial cultures revealing growth of MSSA. Repeat blood cultures with NGTD. No definitive evidence of vegetation on TTE. Plan for JAYE today. ID and cardiology input appreciated.     Metabolic encephalopathy superimposed on baseline dementia: Possibly 2/2 above. Appears improved today. Cont supportive treatment in addition to treatment as outlined above. Sitter at bedside.      Elevated liver enzymes: Stopped statin and dantrolene. Viral hepatitis panel non-reactive. Liver US unremarkable. Improved today. Possibly related to sepsis. Repeat CMP in the AM.      Electrolyte abnormalities: Mg and PO4 both improved today with supplementation. Cont to monitor.    PAF: Currently NSR. Cont Eliquis for stroke prevention.     DM II, non-insulin dependent: Holding home PO meds. BG levels currently stable.     Essential HTN: BP currently overall stable. Cont to monitor.     Hx of CVA with left-sided hemiplegia: Cont supportive treatment.     DVT PPX: Eliquis    Disposition: Plan for SNF placement with possible hospice once medically stable. Palliative care following with input appreciated.     Pt is at high risk 2/2 severe sepsis, parotitis, UTI, bacteremia, metabolic encephalopathy superimposed on baseline dementia, electrolyte abnormalities, PAF and hx of CVA.       Chalino DE SOUZA  DO Main  01/09/19  12:50 PM

## 2019-01-09 NOTE — SIGNIFICANT NOTE
01/09/19 1506   Rehab Treatment   Discipline physical therapy assistant   Reason Treatment Not Performed patient/family declined treatment  (Pt. fatigued from having JAYE and declined participation. PT to follow up tomorrow.)   Recommendation   PT - Next Appointment 01/10/19

## 2019-01-09 NOTE — PROGRESS NOTES
Discharge Planning Assessment  [facility]     Patient Name: Jeanna Flower  MRN: [id number]  Today's Date: 1/9/2019    Admit Date: 1/6/2019      Discharge Plan     Row Name 01/09/19 1119       Plan    Plan Pt was discussed in [facility] today. Pt is not medically stable for discharge at this time. Pt has a sitter at this time due to confusion. SS attempted contact with Carlee at [facility] [phone] and left a message. SS to follow.      Destination - Selection Complete      Service Provider Request Status Selected Services Address Phone Number Fax Number    [facility] Selected Skilled Nursing [address], [facility] [address] [phone]     Lakia Alatorre

## 2019-01-09 NOTE — SIGNIFICANT NOTE
F/u this am for diet tolerance assessment. Ms. Flower is currently NPO awaiting JAYE today. Per this status, f/u deferred.     Please continue least restrictive po diet puree, thin liquids w/ 1:1 assist, medications crushed in puree, once pt cleared for po intake.     SLP to f/u 1/10/19    Thank you-  Ivanna Kuo M.S., CCC/SLP

## 2019-01-09 NOTE — THERAPY TREATMENT NOTE
Acute Care - Occupational Therapy Treatment Note   [facility]     Patient Name: Jeanna Flower  : 1940  MRN: [id number]  Today's Date: 2019  Onset of Illness/Injury or Date of Surgery: 19     Referring Physician: Dr. Medel    Admit Date: 2019       ICD-10-CM ICD-9-CM   1. Sepsis, due to unspecified organism (CMS/[facility]) A41.9 038.9     995.91   2. Urinary tract infection without hematuria, site unspecified N39.0 599.0   3. Acute parotitis K11.21 527.2     Patient Active Problem List   Diagnosis   • Paroxysmal atrial fibrillation (CMS/[facility])   • Essential hypertension   • Type 2 diabetes mellitus without complication (CMS/[facility])   • CVA (cerebral vascular accident) with left hemiparesis.   • Other sleep apnea   • Severe sepsis (CMS/[facility])   • MSSA bacteremia   • E. coli UTI     Past Medical History:   Diagnosis Date   • Anemia    • Closed wedge compression fracture of first lumbar vertebra (CMS/[facility])    • COPD (chronic obstructive pulmonary disease) (CMS/[facility])    • CVA (cerebral vascular accident) with left hemiparesis. 2017   • Dementia    • Diabetes mellitus (CMS/[facility])    • Dysarthria following cerebral infarction    • Dysphagia, oropharyngeal phase    • Dysphagia, pharyngoesophageal phase    • GERD (gastroesophageal reflux disease)    • Heart disease    • Hyperlipidemia    • Hypertension    • Hypothyroidism    • Insomnia    • Obesity    • Other sleep apnea 2018   • Paroxysmal atrial fibrillation (CMS/[facility]) 2017     Past Surgical History:   Procedure Laterality Date   • APPENDECTOMY     • BLADDER SURGERY     • CARDIAC CATHETERIZATION     • CHOLECYSTECTOMY         Therapy Treatment    Rehabilitation Treatment Summary     Row Name 19 1400             Treatment Time/Intention    Discipline  occupational therapist  -KR      Document Type  therapy note (daily note)  -KR      Patient Effort  adequate  -KR      Comment  Pt. presents with increased alertness today and willing to cooperate.  Continued need for consistent verbal and physical cues.   -KR      Recorded by [KR] Antony Solano, OT 01/09/19 1403      Row Name 01/09/19 1400             Motor Skills Assessment/Interventions    Additional Documentation  Therapeutic Exercise (Group)  -KR      Recorded by [KR] Antony Solano OT 01/09/19 1403      Row Name 01/09/19 1400             Therapeutic Exercise    Upper Extremity (Therapeutic Exercise)  wand exercises  -KR      Hand (Therapeutic Exercise)  hand , bilateral  -KR      Exercise Type (Therapeutic Exercise)  AROM (active range of motion);AAROM (active assistive range of motion);PROM (passive range of motion)  -KR      Position (Therapeutic Exercise)  supine  -KR      Equipment (Therapeutic Exercise)  dowel cory/wand  -KR      Comment (Therapeutic Exercise)  PROM, AAROM LUE  -KR      Recorded by [KR] Antony Solano OT 01/09/19 1403      Row Name                Wound 01/07/19 0401 Right posterior gluteal pressure injury    Wound - Properties Group Date first assessed: 01/07/19 [CC] Time first assessed: 0401 [CC] Present On Admission : yes [CC] Side: Right [CC] Orientation: posterior [CC] Location: gluteal [CC] Type: pressure injury [CC] Stage, Pressure Injury: Stage 2 [CC] Recorded by:  [CC] Charlene Lockwood RN 01/07/19 0402    Row Name                Wound 01/07/19 0403 Left lower gluteal pressure injury    Wound - Properties Group Date first assessed: 01/07/19 [CC] Time first assessed: 0403 [CC] Side: Left [CC] Orientation: lower [CC] Location: gluteal [CC] Type: pressure injury [CC] Stage, Pressure Injury: Stage 2 [CC] Recorded by:  [CC] Charlene Lockwood RN 01/07/19 0403      User Key  (r) = Recorded By, (t) = Taken By, (c) = Cosigned By    Initials Name Effective Dates Discipline    CC Charlene Lockwood RN 06/16/16 -  Nurse    Antony Harrell OT 04/03/18 -  OT        Wound 01/07/19 0401 Right posterior gluteal pressure injury (Active)   Dressing Appearance open to air 1/9/2019  9:30 AM    Closure None 1/9/2019  9:30 AM   Base pink;moist;clean 1/9/2019  9:30 AM   Periwound redness 1/9/2019  9:30 AM   Periwound Temperature warm 1/9/2019  9:30 AM   Periwound Skin Turgor soft 1/9/2019  9:30 AM   Edges irregular 1/8/2019  8:30 PM   Drainage Amount none 1/9/2019  9:30 AM   Care, Wound cleansed with;sterile normal saline 1/9/2019  9:30 AM   Periwound Care, Wound barrier ointment applied 1/9/2019  9:30 AM       Wound 01/07/19 0403 Left lower gluteal pressure injury (Active)   Dressing Appearance open to air 1/9/2019  9:30 AM   Closure None 1/9/2019  9:30 AM   Periwound warm;redness;blanchable 1/9/2019  9:30 AM   Periwound Temperature warm 1/9/2019  9:30 AM   Periwound Skin Turgor soft 1/9/2019  9:30 AM   Edges irregular 1/8/2019  8:30 PM   Drainage Amount none 1/9/2019  9:30 AM   Care, Wound cleansed with;sterile normal saline 1/9/2019  9:30 AM   Periwound Care, Wound barrier ointment applied 1/9/2019  9:30 AM           OT Recommendation and Plan        Outcome Measures     Row Name 01/07/19 1600             How much help from another person do you currently need...    Turning from your back to your side while in flat bed without using bedrails?  2  -AG      Moving from lying on back to sitting on the side of a flat bed without bedrails?  2  -AG      Moving to and from a bed to a chair (including a wheelchair)?  1  -AG      Standing up from a chair using your arms (e.g., wheelchair, bedside chair)?  2  -AG      Climbing 3-5 steps with a railing?  1  -AG      To walk in hospital room?  1  -AG      AM-PAC 6 Clicks Score  9  -AG         Functional Assessment    Outcome Measure Options  AM-PAC 6 Clicks Basic Mobility (PT)  -AG        User Key  (r) = Recorded By, (t) = Taken By, (c) = Cosigned By    Initials Name Provider Type    Saadia Cunningham, PT Physical Therapist           Time Calculation:   Time Calculation- OT     Row Name 01/09/19 1403             Time Calculation- OT    Total Timed Code Minutes- OT   15 minute(s)  -KARRIE        User Key  (r) = Recorded By, (t) = Taken By, (c) = Cosigned By    Initials Name Provider Type    Antony Harrell OT Occupational Therapist           Therapy Suggested Charges     Code   Minutes Charges    None           Therapy Charges for Today     Code Description Service Date Service Provider Modifiers Qty    [id number]  OT THER PROC EA 15 MIN 1/9/2019 Antony Solano OT GO 1          OT G-codes  Functional Limitation: Self care  Self Care Current Status (): At least 60 percent but less than 80 percent impaired, limited or restricted  Self Care Goal Status (): At least 20 percent but less than 40 percent impaired, limited or restricted    Anotny Solano OT  1/9/2019

## 2019-01-09 NOTE — PLAN OF CARE
Problem: Fall Risk (Adult)  Goal: Absence of Fall  Outcome: Ongoing (interventions implemented as appropriate)   01/09/19 0132   Fall Risk (Adult)   Absence of Fall making progress toward outcome       Problem: Skin Injury Risk (Adult)  Goal: Skin Health and Integrity  Outcome: Ongoing (interventions implemented as appropriate)   01/09/19 0132   Skin Injury Risk (Adult)   Skin Health and Integrity making progress toward outcome       Problem: Wound (Includes Pressure Injury) (Adult)  Goal: Signs and Symptoms of Listed Potential Problems Will be Absent, Minimized or Managed (Wound)  Outcome: Ongoing (interventions implemented as appropriate)   01/08/19 1622   Goal/Outcome Evaluation   Problems Assessed (Wound) all   Problems Present (Wound) delayed wound healing       Problem: Self-Care Deficit (Adult,Obstetrics,Pediatric)  Goal: Improved Ability to Perform BADL and IADL  Outcome: Ongoing (interventions implemented as appropriate)   01/09/19 0132   Self-Care Deficit (Adult,Obstetrics,Pediatric)   Improved Ability to Perform BADL and IADL making progress toward outcome       Problem: Diabetes, Type 2 (Adult)  Goal: Signs and Symptoms of Listed Potential Problems Will be Absent, Minimized or Managed (Diabetes, Type 2)  Outcome: Ongoing (interventions implemented as appropriate)      Problem: Dysphagia (Adult)  Goal: Functional/Safe Swallow  Outcome: Ongoing (interventions implemented as appropriate)   01/09/19 0132   Dysphagia (Adult)   Functional/Safe Swallow making progress toward outcome

## 2019-01-09 NOTE — PROGRESS NOTES
Discharge Planning Assessment  [facility]     Patient Name: Jeanna Flower  MRN: [id number]  Today's Date: 1/9/2019    Admit Date: 1/6/2019      Discharge Plan     Row Name 01/09/19 0901       Plan    Plan  [facility] per Amy who states they are agreeable to accept pt at discharge. SS spoke with pt's granddaughter and POA to inform. Pt POA is agreeable to [facility]. SS will follow and assist as needed.     Patient/Family in Agreement with Plan  yes        Expected Discharge Date and Time     Expected Discharge Date Expected Discharge Time    Billy 10, 2019               Maribell Varela

## 2019-01-09 NOTE — ANESTHESIA PREPROCEDURE EVALUATION
Anesthesia Evaluation     Patient summary reviewed and Nursing notes reviewed   no history of anesthetic complications:  NPO Solid Status: > 8 hours  NPO Liquid Status: > 8 hours           Airway   Mallampati: III  TM distance: >3 FB  Neck ROM: full  No difficulty expected  Dental - normal exam   (+) edentulous    Pulmonary - normal exam   (+) COPD, sleep apnea,   Cardiovascular - normal exam    (+) hypertension, dysrhythmias, hyperlipidemia,       Neuro/Psych  (+) CVA, psychiatric history, dementia,     GI/Hepatic/Renal/Endo    (+)  GERD,  diabetes mellitus, hypothyroidism,     Musculoskeletal (-) negative ROS    Abdominal  - normal exam    Bowel sounds: normal.   Substance History - negative use     OB/GYN negative ob/gyn ROS         Other                        Anesthesia Plan    ASA 3     general     intravenous induction   Anesthetic plan, all risks, benefits, and alternatives have been provided, discussed and informed consent has been obtained with: patient.    Plan discussed with CRNA.

## 2019-01-09 NOTE — PLAN OF CARE
Problem: Patient Care Overview  Goal: Plan of Care Review  Outcome: Ongoing (interventions implemented as appropriate)   01/09/19 8560   Coping/Psychosocial   Plan of Care Reviewed With patient;caregiver   Plan of Care Review   Progress improving   OTHER   Outcome Summary Pt. presents today with increased alertness and ability to participate. Continues to require mod/max verbal and physical cues / demonstration to adequately perform ther ex/ROM. OT to continue POC as tolerated.

## 2019-01-10 LAB
ALBUMIN SERPL-MCNC: 2.9 G/DL (ref 3.4–4.8)
ALBUMIN/GLOB SERPL: 0.9 G/DL (ref 1.5–2.5)
ALP SERPL-CCNC: 288 U/L (ref 35–104)
ALT SERPL W P-5'-P-CCNC: 102 U/L (ref 10–36)
ANION GAP SERPL CALCULATED.3IONS-SCNC: 7.8 MMOL/L (ref 3.6–11.2)
AST SERPL-CCNC: 89 U/L (ref 10–30)
BASOPHILS # BLD AUTO: 0.08 10*3/MM3 (ref 0–0.3)
BASOPHILS NFR BLD AUTO: 1 % (ref 0–2)
BILIRUB SERPL-MCNC: 0.5 MG/DL (ref 0.2–1.8)
BUN BLD-MCNC: 12 MG/DL (ref 7–21)
BUN/CREAT SERPL: 16.4 (ref 7–25)
CALCIUM SPEC-SCNC: 8.4 MG/DL (ref 7.7–10)
CHLORIDE SERPL-SCNC: 110 MMOL/L (ref 99–112)
CO2 SERPL-SCNC: 21.2 MMOL/L (ref 24.3–31.9)
CREAT BLD-MCNC: 0.73 MG/DL (ref 0.43–1.29)
CRP SERPL-MCNC: 9.75 MG/DL (ref 0–0.99)
DEPRECATED RDW RBC AUTO: 64.2 FL (ref 37–54)
EOSINOPHIL # BLD AUTO: 0.47 10*3/MM3 (ref 0–0.7)
EOSINOPHIL NFR BLD AUTO: 5.6 % (ref 0–7)
ERYTHROCYTE [DISTWIDTH] IN BLOOD BY AUTOMATED COUNT: 18.8 % (ref 11.5–14.5)
GFR SERPL CREATININE-BSD FRML MDRD: 77 ML/MIN/1.73
GLOBULIN UR ELPH-MCNC: 3.1 GM/DL
GLUCOSE BLD-MCNC: 83 MG/DL (ref 70–110)
HCT VFR BLD AUTO: 36.1 % (ref 37–47)
HGB BLD-MCNC: 10.8 G/DL (ref 12–16)
IMM GRANULOCYTES # BLD AUTO: 0.05 10*3/MM3 (ref 0–0.03)
IMM GRANULOCYTES NFR BLD AUTO: 0.6 % (ref 0–0.5)
LYMPHOCYTES # BLD AUTO: 3.71 10*3/MM3 (ref 1–3)
LYMPHOCYTES NFR BLD AUTO: 44.1 % (ref 16–46)
MAGNESIUM SERPL-MCNC: 1.6 MG/DL (ref 1.7–2.6)
MCH RBC QN AUTO: 27.8 PG (ref 27–33)
MCHC RBC AUTO-ENTMCNC: 29.9 G/DL (ref 33–37)
MCV RBC AUTO: 93 FL (ref 80–94)
MONOCYTES # BLD AUTO: 1.1 10*3/MM3 (ref 0.1–0.9)
MONOCYTES NFR BLD AUTO: 13.1 % (ref 0–12)
NEUTROPHILS # BLD AUTO: 3.01 10*3/MM3 (ref 1.4–6.5)
NEUTROPHILS NFR BLD AUTO: 35.6 % (ref 40–75)
OSMOLALITY SERPL CALC.SUM OF ELEC: 276.4 MOSM/KG (ref 273–305)
PHOSPHATE SERPL-MCNC: 2.3 MG/DL (ref 2.7–4.5)
PLATELET # BLD AUTO: 236 10*3/MM3 (ref 130–400)
PMV BLD AUTO: 10.2 FL (ref 6–10)
POTASSIUM BLD-SCNC: 3.5 MMOL/L (ref 3.5–5.3)
POTASSIUM BLD-SCNC: 4.2 MMOL/L (ref 3.5–5.3)
PROT SERPL-MCNC: 6 G/DL (ref 6–8)
RBC # BLD AUTO: 3.88 10*6/MM3 (ref 4.2–5.4)
SODIUM BLD-SCNC: 139 MMOL/L (ref 135–153)
WBC NRBC COR # BLD: 8.42 10*3/MM3 (ref 4.5–12.5)

## 2019-01-10 PROCEDURE — 92526 ORAL FUNCTION THERAPY: CPT

## 2019-01-10 PROCEDURE — 80053 COMPREHEN METABOLIC PANEL: CPT | Performed by: INTERNAL MEDICINE

## 2019-01-10 PROCEDURE — 94799 UNLISTED PULMONARY SVC/PX: CPT

## 2019-01-10 PROCEDURE — 85025 COMPLETE CBC W/AUTO DIFF WBC: CPT | Performed by: INTERNAL MEDICINE

## 2019-01-10 PROCEDURE — 36415 COLL VENOUS BLD VENIPUNCTURE: CPT | Performed by: INTERNAL MEDICINE

## 2019-01-10 PROCEDURE — 99233 SBSQ HOSP IP/OBS HIGH 50: CPT | Performed by: INTERNAL MEDICINE

## 2019-01-10 PROCEDURE — 97530 THERAPEUTIC ACTIVITIES: CPT

## 2019-01-10 PROCEDURE — 84132 ASSAY OF SERUM POTASSIUM: CPT | Performed by: INTERNAL MEDICINE

## 2019-01-10 PROCEDURE — 97535 SELF CARE MNGMENT TRAINING: CPT | Performed by: OCCUPATIONAL THERAPIST

## 2019-01-10 PROCEDURE — 86140 C-REACTIVE PROTEIN: CPT | Performed by: INTERNAL MEDICINE

## 2019-01-10 PROCEDURE — 84100 ASSAY OF PHOSPHORUS: CPT | Performed by: INTERNAL MEDICINE

## 2019-01-10 PROCEDURE — 83735 ASSAY OF MAGNESIUM: CPT | Performed by: INTERNAL MEDICINE

## 2019-01-10 PROCEDURE — 97530 THERAPEUTIC ACTIVITIES: CPT | Performed by: OCCUPATIONAL THERAPIST

## 2019-01-10 PROCEDURE — 25010000003 CEFAZOLIN SODIUM-DEXTROSE 2-3 GM-%(50ML) RECONSTITUTED SOLUTION

## 2019-01-10 RX ORDER — FAMOTIDINE 20 MG/1
20 TABLET, FILM COATED ORAL 2 TIMES DAILY
Status: DISCONTINUED | OUTPATIENT
Start: 2019-01-10 | End: 2019-01-11 | Stop reason: HOSPADM

## 2019-01-10 RX ORDER — L.ACID,PARA/B.BIFIDUM/S.THERM 8B CELL
1 CAPSULE ORAL DAILY
Status: DISCONTINUED | OUTPATIENT
Start: 2019-01-10 | End: 2019-01-11 | Stop reason: HOSPADM

## 2019-01-10 RX ORDER — VENLAFAXINE 37.5 MG/1
37.5 TABLET ORAL 2 TIMES DAILY WITH MEALS
Status: DISCONTINUED | OUTPATIENT
Start: 2019-01-10 | End: 2019-01-11 | Stop reason: HOSPADM

## 2019-01-10 RX ORDER — MAGNESIUM SULFATE HEPTAHYDRATE 40 MG/ML
4 INJECTION, SOLUTION INTRAVENOUS ONCE
Status: COMPLETED | OUTPATIENT
Start: 2019-01-10 | End: 2019-01-10

## 2019-01-10 RX ORDER — SENNA AND DOCUSATE SODIUM 50; 8.6 MG/1; MG/1
2 TABLET, FILM COATED ORAL 2 TIMES DAILY
Status: DISCONTINUED | OUTPATIENT
Start: 2019-01-10 | End: 2019-01-11 | Stop reason: HOSPADM

## 2019-01-10 RX ORDER — POTASSIUM CHLORIDE 1.5 G/1.77G
40 POWDER, FOR SOLUTION ORAL EVERY 4 HOURS
Status: COMPLETED | OUTPATIENT
Start: 2019-01-10 | End: 2019-01-10

## 2019-01-10 RX ADMIN — CEFAZOLIN SODIUM 2 G: 2 SOLUTION INTRAVENOUS at 20:33

## 2019-01-10 RX ADMIN — CEFAZOLIN SODIUM 2 G: 2 SOLUTION INTRAVENOUS at 04:17

## 2019-01-10 RX ADMIN — POTASSIUM CHLORIDE 40 MEQ: 1.5 POWDER, FOR SOLUTION ORAL at 08:09

## 2019-01-10 RX ADMIN — SENNOSIDES AND DOCUSATE SODIUM 2 TABLET: 8.6; 5 TABLET ORAL at 08:09

## 2019-01-10 RX ADMIN — VENLAFAXINE HYDROCHLORIDE 37.5 MG: 37.5 TABLET ORAL at 17:29

## 2019-01-10 RX ADMIN — CYCLOSPORINE 1 DROP: 0.5 EMULSION OPHTHALMIC at 20:33

## 2019-01-10 RX ADMIN — ALBUTEROL SULFATE 2.5 MG: 2.5 SOLUTION RESPIRATORY (INHALATION) at 19:33

## 2019-01-10 RX ADMIN — FLUTICASONE PROPIONATE 2 SPRAY: 50 SPRAY, METERED NASAL at 08:10

## 2019-01-10 RX ADMIN — SODIUM CHLORIDE, PRESERVATIVE FREE 10 ML: 5 INJECTION INTRAVENOUS at 20:33

## 2019-01-10 RX ADMIN — SENNOSIDES AND DOCUSATE SODIUM 2 TABLET: 8.6; 5 TABLET ORAL at 20:32

## 2019-01-10 RX ADMIN — MAGNESIUM SULFATE HEPTAHYDRATE 4 G: 40 INJECTION, SOLUTION INTRAVENOUS at 11:56

## 2019-01-10 RX ADMIN — CEFAZOLIN SODIUM 2 G: 2 SOLUTION INTRAVENOUS at 11:15

## 2019-01-10 RX ADMIN — Medication 1 CAPSULE: at 16:15

## 2019-01-10 RX ADMIN — APIXABAN 5 MG: 5 TABLET, FILM COATED ORAL at 20:32

## 2019-01-10 RX ADMIN — ALBUTEROL SULFATE 2.5 MG: 2.5 SOLUTION RESPIRATORY (INHALATION) at 07:08

## 2019-01-10 RX ADMIN — CASTOR OIL AND BALSAM, PERU 5 G: 788; 87 OINTMENT TOPICAL at 20:32

## 2019-01-10 RX ADMIN — BUDESONIDE 0.5 MG: 0.5 SUSPENSION RESPIRATORY (INHALATION) at 19:34

## 2019-01-10 RX ADMIN — BUDESONIDE 0.5 MG: 0.5 SUSPENSION RESPIRATORY (INHALATION) at 07:08

## 2019-01-10 RX ADMIN — VENLAFAXINE HYDROCHLORIDE 37.5 MG: 37.5 TABLET ORAL at 08:08

## 2019-01-10 RX ADMIN — SODIUM CHLORIDE, PRESERVATIVE FREE 10 ML: 5 INJECTION INTRAVENOUS at 08:11

## 2019-01-10 RX ADMIN — FAMOTIDINE 20 MG: 20 TABLET, FILM COATED ORAL at 20:32

## 2019-01-10 RX ADMIN — POTASSIUM CHLORIDE 40 MEQ: 1.5 POWDER, FOR SOLUTION ORAL at 11:56

## 2019-01-10 RX ADMIN — APIXABAN 5 MG: 5 TABLET, FILM COATED ORAL at 08:10

## 2019-01-10 RX ADMIN — FAMOTIDINE 20 MG: 20 TABLET, FILM COATED ORAL at 08:09

## 2019-01-10 RX ADMIN — CASTOR OIL AND BALSAM, PERU 5 G: 788; 87 OINTMENT TOPICAL at 08:08

## 2019-01-10 RX ADMIN — LEVOTHYROXINE SODIUM 100 MCG: 100 TABLET ORAL at 08:10

## 2019-01-10 NOTE — PLAN OF CARE
Problem: Patient Care Overview  Goal: Plan of Care Review  Outcome: Ongoing (interventions implemented as appropriate)   01/09/19 1404 01/09/19 2000   Coping/Psychosocial   Plan of Care Reviewed With --  patient   Plan of Care Review   Progress improving --    OTHER   Outcome Summary Pt. presents today with increased alertness and ability to participate. Continues to require mod/max verbal and physical cues / demonstration to adequately perform ther ex/ROM. OT to continue POC as tolerated.  --      Goal: Discharge Needs Assessment  Outcome: Ongoing (interventions implemented as appropriate)   01/07/19 1337   Discharge Needs Assessment   Patient/Family Anticipates Transition to home   Patient/Family Anticipated Services at Transition skilled nursing   Transportation Concerns other (see comments)   Transportation Anticipated family or friend will provide   Equipment Needed After Discharge none   Disability   Equipment Currently Used at Home hospital bed;bath bench;walker, rolling     Goal: Interprofessional Rounds/Family Conf  Outcome: Ongoing (interventions implemented as appropriate)   01/09/19 2117   Interdisciplinary Rounds/Family Conf   Participants nursing       Problem: Fall Risk (Adult)  Goal: Identify Related Risk Factors and Signs and Symptoms  Outcome: Ongoing (interventions implemented as appropriate)   01/07/19 0424   Fall Risk (Adult)   Related Risk Factors (Fall Risk) age-related changes;bladder function altered;confusion/agitation;history of falls;gait/mobility problems;environment unfamiliar   Signs and Symptoms (Fall Risk) presence of risk factors     Goal: Absence of Fall  Outcome: Ongoing (interventions implemented as appropriate)   01/09/19 1013   Fall Risk (Adult)   Absence of Fall making progress toward outcome       Problem: Skin Injury Risk (Adult)  Goal: Identify Related Risk Factors and Signs and Symptoms  Outcome: Ongoing (interventions implemented as appropriate)   01/07/19 0424   Skin  Injury Risk (Adult)   Related Risk Factors (Skin Injury Risk) advanced age;cognitive impairment;mobility impaired;moisture;nutritional deficiencies     Goal: Skin Health and Integrity  Outcome: Ongoing (interventions implemented as appropriate)   01/09/19 1013   Skin Injury Risk (Adult)   Skin Health and Integrity making progress toward outcome       Problem: Wound (Includes Pressure Injury) (Adult)  Goal: Signs and Symptoms of Listed Potential Problems Will be Absent, Minimized or Managed (Wound)  Outcome: Ongoing (interventions implemented as appropriate)   01/08/19 1622   Goal/Outcome Evaluation   Problems Assessed (Wound) all   Problems Present (Wound) delayed wound healing       Problem: Cognitive Impairment (Dementia Signs/Symptoms) (Adult)  Goal: Optimized Cognitive Function (Dementia Signs/Symptoms)   01/08/19 1622   Optimized Cognitive Function (Dementia Signs/Symptoms)   Optimized Cognitive Ability Action Step (STG) Outcome making progress toward outcome      01/08/19 1622   Optimized Cognitive Function (Dementia Signs/Symptoms)   Optimized Cognitive Ability Action Step (STG) Outcome making progress toward outcome      01/08/19 1622   Optimized Cognitive Function (Dementia Signs/Symptoms)   Optimized Cognitive Ability Action Step (STG) Outcome making progress toward outcome       Problem: Self-Care Deficit (Adult,Obstetrics,Pediatric)  Goal: Identify Related Risk Factors and Signs and Symptoms  Outcome: Ongoing (interventions implemented as appropriate)   01/07/19 0424 01/08/19 1622   Self-Care Deficit (Adult,Obstetrics,Pediatric)   Related Risk Factors (Self-Care Deficit) --  activity intolerance   Signs and Symptoms (Self-Care Deficit) cognitive changes;decreased functional activity tolerance;range of motion decreased;inability/unwillingness to perform self-care;unable to perform bathing/hygiene tasks;unable to perform feeding tasks;unable to perform toileting/toilet hygiene tasks --      Goal: Improved  Ability to Perform BADL and IADL  Outcome: Ongoing (interventions implemented as appropriate)   01/09/19 1013   Self-Care Deficit (Adult,Obstetrics,Pediatric)   Improved Ability to Perform BADL and IADL making progress toward outcome       Problem: Diabetes, Type 2 (Adult)  Goal: Signs and Symptoms of Listed Potential Problems Will be Absent, Minimized or Managed (Diabetes, Type 2)  Outcome: Ongoing (interventions implemented as appropriate)   01/08/19 1622   Goal/Outcome Evaluation   Problems Assessed (Type 2 Diabetes) all   Problems Present (Type 2 Diabetes) none       Problem: Dysphagia (Adult)  Goal: Identify Related Risk Factors and Signs and Symptoms  Outcome: Ongoing (interventions implemented as appropriate)    Goal: Functional/Safe Swallow  Outcome: Ongoing (interventions implemented as appropriate)   01/09/19 1013   Dysphagia (Adult)   Functional/Safe Swallow making progress toward outcome     Goal: Compensatory Techniques to Improve Safety/Function with Swallowing  Outcome: Ongoing (interventions implemented as appropriate)   01/09/19 2117   Dysphagia (Adult)   Compensatory Techniques to Improve Safety/Function with Swallowing making progress toward outcome       Problem: Functional Mobility Impairment (IRF) (Adult)  Goal: Optimal/Safe Level of [place] with Mobility  Outcome: Ongoing (interventions implemented as appropriate)   01/09/19 2117   Functional Mobility Impairment (IRF) (Adult)   Optimal/Safe Level of [place] with Mobility progress not adequate

## 2019-01-10 NOTE — PROGRESS NOTES
Subjective     History:   Jeanna Flower is a 78 y.o. female admitted on 1/6/2019 secondary to Severe sepsis (CMS/HCC)     Procedures:   1/9/19: Transesophageal echocardiogram  1. No definitive evidence of endocarditis   2. Normal LV systolic function   3. No pericardial effusion.     CC: Follow up sepsis     Patient seen and examined with RUFUS Casas. Awake and alert but remains confused. Currently sitting in bedside chair. Oriented to person only. Pt tells me that I have a bug on my shirt and that there are snakes in the room. RN reports pt has been seeing snakes as well as mice. No acute events overnight per RN.     History taken from: chart, and RN.      Objective     Vital Signs  Temp:  [97.2 °F (36.2 °C)-98.1 °F (36.7 °C)] 98 °F (36.7 °C)  Heart Rate:  [] 101  Resp:  [16-20] 16  BP: ()/(43-78) 123/51    Intake/Output Summary (Last 24 hours) at 1/10/2019 1226  Last data filed at 1/10/2019 0900  Gross per 24 hour   Intake 560 ml   Output --   Net 560 ml         Physical Exam:  General:    Awake and alert but remains confused, oriented to person and knows she's in a hospital, able to follow a few simple commands, chronically ill appearing, right-sided erythema near parotid gland appears overall much improved   Heart:      Normal S1 and S2. Regular rate and rhythm. No significant murmur, rubs or gallops appreciated.   Lungs:     Respirations regular, even and unlabored. Lungs clear to auscultation B/L. No wheezes, rales or rhonchi.   Abdomen:   Soft and nontender. No guarding, rebound tenderness or  organomegaly noted. Bowel sounds present x 4.   Extremities:  No clubbing, cyanosis or edema noted. Contracture of LUE.      Results Review:    Results from last 7 days   Lab Units  01/10/19   0536  01/09/19   0638  01/08/19   0651  01/06/19   2156   WBC 10*3/mm3  8.42  8.81  9.83  18.17*   HEMOGLOBIN g/dL  10.8*  9.5*  11.8*  13.2   PLATELETS 10*3/mm3  236  197  186  238     Results from last 7 days   Lab  Units  01/10/19   0536  01/09/19   0637  01/08/19   0651  01/06/19   2156   SODIUM mmol/L  139  142  142  137   POTASSIUM mmol/L  3.5  3.7  3.8  4.6   CHLORIDE mmol/L  110  112  114*  103   CO2 mmol/L  21.2*  22.6*  19.5*  20.8*   BUN mg/dL  12  12  15  25*   CREATININE mg/dL  0.73  0.74  0.75  1.18   CALCIUM mg/dL  8.4  8.2  8.8  8.9   GLUCOSE mg/dL  83  80  116*  157*     Results from last 7 days   Lab Units  01/10/19   0536  01/09/19   0637  01/08/19   0651  01/06/19   2156   BILIRUBIN mg/dL  0.5  0.5  1.7  0.8   ALK PHOS U/L  288*  294*  441*  99   AST (SGOT) U/L  89*  149*  500*  29   ALT (SGPT) U/L  102*  192*  360*  32     Results from last 7 days   Lab Units  01/10/19   0536  01/09/19   0637  01/08/19   0651   MAGNESIUM mg/dL  1.6*  2.2  1.3*     Results from last 7 days   Lab Units  01/06/19   2156   INR   1.36*           Imaging Results (last 24 hours)     ** No results found for the last 24 hours. **            Medications:    apixaban 5 mg Oral Q12H   budesonide 0.5 mg Nebulization BID - RT   castor oil-balsam peru 1 each Topical Q12H   ceFAZolin 1 g Intravenous Once   CeFAZolin Sodium-Dextrose 2 g Intravenous Q8H   cycloSPORINE 1 drop Both Eyes BID   famotidine 20 mg Oral BID   fluticasone 2 spray Nasal Daily   lactobacillus acidophilus 1 capsule Oral Daily   levothyroxine 100 mcg Oral Daily   magnesium sulfate 4 g Intravenous Once   sennosides-docusate sodium 2 tablet Oral BID   sodium chloride 10 mL Intravenous Q12H   sodium chloride 3 mL Intravenous Q12H   venlafaxine 37.5 mg Oral BID With Meals              Assessment/Plan   Severe sepsis with metabolic encephalopathy and lactic acid>2: Likely 2/2 right-sided parotitis, UTI and bacteremia. Afebrile and hemodynamically stable. Leukocytosis has resolved with stable WBC today. CRP improved again today. Lactic acid has normalized. 1/2 initial blood cultures revealing Staph aureus, not MRSA. Urine culture revealing growth of E coli. Repeat blood cultures  with NGTD. Currently on Ancef per ID. Follow cultures and repeat labs in the AM. ID input appreciated.     Right-sided parotitis: Appears improved with less erythema and tenderness compared to upon admission. Cont antibiotics as above.     E coli UTI: Cont antibiotics as above.    MSSA bacteremia: 1/2 initial cultures revealing growth of MSSA. Repeat blood cultures with NGTD. No definitive evidence of vegetation on JAYE. ID and cardiology input appreciated.     Metabolic encephalopathy superimposed on baseline dementia: Possibly 2/2 above. Appears overall improved and more awake. Remains confused. Cont supportive treatment in addition to treatment as outlined above. Sitter at bedside.      Elevated liver enzymes: Stopped statin and dantrolene. Viral hepatitis panel non-reactive. Liver US unremarkable. Improved again today. Possibly related to sepsis. Repeat CMP in the AM.      Electrolyte abnormalities: K+ borderline low. Mg is <2. PO4 low as well. Cont electrolyte replacement protocols. Cont to monitor.    PAF: Currently NSR. Cont Eliquis for stroke prevention.     DM II, non-insulin dependent: Holding home PO meds. BG levels currently stable.     Essential HTN: BP currently overall stable. Cont to monitor.     Hx of CVA with left-sided hemiplegia: Cont supportive treatment.     DVT PPX: Eliquis    Disposition: Plan for SNF placement with possible hospice once medically stable. Palliative care following with input appreciated.     Pt is at high risk 2/2 severe sepsis, parotitis, UTI, bacteremia, metabolic encephalopathy superimposed on baseline dementia, electrolyte abnormalities, PAF and hx of CVA.       Chalino Quach,   01/10/19  12:26 PM

## 2019-01-10 NOTE — PROGRESS NOTES
PROGRESS NOTE         Patient Identification:  Name:  Jeanna Flower  Age:  78 y.o.  Sex:  female  :  1940  MRN:  [id number]  Visit Number:  [id number]  Primary Care Provider:  Adela Mathew MD      ----------------------------------------------------------------------------------------------------------------------  Subjective       Chief Complaints:    Fatigue and Neck Swelling        Interval History:      Patient is comfortable at bedside. Nursing staff reports no complaints. Afebrile with no diarrhea. CRP improved to 9.75 with normal WBC.Blood cultures reveal no current growth. JAYE shows no evidence of endocarditis.       Review of Systems:    Unable to obtain. Confused.  ----------------------------------------------------------------------------------------------------------------------      Objective       [facility] Meds:    apixaban 5 mg Oral Q12H   budesonide 0.5 mg Nebulization BID - RT   castor oil-balsam peru 1 each Topical Q12H   ceFAZolin 1 g Intravenous Once   CeFAZolin Sodium-Dextrose 2 g Intravenous Q8H   cycloSPORINE 1 drop Both Eyes BID   famotidine 20 mg Oral BID   fluticasone 2 spray Nasal Daily   lactobacillus acidophilus 1 capsule Oral Daily   levothyroxine 100 mcg Oral Daily   sennosides-docusate sodium 2 tablet Oral BID   sodium chloride 10 mL Intravenous Q12H   sodium chloride 3 mL Intravenous Q12H   venlafaxine 37.5 mg Oral BID With Meals        ----------------------------------------------------------------------------------------------------------------------    Vital Signs:  Temp:  [96.8 °F (36 °C)-98.1 °F (36.7 °C)] 96.8 °F (36 °C)  Heart Rate:  [] 90  Resp:  [16-20] 20  BP: (118-137)/(50-70) 137/54  No data found.  SpO2 Percentage    01/10/19 0700 01/10/19 0708 01/10/19 0716   SpO2: 96% 94% 98%     SpO2:  [90 %-98 %] 98 %  on  Flow (L/min):  [2] 2;   Device (Oxygen Therapy): nasal cannula    Body mass index is 30.02 kg/m².  Wt Readings from Last  3 Encounters:   01/07/19 76.8 kg (169 lb 6.4 oz)   12/05/18 87.1 kg (192 lb)   11/30/18 84.4 kg (186 lb)        Intake/Output Summary (Last 24 hours) at 1/10/2019 1646  Last data filed at 1/10/2019 1500  Gross per 24 hour   Intake 600 ml   Output --   Net 600 ml     Diet Pureed; Thin  ----------------------------------------------------------------------------------------------------------------------    Physical exam:    Constitutional:  Well-developed and well-nourished.  No respiratory distress.      HENT:  Head: Normocephalic and atraumatic.  Mouth:  Moist mucous membranes.    Eyes:  Conjunctivae and EOM are normal.  No scleral icterus.  Neck:  Neck supple.  No JVD present.    Cardiovascular:  Normal rate, regular rhythm and normal heart sounds with no murmur. No edema.  Pulmonary/Chest:  No respiratory distress, no wheezes, no crackles, with normal breath sounds and good air movement.  Abdominal:  Soft.  Bowel sounds are normal.  No distension and no tenderness.   Musculoskeletal:  No edema, no tenderness, and no deformity.  No swelling or redness of joints.  Neurological:  Confused.    Skin:  Skin is warm and dry.  No rash noted.  No pallor.   Psychiatric:  Confused.    ----------------------------------------------------------------------------------------------------------------------    ----------------------------------------------------------------------------------------------------------------------          Results from last 7 days   Lab Units  01/08/19   1052   PH, ARTERIAL pH units  7.496*   PO2 ART mm Hg  103.6*   PCO2, ARTERIAL mm Hg  20.7*   HCO3 ART mmol/L  15.6*     Results from last 7 days   Lab Units  01/10/19   0536  01/09/19   0638  01/09/19   0637  01/08/19   0651  01/07/19   0155  01/06/19   2156   CRP mg/dL  9.75*   --   15.00*  16.97*   --   9.85*   LACTATE mmol/L   --    --    --    --   1.2  2.5*   WBC 10*3/mm3  8.42  8.81   --   9.83   --   18.17*   HEMOGLOBIN g/dL  10.8*  9.5*   --    11.8*   --   13.2   HEMATOCRIT %  36.1*  30.5*   --   38.2   --   43.9   MCV fL  93.0  90.2   --   90.5   --   93.0   MCHC g/dL  29.9*  31.1*   --   30.9*   --   30.1*   PLATELETS 10*3/mm3  236  197   --   186   --   238   INR    --    --    --    --    --   1.36*     Results from last 7 days   Lab Units  01/10/19   1613  01/10/19   0536  01/09/19   0637  01/08/19   0651   SODIUM mmol/L   --   139  142  142   POTASSIUM mmol/L  4.2  3.5  3.7  3.8   MAGNESIUM mg/dL   --   1.6*  2.2  1.3*   CHLORIDE mmol/L   --   110  112  114*   CO2 mmol/L   --   21.2*  22.6*  19.5*   BUN mg/dL   --   12  12  15   CREATININE mg/dL   --   0.73  0.74  0.75   EGFR IF NONAFRICN AM mL/min/1.73   --   77  76  75   CALCIUM mg/dL   --   8.4  8.2  8.8   GLUCOSE mg/dL   --   83  80  116*   ALBUMIN g/dL   --   2.90*  2.80*  3.40   BILIRUBIN mg/dL   --   0.5  0.5  1.7   ALK PHOS U/L   --   288*  294*  441*   AST (SGOT) U/L   --   89*  149*  500*   ALT (SGPT) U/L   --   102*  192*  360*   Estimated Creatinine Clearance: 57.8 mL/min (by C-G formula based on SCr of 0.73 mg/dL).  Ammonia   Date Value Ref Range Status   01/08/2019 28 11 - 51 umol/L Final       No results found for: HGBA1C, POCGLU  Lab Results   Component Value Date    HGBA1C 6.2 (H) 03/24/2014     Lab Results   Component Value Date    TSH 1.626 09/15/2018       Blood Culture   Date Value Ref Range Status   01/08/2019 No growth at 24 hours  Preliminary   01/08/2019 No growth at 24 hours  Preliminary   01/06/2019 No growth at 2 days  Preliminary   01/06/2019 Staphylococcus aureus (A)  Final     Comment:     This isolate is presumed to be clindamyin resistant based on detection of inducible clindamycin resistance. Clindamycin may still be effective in some patients.  Consider infectious disease consult to rule out distant focus of infection.     Urine Culture   Date Value Ref Range Status   01/07/2019 >100,000 CFU/mL Escherichia coli (A)  Final              Pain Management Panel     Pain  Management Panel Latest Ref Rng & Units 9/16/2018    AMPHETAMINES SCREEN, URINE Negative Negative    BARBITURATES SCREEN Negative Negative    BENZODIAZEPINE SCREEN, URINE Negative Negative    BUPRENORPHINE Negative Negative    COCAINE SCREEN, URINE Negative Negative    METHADONE SCREEN, URINE Negative Negative          I have personally reviewed the above laboratory results.   ----------------------------------------------------------------------------------------------------------------------  Imaging Results (last 24 hours)     ** No results found for the last 24 hours. **        I have personally reviewed the above radiology results.   ----------------------------------------------------------------------------------------------------------------------    Assessment/Plan       Assessment/Plan     ASSESSMENT:    1. Severe sepsis with lactic acid greater than 2 on admission  2. MSSA bacteremia  3. E. Coli UTI    PLAN:    Patient is comfortable at bedside. Nursing staff reports no complaints. Afebrile with no diarrhea. CRP improved to 9.75 with normal WBC.Blood cultures reveal no current growth. JAYE shows no evidence of endocarditis.      Urine culture finalized as adair susceptible E. Coli. Rheumatoid factor elevated at 32. Repeat blood cultures from 1/8/19 reveal no growth at this time.    CT of neck revealed right-sided parotid gland more hyperdense compared to left side that may represent inflammation.  Chest x-ray from 1/8/19 unremarkable.  Ultrasound of bilateral upper extremities reveals no DVT.  Urine culture preliminary reveals greater than 100,000 colonies of gram-negative bacilli consistent with Escherichia coli.  Blood cultures one out of 2 sets from 1/6/19 reveals MSSA.     At this time we are very concerned for septic phlebitis of the jugular vein. Venous duplex was ordered to rule out septic DVT, no DVT found. Highly recommend JAYE to rule out vegetation.     We recommend to continue Cefazolin 2gm IV  Q8H. CRP ordered for AM.      Current Antimicrobials:  Cefazolin 2gm IV Q8H graded challenge  Gentamicin x1 dose on 1/8/19          Code Status:   Code Status and Medical Interventions:   Ordered at: 01/07/19 0527     Limited Support to NOT Include:    Artificial Nutrition    Blood Products    Cardioversion/Defibrillation    Dialysis    Intubation     Code Status:    No CPR     Medical Interventions (Level of Support Prior to Arrest):    Limited       Osman Rajan PA-C  01/10/19  4:46 PM

## 2019-01-10 NOTE — THERAPY TREATMENT NOTE
Acute Care - Physical Therapy Treatment Note   [place]     Patient Name: Jeanna Flower  : 1940  MRN: [id number]  Today's Date: 1/10/2019  Onset of Illness/Injury or Date of Surgery: 19  Date of Referral to PT: 19  Referring Physician: Dr. Medel    Admit Date: 2019    Visit Dx:    ICD-10-CM ICD-9-CM   1. Sepsis, due to unspecified organism (CMS/[facility]) A41.9 038.9     995.91   2. Urinary tract infection without hematuria, site unspecified N39.0 599.0   3. Acute parotitis K11.21 527.2     Patient Active Problem List   Diagnosis   • Paroxysmal atrial fibrillation (CMS/[facility])   • Essential hypertension   • Type 2 diabetes mellitus without complication (CMS/[facility])   • CVA (cerebral vascular accident) with left hemiparesis.   • Other sleep apnea   • Severe sepsis (CMS/[facility])   • MSSA bacteremia   • E. coli UTI       Therapy Treatment    Rehabilitation Treatment Summary     Row Name 01/10/19 1421 01/10/19 1238          Treatment Time/Intention    Discipline  physical therapy assistant  -KB  occupational therapist  -BF     Document Type  therapy note (daily note)  -KB  therapy note (daily note)  -BF     Subjective Information  no complaints  -KB  no complaints  -BF     Mode of Treatment  individual therapy;physical therapy  -KB  occupational therapy  -BF     Patient/Family Observations  Pt. supine in bed and presents with confusion; agreeable to PT.  -KB  Pt supine in bed, awake & alert w/ sitter present; Pt confused  -BF     Therapy Frequency (PT Clinical Impression)  other (see comments) 3-5 times/ week per priority policy  -KB  --     Patient Effort  adequate  -KB  adequate  -BF     Existing Precautions/Restrictions  fall  -KB  fall;other (see comments) confusion  -BF     Patient Response to Treatment  Pt. tolerated tx's adequately with no adverse effects noted.  -KB  --     Recorded by [KB] Rosana Urrutia, PTA 01/10/19 1430 [BF] Jolynn Balderas OT 01/10/19 1251     Row Name 01/10/19 1423  01/10/19 1238          Cognitive Assessment/Intervention- PT/OT    Orientation Status (Cognition)  oriented to;person  -KB  oriented to;person  -BF     Follows Commands (Cognition)  physical/tactile prompts required;repetition of directions required;verbal cues/prompting required;25-49% accuracy;follows one step commands  -KB  verbal cues/prompting required;repetition of directions required;physical/tactile prompts required  -BF     Safety Deficit (Cognitive)  ability to follow commands;insight into deficits/self awareness;safety precautions awareness;safety precautions follow-through/compliance  -KB  safety precautions awareness;safety precautions follow-through/compliance;insight into deficits/self awareness;awareness of need for assistance  -BF     Recorded by [KB] Rosana Urrutia, JF 01/10/19 1430 [BF] Jolynn Balderas OT 01/10/19 1251     Row Name 01/10/19 1421             Safety Issues, Functional Mobility    Safety Issues Affecting Function (Mobility)  ability to follow commands;awareness of need for assistance;insight into deficits/self awareness;safety precaution awareness;safety precautions follow-through/compliance  -KB      Impairments Affecting Function (Mobility)  balance;cognition;coordination;endurance/activity tolerance;postural/trunk control;strength  -KB      Recorded by [KB] Rosana Urrutia PTA 01/10/19 1430      Row Name 01/10/19 1421             Mobility Assessment/Intervention    Extremity Weight-bearing Status  left lower extremity;right lower extremity  -KB      Left Lower Extremity (Weight-bearing Status)  weight-bearing as tolerated (WBAT)  -KB      Right Lower Extremity (Weight-bearing Status)  weight-bearing as tolerated (WBAT)  -KB      Recorded by [KB] Rosana Urrutia PTA 01/10/19 1430      Row Name 01/10/19 1421             Bed Mobility Assessment/Treatment    Bed Mobility Assessment/Treatment  scooting/bridging;supine-sit;sit-supine  -KB      Scooting/Bridging [place]  (Bed Mobility)  maximum assist (25% patient effort);2 person assist;verbal cues;nonverbal cues (demo/gesture)  -KB      Supine-Sit [place] (Bed Mobility)  maximum assist (25% patient effort);2 person assist;verbal cues;nonverbal cues (demo/gesture)  -KB      Sit-Supine [place] (Bed Mobility)  maximum assist (25% patient effort);2 person assist;verbal cues;nonverbal cues (demo/gesture)  -KB      Bed Mobility, Safety Issues  cognitive deficits limit understanding;decreased use of arms for pushing/pulling;decreased use of legs for bridging/pushing  -KB      Assistive Device (Bed Mobility)  bed rails;draw sheet  -KB      Recorded by [KB] Rosana Urrutia, PTA 01/10/19 1430      Row Name 01/10/19 1421             Transfer Assessment/Treatment    Transfer Assessment/Treatment  sit-stand transfer;stand-sit transfer;bed-chair transfer;chair-bed transfer  -KB      Maintains Weight-bearing Status (Transfers)  physical assist to maintain  -KB      Recorded by [KB] Rosana Urrutia, PTA 01/10/19 1430      Row Name 01/10/19 1421             Bed-Chair Transfer    Bed-Chair [place] (Transfers)  minimum assist (75% patient effort);2 person assist;verbal cues;nonverbal cues (demo/gesture)  -KB      Assistive Device (Bed-Chair Transfers)  other (see comments) No AD  -KB      Recorded by [KB] Rosana Urrutia, PTA 01/10/19 1430      Row Name 01/10/19 1421             Chair-Bed Transfer    Chair-Bed [place] (Transfers)  maximum assist (25% patient effort);2 person assist;verbal cues;nonverbal cues (demo/gesture)  -KB      Assistive Device (Chair-Bed Transfers)  -- No AD  -KB      Recorded by [KB] Rosana Urrutia, PTA 01/10/19 1430      Row Name 01/10/19 1421             Sit-Stand Transfer    Sit-Stand [place] (Transfers)  verbal cues;nonverbal cues (demo/gesture);moderate assist (50% patient effort);2 person assist  -KB      Assistive Device (Sit-Stand Transfers)  walker, front-wheeled  -KB      Recorded by [KB]  Renan, Rosana L, PTA 01/10/19 1430      Row Name 01/10/19 1421             Stand-Sit Transfer    Stand-Sit [place] (Transfers)  verbal cues;nonverbal cues (demo/gesture);moderate assist (50% patient effort);2 person assist  -KB      Assistive Device (Stand-Sit Transfers)  walker, front-wheeled  -KB      Recorded by [KB] [place], Rosana L, PTA 01/10/19 1430      Row Name 01/10/19 1421             Gait/Stairs Assessment/Training    Gait/Stairs Assessment/Training  gait/ambulation independence;gait/ambulation assistive device;distance ambulated;gait pattern;gait deviations;maintains weight-bearing status  -KB      [place] Level (Gait)  maximum assist (25% patient effort);2 person assist;verbal cues;nonverbal cues (demo/gesture)  -KB      Distance in Feet (Gait)  3' x 2 during both t/fs  -KB      Pattern (Gait)  step-to  -KB      Deviations/Abnormal Patterns (Gait)  left sided deviations;base of support, narrow;antalgic;jose guadalupe decreased;festinating/shuffling;gait speed decreased;stride length decreased  -KB      Bilateral Gait Deviations  knee buckling, bilateral;weight shift ability decreased;heel strike decreased  -KB      Recorded by [KB] Rosana Urrutia, PTA 01/10/19 1430      Row Name 01/10/19 1238             ADL Assessment/Intervention    BADL Assessment/Intervention  grooming  -BF      Recorded by [BF] Jolynn Balderas, OT 01/10/19 1251      Row Name 01/10/19 1238             Grooming Assessment/Training    [place] Level (Grooming)  hair care, combing/brushing;minimum assist (75% patient effort);verbal cues;nonverbal cues (demo/gesture)  -BF      Grooming Position  supported sitting  -BF      Recorded by [BF] Jolynn Balderas, OT 01/10/19 1251      Row Name 01/10/19 1238             Therapeutic Exercise    Therapeutic Exercise  seated, upper extremities  -BF      Additional Documentation  Therapeutic Exercise (Row)  -BF      Recorded by [BF] Jolynn Balderas, OT 01/10/19 1251       Row Name 01/10/19 1238             Upper Extremity Seated Therapeutic Exercise    Performed, Seated Upper Extremity (Therapeutic Exercise)  shoulder flexion/extension;elbow flexion/extension;digit flexion/extension  -BF      Exercise Type, Seated Upper Extremity (Therapeutic Exercise)  AROM (active range of motion);AAROM (active assistive range of motion) [facility] therex  -BF      Expected Outcomes, Seated Upper Extremity (Therapeutic Exercise)  improve functional tolerance, self-care activity;improve performance, BADLs;strengthen, facilitate independent active range of motion;improve performance, transfer skills;improve performance, gross motor coordination skills  -BF      Recorded by [BF] Jolynn Balderas, OT 01/10/19 1251      Row Name 01/10/19 1421             Balance    Balance  static sitting balance;static standing balance  -KB      Recorded by [KB] Rosana Urrutia, JF 01/10/19 1430      Row Name 01/10/19 1421             Static Sitting Balance    Level of [place] (Unsupported Sitting, Static Balance)  minimal assist, 75% patient effort;contact guard assist  -KB      Sitting Position (Unsupported Sitting, Static Balance)  sitting on edge of bed  -KB      Time Able to Maintain Position (Unsupported Sitting, Static Balance)  more than 5 minutes  -KB      Level of [place] (Supported Sitting, Static Balance)  supervision  -KB      Sitting Position (Supported Sitting, Static Balance)  sitting in chair reclined chair  -KB      Time Able to Maintain Position (Supported Sitting, Static Balance)  more than 5 minutes  -KB      Recorded by [KB] Rosana Urrutia, PTA 01/10/19 1430      Row Name 01/10/19 1421             Static Standing Balance    Level of [place] (Supported Standing, Static Balance)  maximal assist, 25 to 49% patient effort  -KB      Time Able to Maintain Position (Supported Standing, Static Balance)  1 to 2 minutes  -KB      Assistive Device Utilized (Supported Standing, Static  Balance)  rolling walker  -KB      Comment (Supported Standing, Static Balance)  narrow TANIKA  -KB      Recorded by [KB] Rosana Urrutia, PTA 01/10/19 1430      Row Name 01/10/19 1421 01/10/19 1238          Positioning and Restraints    Pre-Treatment Position  in bed  -KB  --     Post Treatment Position  chair  -KB  chair  -BF     In Bed  supine;call light within reach;encouraged to call for assist;with nsg after second session  -KB  --     In Chair  sitting;call light within reach;encouraged to call for assist;with nsg after first session  -KB  sitting;call light within reach;encouraged to call for assist;with other staff w/ sitter  -BF     Recorded by [KB] Rosana Urrutia, JF 01/10/19 1430 [BF] Jolynn Balderas, OT 01/10/19 1251     Row Name 01/10/19 1421             Pain Scale: FACES Pre/Post-Treatment    Pain: FACES Scale, Pretreatment  0-->no hurt  -KB      Pain: FACES Scale, Post-Treatment  0-->no hurt  -KB      Recorded by [KB] Rosana Urrutia, PTA 01/10/19 1430      Row Name 01/10/19 1421             Sensory Assessment/Intervention    Sensory General Assessment  --  -KB      Recorded by [KB] Rosana Urrutia, PTA 01/10/19 1430      Row Name 01/10/19 1421             Vision Assessment/Intervention    Visual Impairment/Limitations  unable/difficult to assess  -KB      Recorded by [KB] Rosana Urrutia, PTA 01/10/19 1430      Row Name                Wound 01/07/19 0401 Right posterior gluteal pressure injury    Wound - Properties Group Date first assessed: 01/07/19 [CC] Time first assessed: 0401 [CC] Present On Admission : yes [CC] Side: Right [CC] Orientation: posterior [CC] Location: gluteal [CC] Type: pressure injury [CC] Stage, Pressure Injury: Stage 2 [CC] Recorded by:  [CC] Charlene Lockwood RN 01/07/19 0402    Row Name                Wound 01/07/19 0403 Left lower gluteal pressure injury    Wound - Properties Group Date first assessed: 01/07/19 [CC] Time first assessed: 0403 [CC] Side: Left [CC]  Orientation: lower [CC] Location: gluteal [CC] Type: pressure injury [CC] Stage, Pressure Injury: Stage 2 [CC] Recorded by:  [CC] Charlene Lockwood RN 01/07/19 0403    Row Name 01/10/19 1421             Coping    Observed Emotional State  accepting  -KB      Verbalized Emotional State  acceptance  -KB      Recorded by [KB] Rosana Urrutia, PTA 01/10/19 1430      Row Name 01/10/19 1421             Outcome Summary/Treatment Plan (PT)    Daily Summary of Progress (PT)  progress toward functional goals is gradual  -KB      Plan for Continued Treatment (PT)  Cont. per POC.  -KB      Anticipated Equipment Needs at Discharge (PT)  -- TBD  -DEEJAY      Anticipated Discharge Disposition (PT)  skilled nursing facility  -KB      Recorded by [DEEJAY] Rosana Urrutia, JF 01/10/19 1430        User Key  (r) = Recorded By, (t) = Taken By, (c) = Cosigned By    Initials Name Effective Dates Discipline    CC Charlene Lockwood RN 06/16/16 -  Nurse    Jolynn Monae, OT 04/03/18 -  OT    Rosana Bradshaw, PTA 12/20/17 -  PT          Wound 01/07/19 0401 Right posterior gluteal pressure injury (Active)   Dressing Appearance open to air 1/10/2019  7:15 AM   Closure None 1/10/2019  7:15 AM   Base moist;pink;clean 1/10/2019  7:15 AM   Periwound redness 1/10/2019  7:15 AM   Periwound Temperature warm 1/10/2019  7:15 AM   Care, Wound cleansed with;sterile normal saline 1/9/2019  8:00 PM   Periwound Care, Wound barrier film applied 1/9/2019  8:00 PM       Wound 01/07/19 0403 Left lower gluteal pressure injury (Active)   Dressing Appearance open to air 1/10/2019  7:15 AM   Closure None 1/10/2019  7:15 AM   Periwound warm;redness 1/10/2019  7:15 AM   Periwound Temperature warm 1/10/2019  7:15 AM   Periwound Skin Turgor soft 1/10/2019  7:15 AM   Edges irregular 1/10/2019  7:15 AM   Drainage Amount none 1/10/2019  7:15 AM   Care, Wound cleansed with;sterile normal saline 1/9/2019  8:00 PM   Periwound Care, Wound barrier ointment applied  1/9/2019  8:00 PM           Physical Therapy Education     Title: PT OT SLP Therapies (In Progress)     Topic: Physical Therapy (In Progress)     Point: Mobility training (In Progress)     Learning Progress Summary           Patient Acceptance, E,TB, NL,NR by DEEJAY at 1/10/2019  2:20 PM    Acceptance, E,TB, VU by SHANDRA at 1/10/2019  9:07 AM    Acceptance, E, NL by MB at 1/8/2019  2:33 AM    Acceptance, E,D, NR by AG at 1/7/2019  4:41 PM                   Point: Home exercise program (In Progress)     Learning Progress Summary           Patient Acceptance, E,TB, NL,NR by DEEJAY at 1/10/2019  2:20 PM    Acceptance, E,TB, VU by SHANDRA at 1/10/2019  9:07 AM    Acceptance, E, NL by MB at 1/8/2019  2:33 AM    Acceptance, E,D, NR by AG at 1/7/2019  4:41 PM                   Point: Body mechanics (In Progress)     Learning Progress Summary           Patient Acceptance, E,TB, NL,NR by DEEJAY at 1/10/2019  2:20 PM    Acceptance, E,TB, VU by SHANDRA at 1/10/2019  9:07 AM    Acceptance, E, NL by MB at 1/8/2019  2:33 AM    Acceptance, E,D, NR by AG at 1/7/2019  4:41 PM                   Point: Precautions (In Progress)     Learning Progress Summary           Patient Acceptance, E,TB, NL,NR by DEEJAY at 1/10/2019  2:20 PM    Acceptance, E,TB, VU by SHANDRA at 1/10/2019  9:07 AM    Acceptance, E, NL by MB at 1/8/2019  2:33 AM    Acceptance, E,D, NR by AG at 1/7/2019  4:41 PM                               User Key     Initials Effective Dates Name Provider Type Discipline     06/16/16 -  Yessenia Morales, RN Registered Nurse Nurse     04/03/18 -  Saadia Kc, PT Physical Therapist PT    MB 06/21/18 -  Guanaco Brown, RN Registered Nurse Nurse     12/20/17 -  Rosana Urrutia PTA Physical Therapy Assistant PT                PT Recommendation and Plan  Anticipated Discharge Disposition (PT): skilled nursing facility  Therapy Frequency (PT Clinical Impression): other (see comments)(3-5 times/ week per priority policy)  Outcome Summary/Treatment Plan  (PT)  Daily Summary of Progress (PT): progress toward functional goals is gradual  Plan for Continued Treatment (PT): Cont. per POC.  Anticipated Equipment Needs at Discharge (PT): (TBD)  Anticipated Discharge Disposition (PT): skilled nursing facility  Plan of Care Reviewed With: patient  Progress: improving  Outcome Summary: Pt. performed all therapeutic activities with MAX A and cuing. Cont. per POC.  Outcome Measures     Row Name 01/10/19 1400 01/07/19 1600          How much help from another person do you currently need...    Turning from your back to your side while in flat bed without using bedrails?  2  -KB  2  -AG     Moving from lying on back to sitting on the side of a flat bed without bedrails?  2  -KB  2  -AG     Moving to and from a bed to a chair (including a wheelchair)?  1  -KB  1  -AG     Standing up from a chair using your arms (e.g., wheelchair, bedside chair)?  2  -KB  2  -AG     Climbing 3-5 steps with a railing?  1  -KB  1  -AG     To walk in hospital room?  1  -KB  1  -AG     AM-PAC 6 Clicks Score  9  -KB  9  -AG        Functional Assessment    Outcome Measure Options  AM-PAC 6 Clicks Basic Mobility (PT)  -KB  AM-PAC 6 Clicks Basic Mobility (PT)  -AG       User Key  (r) = Recorded By, (t) = Taken By, (c) = Cosigned By    Initials Name Provider Type    AG Saadia Kc, PT Physical Therapist    Rosana Bradshaw, JF Physical Therapy Assistant         Time Calculation:   PT Charges     Row Name 01/10/19 1421 01/10/19 1419          Time Calculation    PT Received On  01/10/19  -KB  01/10/19  -KB     PT - Next Appointment  01/11/19  -KB  01/11/19  -KB     PT Goal Re-Cert Due Date  01/21/19  -KB  01/21/19  -KB        Time Calculation- PT    Total Timed Code Minutes- PT  15 minute(s)  -KB  30 minute(s)  -KB       User Key  (r) = Recorded By, (t) = Taken By, (c) = Cosigned By    Initials Name Provider Type    Rosana Bradshaw, JF Physical Therapy Assistant        Therapy Suggested Charges      Code   Minutes Charges    None           Therapy Charges for Today     Code Description Service Date Service Provider Modifiers Qty    64612488518  PT THERAPEUTIC ACT EA 15 MIN 1/10/2019 Rosana Urrutia, JF GP 3    55220772005 HC PT THER SUPP EA 15 MIN 1/10/2019 Rosana Urrutia, JF GP 3          PT G-Codes  Outcome Measure Options: AM-PAC 6 Clicks Basic Mobility (PT)  AM-PAC 6 Clicks Score: 9  Functional Limitation: Mobility: Walking and moving around  Mobility: Walking and Moving Around Goal Status (): At least 40 percent but less than 60 percent impaired, limited or restricted    Rosana Urrutia PTA  1/10/2019

## 2019-01-10 NOTE — THERAPY TREATMENT NOTE
Acute Care - Occupational Therapy Treatment Note   Tom     Patient Name: Jeanna Flower  : 1940  MRN: [id number]  Today's Date: 1/10/2019  Onset of Illness/Injury or Date of Surgery: 19     Referring Physician: Dr. Medel    Admit Date: 2019       ICD-10-CM ICD-9-CM   1. Sepsis, due to unspecified organism (CMS/formerly [facility]) A41.9 038.9     995.91   2. Urinary tract infection without hematuria, site unspecified N39.0 599.0   3. Acute parotitis K11.21 527.2     Patient Active Problem List   Diagnosis   • Paroxysmal atrial fibrillation (CMS/formerly [facility])   • Essential hypertension   • Type 2 diabetes mellitus without complication (CMS/formerly [facility])   • CVA (cerebral vascular accident) with left hemiparesis.   • Other sleep apnea   • Severe sepsis (CMS/formerly [facility])   • MSSA bacteremia   • E. coli UTI     Past Medical History:   Diagnosis Date   • Anemia    • Closed wedge compression fracture of first lumbar vertebra (CMS/formerly [facility])    • COPD (chronic obstructive pulmonary disease) (CMS/formerly [facility])    • CVA (cerebral vascular accident) with left hemiparesis. 2017   • Dementia    • Diabetes mellitus (CMS/formerly [facility])    • Dysarthria following cerebral infarction    • Dysphagia, oropharyngeal phase    • Dysphagia, pharyngoesophageal phase    • GERD (gastroesophageal reflux disease)    • Heart disease    • Hyperlipidemia    • Hypertension    • Hypothyroidism    • Insomnia    • Obesity    • Other sleep apnea 2018   • Paroxysmal atrial fibrillation (CMS/formerly [facility]) 2017     Past Surgical History:   Procedure Laterality Date   • APPENDECTOMY     • BLADDER SURGERY     • CARDIAC CATHETERIZATION     • CHOLECYSTECTOMY         Therapy Treatment    Rehabilitation Treatment Summary     Row Name 01/10/19 1238             Treatment Time/Intention    Discipline  occupational therapist  -BF      Document Type  therapy note (daily note)  -BF      Subjective Information  no complaints  -BF      Mode of Treatment  occupational therapy  -BF      Patient/Family  Observations  Pt supine in bed, awake & alert w/ sitter present; Pt confused  -BF      Patient Effort  adequate  -BF      Existing Precautions/Restrictions  fall;other (see comments) confusion  -BF      Recorded by [BF] Jolynn Balderas, OT 01/10/19 1251      Row Name 01/10/19 1238             Cognitive Assessment/Intervention- PT/OT    Orientation Status (Cognition)  oriented to;person  -BF      Follows Commands (Cognition)  verbal cues/prompting required;repetition of directions required;physical/tactile prompts required  -BF      Safety Deficit (Cognitive)  safety precautions awareness;safety precautions follow-through/compliance;insight into deficits/self awareness;awareness of need for assistance  -BF      Recorded by [BF] Jolynn Balderas OT 01/10/19 1251      Row Name 01/10/19 1238             ADL Assessment/Intervention    BADL Assessment/Intervention  grooming  -BF      Recorded by [BF] Jolynn Balderas OT 01/10/19 1251      Row Name 01/10/19 1238             Grooming Assessment/Training    [place] Level (Grooming)  hair care, combing/brushing;minimum assist (75% patient effort);verbal cues;nonverbal cues (demo/gesture)  -BF      Grooming Position  supported sitting  -BF      Recorded by [BF] Jolynn Balderas, OT 01/10/19 1251      Row Name 01/10/19 1238             Therapeutic Exercise    Therapeutic Exercise  seated, upper extremities  -BF      Additional Documentation  Therapeutic Exercise (Row)  -BF      Recorded by [BF] Jolynn Balderas OT 01/10/19 1251      Row Name 01/10/19 1238             Upper Extremity Seated Therapeutic Exercise    Performed, Seated Upper Extremity (Therapeutic Exercise)  shoulder flexion/extension;elbow flexion/extension;digit flexion/extension  -BF      Exercise Type, Seated Upper Extremity (Therapeutic Exercise)  AROM (active range of motion);AAROM (active assistive range of motion) [facility]x  -BF      Expected Outcomes, Seated Upper  Extremity (Therapeutic Exercise)  improve functional tolerance, self-care activity;improve performance, BADLs;strengthen, facilitate independent active range of motion;improve performance, transfer skills;improve performance, gross motor coordination skills  -BF      Recorded by [BF] Jolynn Balderas OT 01/10/19 1251      Row Name 01/10/19 1238             Positioning and Restraints    Post Treatment Position  chair  -BF      In Chair  sitting;call light within reach;encouraged to call for assist;with other staff w/ sitter  -BF      Recorded by [BF] Jolynn Balderas, OT 01/10/19 1251      Row Name                Wound 01/07/19 0401 Right posterior gluteal pressure injury    Wound - Properties Group Date first assessed: 01/07/19 [CC] Time first assessed: 0401 [CC] Present On Admission : yes [CC] Side: Right [CC] Orientation: posterior [CC] Location: gluteal [CC] Type: pressure injury [CC] Stage, Pressure Injury: Stage 2 [CC] Recorded by:  [CC] Charlene Lockwood RN 01/07/19 0402    Row Name                Wound 01/07/19 0403 Left lower gluteal pressure injury    Wound - Properties Group Date first assessed: 01/07/19 [CC] Time first assessed: 0403 [CC] Side: Left [CC] Orientation: lower [CC] Location: gluteal [CC] Type: pressure injury [CC] Stage, Pressure Injury: Stage 2 [CC] Recorded by:  [CC] Charlene Lockwood RN 01/07/19 0403      User Key  (r) = Recorded By, (t) = Taken By, (c) = Cosigned By    Initials Name Effective Dates Discipline    CC Charlene Lockwood RN 06/16/16 -  Nurse    BF Jolynn Balderas, OT 04/03/18 -  OT        Wound 01/07/19 0401 Right posterior gluteal pressure injury (Active)   Dressing Appearance open to air 1/10/2019  7:15 AM   Closure None 1/10/2019  7:15 AM   Base moist;pink;clean 1/10/2019  7:15 AM   Periwound redness 1/10/2019  7:15 AM   Periwound Temperature warm 1/10/2019  7:15 AM   Care, Wound cleansed with;sterile normal saline 1/9/2019  8:00 PM   Periwound Care, Wound  barrier film applied 1/9/2019  8:00 PM       Wound 01/07/19 0403 Left lower gluteal pressure injury (Active)   Dressing Appearance open to air 1/10/2019  7:15 AM   Closure None 1/10/2019  7:15 AM   Periwound warm;redness 1/10/2019  7:15 AM   Periwound Temperature warm 1/10/2019  7:15 AM   Periwound Skin Turgor soft 1/10/2019  7:15 AM   Edges irregular 1/10/2019  7:15 AM   Drainage Amount none 1/10/2019  7:15 AM   Care, Wound cleansed with;sterile normal saline 1/9/2019  8:00 PM   Periwound Care, Wound barrier ointment applied 1/9/2019  8:00 PM           OT Recommendation and Plan        Outcome Measures     Row Name 01/07/19 1600             How much help from another person do you currently need...    Turning from your back to your side while in flat bed without using bedrails?  2  -AG      Moving from lying on back to sitting on the side of a flat bed without bedrails?  2  -AG      Moving to and from a bed to a chair (including a wheelchair)?  1  -AG      Standing up from a chair using your arms (e.g., wheelchair, bedside chair)?  2  -AG      Climbing 3-5 steps with a railing?  1  -AG      To walk in hospital room?  1  -AG      AM-PAC 6 Clicks Score  9  -AG         Functional Assessment    Outcome Measure Options  AM-PAC 6 Clicks Basic Mobility (PT)  -AG        User Key  (r) = Recorded By, (t) = Taken By, (c) = Cosigned By    Initials Name Provider Type    AG Saadia Kc, PT Physical Therapist           Time Calculation:   Time Calculation- OT     Row Name 01/10/19 1251             Time Calculation- OT    Total Timed Code Minutes- OT  30 minute(s)  -BF      OT Non-Billable Time (min)  10 min  -BF         Timed Charges    26333 - OT Therapeutic Activity Minutes  15  -BF      72635 - OT Self Care/Mgmt Minutes  15  -BF        User Key  (r) = Recorded By, (t) = Taken By, (c) = Cosigned By    Initials Name Provider Type    Jolynn Monae, OT Occupational Therapist             Therapy Charges for Today      Code Description Service Date Service Provider Modifiers Qty    25467327723  OT THERAPEUTIC ACT EA 15 MIN 1/10/2019 Jolynn Balderas OT GO 1    [id number]  OT SELF CARE/MGMT/TRAIN EA 15 MIN 1/10/2019 Jolynn Balderas OT GO 1          OT G-codes  Functional Limitation: Self care  Self Care Current Status (): At least 60 percent but less than 80 percent impaired, limited or restricted  Self Care Goal Status (): At least 20 percent but less than 40 percent impaired, limited or restricted    Jolynn Balderas OT  1/10/2019

## 2019-01-10 NOTE — PLAN OF CARE
Problem: Patient Care Overview  Goal: Plan of Care Review  Outcome: Ongoing (interventions implemented as appropriate)   01/10/19 1420   Coping/Psychosocial   Plan of Care Reviewed With patient   Plan of Care Review   Progress improving   OTHER   Outcome Summary Pt. performed all therapeutic activities with MAX A and cuing. Cont. per POC.

## 2019-01-10 NOTE — PROGRESS NOTES
"Palliative Care Progress Note    Date of Admission: 1/6/2019    Code Status:   Current Code Status     Date Active Code Status Order ID Comments User Context       1/7/2019 05:27 No CPR [id number]  Enoc Medel MD Inpatient       Questions for Current Code Status     Question Answer Comment    Code Status No CPR     Medical Interventions (Level of Support Prior to Arrest) Limited     Limited Support to NOT Include Artificial Nutrition      Blood Products      Cardioversion/Defibrillation      Dialysis      Intubation         Advance Directive:  POA papers on file now   Surrogate decision maker: Grand-daughter Ernestina is POA (She only wants information about patient shared with her)    Subjective:   Patient much more alert today. Sitting up at bedside with sitter at side. She is very talkative, alert to herself only.  She told me that I \"have her vote\"     Reviewed current scheduled and prn medications for route, type, dose, and frequency.     •  albuterol  •  HYDROcodone-acetaminophen  •  magnesium sulfate **OR** magnesium sulfate **OR** magnesium sulfate  •  potassium chloride **OR** potassium chloride **OR** potassium chloride  •  potassium phosphate infusion greater than 15 mMoles **OR** potassium phosphate infusion greater than 15 mMoles **OR** potassium phosphate **OR** sodium phosphate IVPB **OR** sodium phosphate IVPB **OR** sodium phosphate IVPB  •  [COMPLETED] Insert peripheral IV **AND** sodium chloride  •  sodium chloride  •  sodium chloride    Objective:  PPS 30 /51 (BP Location: Left arm, Patient Position: Lying)   Pulse 101   Temp 98 °F (36.7 °C) (Oral)   Resp 16   Ht 160 cm (62.99\")   Wt 76.8 kg (169 lb 6.4 oz)   SpO2 98%   BMI 30.02 kg/m²    Intake & Output (last day)       01/09 0701 - 01/10 0700 01/10 0701 - 01/11 0700    P.O. 120 240    I.V. (mL/kg) 200 (2.6)     Total Intake(mL/kg) 320 (4.2) 240 (3.1)    Net +320 +240          Urine Unmeasured Occurrence 7 x 1 x    Stool Unmeasured " Occurrence 0 x         Lab Results (last 24 hours)     Procedure Component Value Units Date/Time    Blood Culture - Blood, Arm, Right [[id number]] Collected:  01/08/19 1046    Specimen:  Blood from Arm, Right Updated:  01/10/19 1115     Blood Culture No growth at 2 days    Blood Culture - Blood, Arm, Left [[id number]] Collected:  01/08/19 1055    Specimen:  Blood from Arm, Left Updated:  01/10/19 1115     Blood Culture No growth at 2 days    Phosphorus [[id number]]  (Abnormal) Collected:  01/10/19 0536    Specimen:  Blood Updated:  01/10/19 0635     Phosphorus 2.3 mg/dL     Magnesium [[id number]]  (Abnormal) Collected:  01/10/19 0536    Specimen:  Blood Updated:  01/10/19 0635     Magnesium 1.6 mg/dL     Comprehensive Metabolic Panel [[id number]]  (Abnormal) Collected:  01/10/19 0536    Specimen:  Blood Updated:  01/10/19 0632     Glucose 83 mg/dL      BUN 12 mg/dL      Creatinine 0.73 mg/dL      Sodium 139 mmol/L      Potassium 3.5 mmol/L      Chloride 110 mmol/L      CO2 21.2 mmol/L      Calcium 8.4 mg/dL      Total Protein 6.0 g/dL      Albumin 2.90 g/dL      ALT (SGPT) 102 U/L      AST (SGOT) 89 U/L      Alkaline Phosphatase 288 U/L      Comment: Note New Reference Ranges        Total Bilirubin 0.5 mg/dL      eGFR Non African Amer 77 mL/min/1.73      Globulin 3.1 gm/dL      A/G Ratio 0.9 g/dL      BUN/Creatinine Ratio 16.4     Anion Gap 7.8 mmol/L     Narrative:       The MDRD GFR formula is only valid for adults with stable renal function between ages 18 and 70.    Osmolality, Calculated [[id number]]  (Normal) Collected:  01/10/19 0536    Specimen:  Blood Updated:  01/10/19 0632     Osmolality Calc 276.4 mOsm/kg     C-reactive Protein [[id number]]  (Abnormal) Collected:  01/10/19 0536    Specimen:  Blood Updated:  01/10/19 0623     C-Reactive Protein 9.75 mg/dL     CBC & Differential [[id number]] Collected:  01/10/19 0536    Specimen:  Blood Updated:  01/10/19 0558    Narrative:       The following orders were created  for panel order CBC & Differential.  Procedure                               Abnormality         Status                     ---------                               -----------         ------                     CBC Auto Differential[[id number]]        Abnormal            Final result                 Please view results for these tests on the individual orders.    CBC Auto Differential [[id number]]  (Abnormal) Collected:  01/10/19 0536    Specimen:  Blood Updated:  01/10/19 0558     WBC 8.42 10*3/mm3      RBC 3.88 10*6/mm3      Hemoglobin 10.8 g/dL      Hematocrit 36.1 %      MCV 93.0 fL      MCH 27.8 pg      MCHC 29.9 g/dL      RDW 18.8 %      RDW-SD 64.2 fl      MPV 10.2 fL      Platelets 236 10*3/mm3      Neutrophil % 35.6 %      Lymphocyte % 44.1 %      Monocyte % 13.1 %      Eosinophil % 5.6 %      Basophil % 1.0 %      Immature Grans % 0.6 %      Neutrophils, Absolute 3.01 10*3/mm3      Lymphocytes, Absolute 3.71 10*3/mm3      Monocytes, Absolute 1.10 10*3/mm3      Eosinophils, Absolute 0.47 10*3/mm3      Basophils, Absolute 0.08 10*3/mm3      Immature Grans, Absolute 0.05 10*3/mm3     Blood Culture - Blood, Arm, Right [[id number]] Collected:  01/06/19 2156    Specimen:  Blood from Arm, Right Updated:  01/09/19 2245     Blood Culture No growth at 3 days    Mumps (IgG / M) [[id number]] Collected:  01/07/19 0110    Specimen:  Blood Updated:  01/09/19 2107     Mumps IgG 13.3 AU/mL      Comment:                                 Negative         <9.0                                  Equivocal  9.0 - 10.9                                  Positive        >10.9  A positive result generally indicates past exposure to  Mumps virus or previous vaccination.        Mumps IgM <0.80 AU      Comment:                                Negative         < 0.80                                 Borderline  0.80 - 1.20                                 Positive         > 1.20  Note: The presence of IgM specific antibody should be  interpreted  in conjunction with the patient's clinical  history and exposure risk when an acute infection is  suspected.       Narrative:       Performed at:  01 - [facility]  [address], [place], [place]  [id number]  : Gordy Panda PhD, Phone:  [phone]  Performed at:  02 - [facility] [place] [address] [place], [place]  [id number]  : Shanna Aleman MD, Phone:  [phone]        Imaging Results (last 24 hours)     ** No results found for the last 24 hours. **          Physical Exam:  Gen: NAD, elderly   Skin: warm, dry  Eyes: MERRY, conjunctiva clear and moist   HEENT: oral cavity is moist and no signs of thrush or lesions.  Swelling in right neck is no longer noticeable. Denies pain with palpation  Resp/thorax: even effort, non labored, CTA   CV: RRR   ABD: soft, bowel sounds active  Ext: no edema, no redness  Neuro: alert, interactive, pleasantly confused       Reviewed labs and diagnostic results.   Lab Results   Component Value Date    HGBA1C 6.2 (H) 03/24/2014     Results from last 7 days   Lab Units  01/10/19   0536   WBC 10*3/mm3  8.42   HEMOGLOBIN g/dL  10.8*   HEMATOCRIT %  36.1*   PLATELETS 10*3/mm3  236     Results from last 7 days   Lab Units  01/10/19   0536   SODIUM mmol/L  139   POTASSIUM mmol/L  3.5   CHLORIDE mmol/L  110   CO2 mmol/L  21.2*   BUN mg/dL  12   CREATININE mg/dL  0.73   CALCIUM mg/dL  8.4   BILIRUBIN mg/dL  0.5   ALK PHOS U/L  288*   ALT (SGPT) U/L  102*   AST (SGOT) U/L  89*   GLUCOSE mg/dL  83       Impression: 78 y.o. female with Dementia, has been accepted to [facility] for long term placement.  POA is established (papers on chart) Possibly to NH with Hospice     Plan:     1,  Goals of Care   - Established POS wishes per palliative RN conversation with POA.  Awaiting discharge back to NH.  Hospice if approved by facility and appropriate.  My concern is the patient I seen today is not as advanced as I seen Monday.  But certainly can be evaluated  by hospice.  Did not see family at bedside or speak with POA today.  She does only want information relayed to her and not anyone else visiting.     2.  Neck Pain/Inflammation   - Patient neck appears much better. Nursing has been using pain meds when needed for neck pain.  Patient denied pain today.       Time: 30 minutes   > 50% time spent in counseling and education concerning current orders for symptom management with patient, , palliative RN    Maria L Khan, APRN  [phone]  01/10/19  2:23 PM

## 2019-01-10 NOTE — THERAPY TREATMENT NOTE
Acute Care - Speech Language Pathology   Swallow Discharge Note  Tom     Patient Name: Jeanna Flower  : 1940  MRN: [id number]  Today's Date: 1/10/2019  Onset of Illness/Injury or Date of Surgery: 19     Referring Physician: Dr. Medel      Admit Date: 2019    Visit Dx:      ICD-10-CM ICD-9-CM   1. Sepsis, due to unspecified organism (CMS/[facility]) A41.9 038.9     995.91   2. Urinary tract infection without hematuria, site unspecified N39.0 599.0   3. Acute parotitis K11.21 527.2     Patient Active Problem List   Diagnosis   • Paroxysmal atrial fibrillation (CMS/[facility])   • Essential hypertension   • Type 2 diabetes mellitus without complication (CMS/[facility])   • CVA (cerebral vascular accident) with left hemiparesis.   • Other sleep apnea   • Severe sepsis (CMS/[facility])   • MSSA bacteremia   • E. coli UTI       Therapy Treatment  Rehabilitation Treatment Summary     Row Name 01/10/19 1421 01/10/19 1238          Treatment Time/Intention    Discipline  physical therapy assistant  -KB  occupational therapist  -BF     Document Type  therapy note (daily note)  -KB  therapy note (daily note)  -BF     Subjective Information  no complaints  -KB  no complaints  -BF     Mode of Treatment  individual therapy;physical therapy  -KB  occupational therapy  -BF     Patient/Family Observations  Pt. supine in bed and presents with confusion; agreeable to PT.  -KB  Pt supine in bed, awake & alert w/ sitter present; Pt confused  -BF     Therapy Frequency (PT Clinical Impression)  other (see comments) 3-5 times/ week per priority policy  -KB  --     Patient Effort  adequate  -KB  adequate  -BF     Existing Precautions/Restrictions  fall  -KB  fall;other (see comments) confusion  -BF     Patient Response to Treatment  Pt. tolerated tx's adequately with no adverse effects noted.  -KB  --     Recorded by [KB] Rosana Urrutia, PTA 01/10/19 1430 [BF] Jolynn Balderas OT 01/10/19 1251     Row Name 01/10/19 1421 01/10/19 1238           Cognitive Assessment/Intervention- PT/OT    Orientation Status (Cognition)  oriented to;person  -KB  oriented to;person  -BF     Follows Commands (Cognition)  physical/tactile prompts required;repetition of directions required;verbal cues/prompting required;25-49% accuracy;follows one step commands  -KB  verbal cues/prompting required;repetition of directions required;physical/tactile prompts required  -BF     Safety Deficit (Cognitive)  ability to follow commands;insight into deficits/self awareness;safety precautions awareness;safety precautions follow-through/compliance  -KB  safety precautions awareness;safety precautions follow-through/compliance;insight into deficits/self awareness;awareness of need for assistance  -BF     Recorded by [KB] Rosana Urrutia, JF 01/10/19 1430 [BF] Jolynn Balderas OT 01/10/19 1251     Row Name 01/10/19 1421             Safety Issues, Functional Mobility    Safety Issues Affecting Function (Mobility)  ability to follow commands;awareness of need for assistance;insight into deficits/self awareness;safety precaution awareness;safety precautions follow-through/compliance  -KB      Impairments Affecting Function (Mobility)  balance;cognition;coordination;endurance/activity tolerance;postural/trunk control;strength  -KB      Recorded by [KB] Rosana Urrutia PTA 01/10/19 1430      Row Name 01/10/19 1421             Mobility Assessment/Intervention    Extremity Weight-bearing Status  left lower extremity;right lower extremity  -KB      Left Lower Extremity (Weight-bearing Status)  weight-bearing as tolerated (WBAT)  -KB      Right Lower Extremity (Weight-bearing Status)  weight-bearing as tolerated (WBAT)  -KB      Recorded by [KB] Rosana Urrutia PTA 01/10/19 1430      Row Name 01/10/19 1421             Bed Mobility Assessment/Treatment    Bed Mobility Assessment/Treatment  scooting/bridging;supine-sit;sit-supine  -KB      Scooting/Bridging [place] (Bed Mobility)   maximum assist (25% patient effort);2 person assist;verbal cues;nonverbal cues (demo/gesture)  -KB      Supine-Sit [place] (Bed Mobility)  maximum assist (25% patient effort);2 person assist;verbal cues;nonverbal cues (demo/gesture)  -KB      Sit-Supine [place] (Bed Mobility)  maximum assist (25% patient effort);2 person assist;verbal cues;nonverbal cues (demo/gesture)  -KB      Bed Mobility, Safety Issues  cognitive deficits limit understanding;decreased use of arms for pushing/pulling;decreased use of legs for bridging/pushing  -KB      Assistive Device (Bed Mobility)  bed rails;draw sheet  -KB      Recorded by [KB] Rosana Urrutia, PTA 01/10/19 1430      Row Name 01/10/19 1421             Transfer Assessment/Treatment    Transfer Assessment/Treatment  sit-stand transfer;stand-sit transfer;bed-chair transfer;chair-bed transfer  -KB      Maintains Weight-bearing Status (Transfers)  physical assist to maintain  -KB      Recorded by [KB] Rosana Urrutia, PTA 01/10/19 1430      Row Name 01/10/19 1421             Bed-Chair Transfer    Bed-Chair [place] (Transfers)  minimum assist (75% patient effort);2 person assist;verbal cues;nonverbal cues (demo/gesture)  -KB      Assistive Device (Bed-Chair Transfers)  other (see comments) No AD  -KB      Recorded by [KB] Rosana Urrutia, PTA 01/10/19 1430      Row Name 01/10/19 1421             Chair-Bed Transfer    Chair-Bed [place] (Transfers)  maximum assist (25% patient effort);2 person assist;verbal cues;nonverbal cues (demo/gesture)  -KB      Assistive Device (Chair-Bed Transfers)  -- No AD  -KB      Recorded by [KB] Rosana Urrutia, PTA 01/10/19 1430      Row Name 01/10/19 1421             Sit-Stand Transfer    Sit-Stand [place] (Transfers)  verbal cues;nonverbal cues (demo/gesture);moderate assist (50% patient effort);2 person assist  -KB      Assistive Device (Sit-Stand Transfers)  walker, front-wheeled  -KB      Recorded by [KB] Rosana Urrutia,  PTA 01/10/19 1430      Row Name 01/10/19 1421             Stand-Sit Transfer    Stand-Sit [place] (Transfers)  verbal cues;nonverbal cues (demo/gesture);moderate assist (50% patient effort);2 person assist  -KB      Assistive Device (Stand-Sit Transfers)  walker, front-wheeled  -KB      Recorded by [KB] Rosana Urrutia, PTA 01/10/19 1430      Row Name 01/10/19 1421             Gait/Stairs Assessment/Training    Gait/Stairs Assessment/Training  gait/ambulation independence;gait/ambulation assistive device;distance ambulated;gait pattern;gait deviations;maintains weight-bearing status  -KB      [place] Level (Gait)  maximum assist (25% patient effort);2 person assist;verbal cues;nonverbal cues (demo/gesture)  -KB      Distance in Feet (Gait)  3' x 2 during both t/fs  -KB      Pattern (Gait)  step-to  -KB      Deviations/Abnormal Patterns (Gait)  left sided deviations;base of support, narrow;antalgic;jose guadalupe decreased;festinating/shuffling;gait speed decreased;stride length decreased  -KB      Bilateral Gait Deviations  knee buckling, bilateral;weight shift ability decreased;heel strike decreased  -KB      Recorded by [KB] Rosana Urrutia, PTA 01/10/19 1430      Row Name 01/10/19 1238             ADL Assessment/Intervention    BADL Assessment/Intervention  grooming  -BF      Recorded by [BF] Jolynn Balderas, OT 01/10/19 1251      Row Name 01/10/19 1238             Grooming Assessment/Training    [place] Level (Grooming)  hair care, combing/brushing;minimum assist (75% patient effort);verbal cues;nonverbal cues (demo/gesture)  -BF      Grooming Position  supported sitting  -BF      Recorded by [BF] Jolynn Balderas, OT 01/10/19 1251      Row Name 01/10/19 1238             Therapeutic Exercise    Therapeutic Exercise  seated, upper extremities  -BF      Additional Documentation  Therapeutic Exercise (Row)  -BF      Recorded by [BF] Jolynn Balderas, OT 01/10/19 1251      Row Name  01/10/19 1238             Upper Extremity Seated Therapeutic Exercise    Performed, Seated Upper Extremity (Therapeutic Exercise)  shoulder flexion/extension;elbow flexion/extension;digit flexion/extension  -BF      Exercise Type, Seated Upper Extremity (Therapeutic Exercise)  AROM (active range of motion);AAROM (active assistive range of motion) [facility] therex  -BF      Expected Outcomes, Seated Upper Extremity (Therapeutic Exercise)  improve functional tolerance, self-care activity;improve performance, BADLs;strengthen, facilitate independent active range of motion;improve performance, transfer skills;improve performance, gross motor coordination skills  -BF      Recorded by [BF] Jolynn Balderas OT 01/10/19 1251      Row Name 01/10/19 1421             Balance    Balance  static sitting balance;static standing balance  -KB      Recorded by [KB] Rosana Urrutia PTA 01/10/19 1430      Row Name 01/10/19 1421             Static Sitting Balance    Level of [place] (Unsupported Sitting, Static Balance)  minimal assist, 75% patient effort;contact guard assist  -KB      Sitting Position (Unsupported Sitting, Static Balance)  sitting on edge of bed  -KB      Time Able to Maintain Position (Unsupported Sitting, Static Balance)  more than 5 minutes  -KB      Level of [place] (Supported Sitting, Static Balance)  supervision  -KB      Sitting Position (Supported Sitting, Static Balance)  sitting in chair reclined chair  -KB      Time Able to Maintain Position (Supported Sitting, Static Balance)  more than 5 minutes  -KB      Recorded by [KB] Rosana Urrutia, JF 01/10/19 1430      Row Name 01/10/19 1421             Static Standing Balance    Level of [place] (Supported Standing, Static Balance)  maximal assist, 25 to 49% patient effort  -KB      Time Able to Maintain Position (Supported Standing, Static Balance)  1 to 2 minutes  -KB      Assistive Device Utilized (Supported Standing, Static Balance)   rolling walker  -KB      Comment (Supported Standing, Static Balance)  narrow TANIKA  -KB      Recorded by [KB] Rosana Urrutia, PTA 01/10/19 1430      Row Name 01/10/19 1421 01/10/19 1238          Positioning and Restraints    Pre-Treatment Position  in bed  -KB  --     Post Treatment Position  chair  -KB  chair  -BF     In Bed  supine;call light within reach;encouraged to call for assist;with nsg after second session  -KB  --     In Chair  sitting;call light within reach;encouraged to call for assist;with nsg after first session  -KB  sitting;call light within reach;encouraged to call for assist;with other staff w/ sitter  -BF     Recorded by [KB] Rosana Urrutia, PTA 01/10/19 1430 [BF] Jolynn Balderas, OT 01/10/19 1251     Row Name 01/10/19 1421             Pain Scale: FACES Pre/Post-Treatment    Pain: FACES Scale, Pretreatment  0-->no hurt  -KB      Pain: FACES Scale, Post-Treatment  0-->no hurt  -KB      Recorded by [KB] Rosana Urrutia, PTA 01/10/19 1430      Row Name 01/10/19 1421             Sensory Assessment/Intervention    Sensory General Assessment  --  -KB      Recorded by [KB] Rosana Urrutia, PTA 01/10/19 1430      Row Name 01/10/19 1421             Vision Assessment/Intervention    Visual Impairment/Limitations  unable/difficult to assess  -KB      Recorded by [KB] Rosana Urrutia, PTA 01/10/19 1430      Row Name                Wound 01/07/19 0401 Right posterior gluteal pressure injury    Wound - Properties Group Date first assessed: 01/07/19 [CC] Time first assessed: 0401 [CC] Present On Admission : yes [CC] Side: Right [CC] Orientation: posterior [CC] Location: gluteal [CC] Type: pressure injury [CC] Stage, Pressure Injury: Stage 2 [CC] Recorded by:  [CC] Charlene Lockwood RN 01/07/19 0402    Row Name                Wound 01/07/19 0403 Left lower gluteal pressure injury    Wound - Properties Group Date first assessed: 01/07/19 [CC] Time first assessed: 0403 [CC] Side: Left [CC] Orientation:  "lower [CC] Location: gluteal [CC] Type: pressure injury [CC] Stage, Pressure Injury: Stage 2 [CC] Recorded by:  [CC] Charlene Lockwood RN 01/07/19 0403    Row Name 01/10/19 1421             Coping    Observed Emotional State  accepting  -KB      Verbalized Emotional State  acceptance  -KB      Recorded by [KB] Rosana Urrutia, PTA 01/10/19 1430      Row Name 01/10/19 1421             Outcome Summary/Treatment Plan (PT)    Daily Summary of Progress (PT)  progress toward functional goals is gradual  -KB      Plan for Continued Treatment (PT)  Cont. per POC.  -KB      Anticipated Equipment Needs at Discharge (PT)  -- TBD  -KB      Anticipated Discharge Disposition (PT)  skilled nursing facility  -KB      Recorded by [DEEJAY] Rosana Urrutia, PTA 01/10/19 1430        User Key  (r) = Recorded By, (t) = Taken By, (c) = Cosigned By    Initials Name Effective Dates Discipline    CC Charlene Lockwood RN 06/16/16 -  Nurse    Jolynn Monae, OT 04/03/18 -  OT    Rosana Bradshaw, PTA 12/20/17 -  PT        Ms. Flower was seen at bedside this pm on 3N for diet tolerance assessment. Her sitter, RN, Yessenia, is present.     Ms. Flower is a/a and cooperative. She is positioned upright and centered in bed. She is oriented to herself only. She is noted w/ verbal strings w/ noncontextual references, most notably w/ perseverations concerning her \"first grandchild.\" She does follow simple commands w/ cues.     She accepts thin water via tsp and straw, along w/ applesauce via tsp. She presents w/ delayed bolus anticipation, good labial seal for bolus clearance w/ partial tsp acceptance at max w/ cues. A-p transit is timely w/o oral residue. Pharyngeal swallow is timely w/ adequate hyolaryngeal elevation per palpation. No overt s/s aspiration.     Outcome Summary   Ms. Flower continues to tolerate what is felt to be her new least restrictive baseline po diet, puree and thin liquids. She continues w/ advanced dementia w/ ams. She will " require 1:1 assistance w/ po intake for presentation, as well as to encourage acceptance.     No further formal SLP f/u is recommended.     EDUCATION  The patient has been educated in the following areas:   Dysphagia (Swallowing Impairment) Oral Care/Hydration Modified Diet Instruction.    SLP Recommendation and Plan   1. Continue least restrictive po diet puree, thin liquids.   2. Medications crushed in puree.   3. 1:1 feeds.   4. Universal aspiration precautions.   5. SAGRARIO precautions.   6. Oral care protocol.   No further formal SLP f/u recommended.     Thank you-  Ivanna Kuo M.S., CCC/SLP                                     Plan of Care Reviewed With: patient  Plan of Care Review  Plan of Care Reviewed With: patient  Outcome Summary: Clinical dysphagia assessment w/o overt s/s aspiration. No silent aspiration suspected as pt w/o significant changes in secretions, respirations, vocal quality w/ verbalizations. Poor po intake w/ current R parotitis. Wincing w/ swallow, therefore suggest downgrade to puree, thin liquids. 1:1 assist w/ po intake . SLP to f/u to assess improved parotitis, odynophagia w/ diet upgrade pending.     Time Calculation:     Therapy Charges for Today     Code Description Service Date Service Provider Modifiers Qty    81330808503 HC ST SWALLOWING DISCHARGE 1/10/2019 Ivanna Kuo, MS CCC-SLP  1    18901906252 HC ST TREATMENT SWALLOW 2 1/10/2019 Ivanna Kuo MS CCC-SLP GN 1        SLP G-Codes  SLP NOMS Used?: Yes  Functional Limitations: Swallowing  Swallow Current Status (): At least 1 percent but less than 20 percent impaired, limited or restricted  Swallow Goal Status (): 0 percent impaired, limited or restricted  Swallow Discharge Status (): At least 1 percent but less than 20 percent impaired, limited or restricted      Ivanna Kuo MS CCC-SLP  1/10/2019

## 2019-01-10 NOTE — PLAN OF CARE
Problem: Patient Care Overview  Goal: Plan of Care Review  Outcome: Ongoing (interventions implemented as appropriate)   01/09/19 1404 01/10/19 0715   Coping/Psychosocial   Plan of Care Reviewed With --  patient   Plan of Care Review   Progress improving --      Goal: Individualization and Mutuality  Outcome: Ongoing (interventions implemented as appropriate)    Goal: Discharge Needs Assessment  Outcome: Ongoing (interventions implemented as appropriate)   01/07/19 1337   Discharge Needs Assessment   Patient/Family Anticipates Transition to home   Patient/Family Anticipated Services at Transition skilled nursing   Transportation Anticipated family or friend will provide   Equipment Needed After Discharge none   Disability   Equipment Currently Used at Home hospital bed;bath bench;walker, rolling     Goal: Interprofessional Rounds/Family Conf  Outcome: Ongoing (interventions implemented as appropriate)   01/09/19 2117   Interdisciplinary Rounds/Family Conf   Participants nursing       Problem: Fall Risk (Adult)  Goal: Identify Related Risk Factors and Signs and Symptoms  Outcome: Ongoing (interventions implemented as appropriate)   01/07/19 0424   Fall Risk (Adult)   Related Risk Factors (Fall Risk) age-related changes;bladder function altered;confusion/agitation;history of falls;gait/mobility problems;environment unfamiliar   Signs and Symptoms (Fall Risk) presence of risk factors     Goal: Absence of Fall  Outcome: Ongoing (interventions implemented as appropriate)   01/09/19 1013   Fall Risk (Adult)   Absence of Fall making progress toward outcome       Problem: Skin Injury Risk (Adult)  Goal: Identify Related Risk Factors and Signs and Symptoms  Outcome: Ongoing (interventions implemented as appropriate)   01/07/19 0424   Skin Injury Risk (Adult)   Related Risk Factors (Skin Injury Risk) advanced age;cognitive impairment;mobility impaired;moisture;nutritional deficiencies     Goal: Skin Health and  Integrity  Outcome: Ongoing (interventions implemented as appropriate)   01/09/19 1013   Skin Injury Risk (Adult)   Skin Health and Integrity making progress toward outcome       Problem: Wound (Includes Pressure Injury) (Adult)  Goal: Signs and Symptoms of Listed Potential Problems Will be Absent, Minimized or Managed (Wound)  Outcome: Ongoing (interventions implemented as appropriate)   01/08/19 1622   Goal/Outcome Evaluation   Problems Assessed (Wound) all   Problems Present (Wound) delayed wound healing       Problem: Cognitive Impairment (Dementia Signs/Symptoms) (Adult)  Goal: Optimized Cognitive Function (Dementia Signs/Symptoms)  Outcome: Ongoing (interventions implemented as appropriate)   01/08/19 1622   Optimized Cognitive Function (Dementia Signs/Symptoms)   Optimized Cognitive Ability Action Step (STG) Outcome making progress toward outcome       Problem: Self-Care Deficit (Adult,Obstetrics,Pediatric)  Goal: Identify Related Risk Factors and Signs and Symptoms  Outcome: Ongoing (interventions implemented as appropriate)   01/08/19 1622   Self-Care Deficit (Adult,Obstetrics,Pediatric)   Related Risk Factors (Self-Care Deficit) activity intolerance     Goal: Improved Ability to Perform BADL and IADL  Outcome: Ongoing (interventions implemented as appropriate)      Problem: Diabetes, Type 2 (Adult)  Goal: Signs and Symptoms of Listed Potential Problems Will be Absent, Minimized or Managed (Diabetes, Type 2)  Outcome: Ongoing (interventions implemented as appropriate)   01/08/19 1622   Goal/Outcome Evaluation   Problems Assessed (Type 2 Diabetes) all      01/08/19 1622   Goal/Outcome Evaluation   Problems Assessed (Type 2 Diabetes) all   Problems Present (Type 2 Diabetes) none       Problem: Dysphagia (Adult)  Goal: Identify Related Risk Factors and Signs and Symptoms  Outcome: Ongoing (interventions implemented as appropriate)   01/10/19 0858   Dysphagia (Adult)   Related Risk Factors (Dysphagia) mental  status altered   Pharyngeal Phase Dysphagia Signs and Symptoms (Dysphagia) frequent cough after meal     Goal: Functional/Safe Swallow  Outcome: Ongoing (interventions implemented as appropriate)   01/09/19 1013   Dysphagia (Adult)   Functional/Safe Swallow making progress toward outcome     Goal: Compensatory Techniques to Improve Safety/Function with Swallowing  Outcome: Ongoing (interventions implemented as appropriate)   01/09/19 2117   Dysphagia (Adult)   Compensatory Techniques to Improve Safety/Function with Swallowing making progress toward outcome

## 2019-01-10 NOTE — PROGRESS NOTES
Discharge Planning Assessment  [facility]     Patient Name: Jeanna Flower  MRN: [id number]  Today's Date: 1/10/2019    Admit Date: 1/6/2019        Discharge Plan     Row Name 01/10/19 1244       Plan    Plan SS received call from Carlee at [facility] today. SS informed [facility] per Carlee that pt will be discussed further with Dr. Quach today. SS discussed hospice with [facility] per Carlee and she will verify that pt can be admitted with hospice services. SS to follow.     15:01 Pt is not medically stable at this time for discharge. SS attempted contact with Carlee at [facility] and left message. Bharat at [facility] to contact SS back regarding hospice at the nursing home. SS follow.         Destination - Selection Complete      Service Provider Request Status Selected Services Address Phone Number Fax Number    [facility] Selected Skilled Nursing [address], [facility] [address] [phone]     Lakia Alatorre

## 2019-01-11 ENCOUNTER — APPOINTMENT (OUTPATIENT)
Dept: GENERAL RADIOLOGY | Facility: HOSPITAL | Age: 79
End: 2019-01-11

## 2019-01-11 VITALS
BODY MASS INDEX: 30.02 KG/M2 | DIASTOLIC BLOOD PRESSURE: 61 MMHG | SYSTOLIC BLOOD PRESSURE: 129 MMHG | HEART RATE: 90 BPM | RESPIRATION RATE: 19 BRPM | OXYGEN SATURATION: 98 % | HEIGHT: 63 IN | TEMPERATURE: 97.8 F | WEIGHT: 169.4 LBS

## 2019-01-11 LAB
ALBUMIN SERPL-MCNC: 2.9 G/DL (ref 3.4–4.8)
ALBUMIN/GLOB SERPL: 1 G/DL (ref 1.5–2.5)
ALP SERPL-CCNC: 246 U/L (ref 35–104)
ALT SERPL W P-5'-P-CCNC: 45 U/L (ref 10–36)
ANION GAP SERPL CALCULATED.3IONS-SCNC: 7.3 MMOL/L (ref 3.6–11.2)
AST SERPL-CCNC: 49 U/L (ref 10–30)
BACTERIA SPEC AEROBE CULT: NORMAL
BASOPHILS # BLD AUTO: 0.08 10*3/MM3 (ref 0–0.3)
BASOPHILS NFR BLD AUTO: 0.8 % (ref 0–2)
BILIRUB SERPL-MCNC: 0.4 MG/DL (ref 0.2–1.8)
BUN BLD-MCNC: 12 MG/DL (ref 7–21)
BUN/CREAT SERPL: 17.6 (ref 7–25)
CALCIUM SPEC-SCNC: 8.3 MG/DL (ref 7.7–10)
CHLORIDE SERPL-SCNC: 109 MMOL/L (ref 99–112)
CO2 SERPL-SCNC: 21.7 MMOL/L (ref 24.3–31.9)
CREAT BLD-MCNC: 0.68 MG/DL (ref 0.43–1.29)
CRP SERPL-MCNC: 5.1 MG/DL (ref 0–0.99)
DEPRECATED RDW RBC AUTO: 62.3 FL (ref 37–54)
EOSINOPHIL # BLD AUTO: 0.32 10*3/MM3 (ref 0–0.7)
EOSINOPHIL NFR BLD AUTO: 3 % (ref 0–7)
ERYTHROCYTE [DISTWIDTH] IN BLOOD BY AUTOMATED COUNT: 19.2 % (ref 11.5–14.5)
GFR SERPL CREATININE-BSD FRML MDRD: 84 ML/MIN/1.73
GLOBULIN UR ELPH-MCNC: 3 GM/DL
GLUCOSE BLD-MCNC: 107 MG/DL (ref 70–110)
HCT VFR BLD AUTO: 33.9 % (ref 37–47)
HGB BLD-MCNC: 10.5 G/DL (ref 12–16)
IMM GRANULOCYTES # BLD AUTO: 0.06 10*3/MM3 (ref 0–0.03)
IMM GRANULOCYTES NFR BLD AUTO: 0.6 % (ref 0–0.5)
LYMPHOCYTES # BLD AUTO: 3.94 10*3/MM3 (ref 1–3)
LYMPHOCYTES NFR BLD AUTO: 37.3 % (ref 16–46)
MAGNESIUM SERPL-MCNC: 1.9 MG/DL (ref 1.7–2.6)
MCH RBC QN AUTO: 28.5 PG (ref 27–33)
MCHC RBC AUTO-ENTMCNC: 31 G/DL (ref 33–37)
MCV RBC AUTO: 92.1 FL (ref 80–94)
MONOCYTES # BLD AUTO: 1.27 10*3/MM3 (ref 0.1–0.9)
MONOCYTES NFR BLD AUTO: 12 % (ref 0–12)
NEUTROPHILS # BLD AUTO: 4.89 10*3/MM3 (ref 1.4–6.5)
NEUTROPHILS NFR BLD AUTO: 46.3 % (ref 40–75)
OSMOLALITY SERPL CALC.SUM OF ELEC: 275.9 MOSM/KG (ref 273–305)
PHOSPHATE SERPL-MCNC: 2.2 MG/DL (ref 2.7–4.5)
PLATELET # BLD AUTO: 250 10*3/MM3 (ref 130–400)
PMV BLD AUTO: 9.6 FL (ref 6–10)
POTASSIUM BLD-SCNC: 4.4 MMOL/L (ref 3.5–5.3)
PROT SERPL-MCNC: 5.9 G/DL (ref 6–8)
RBC # BLD AUTO: 3.68 10*6/MM3 (ref 4.2–5.4)
SODIUM BLD-SCNC: 138 MMOL/L (ref 135–153)
WBC NRBC COR # BLD: 10.56 10*3/MM3 (ref 4.5–12.5)

## 2019-01-11 PROCEDURE — 99239 HOSP IP/OBS DSCHRG MGMT >30: CPT | Performed by: INTERNAL MEDICINE

## 2019-01-11 PROCEDURE — 94799 UNLISTED PULMONARY SVC/PX: CPT

## 2019-01-11 PROCEDURE — 71045 X-RAY EXAM CHEST 1 VIEW: CPT

## 2019-01-11 PROCEDURE — 97530 THERAPEUTIC ACTIVITIES: CPT | Performed by: OCCUPATIONAL THERAPIST

## 2019-01-11 PROCEDURE — 86140 C-REACTIVE PROTEIN: CPT | Performed by: INTERNAL MEDICINE

## 2019-01-11 PROCEDURE — 85025 COMPLETE CBC W/AUTO DIFF WBC: CPT | Performed by: INTERNAL MEDICINE

## 2019-01-11 PROCEDURE — C1751 CATH, INF, PER/CENT/MIDLINE: HCPCS

## 2019-01-11 PROCEDURE — 83735 ASSAY OF MAGNESIUM: CPT | Performed by: INTERNAL MEDICINE

## 2019-01-11 PROCEDURE — 71045 X-RAY EXAM CHEST 1 VIEW: CPT | Performed by: RADIOLOGY

## 2019-01-11 PROCEDURE — 25010000003 CEFAZOLIN SODIUM-DEXTROSE 2-3 GM-%(50ML) RECONSTITUTED SOLUTION

## 2019-01-11 PROCEDURE — 84100 ASSAY OF PHOSPHORUS: CPT | Performed by: INTERNAL MEDICINE

## 2019-01-11 PROCEDURE — 02HV33Z INSERTION OF INFUSION DEVICE INTO SUPERIOR VENA CAVA, PERCUTANEOUS APPROACH: ICD-10-PCS | Performed by: INTERNAL MEDICINE

## 2019-01-11 PROCEDURE — 80053 COMPREHEN METABOLIC PANEL: CPT | Performed by: INTERNAL MEDICINE

## 2019-01-11 RX ORDER — QUETIAPINE FUMARATE 25 MG/1
25 TABLET, FILM COATED ORAL 2 TIMES DAILY PRN
Start: 2019-01-11

## 2019-01-11 RX ORDER — SODIUM CHLORIDE 0.9 % (FLUSH) 0.9 %
10 SYRINGE (ML) INJECTION EVERY 12 HOURS SCHEDULED
Status: DISCONTINUED | OUTPATIENT
Start: 2019-01-11 | End: 2019-01-11 | Stop reason: HOSPADM

## 2019-01-11 RX ORDER — QUETIAPINE FUMARATE 25 MG/1
25 TABLET, FILM COATED ORAL 2 TIMES DAILY PRN
Status: DISCONTINUED | OUTPATIENT
Start: 2019-01-11 | End: 2019-01-11 | Stop reason: HOSPADM

## 2019-01-11 RX ORDER — CEFAZOLIN SODIUM 2 G/50ML
2 SOLUTION INTRAVENOUS EVERY 8 HOURS
Start: 2019-01-11 | End: 2019-01-21 | Stop reason: HOSPADM

## 2019-01-11 RX ORDER — QUETIAPINE FUMARATE 25 MG/1
25 TABLET, FILM COATED ORAL NIGHTLY
Start: 2019-01-11

## 2019-01-11 RX ORDER — QUETIAPINE FUMARATE 25 MG/1
25 TABLET, FILM COATED ORAL NIGHTLY
Status: DISCONTINUED | OUTPATIENT
Start: 2019-01-11 | End: 2019-01-11 | Stop reason: HOSPADM

## 2019-01-11 RX ORDER — SODIUM CHLORIDE 0.9 % (FLUSH) 0.9 %
10 SYRINGE (ML) INJECTION AS NEEDED
Status: DISCONTINUED | OUTPATIENT
Start: 2019-01-11 | End: 2019-01-11 | Stop reason: HOSPADM

## 2019-01-11 RX ORDER — HYDROCODONE BITARTRATE AND ACETAMINOPHEN 5; 325 MG/1; MG/1
1 TABLET ORAL EVERY 6 HOURS PRN
Qty: 12 TABLET | Refills: 0 | Status: ON HOLD | OUTPATIENT
Start: 2019-01-11 | End: 2019-01-21 | Stop reason: SDUPTHER

## 2019-01-11 RX ORDER — SODIUM CHLORIDE 0.9 % (FLUSH) 0.9 %
20 SYRINGE (ML) INJECTION AS NEEDED
Status: DISCONTINUED | OUTPATIENT
Start: 2019-01-11 | End: 2019-01-11 | Stop reason: HOSPADM

## 2019-01-11 RX ORDER — MAGNESIUM SULFATE HEPTAHYDRATE 40 MG/ML
4 INJECTION, SOLUTION INTRAVENOUS ONCE
Status: COMPLETED | OUTPATIENT
Start: 2019-01-11 | End: 2019-01-11

## 2019-01-11 RX ORDER — L.ACID,PARA/B.BIFIDUM/S.THERM 8B CELL
1 CAPSULE ORAL DAILY
Qty: 9 CAPSULE | Refills: 0 | Status: ON HOLD
Start: 2019-01-12 | End: 2019-01-21 | Stop reason: SDUPTHER

## 2019-01-11 RX ADMIN — MAGNESIUM SULFATE HEPTAHYDRATE 4 G: 40 INJECTION, SOLUTION INTRAVENOUS at 10:15

## 2019-01-11 RX ADMIN — ALBUTEROL SULFATE 2.5 MG: 2.5 SOLUTION RESPIRATORY (INHALATION) at 07:41

## 2019-01-11 RX ADMIN — APIXABAN 5 MG: 5 TABLET, FILM COATED ORAL at 08:01

## 2019-01-11 RX ADMIN — SENNOSIDES AND DOCUSATE SODIUM 2 TABLET: 8.6; 5 TABLET ORAL at 08:01

## 2019-01-11 RX ADMIN — CEFAZOLIN SODIUM 2 G: 2 SOLUTION INTRAVENOUS at 04:00

## 2019-01-11 RX ADMIN — LEVOTHYROXINE SODIUM 100 MCG: 100 TABLET ORAL at 08:01

## 2019-01-11 RX ADMIN — QUETIAPINE FUMARATE 25 MG: 25 TABLET, FILM COATED ORAL at 15:06

## 2019-01-11 RX ADMIN — CASTOR OIL AND BALSAM, PERU 5 G: 788; 87 OINTMENT TOPICAL at 08:10

## 2019-01-11 RX ADMIN — HYDROCODONE BITARTRATE AND ACETAMINOPHEN 1 TABLET: 5; 325 TABLET ORAL at 09:11

## 2019-01-11 RX ADMIN — VENLAFAXINE HYDROCHLORIDE 37.5 MG: 37.5 TABLET ORAL at 17:02

## 2019-01-11 RX ADMIN — CEFAZOLIN SODIUM 2 G: 2 SOLUTION INTRAVENOUS at 11:02

## 2019-01-11 RX ADMIN — BUDESONIDE 0.5 MG: 0.5 SUSPENSION RESPIRATORY (INHALATION) at 07:41

## 2019-01-11 RX ADMIN — HYDROCODONE BITARTRATE AND ACETAMINOPHEN 1 TABLET: 5; 325 TABLET ORAL at 01:28

## 2019-01-11 RX ADMIN — FAMOTIDINE 20 MG: 20 TABLET, FILM COATED ORAL at 08:01

## 2019-01-11 RX ADMIN — VENLAFAXINE HYDROCHLORIDE 37.5 MG: 37.5 TABLET ORAL at 08:02

## 2019-01-11 RX ADMIN — SODIUM CHLORIDE, PRESERVATIVE FREE 3 ML: 5 INJECTION INTRAVENOUS at 08:15

## 2019-01-11 RX ADMIN — Medication 1 CAPSULE: at 08:01

## 2019-01-11 RX ADMIN — POTASSIUM PHOSPHATE, MONOBASIC AND POTASSIUM PHOSPHATE, DIBASIC 15 MMOL: 224; 236 INJECTION, SOLUTION INTRAVENOUS at 11:42

## 2019-01-11 RX ADMIN — SODIUM CHLORIDE, PRESERVATIVE FREE 10 ML: 5 INJECTION INTRAVENOUS at 08:16

## 2019-01-11 RX ADMIN — FLUTICASONE PROPIONATE 2 SPRAY: 50 SPRAY, METERED NASAL at 08:10

## 2019-01-11 NOTE — PROGRESS NOTES
"Palliative Care Progress Note    Date of Admission: 1/6/2019    Code Status:   Current Code Status     Date Active Code Status Order ID Comments User Context       1/7/2019 05:27 No CPR [id number]  Enoc Medel MD Inpatient       Questions for Current Code Status     Question Answer Comment    Code Status No CPR     Medical Interventions (Level of Support Prior to Arrest) Limited     Limited Support to NOT Include Artificial Nutrition      Blood Products      Cardioversion/Defibrillation      Dialysis      Intubation         Advance Directive: POA papers on chart  Surrogate decision maker:  Ernestina- grand-daughter     Subjective:    Patient remains alert today, confused.  A grand daughter is at bedside today visiting, but is not Ernestina, the POA.  Neck appears better.  Pt shows no signs of pain with palpation of neck. Denies any pain     Reviewed current scheduled and prn medications for route, type, dose, and frequency.     •  albuterol  •  HYDROcodone-acetaminophen  •  magnesium sulfate **OR** magnesium sulfate **OR** magnesium sulfate  •  potassium chloride **OR** potassium chloride **OR** potassium chloride  •  potassium phosphate infusion greater than 15 mMoles **OR** potassium phosphate infusion greater than 15 mMoles **OR** potassium phosphate **OR** sodium phosphate IVPB **OR** sodium phosphate IVPB **OR** sodium phosphate IVPB  •  QUEtiapine  •  [COMPLETED] Insert peripheral IV **AND** sodium chloride  •  sodium chloride  •  sodium chloride    Objective:  PPS 30 /78 (BP Location: Right arm, Patient Position: Lying)   Pulse 88   Temp 97.5 °F (36.4 °C) (Oral)   Resp 20   Ht 160 cm (62.99\")   Wt 76.8 kg (169 lb 6.4 oz)   SpO2 98%   BMI 30.02 kg/m²    Intake & Output (last day)       01/10 0701 - 01/11 0700 01/11 0701 - 01/12 0700    P.O. 660 747    I.V. (mL/kg)      Total Intake(mL/kg) 660 (8.6) 747 (9.7)    Net +660 +747          Urine Unmeasured Occurrence 5 x     Stool Unmeasured Occurrence 2 x " 1 x        Lab Results (last 24 hours)     Procedure Component Value Units Date/Time    Blood Culture - Blood, Arm, Right [[id number]] Collected:  01/08/19 1046    Specimen:  Blood from Arm, Right Updated:  01/11/19 1115     Blood Culture No growth at 3 days    Blood Culture - Blood, Arm, Left [[id number]] Collected:  01/08/19 1055    Specimen:  Blood from Arm, Left Updated:  01/11/19 1115     Blood Culture No growth at 3 days    Comprehensive Metabolic Panel [[id number]]  (Abnormal) Collected:  01/11/19 0450    Specimen:  Blood Updated:  01/11/19 0520     Glucose 107 mg/dL      BUN 12 mg/dL      Creatinine 0.68 mg/dL      Sodium 138 mmol/L      Potassium 4.4 mmol/L      Chloride 109 mmol/L      CO2 21.7 mmol/L      Calcium 8.3 mg/dL      Total Protein 5.9 g/dL      Albumin 2.90 g/dL      ALT (SGPT) 45 U/L      AST (SGOT) 49 U/L      Alkaline Phosphatase 246 U/L      Comment: Note New Reference Ranges        Total Bilirubin 0.4 mg/dL      eGFR Non African Amer 84 mL/min/1.73      Globulin 3.0 gm/dL      A/G Ratio 1.0 g/dL      BUN/Creatinine Ratio 17.6     Anion Gap 7.3 mmol/L     Narrative:       The MDRD GFR formula is only valid for adults with stable renal function between ages 18 and 70.    Osmolality, Calculated [[id number]]  (Normal) Collected:  01/11/19 0450    Specimen:  Blood Updated:  01/11/19 0520     Osmolality Calc 275.9 mOsm/kg     Magnesium [[id number]]  (Normal) Collected:  01/11/19 0450    Specimen:  Blood Updated:  01/11/19 0520     Magnesium 1.9 mg/dL     Phosphorus [[id number]]  (Abnormal) Collected:  01/11/19 0450    Specimen:  Blood Updated:  01/11/19 0520     Phosphorus 2.2 mg/dL     C-reactive Protein [[id number]]  (Abnormal) Collected:  01/11/19 0450    Specimen:  Blood Updated:  01/11/19 0520     C-Reactive Protein 5.10 mg/dL     CBC & Differential [[id number]] Collected:  01/11/19 0500    Specimen:  Blood Updated:  01/11/19 0519    Narrative:       The following orders were created for panel  order CBC & Differential.  Procedure                               Abnormality         Status                     ---------                               -----------         ------                     CBC Auto Differential[[id number]]        Abnormal            Final result                 Please view results for these tests on the individual orders.    CBC Auto Differential [[id number]]  (Abnormal) Collected:  01/11/19 0500    Specimen:  Blood Updated:  01/11/19 0519     WBC 10.56 10*3/mm3      RBC 3.68 10*6/mm3      Hemoglobin 10.5 g/dL      Hematocrit 33.9 %      MCV 92.1 fL      MCH 28.5 pg      MCHC 31.0 g/dL      RDW 19.2 %      RDW-SD 62.3 fl      MPV 9.6 fL      Platelets 250 10*3/mm3      Neutrophil % 46.3 %      Lymphocyte % 37.3 %      Monocyte % 12.0 %      Eosinophil % 3.0 %      Basophil % 0.8 %      Immature Grans % 0.6 %      Neutrophils, Absolute 4.89 10*3/mm3      Lymphocytes, Absolute 3.94 10*3/mm3      Monocytes, Absolute 1.27 10*3/mm3      Eosinophils, Absolute 0.32 10*3/mm3      Basophils, Absolute 0.08 10*3/mm3      Immature Grans, Absolute 0.06 10*3/mm3     Blood Culture - Blood, Arm, Right [[id number]] Collected:  01/06/19 2156    Specimen:  Blood from Arm, Right Updated:  01/10/19 2245     Blood Culture No growth at 4 days    Potassium [[id number]]  (Normal) Collected:  01/10/19 1613    Specimen:  Blood Updated:  01/10/19 1645     Potassium 4.2 mmol/L         Imaging Results (last 24 hours)     ** No results found for the last 24 hours. **          Physical Exam:  Gen: NAD, pleasantly confused   Skin: warm, dry  Eyes: MERRY, conjunctiva clear and moist  HEENT: oral cavity moist, no lesions or thrush noted   Resp/thorax: even effort, non labored, diminished bases  CV: RRR   ABD: soft, bowel sounds hypoactive   Ext: no edema, no redness  Neuro: alert, interactive, pleasantly confused       Reviewed labs and diagnostic results.   Lab Results   Component Value Date    HGBA1C 6.2 (H) 03/24/2014      Results from last 7 days   Lab Units  01/11/19   0500   WBC 10*3/mm3  10.56   HEMOGLOBIN g/dL  10.5*   HEMATOCRIT %  33.9*   PLATELETS 10*3/mm3  250     Results from last 7 days   Lab Units  01/11/19   0450   SODIUM mmol/L  138   POTASSIUM mmol/L  4.4   CHLORIDE mmol/L  109   CO2 mmol/L  21.7*   BUN mg/dL  12   CREATININE mg/dL  0.68   CALCIUM mg/dL  8.3   BILIRUBIN mg/dL  0.4   ALK PHOS U/L  246*   ALT (SGPT) U/L  45*   AST (SGOT) U/L  49*   GLUCOSE mg/dL  107       Impression: 78 y.o. female awaiting discharge to [facility], possibly with hospice for advanced dementia.     Plan:     1.  Goals of Care   - Established and family was needing placement in LTCF and had been unable to get patient placed on their own.  SS and attending able to get a bed for patient and ease burden for family.  Code status reflects POA wishes.    2.  Hallucinations   - Dr. Quach discussed hallucinations with me and possible medications.  Advised to begin seroquel for the agitation regarding hallucinations.  Explained they could use PRN through the day with a scheduled dose at bedtime to aid in rest.      3.  Neck pain   - Resolved well with antibiotics and taking the low dose opioid.  I would suggest that patient be discharged with pain meds to LTCF.      4.  UTI   - I/D suggesting IV Ancef to be given for next 10-14 days per Dr. Quach.  I recommended patient be sent to LTCF with orders to notify hospice for consult after patient completes antibiotics.      Time: 30 minutes   > 50% time spent in counseling and education concerning current orders for symptom management with RN, SS, Dr. Main Khan, APRN  [phone]  01/11/19  1:42 PM

## 2019-01-11 NOTE — DISCHARGE SUMMARY
Date of Admission: 1/6/2019    Date of Discharge: 1/11/2019     PCP: Adela Mathew MD    Admission Diagnosis:   Please see admission H&P    Discharge Diagnosis:   Severe sepsis with metabolic encephalopathy  Right-sided parotitis  E coli UTI  MSSA bacteremia  Metabolic encephalopathy superimposed on baseline dementia  Elevated liver enzymes  Borderline hypokalemia  Hypomagnesemia  Hypophosphatemia  Paroxysmal Afib  DM II, non-insulin dependent  Essential HTN  Hx of CVA with left-sided hemiplegia    Procedures Performed:  1/9/19: Transesophageal echocardiogram  1. No definitive evidence of endocarditis   2. Normal LV systolic function   3. No pericardial effusion.    1/11/19: RUE PICC placement    Consults:   Consults     Date and Time Order Name Status Description    1/8/2019 1718 Inpatient Cardiology Consult      1/8/2019 0030 Inpatient Infectious Diseases Consult Completed     1/7/2019 0614 Inpatient Palliative Care MD Consult Completed     1/7/2019 0216 Hospitalist (on-call MD unless specified)              History of Present Illness:  Jeanna Flower is a 78 y.o. female with hx of dementia and CVA who presented to [facility] ED for evaluation of right neck redness and swelling. Please see admission H&P for complete details.     In the ED, workup revealed sepsis with a UTI and right-sided parotitis. Cultures were obtained and broad spectrum IV antibiotics initiated.          Hospital Course  Jeanna Flower was admitted to the med/surg floor for further evaluation and treatment. She was continued on broad spectrum IV antibiotics including Vanc and Ancef. Her home antihypertensives and diabetes medications were held as her BP and BG levels were stable. Palliative care was consulted to assist with GOC discussions with pt's POA as she voiced concerns she could not longer provide adequate care to the patient at home in the setting of her worsening dementia.     Cultures were followed and 1/2 initial blood cultures  revealed growth of MSSA. Urine culture was finalized as E coli. Repeat blood cultures were obtained and would be finalized with no growth. ID was consulted and transitioned her antibiotic regimen to Ancef with a graded challenge. Cardiology was consulted and a JAYE was performed on 1/9/19 with no definitive evidence of endocarditis. Over the course of a few days her parotitis significantly improved with antibiotics and ID felt this was the source of her bacteremia. Upon admission she was more lethargic but became more alert as her sepsis was treated. She remained very confused requiring a sitter and experienced episodes of hallucinations. This was discussed with palliative care and Seroquel was added per their recs. Her electrolytes were repleted per protocol. Her liver enzymes initially maged early in her hospitalization. Her statin and dantrolene were stopped. Viral hepatitis panel was non-reactive and liver US unremarkable. Her enzymes quickly began improving and was thought to possibly be related to sepsis. SS worked to arrange SNF placement with possible hospice upon discharge.     Ms. Flower was seen and examined with RUFUS Rdz on 1/11/19. She remained hemodynamically stable with stable routine AM labs. Her cultures were finalized and ID gave recommendations to continue Ancef through 1/21/19. Placement had been arranged at [facility]. Palliative care advised that patient be discharged to SNF without hospice until completion of IV antibiotics. At that time SNF can then contact them and they will enroll the patient in hospice services. This was discussed with the pt's POA and she was agreeable. A PICC was placed prior to discharge per SNF request and arrangements were made for discharge to SNF via ambulance.       Condition on Discharge:  Stable    Vital Signs  Vitals:    01/11/19 1500   BP: 115/61   Pulse: 86   Resp: 20   Temp: 97.4 °F (36.3 °C)       Physical Exam:  General:    Awake and alert but  remains confused, oriented to person and knows she's in a hospital, able to follow a few simple commands, chronically ill appearing, right-sided erythema near parotid gland appears overall much improved   Heart:      Normal S1 and S2. Regular rate and rhythm. No significant murmur, rubs or gallops appreciated.   Lungs:     Respirations regular, even and unlabored. Lungs clear to auscultation B/L. No wheezes, rales or rhonchi.   Abdomen:   Soft and nontender. No guarding, rebound tenderness or  organomegaly noted. Bowel sounds present x 4.   Extremities:  No clubbing, cyanosis or edema noted. Contracture of LUE.      Discharge Disposition:   SNF      Discharge Medications:     Discharge Medications      New Medications      Instructions Start Date   CeFAZolin Sodium-Dextrose 2-3 GM-%(50ML) IVPB  Commonly known as:  ANCEF   2 g, Intravenous, Every 8 Hours      HYDROcodone-acetaminophen 5-325 MG per tablet  Commonly known as:  NORCO   1 tablet, Oral, Every 6 Hours PRN      lactobacillus acidophilus capsule capsule   1 capsule, Oral, Daily      QUEtiapine 25 MG tablet  Commonly known as:  SEROquel   25 mg, Oral, Nightly      QUEtiapine 25 MG tablet  Commonly known as:  SEROquel   25 mg, Oral, 2 Times Daily PRN         Continue These Medications      Instructions Start Date   ADVAIR DISKUS 250-50 MCG/DOSE DISKUS  Generic drug:  fluticasone-salmeterol   1 puff, Inhalation, 2 Times Daily - RT      albuterol sulfate  (90 Base) MCG/ACT inhaler  Commonly known as:  PROVENTIL HFA;VENTOLIN HFA;PROAIR HFA   2 puffs, Inhalation, Every 4 Hours PRN      apixaban 5 MG tablet tablet  Commonly known as:  ELIQUIS   5 mg, Oral, Every 12 Hours Scheduled      budesonide 0.5 MG/2ML nebulizer solution  Commonly known as:  PULMICORT   0.5 mg, Nebulization, 2 Times Daily - RT      castor oil-balsam peru ointment   1 each, Topical, 2 Times Daily      cycloSPORINE 0.05 % ophthalmic emulsion  Commonly known as:  RESTASIS   1 drop, 2 Times  Daily      docusate sodium 150 MG/15ML liquid  Commonly known as:  COLACE   100 mg, Per G Tube, 2 Times Daily      famotidine 20 MG tablet  Commonly known as:  PEPCID   20 mg, Per G Tube, 2 Times Daily      fluticasone 50 MCG/ACT nasal spray  Commonly known as:  FLONASE   2 sprays, Nasal, Daily      levothyroxine 100 MCG tablet  Commonly known as:  SYNTHROID, LEVOTHROID   100 mcg, Oral, Daily      miconazole 2 % powder  Commonly known as:  MICOTIN   1 application, Topical, 3 Times Daily PRN      montelukast 10 MG tablet  Commonly known as:  SINGULAIR   10 mg, Oral, Nightly      venlafaxine 37.5 MG tablet  Commonly known as:  EFFEXOR   37.5 mg, Per G Tube, 2 Times Daily         Stop These Medications    amantadine 50 MG/5ML solution  Commonly known as:  SYMMETREL     atorvastatin 40 MG tablet  Commonly known as:  LIPITOR     dantrolene 25 MG capsule  Commonly known as:  DANTRIUM     glipiZIDE 2.5 MG 24 hr tablet  Commonly known as:  GLUCOTROL XL     loratadine 10 MG tablet  Commonly known as:  CLARITIN     losartan 100 MG tablet  Commonly known as:  COZAAR     melatonin 3 MG tablet     metoprolol tartrate 25 MG tablet  Commonly known as:  LOPRESSOR              Discharge Diet:   Dietary Orders (From admission, onward)    Start     Ordered    01/09/19 1705  Diet Pureed; Thin  Diet Effective Now     Question Answer Comment   Diet Texture / Consistency Pureed    Fluid Consistency Thin        01/09/19 1705    01/08/19 1800  Dietary Nutrition Supplements Magic Cup  Daily With Lunch & Dinner     Question:  Select Supplement  Answer:  Magic Cup    01/08/19 1400    01/07/19 1800  Dietary Nutrition Supplements Boost Plus (Ensure Enlive, Ensure Plus)  Daily With Breakfast, Lunch & Dinner     Question:  Select Supplement  Answer:  Boost Plus (Ensure Enlive, Ensure Plus)    01/07/19 1219          Activity at Discharge:  activity as tolerated    Follow-up Appointments:  Additional Instructions for the Follow-ups that You Need to  Schedule     Discharge Follow-up with PCP   As directed       Currently Documented PCP:    Adela Mathew MD    PCP Phone Number:    [phone]     Follow Up Details:  Follow up with PCP at the SNF.         Discharge Follow-up with PCP   As directed       Currently Documented PCP:    Adela Mathew MD    PCP Phone Number:    [phone]     Follow Up Details:  Follow up with Dr. Robert at the [facility].            Contact information for follow-up providers     Adela Mathew MD .    Specialty:  Internal Medicine  Why:  Follow up with PCP at the [facility].  Contact information:  [address] [facility] [address]  [place] [address]  [phone]             Adela Mathew MD .    Specialty:  Internal Medicine  Why:  Follow up with Dr. Robert at the [facility].  Contact information:  [address] [facility] [address]  [place] [address]  [phone]                   Contact information for after-discharge care     Destination     [facility] Follow up.    Service:  Skilled Nursing  Contact information:  [address] [place] [address]  [phone]                           Your Scheduled Appointments    Jan 14, 2019  1:00 PM EST  New Patient with AMELIE Arreola  [facility] (--) [address] [facility] [address]  [facility] [address]  [phone]   New patient must bring a list of current medication and arrive 30 minutes early to allow for a longer registration and triage process.    Feb 28, 2019  3:00 PM EST  Follow Up with Ta Garcia PA-C  [facility] (--) [address] [facility] [id number] [phone]   Arrive 15 minutes prior to appointment.            Test Results Pending at Discharge:  None     Chalino Quach DO  01/11/19  3:27 PM      Time: Greater than 30 minutes spent on this discharge.

## 2019-01-11 NOTE — PROGRESS NOTES
PROGRESS NOTE         Patient Identification:  Name:  Jeanna Flower  Age:  78 y.o.  Sex:  female  :  1940  MRN:  [id number]  Visit Number:  [id number]  Primary Care Provider:  Adela Mathew MD      ----------------------------------------------------------------------------------------------------------------------  Subjective       Chief Complaints:    Fatigue and Neck Swelling        Interval History:      Patient seen in bed this morning. No complaints by nursing staff. Afebrile with no diarrhea. CRP improved to 5.10 with normal WBC. Blood culture pending.     Review of Systems:    Unable to obtain. Confused.  ----------------------------------------------------------------------------------------------------------------------      Objective       [facility] Meds:    apixaban 5 mg Oral Q12H   budesonide 0.5 mg Nebulization BID - RT   castor oil-balsam peru 1 each Topical Q12H   ceFAZolin 1 g Intravenous Once   CeFAZolin Sodium-Dextrose 2 g Intravenous Q8H   cycloSPORINE 1 drop Both Eyes BID   famotidine 20 mg Oral BID   fluticasone 2 spray Nasal Daily   lactobacillus acidophilus 1 capsule Oral Daily   levothyroxine 100 mcg Oral Daily   potassium phosphate 15 mmol Intravenous Once   QUEtiapine 25 mg Oral Nightly   sennosides-docusate sodium 2 tablet Oral BID   sodium chloride 10 mL Intravenous Q12H   sodium chloride 3 mL Intravenous Q12H   venlafaxine 37.5 mg Oral BID With Meals        ----------------------------------------------------------------------------------------------------------------------    Vital Signs:  Temp:  [97.4 °F (36.3 °C)-98.7 °F (37.1 °C)] 97.5 °F (36.4 °C)  Heart Rate:  [56-98] 88  Resp:  [18-20] 20  BP: (113-142)/(46-78) 130/78  No data found.  SpO2 Percentage    01/10/19 1933 01/10/19 19519 0741   SpO2: Comment: could not get o2 sat to  Comment: cannot get o2 sat to  Comment: unable to obtain        on  Flow (L/min):  [2] 2;   Device  (Oxygen Therapy): nasal cannula    Body mass index is 30.02 kg/m².  Wt Readings from Last 3 Encounters:   01/07/19 76.8 kg (169 lb 6.4 oz)   12/05/18 87.1 kg (192 lb)   11/30/18 84.4 kg (186 lb)        Intake/Output Summary (Last 24 hours) at 1/11/2019 1510  Last data filed at 1/11/2019 1230  Gross per 24 hour   Intake 927 ml   Output --   Net 927 ml     Diet Pureed; Thin  ----------------------------------------------------------------------------------------------------------------------    Physical exam:    Constitutional:  Well-developed and well-nourished.  No respiratory distress.      HENT:  Head: Normocephalic and atraumatic.  Mouth:  Moist mucous membranes.    Eyes:  Conjunctivae and EOM are normal.  No scleral icterus.  Neck:  Neck supple.  No JVD present.    Cardiovascular:  Normal rate, regular rhythm and normal heart sounds with no murmur. No edema.  Pulmonary/Chest:  No respiratory distress, no wheezes, no crackles, with normal breath sounds and good air movement.  Abdominal:  Soft.  Bowel sounds are normal.  No distension and no tenderness.   Musculoskeletal:  No edema, no tenderness, and no deformity.  No swelling or redness of joints.  Neurological:  Confused.    Skin:  Skin is warm and dry.  No rash noted.  No pallor.   Psychiatric:  Confused.    ----------------------------------------------------------------------------------------------------------------------    ----------------------------------------------------------------------------------------------------------------------          Results from last 7 days   Lab Units  01/08/19   1052   PH, ARTERIAL pH units  7.496*   PO2 ART mm Hg  103.6*   PCO2, ARTERIAL mm Hg  20.7*   HCO3 ART mmol/L  15.6*     Results from last 7 days   Lab Units  01/11/19   0500  01/11/19   0450  01/10/19   0536  01/09/19   0638  01/09/19   0637   01/07/19   0155  01/06/19   2156   CRP mg/dL   --   5.10*  9.75*   --   15.00*   < >   --   9.85*   LACTATE mmol/L   --     --    --    --    --    --   1.2  2.5*   WBC 10*3/mm3  10.56   --   8.42  8.81   --    < >   --   18.17*   HEMOGLOBIN g/dL  10.5*   --   10.8*  9.5*   --    < >   --   13.2   HEMATOCRIT %  33.9*   --   36.1*  30.5*   --    < >   --   43.9   MCV fL  92.1   --   93.0  90.2   --    < >   --   93.0   MCHC g/dL  31.0*   --   29.9*  31.1*   --    < >   --   30.1*   PLATELETS 10*3/mm3  250   --   236  197   --    < >   --   238   INR    --    --    --    --    --    --    --   1.36*    < > = values in this interval not displayed.     Results from last 7 days   Lab Units  01/11/19   0450  01/10/19   1613  01/10/19   0536  01/09/19   0637   SODIUM mmol/L  138   --   139  142   POTASSIUM mmol/L  4.4  4.2  3.5  3.7   MAGNESIUM mg/dL  1.9   --   1.6*  2.2   CHLORIDE mmol/L  109   --   110  112   CO2 mmol/L  21.7*   --   21.2*  22.6*   BUN mg/dL  12   --   12  12   CREATININE mg/dL  0.68   --   0.73  0.74   EGFR IF NONAFRICN AM mL/min/1.73  84   --   77  76   CALCIUM mg/dL  8.3   --   8.4  8.2   GLUCOSE mg/dL  107   --   83  80   ALBUMIN g/dL  2.90*   --   2.90*  2.80*   BILIRUBIN mg/dL  0.4   --   0.5  0.5   ALK PHOS U/L  246*   --   288*  294*   AST (SGOT) U/L  49*   --   89*  149*   ALT (SGPT) U/L  45*   --   102*  192*   Estimated Creatinine Clearance: 57.8 mL/min (by C-G formula based on SCr of 0.68 mg/dL).  No results found for: AMMONIA    No results found for: HGBA1C, POCGLU  Lab Results   Component Value Date    HGBA1C 6.2 (H) 03/24/2014     Lab Results   Component Value Date    TSH 1.626 09/15/2018       Blood Culture   Date Value Ref Range Status   01/08/2019 No growth at 24 hours  Preliminary   01/08/2019 No growth at 24 hours  Preliminary   01/06/2019 No growth at 2 days  Preliminary   01/06/2019 Staphylococcus aureus (A)  Final     Comment:     This isolate is presumed to be clindamyin resistant based on detection of inducible clindamycin resistance. Clindamycin may still be effective in some patients.  Consider  infectious disease consult to rule out distant focus of infection.     Urine Culture   Date Value Ref Range Status   01/07/2019 >100,000 CFU/mL Escherichia coli (A)  Final              Pain Management Panel     Pain Management Panel Latest Ref Rng & Units 9/16/2018    AMPHETAMINES SCREEN, URINE Negative Negative    BARBITURATES SCREEN Negative Negative    BENZODIAZEPINE SCREEN, URINE Negative Negative    BUPRENORPHINE Negative Negative    COCAINE SCREEN, URINE Negative Negative    METHADONE SCREEN, URINE Negative Negative          I have personally reviewed the above laboratory results.   ----------------------------------------------------------------------------------------------------------------------  Imaging Results (last 24 hours)     ** No results found for the last 24 hours. **        I have personally reviewed the above radiology results.   ----------------------------------------------------------------------------------------------------------------------    Assessment/Plan       Assessment/Plan     ASSESSMENT:    1. Severe sepsis with lactic acid greater than 2 on admission  2. MSSA bacteremia  3. E. Coli UTI    PLAN:    Patient seen in bed this morning. No complaints by nursing staff. Afebrile with no diarrhea. CRP improved to 5.10 with normal WBC. Blood culture pending.     Blood cultures reveal no current growth. JAYE shows no evidence of endocarditis.      Urine culture finalized as adair susceptible E. Coli. Rheumatoid factor elevated at 32. Repeat blood cultures from 1/8/19 reveal no growth at this time.    CT of neck revealed right-sided parotid gland more hyperdense compared to left side that may represent inflammation.  Chest x-ray from 1/8/19 unremarkable.  Ultrasound of bilateral upper extremities reveals no DVT.  Urine culture preliminary reveals greater than 100,000 colonies of gram-negative bacilli consistent with Escherichia coli.  Blood cultures one out of 2 sets from 1/6/19 reveals  MSSA.     At this time we are very concerned for septic phlebitis of the jugular vein. Venous duplex was ordered to rule out septic DVT, no DVT found. Highly recommend JAYE to rule out vegetation.     Based off of Clinical inprovement and resolution of Bacteremia with unremarkable JAYE Cefazolin 2g IV Q8hrs will be continued through 1/21/19     Current Antimicrobials:  Cefazolin 2gm IV Q8H through 1/21/19            Code Status:   Code Status and Medical Interventions:   Ordered at: 01/07/19 0527     Limited Support to NOT Include:    Artificial Nutrition    Blood Products    Cardioversion/Defibrillation    Dialysis    Intubation     Code Status:    No CPR     Medical Interventions (Level of Support Prior to Arrest):    Limited       Osman Rajan PA-C  01/11/19  3:10 PM

## 2019-01-11 NOTE — THERAPY TREATMENT NOTE
Acute Care - Occupational Therapy Treatment Note   Tom     Patient Name: Jeanna Flower  : 1940  MRN: [id number]  Today's Date: 2019  Onset of Illness/Injury or Date of Surgery: 19     Referring Physician: Dr. Medel    Admit Date: 2019       ICD-10-CM ICD-9-CM   1. Sepsis, due to unspecified organism (CMS/[facility]) A41.9 038.9     995.91   2. Urinary tract infection without hematuria, site unspecified N39.0 599.0   3. 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   • Other sleep apnea   • Severe sepsis (CMS/[facility])   • MSSA bacteremia   • E. coli UTI     Past Medical History:   Diagnosis Date   • Anemia    • Closed wedge compression fracture of first lumbar vertebra (CMS/[facility])    • COPD (chronic obstructive pulmonary disease) (CMS/[facility])    • CVA (cerebral vascular accident) with left hemiparesis. 2017   • Dementia    • Diabetes mellitus (CMS/[facility])    • Dysarthria following cerebral infarction    • Dysphagia, oropharyngeal phase    • Dysphagia, pharyngoesophageal phase    • GERD (gastroesophageal reflux disease)    • Heart disease    • Hyperlipidemia    • Hypertension    • Hypothyroidism    • Insomnia    • Obesity    • Other sleep apnea 2018   • Paroxysmal atrial fibrillation (CMS/[facility]) 2017     Past Surgical History:   Procedure Laterality Date   • APPENDECTOMY     • BLADDER SURGERY     • CARDIAC CATHETERIZATION     • CHOLECYSTECTOMY         Therapy Treatment    Rehabilitation Treatment Summary     Row Name 19 1041             Treatment Time/Intention    Discipline  occupational therapist  -BF      Document Type  therapy note (daily note)  -BF      Subjective Information  no complaints  -BF      Mode of Treatment  occupational therapy  -BF      Patient/Family  Observations  Pt supine in bed, awake & alert, sitter present; pt confused  -BF      Patient Effort  fair  -BF      Existing Precautions/Restrictions  fall;other (see comments) cognition  -BF      Recorded by [BF] Jolynn Balderas OT 01/11/19 1044      Row Name 01/11/19 1041             Cognitive Assessment/Intervention- PT/OT    Orientation Status (Cognition)  oriented to;person  -BF      Follows Commands (Cognition)  verbal cues/prompting required;repetition of directions required;physical/tactile prompts required  -BF      Recorded by [BF] Jolynn Balderas OT 01/11/19 1044      Row Name 01/11/19 1041             Therapeutic Exercise    Therapeutic Exercise  supine, upper extremities  -BF      Recorded by [BF] Jolynn Balderas OT 01/11/19 1044      Row Name 01/11/19 1041             Upper Extremity Supine Therapeutic Exercise    Performed, Supine Upper Extremity (Therapeutic Exercise)  shoulder flexion/extension;elbow flexion/extension  -BF      Exercise Type, Supine Upper Extremity (Therapeutic Exercise)  AAROM (active assistive range of motion);AROM (active range of motion);PROM (passive range of motion) RUE A/AAROM; LUE P/AA/AROM  -BF      Expected Outcomes, Supine Upper Extremity (Therapeutic Exercise)  improve functional tolerance, self-care activity;improve performance, BADLs;improve performance, transfer skills;strengthen, facilitate independent active range of motion  -BF      Comment, Supine Upper Extremity (Therapeutic Exercise)  Pt required frequent verbal cues for attention to task/following directions  -BF      Recorded by [BF] Jolynn Balderas OT 01/11/19 1044      Row Name 01/11/19 1041             Positioning and Restraints    In Bed  supine;call light within reach;with nsg w/ sitter  -BF      Recorded by [BF] Jolynn Balderas OT 01/11/19 1044      Row Name                Wound 01/07/19 0401 Right posterior gluteal pressure injury    Wound - Properties Group Date  first assessed: 01/07/19 [CC] Time first assessed: 0401 [CC] Present On Admission : yes [CC] Side: Right [CC] Orientation: posterior [CC] Location: gluteal [CC] Type: pressure injury [CC] Stage, Pressure Injury: Stage 2 [CC] Recorded by:  [CC] Charlene Lockwood RN 01/07/19 0402    Row Name                Wound 01/07/19 0403 Left lower gluteal pressure injury    Wound - Properties Group Date first assessed: 01/07/19 [CC] Time first assessed: 0403 [CC] Side: Left [CC] Orientation: lower [CC] Location: gluteal [CC] Type: pressure injury [CC] Stage, Pressure Injury: Stage 2 [CC] Recorded by:  [DIOR] Charlene Lockwood RN 01/07/19 0403      User Key  (r) = Recorded By, (t) = Taken By, (c) = Cosigned By    Initials Name Effective Dates Discipline    CC Charlene Lockwood RN 06/16/16 -  Nurse    Jolynn Monae OT 04/03/18 -  OT        Wound 01/07/19 0401 Right posterior gluteal pressure injury (Active)   Dressing Appearance open to air 1/11/2019  7:22 AM   Closure None 1/11/2019  7:22 AM   Base moist;pink;clean 1/11/2019  7:22 AM   Periwound intact;pink;blanchable 1/11/2019  7:22 AM   Periwound Temperature warm 1/11/2019  7:22 AM   Drainage Amount none 1/11/2019  7:22 AM   Care, Wound cleansed with;sterile half normal saline 1/11/2019  7:22 AM   Dressing Care, Wound dressing applied;border dressing;other (see comments) 1/11/2019  7:22 AM   Periwound Care, Wound dry periwound area maintained 1/11/2019  7:22 AM       Wound 01/07/19 0403 Left lower gluteal pressure injury (Active)   Dressing Appearance open to air 1/11/2019  7:22 AM   Closure None 1/11/2019  7:22 AM   Periwound pink;blanchable;moist 1/11/2019  7:22 AM   Periwound Temperature warm 1/11/2019  7:22 AM   Periwound Skin Turgor soft 1/11/2019  7:22 AM   Edges irregular 1/11/2019  7:22 AM   Drainage Amount none 1/11/2019  7:22 AM   Care, Wound cleansed with;sterile half normal saline 1/11/2019  7:22 AM   Dressing Care, Wound dressing applied;border  dressing;other (see comments) 1/11/2019  7:22 AM   Periwound Care, Wound dry periwound area maintained 1/11/2019  7:22 AM           OT Recommendation and Plan        Outcome Measures     Row Name 01/10/19 1400             How much help from another person do you currently need...    Turning from your back to your side while in flat bed without using bedrails?  2  -KB      Moving from lying on back to sitting on the side of a flat bed without bedrails?  2  -KB      Moving to and from a bed to a chair (including a wheelchair)?  1  -KB      Standing up from a chair using your arms (e.g., wheelchair, bedside chair)?  2  -KB      Climbing 3-5 steps with a railing?  1  -KB      To walk in hospital room?  1  -KB      AM-PAC 6 Clicks Score  9  -KB         Functional Assessment    Outcome Measure Options  AM-PAC 6 Clicks Basic Mobility (PT)  -KB        User Key  (r) = Recorded By, (t) = Taken By, (c) = Cosigned By    Initials Name Provider Type    Rosana Bradshaw PTA Physical Therapy Assistant           Time Calculation:   Time Calculation- OT     Row Name 01/11/19 1044             Time Calculation- OT    Total Timed Code Minutes- OT  25 minute(s)  -BF      OT Non-Billable Time (min)  10 min  -BF         Timed Charges    08834 - OT Therapeutic Activity Minutes  25  -BF        User Key  (r) = Recorded By, (t) = Taken By, (c) = Cosigned By    Initials Name Provider Type    Jolynn Monae OT Occupational Therapist             Therapy Charges for Today     Code Description Service Date Service Provider Modifiers Qty    49031108680 HC OT THERAPEUTIC ACT EA 15 MIN 1/10/2019 Jolynn Balderas OT GO 1    48215625280 HC OT SELF CARE/MGMT/TRAIN EA 15 MIN 1/10/2019 Jolynn Balderas OT GO 1    00110971999 HC OT THERAPEUTIC ACT EA 15 MIN 1/11/2019 Jolynn Balderas OT GO 2          OT G-codes  Functional Limitation: Self care  Self Care Current Status (): At least 60 percent but less than 80  percent impaired, limited or restricted  Self Care Goal Status (): At least 20 percent but less than 40 percent impaired, limited or restricted    Jolynn Balderas, OT  1/11/2019

## 2019-01-11 NOTE — PROGRESS NOTES
Antimicrobial Length of Therapy:    Day 4 of 14 cefazolin     Day 6 total abx therapy    Thank you.  Margarita Spring, Pharm.D.  1/11/2019  11:01 AM

## 2019-01-11 NOTE — ANESTHESIA POSTPROCEDURE EVALUATION
Patient: Jeanna Flower    Procedure Summary     Date:  01/09/19 Room / Location:  [facility] NONINVASIVE LAB    Anesthesia Start:  1229 Anesthesia Stop:  1249    Procedure:  ADULT TRANSESOPHAGEAL ECHO (JAYE) W/ CONT IF NECESSARY PER PROTOCOL Diagnosis:       (Valvular Disease)      (Endocarditis)    Scheduled Providers:  Darian Avalos MD Provider:  Frandy Ghotra MD    Anesthesia Type:  general ASA Status:  3          Anesthesia Type: general  Last vitals  BP   124/54 (01/11/19 0801)   Temp   97.4 °F (36.3 °C) (01/11/19 0642)   Pulse   98 (01/11/19 0801)   Resp   18 (01/11/19 0750)     SpO2   98 % (01/10/19 0716)     Post Anesthesia Care and Evaluation    Patient location during evaluation: bedside  Patient participation: complete - patient participated  Level of consciousness: awake and alert  Pain score: 1  Pain management: adequate  Airway patency: patent  Anesthetic complications: No anesthetic complications  PONV Status: controlled  Cardiovascular status: acceptable  Respiratory status: acceptable  Hydration status: acceptable

## 2019-01-11 NOTE — PROGRESS NOTES
Discharge Planning Assessment  [facility]     Patient Name: Jeanna Flower  MRN: [id number]  Today's Date: 1/11/2019    Admit Date: 1/6/2019      Discharge Plan     Row Name 01/11/19 1054       Plan    Plan SS contacted [facility] [phone] per Carlee who states pt can be admitted and receive hospice services. [facility] has a bed available for pt today. SS to follow.     15:08 Pt needs 10 more days of IV Ancef per ID recommendations. Pt has a mid-line for IV access. SS contacted [facility] Carlee who states IV Ancef can be given, however they are recommending PICC line be placed due to having no education about mid-line's. Lead nurse, Mesha spoke to PICC line nurseSanide and she will place a PICC line prior to discharge today. Dr. Quach notified that [facility] can give IV Ancef and he ordered a PICC line. Dr. Quach spoke to Palliative Care NP regarding IV Ancef and hospice services at the nursing home. Palliative Care NP recommends pt go to the nursing home without hospice until IV antibiotics are completed. Dr. Quach to contact family to discuss pt going to nursing home without hospice services until IV antibiotics are completed. SS to follow.     17:16 Pt is being discharged to [facility] today. SS notified [facility] per Reyna and faxed information including discharge orders and AVS to [phone]. Nurse to call report to [facility]. SS contacted pt's POA/granddaughter, Ernestina at [phone] and she is agreeable for pt to be discharged to [facility] without hospice services at this time. SS completed EMS PCS form and will contact EMS will be notified when pt is ready for transport. No other needs identified.         Destination - Selection Complete      Service Provider Request Status Selected Services Address Phone Number Fax Number    [facility]illed Nursing [address] [place] [address]  [place], [place] [address] [phone]     Lakia Alatorre

## 2019-01-11 NOTE — NURSING NOTE
Patients belongings are packed and ready to go with patient when ready for discharge. Shirt, pajama pants, jacket and house shoes are at bedside. Brief changed at this time, patient is clean and dry.

## 2019-01-11 NOTE — SIGNIFICANT NOTE
01/11/19 1051   Rehab Treatment   Discipline physical therapy assistant   Reason Treatment Not Performed unable to treat, medical status change  (Pt. is unable to participate with PT on this date due to increased confusion.  Unable to follow any commands at this time.)   Recommendation   PT - Next Appointment 01/14/19

## 2019-01-11 NOTE — PROCEDURES
Assessment:  Diagnosis: sepsis    Allergies   Allergen Reactions   • Bactrim [Sulfamethoxazole-Trimethoprim]    • Codeine    • Penicillins        Order Date/Time: 01-/1356  Indications: Long Term Antibiotics   LABS:  Lab Results   Component Value Date    INR 1.36 (H) 01/06/2019    PROTIME 17.0 (H) 01/06/2019     Lab Results   Component Value Date    PTT 27.1 01/06/2019     Lab Results   Component Value Date    WBC 10.56 01/11/2019    HGB 10.5 (L) 01/11/2019    HCT 33.9 (L) 01/11/2019    MCV 92.1 01/11/2019     01/11/2019     Lab Results   Component Value Date    BUN 12 01/11/2019     Lab Results   Component Value Date    CREATININE 0.68 01/11/2019     Lab Results   Component Value Date    EGFRIFNONA 84 01/11/2019     Labs Reviewed: WNL    Contraindications for PICC/Midline:  No Contraindication      Recommendations:  Peripherally inserted central catheter    Procedure Time Out:  Time out Time: 1550  Correct Patient Identity: Yes  Correct Surgical Side and Site Are Marked: Yes  Agreement on Procedure to be done: Yes  Antibiotic Given: No  RN: Kajal Kong RN  RN: SHIVAM Weinberg RN  Other: none      Albina Weinberg RN

## 2019-01-11 NOTE — PLAN OF CARE
Problem: Patient Care Overview  Goal: Plan of Care Review  Outcome: Ongoing (interventions implemented as appropriate)    Goal: Interprofessional Rounds/Family Conf  Outcome: Ongoing (interventions implemented as appropriate)   01/09/19 2117   Interdisciplinary Rounds/Family Conf   Participants nursing       Problem: Fall Risk (Adult)  Goal: Identify Related Risk Factors and Signs and Symptoms  Outcome: Ongoing (interventions implemented as appropriate)   01/07/19 0424   Fall Risk (Adult)   Related Risk Factors (Fall Risk) age-related changes;bladder function altered;confusion/agitation;history of falls;gait/mobility problems;environment unfamiliar   Signs and Symptoms (Fall Risk) presence of risk factors     Goal: Absence of Fall  Outcome: Ongoing (interventions implemented as appropriate)   01/09/19 1013   Fall Risk (Adult)   Absence of Fall making progress toward outcome       Problem: Skin Injury Risk (Adult)  Goal: Identify Related Risk Factors and Signs and Symptoms  Outcome: Ongoing (interventions implemented as appropriate)   01/07/19 0424   Skin Injury Risk (Adult)   Related Risk Factors (Skin Injury Risk) advanced age;cognitive impairment;mobility impaired;moisture;nutritional deficiencies     Goal: Skin Health and Integrity  Outcome: Ongoing (interventions implemented as appropriate)   01/09/19 1013   Skin Injury Risk (Adult)   Skin Health and Integrity making progress toward outcome       Problem: Wound (Includes Pressure Injury) (Adult)  Goal: Signs and Symptoms of Listed Potential Problems Will be Absent, Minimized or Managed (Wound)  Outcome: Ongoing (interventions implemented as appropriate)   01/08/19 1622   Goal/Outcome Evaluation   Problems Assessed (Wound) all   Problems Present (Wound) delayed wound healing       Problem: Cognitive Impairment (Dementia Signs/Symptoms) (Adult)  Goal: Optimized Cognitive Function (Dementia Signs/Symptoms)  Outcome: Ongoing (interventions implemented as  appropriate)   01/08/19 1622   Optimized Cognitive Function (Dementia Signs/Symptoms)   Optimized Cognitive Ability Action Step (STG) Outcome making progress toward outcome       Problem: Self-Care Deficit (Adult,Obstetrics,Pediatric)  Goal: Identify Related Risk Factors and Signs and Symptoms  Outcome: Ongoing (interventions implemented as appropriate)   01/07/19 0424 01/08/19 1622   Self-Care Deficit (Adult,Obstetrics,Pediatric)   Related Risk Factors (Self-Care Deficit) --  activity intolerance   Signs and Symptoms (Self-Care Deficit) cognitive changes;decreased functional activity tolerance;range of motion decreased;inability/unwillingness to perform self-care;unable to perform bathing/hygiene tasks;unable to perform feeding tasks;unable to perform toileting/toilet hygiene tasks --      Goal: Improved Ability to Perform BADL and IADL  Outcome: Ongoing (interventions implemented as appropriate)   01/09/19 1013   Self-Care Deficit (Adult,Obstetrics,Pediatric)   Improved Ability to Perform BADL and IADL making progress toward outcome       Problem: Diabetes, Type 2 (Adult)  Goal: Signs and Symptoms of Listed Potential Problems Will be Absent, Minimized or Managed (Diabetes, Type 2)  Outcome: Ongoing (interventions implemented as appropriate)   01/08/19 1622   Goal/Outcome Evaluation   Problems Assessed (Type 2 Diabetes) all   Problems Present (Type 2 Diabetes) none

## 2019-01-11 NOTE — PLAN OF CARE
Problem: Patient Care Overview  Goal: Plan of Care Review  Outcome: Ongoing (interventions implemented as appropriate)   01/10/19 1420 01/10/19 2000   Coping/Psychosocial   Plan of Care Reviewed With --  patient   Plan of Care Review   Progress improving --    OTHER   Outcome Summary Pt. performed all therapeutic activities with MAX A and aristeo. Cont. per POC. --      Goal: Discharge Needs Assessment  Outcome: Ongoing (interventions implemented as appropriate)   01/07/19 1337   Discharge Needs Assessment   Patient/Family Anticipates Transition to home   Patient/Family Anticipated Services at Transition skilled nursing   Transportation Concerns other (see comments)   Transportation Anticipated family or friend will provide   Equipment Needed After Discharge none   Disability   Equipment Currently Used at Home hospital bed;bath bench;walker, rolling     Goal: Interprofessional Rounds/Family Conf  Outcome: Ongoing (interventions implemented as appropriate)   01/09/19 2117   Interdisciplinary Rounds/Family Conf   Participants nursing       Problem: Fall Risk (Adult)  Goal: Identify Related Risk Factors and Signs and Symptoms  Outcome: Ongoing (interventions implemented as appropriate)   01/07/19 0424   Fall Risk (Adult)   Related Risk Factors (Fall Risk) age-related changes;bladder function altered;confusion/agitation;history of falls;gait/mobility problems;environment unfamiliar   Signs and Symptoms (Fall Risk) presence of risk factors     Goal: Absence of Fall  Outcome: Ongoing (interventions implemented as appropriate)   01/09/19 1013   Fall Risk (Adult)   Absence of Fall making progress toward outcome       Problem: Skin Injury Risk (Adult)  Goal: Identify Related Risk Factors and Signs and Symptoms  Outcome: Ongoing (interventions implemented as appropriate)   01/07/19 0424   Skin Injury Risk (Adult)   Related Risk Factors (Skin Injury Risk) advanced age;cognitive impairment;mobility impaired;moisture;nutritional  deficiencies     Goal: Skin Health and Integrity  Outcome: Ongoing (interventions implemented as appropriate)   01/09/19 1013   Skin Injury Risk (Adult)   Skin Health and Integrity making progress toward outcome       Problem: Wound (Includes Pressure Injury) (Adult)  Goal: Signs and Symptoms of Listed Potential Problems Will be Absent, Minimized or Managed (Wound)  Outcome: Ongoing (interventions implemented as appropriate)   01/08/19 1622   Goal/Outcome Evaluation   Problems Assessed (Wound) all   Problems Present (Wound) delayed wound healing       Problem: Cognitive Impairment (Dementia Signs/Symptoms) (Adult)  Goal: Optimized Cognitive Function (Dementia Signs/Symptoms)  Outcome: Ongoing (interventions implemented as appropriate)   01/08/19 1622   Optimized Cognitive Function (Dementia Signs/Symptoms)   Optimized Cognitive Ability Action Step (STG) Outcome making progress toward outcome       Problem: Self-Care Deficit (Adult,Obstetrics,Pediatric)  Goal: Identify Related Risk Factors and Signs and Symptoms  Outcome: Ongoing (interventions implemented as appropriate)   01/07/19 0424 01/08/19 1622   Self-Care Deficit (Adult,Obstetrics,Pediatric)   Related Risk Factors (Self-Care Deficit) --  activity intolerance   Signs and Symptoms (Self-Care Deficit) cognitive changes;decreased functional activity tolerance;range of motion decreased;inability/unwillingness to perform self-care;unable to perform bathing/hygiene tasks;unable to perform feeding tasks;unable to perform toileting/toilet hygiene tasks --      Goal: Improved Ability to Perform BADL and IADL  Outcome: Ongoing (interventions implemented as appropriate)   01/09/19 1013   Self-Care Deficit (Adult,Obstetrics,Pediatric)   Improved Ability to Perform BADL and IADL making progress toward outcome       Problem: Diabetes, Type 2 (Adult)  Goal: Signs and Symptoms of Listed Potential Problems Will be Absent, Minimized or Managed (Diabetes, Type 2)  Outcome:  Ongoing (interventions implemented as appropriate)   01/08/19 1622   Goal/Outcome Evaluation   Problems Assessed (Type 2 Diabetes) all   Problems Present (Type 2 Diabetes) none       Problem: Functional Mobility Impairment (IRF) (Adult)  Goal: Optimal/Safe Level of [place] with Mobility  Outcome: Ongoing (interventions implemented as appropriate)   01/09/19 2117   Functional Mobility Impairment (IRF) (Adult)   Optimal/Safe Level of [place] with Mobility progress not adequate

## 2019-01-11 NOTE — DISCHARGE PLACEMENT REQUEST
"Jeanna Flower (78 y.o. Female)     Date of Birth Social Security Number Address Home Phone MRN    1940  [address] [place] [id number] [phone] [id number]    [employer] Mar[employer] Status          Non-Hoahaoism Single       Admission Date Admission Type Admitting Provider Attending Provider Department, Room/Bed    1/6/19 Emergency Enoc Medel MD Troxell, Christopher A, DO [address], 3327/1P    Discharge Date Discharge Disposition Discharge Destination         Skilled Nursing Facility (DC - External)              Attending Provider:  Chalino Quach DO    Allergies:  Bactrim [Sulfamethoxazole-trimethoprim], Codeine, Penicillins    Isolation:  None   Infection:  None   Code Status:  No CPR    Ht:  160 cm (62.99\")   Wt:  76.8 kg (169 lb 6.4 oz)    Admission Cmt:  None   Principal Problem:  Severe sepsis (CMS/[facility]) [A41.9,R65.20]                 Active Insurance as of 1/6/2019     Primary Coverage     Payor Plan Insurance Group Employer/Plan Group    MEDICARE MEDICARE A & B      Payor Plan Address Payor Plan Phone Number Payor Plan Fax Number Effective Dates    [address] [phone]  1/1/2006 - None Entered    [facility] [address]       Subscriber Name Subscriber Birth Date Member ID       JEANNA FLOWER 1940 [id number]           Secondary Coverage     Payor Plan Insurance Group Employer/Plan Group    [place]      Payor Plan Address Payor Plan Phone Number Payor Plan Fax Number Effective Dates    [address] [phone]  10/17/2017 - None Entered    [facility] [id number]       Subscriber Name Subscriber Birth Date Member ID       JEANNA FLOWER 1940 [id number]                 Emergency Contacts      (Rel.) Home Phone Work Phone Mobile Phone    Ernestina Chamberlain (Power of ) [phone] -- --              Discharge Summary     No notes of this type exist for this encounter.        Discharge Order (From " admission, onward)    Start     Ordered    01/11/19 1520  Discharge patient  Once     Expected Discharge Date:  01/11/19    Discharge Disposition:  Skilled Nursing Facility (DC - External)    Physician of Record for Attribution - Please select from Treatment Team:  SABA BARNETT [[id number]]    Review needed by CMO to determine Physician of Record:  No       Question Answer Comment   Physician of Record for Attribution - Please select from Treatment Team SAAB BARNETT    Review needed by CMO to determine Physician of Record No        01/11/19 1526        After Visit Summary     Jeanna Flower MRN: [id number]      Severe sepsis     1/7/2019 - 1/11/2019     [address]    Your Next Steps     Destination     [facility]   Skilled Nursing   [address] [facility] [address]   [phone]    Ask     ·   Ask how to get these medications   ? CeFAZolin Sodium-Dextrose   ? lactobacillus acidophilus   ? QUEtiapine   Do     ·    these medications from any pharmacy with your printed prescription   ? HYDROcodone-acetaminophen   Read     ·   Read these attachments   ? Sepsis  Adult (English)   Go     Jan 14 New Patient 1:00 PM   AMELIE Arreola   [facility] PULMONOLOGY CRITICAL CARE   [address] [facility] [address]   [facility] [address] [phone]   New patient must bring a list of current medication and arrive 30 minutes early to allow for a longer registration and triage process.    You have more future appointments. Please review your full appointment list.   After Visit Summary   Instructions     ·   Your medications have changed     START taking:   ? CeFAZolin Sodium-Dextrose (ANCEF)   ? HYDROcodone-acetaminophen (NORCO)   ? lactobacillus acidophilus   ? QUEtiapine (SEROquel)   STOP taking:   ? amantadine 50 MG/5ML solution (SYMMETREL)   ? atorvastatin 40 MG tablet (LIPITOR)   ? dantrolene 25 MG capsule (DANTRIUM)   ? glipiZIDE 2.5 MG 24 hr tablet  (GLUCOTROL XL)   ? loratadine 10 MG tablet (CLARITIN)   ? losartan 100 MG tablet (COZAAR)   ? melatonin 3 MG tablet   ? metoprolol tartrate 25 MG tablet (LOPRESSOR)   Review your updated medication list below.   Your Next Steps   Destination   Ask   Do   Read   Go       If you have any questions about your recovery, please call the [facility] Nurse Call Center at [phone]. A registered nurse is available 24 hours a day 7 days a week to assist you.   ·   Other instructions     Discharge Follow-up with PCP   Currently Documented PCP:   Adela Mathew MD   PCP Phone Number:   [phone]   Discharge Follow-up with PCP   Currently Documented PCP:   Adela Mathew MD   PCP Phone Number:   [phone]   What's next     What's next          Follow up with Adela Mathew MD   Follow up with PCP at the [facility].  [address]   [facility] [address]   [place] [address]   [phone]          Follow up with Adela Mathew MD   Follow up with Dr. Robert at the [facility].  [address]   [facility] [address]   [place] [address]   [phone]    Jan 14 New Patient with AMELIE Arreola   Monday Jan 14, 2019 1:00 PM   New patient must bring a list of current medication and arrive 30 minutes early to allow for a longer registration and triage process.  [facility] PULMONOLOGY CRITICAL CARE   [address] [facility] [address]  [facility] [address]   [phone]    Feb 28 Follow Up with Ta Garcia PA-C   Thursday Feb 28, 2019 3:00 PM   Arrive 15 minutes prior to appointment.  [facility] CARDIOLOGY   [address]  [facility] [address]   [phone]    Continuing Care     ·   Destination     [facility]   Skilled Nursing   [address], [facility] [address]   Phone: [phone]   Follow up   Your Allergies   Date Reviewed: 1/9/2019   Your Allergies   Allergen Reactions   Bactrim (Sulfamethoxazole-Trimethoprim) Not Noted       Codeine Not Noted       Penicillins Not Noted   Patient  Belongings Returned     Document Return of Belongings Flowsheet     Were the patient bedside belongings sent home? --   Medications Retrieved from Pharmacy & Sent Home --   Belongings Sent to Safe --   Belongings sent with: --   Belongings Retrieved from Security & Sent Home --       Umbelhart Signup     [facility] [employer] allows you to send messages to your doctor, view your test results, renew your prescriptions, schedule appointments, and more. To sign up, go to [employer] and click on the Sign Up Now link in the New User? box. Enter your [employer] Activation Code exactly as it appears below along with the last four digits of your Social Security Number and your Date of Birth () to complete the sign-up process. If you do not sign up before the expiration date, you must request a new code.     [employer] Activation Code: [email or web address]  Expires: 2019  8:05 AM     If you have questions, you can email [email or web address] or call [phone] to talk to our [employer] staff. Remember, [employer] is NOT to be used for urgent needs. For medical emergencies, dial 911.     Medication List   Medication List     Morning Afternoon Evening Bedtime As Needed    ADVAIR DISKUS 250-50 MCG/DOSE DISKUS   Inhale 1 puff 2 (Two) Times a Day.   Generic drug: fluticasone-salmeterol          albuterol sulfate  (90 Base) MCG/ACT inhaler   Commonly known as: PROVENTIL HFA;VENTOLIN HFA;PROAIR HFA   Inhale 2 puffs Every 4 (Four) Hours As Needed for Wheezing or Shortness of Air.          apixaban 5 MG tablet tablet   Commonly known as: ELIQUIS   Take 1 tablet by mouth Every 12 (Twelve) Hours.          budesonide 0.5 MG/2ML nebulizer solution   Commonly known as: PULMICORT   Take 0.5 mg by nebulization 2 (Two) Times a Day.          castor oil-balsam peru ointment   Apply 1 each topically to the appropriate area as directed 2 (Two) Times a Day.          CeFAZolin Sodium-Dextrose 2-3 GM-%(50ML) IVPB   Commonly  known as: ANCEF   Infuse 2,000 mg into a venous catheter Every 8 (Eight) Hours for 10 days.   For: Bacteria in the Blood          cycloSPORINE 0.05 % ophthalmic emulsion   Commonly known as: RESTASIS   1 drop 2 (Two) Times a Day.          docusate sodium 150 MG/15ML liquid   Commonly known as: COLACE   100 mg by Per G Tube route 2 (Two) Times a Day.          famotidine 20 MG tablet   Commonly known as: PEPCID   20 mg by Per G Tube route 2 (Two) Times a Day.          fluticasone 50 MCG/ACT nasal spray   Commonly known as: FLONASE   2 sprays into the nostril(s) as directed by provider Daily.          HYDROcodone-acetaminophen 5-325 MG per tablet   Commonly known as: NORCO   Take 1 tablet by mouth Every 6 (Six) Hours As Needed for Moderate Pain .          lactobacillus acidophilus capsule capsule   Take 1 capsule by mouth Daily for 9 days.          levothyroxine 100 MCG tablet   Commonly known as: SYNTHROID, LEVOTHROID   Take 100 mcg by mouth Daily.          miconazole 2 % powder   Commonly known as: MICOTIN   Apply 1 application topically to the appropriate area as directed 3 (Three) Times a Day As Needed for Itching (skin folds).          montelukast 10 MG tablet   Commonly known as: SINGULAIR   Take 10 mg by mouth Every Night.          * QUEtiapine 25 MG tablet   Commonly known as: SEROquel   Take 1 tablet by mouth Every Night.          * QUEtiapine 25 MG tablet   Commonly known as: SEROquel   Take 1 tablet by mouth 2 (Two) Times a Day As Needed (Agitation, halllucinations).          venlafaxine 37.5 MG tablet   Commonly known as: EFFEXOR   37.5 mg by Per G Tube route 2 (Two) Times a Day.           After Visit Summary     Jeanna Flower MRN: [id number]      Severe sepsis     1/7/2019 - 1/11/2019     [address]    Your Next Steps     Destination     [facility]   Skilled Nursing   [facility] [address]   [facility] [id number] [phone]    Ask     ·   Ask how to get these medications    ? CeFAZolin Sodium-Dextrose   ? lactobacillus acidophilus   ? QUEtiapine   Do     ·    these medications from any pharmacy with your printed prescription   ? HYDROcodone-acetaminophen   Read     ·   Read these attachments   ? Sepsis  Adult (English)   Go     Jan 14 New Patient 1:00 PM   AMELIE Arreola   [facility] PULMONOLOGY CRITICAL CARE   [address] [facility] [address]   [facility] [address] [phone]   New patient must bring a list of current medication and arrive 30 minutes early to allow for a longer registration and triage process.    You have more future appointments. Please review your full appointment list.   After Visit Summary   Instructions     ·   Your medications have changed     START taking:   ? CeFAZolin Sodium-Dextrose (ANCEF)   ? HYDROcodone-acetaminophen (NORCO)   ? lactobacillus acidophilus   ? QUEtiapine (SEROquel)   STOP taking:   ? amantadine 50 MG/5ML solution (SYMMETREL)   ? atorvastatin 40 MG tablet (LIPITOR)   ? dantrolene 25 MG capsule (DANTRIUM)   ? glipiZIDE 2.5 MG 24 hr tablet (GLUCOTROL XL)   ? loratadine 10 MG tablet (CLARITIN)   ? losartan 100 MG tablet (COZAAR)   ? melatonin 3 MG tablet   ? metoprolol tartrate 25 MG tablet (LOPRESSOR)   Review your updated medication list below.   Your Next Steps   Destination   Ask   Do   Read   Go       If you have any questions about your recovery, please call the [facility] Nurse Call Center at [phone]. A registered nurse is available 24 hours a day 7 days a week to assist you.   ·   Other instructions     Discharge Follow-up with PCP   Currently Documented PCP:   Adela Mathew MD   PCP Phone Number:   [phone]   Discharge Follow-up with PCP   Currently Documented PCP:   Adela Mathew MD   PCP Phone Number:   [phone]   What's next     What's next          Follow up with Aedla Mathew MD   Follow up with PCP at the [facility].  [address]   [facility] [address]   [place] [address] [phone]           Follow up with Adela Mathew MD   Follow up with Dr. Robert at the SNF.  [address]   [facility] [address]   [facility] [address]   [phone]     New Patient with Cris LopezAMELIE    1:00 PM   New patient must bring a list of current medication and arrive 30 minutes early to allow for a longer registration and triage process.  [facility] PULMONOLOGY CRITICAL CARE   [address] [facility] [address]  [facility] [address]   [phone]     Follow Up with Ta Garcia PA-C    3:00 PM   Arrive 15 minutes prior to appointment.  [facility] CARDIOLOGY   [address]  [facility] [address] [phone]    Continuing Care     ·   Destination     [facility]   Skilled Nursing   [address], [facility] [address]   Phone: [phone]   Follow up   Your Allergies   Date Reviewed: 2019   Your Allergies   Allergen Reactions   Bactrim (Sulfamethoxazole-Trimethoprim) Not Noted       Codeine Not Noted       Penicillins Not Noted   Patient Belongings Returned     Document Return of Belongings Flowsheet     Were the patient bedside belongings sent home? --   Medications Retrieved from Pharmacy & Sent Home --   Belongings Sent to Safe --   Belongings sent with: --   Belongings Retrieved from Security & Sent Home --       MyChart Signup     [facility] allows you to send messages to your doctor, view your test results, renew your prescriptions, schedule appointments, and more. To sign up, go to [employer] and click on the Sign Up Now link in the New User? box. Enter your [employer] Activation Code exactly as it appears below along with the last four digits of your Social Security Number and your Date of Birth () to complete the sign-up process. If you do not sign up before the expiration date, you must request a new code.     [employer] Activation Code: [email or web address]  Expires: 2019  8:05 AM      If you have questions, you can email [email or web address] or call [phone] to talk to our MyChart staff. Remember, MyChart is NOT to be used for urgent needs. For medical emergencies, dial 911.     Medication List   Medication List     Morning Afternoon Evening Bedtime As Needed    ADVAIR DISKUS 250-50 MCG/DOSE DISKUS   Inhale 1 puff 2 (Two) Times a Day.   Generic drug: fluticasone-salmeterol          albuterol sulfate  (90 Base) MCG/ACT inhaler   Commonly known as: PROVENTIL HFA;VENTOLIN HFA;PROAIR HFA   Inhale 2 puffs Every 4 (Four) Hours As Needed for Wheezing or Shortness of Air.          apixaban 5 MG tablet tablet   Commonly known as: ELIQUIS   Take 1 tablet by mouth Every 12 (Twelve) Hours.          budesonide 0.5 MG/2ML nebulizer solution   Commonly known as: PULMICORT   Take 0.5 mg by nebulization 2 (Two) Times a Day.          castor oil-balsam peru ointment   Apply 1 each topically to the appropriate area as directed 2 (Two) Times a Day.          CeFAZolin Sodium-Dextrose 2-3 GM-%(50ML) IVPB   Commonly known as: ANCEF   Infuse 2,000 mg into a venous catheter Every 8 (Eight) Hours for 10 days.   For: Bacteria in the Blood          cycloSPORINE 0.05 % ophthalmic emulsion   Commonly known as: RESTASIS   1 drop 2 (Two) Times a Day.          docusate sodium 150 MG/15ML liquid   Commonly known as: COLACE   100 mg by Per G Tube route 2 (Two) Times a Day.          famotidine 20 MG tablet   Commonly known as: PEPCID   20 mg by Per G Tube route 2 (Two) Times a Day.          fluticasone 50 MCG/ACT nasal spray   Commonly known as: FLONASE   2 sprays into the nostril(s) as directed by provider Daily.          HYDROcodone-acetaminophen 5-325 MG per tablet   Commonly known as: NORCO   Take 1 tablet by mouth Every 6 (Six) Hours As Needed for Moderate Pain .          lactobacillus acidophilus capsule capsule   Take 1 capsule by mouth Daily for 9 days.          levothyroxine 100 MCG tablet   Commonly  known as: SYNTHROID, LEVOTHROID   Take 100 mcg by mouth Daily.          miconazole 2 % powder   Commonly known as: MICOTIN   Apply 1 application topically to the appropriate area as directed 3 (Three) Times a Day As Needed for Itching (skin folds).          montelukast 10 MG tablet   Commonly known as: SINGULAIR   Take 10 mg by mouth Every Night.          * QUEtiapine 25 MG tablet   Commonly known as: SEROquel   Take 1 tablet by mouth Every Night.          * QUEtiapine 25 MG tablet   Commonly known as: SEROquel   Take 1 tablet by mouth 2 (Two) Times a Day As Needed (Agitation, halllucinations).          venlafaxine 37.5 MG tablet   Commonly known as: EFFEXOR   37.5 mg by Per G Tube route 2 (Two) Times a Day.

## 2019-01-13 LAB
BACTERIA SPEC AEROBE CULT: NORMAL
BACTERIA SPEC AEROBE CULT: NORMAL

## 2019-01-14 ENCOUNTER — APPOINTMENT (OUTPATIENT)
Dept: GENERAL RADIOLOGY | Facility: HOSPITAL | Age: 79
End: 2019-01-14

## 2019-01-14 ENCOUNTER — HOSPITAL ENCOUNTER (INPATIENT)
Facility: HOSPITAL | Age: 79
LOS: 6 days | Discharge: NURSING, REHAB OR LONG-TERM CARE | End: 2019-01-21
Attending: EMERGENCY MEDICINE | Admitting: INTERNAL MEDICINE

## 2019-01-14 ENCOUNTER — HOSPITAL ENCOUNTER (EMERGENCY)
Facility: HOSPITAL | Age: 79
Discharge: HOME | End: 2019-01-14
Attending: EMERGENCY MEDICINE | Admitting: EMERGENCY MEDICINE

## 2019-01-14 ENCOUNTER — APPOINTMENT (OUTPATIENT)
Dept: CT IMAGING | Facility: HOSPITAL | Age: 79
End: 2019-01-14

## 2019-01-14 VITALS
SYSTOLIC BLOOD PRESSURE: 135 MMHG | HEIGHT: 65 IN | DIASTOLIC BLOOD PRESSURE: 78 MMHG | TEMPERATURE: 98.3 F | HEART RATE: 89 BPM | WEIGHT: 173 LBS | OXYGEN SATURATION: 99 % | RESPIRATION RATE: 19 BRPM | BODY MASS INDEX: 28.82 KG/M2

## 2019-01-14 DIAGNOSIS — K85.90 ACUTE PANCREATITIS, UNSPECIFIED COMPLICATION STATUS, UNSPECIFIED PANCREATITIS TYPE: Primary | ICD-10-CM

## 2019-01-14 DIAGNOSIS — N39.0 URINARY TRACT INFECTION WITHOUT HEMATURIA, SITE UNSPECIFIED: ICD-10-CM

## 2019-01-14 DIAGNOSIS — R06.00 DYSPNEA, UNSPECIFIED TYPE: Primary | ICD-10-CM

## 2019-01-14 LAB
A-A DO2: 25.2 MMHG (ref 0–300)
A-A DO2: 79.4 MMHG (ref 0–300)
ALBUMIN SERPL-MCNC: 2.7 G/DL (ref 3.4–4.8)
ALBUMIN SERPL-MCNC: 3 G/DL (ref 3.4–4.8)
ALBUMIN/GLOB SERPL: 0.8 G/DL (ref 1.5–2.5)
ALBUMIN/GLOB SERPL: 0.8 G/DL (ref 1.5–2.5)
ALP SERPL-CCNC: 169 U/L (ref 35–104)
ALP SERPL-CCNC: 503 U/L (ref 35–104)
ALT SERPL W P-5'-P-CCNC: 10 U/L (ref 10–36)
ALT SERPL W P-5'-P-CCNC: 104 U/L (ref 10–36)
ANION GAP SERPL CALCULATED.3IONS-SCNC: 13.5 MMOL/L (ref 3.6–11.2)
ANION GAP SERPL CALCULATED.3IONS-SCNC: 5.9 MMOL/L (ref 3.6–11.2)
ARTERIAL PATENCY WRIST A: POSITIVE
ARTERIAL PATENCY WRIST A: POSITIVE
AST SERPL-CCNC: 30 U/L (ref 10–30)
AST SERPL-CCNC: 514 U/L (ref 10–30)
ATMOSPHERIC PRESS: 733 MMHG
ATMOSPHERIC PRESS: 733 MMHG
BACTERIA UR QL AUTO: ABNORMAL /HPF
BASE EXCESS BLDA CALC-SCNC: -0.9 MMOL/L
BASE EXCESS BLDA CALC-SCNC: -5 MMOL/L
BASOPHILS # BLD AUTO: 0.03 10*3/MM3 (ref 0–0.3)
BASOPHILS # BLD AUTO: 0.09 10*3/MM3 (ref 0–0.3)
BASOPHILS NFR BLD AUTO: 0.3 % (ref 0–2)
BASOPHILS NFR BLD AUTO: 0.9 % (ref 0–2)
BDY SITE: ABNORMAL
BDY SITE: ABNORMAL
BILIRUB SERPL-MCNC: 0.3 MG/DL (ref 0.2–1.8)
BILIRUB SERPL-MCNC: 1.1 MG/DL (ref 0.2–1.8)
BILIRUB UR QL STRIP: NEGATIVE
BNP SERPL-MCNC: 41 PG/ML (ref 0–100)
BODY TEMPERATURE: 98.6 C
BODY TEMPERATURE: 98.6 C
BUN BLD-MCNC: 10 MG/DL (ref 7–21)
BUN BLD-MCNC: 13 MG/DL (ref 7–21)
BUN/CREAT SERPL: 13.7 (ref 7–25)
BUN/CREAT SERPL: 14 (ref 7–25)
CALCIUM SPEC-SCNC: 8.7 MG/DL (ref 7.7–10)
CALCIUM SPEC-SCNC: 8.8 MG/DL (ref 7.7–10)
CHLORIDE SERPL-SCNC: 108 MMOL/L (ref 99–112)
CHLORIDE SERPL-SCNC: 108 MMOL/L (ref 99–112)
CLARITY UR: CLEAR
CO2 SERPL-SCNC: 19.5 MMOL/L (ref 24.3–31.9)
CO2 SERPL-SCNC: 23.1 MMOL/L (ref 24.3–31.9)
COHGB MFR BLD: 0.7 % (ref 0–5)
COHGB MFR BLD: 1.1 % (ref 0–5)
COLOR UR: ABNORMAL
CREAT BLD-MCNC: 0.73 MG/DL (ref 0.43–1.29)
CREAT BLD-MCNC: 0.93 MG/DL (ref 0.43–1.29)
DEPRECATED RDW RBC AUTO: 63.4 FL (ref 37–54)
DEPRECATED RDW RBC AUTO: 64.8 FL (ref 37–54)
EOSINOPHIL # BLD AUTO: 0.08 10*3/MM3 (ref 0–0.7)
EOSINOPHIL # BLD AUTO: 0.36 10*3/MM3 (ref 0–0.7)
EOSINOPHIL NFR BLD AUTO: 0.7 % (ref 0–7)
EOSINOPHIL NFR BLD AUTO: 3.7 % (ref 0–7)
ERYTHROCYTE [DISTWIDTH] IN BLOOD BY AUTOMATED COUNT: 19.3 % (ref 11.5–14.5)
ERYTHROCYTE [DISTWIDTH] IN BLOOD BY AUTOMATED COUNT: 19.5 % (ref 11.5–14.5)
GFR SERPL CREATININE-BSD FRML MDRD: 58 ML/MIN/1.73
GFR SERPL CREATININE-BSD FRML MDRD: 77 ML/MIN/1.73
GLOBULIN UR ELPH-MCNC: 3.6 GM/DL
GLOBULIN UR ELPH-MCNC: 3.6 GM/DL
GLUCOSE BLD-MCNC: 129 MG/DL (ref 70–110)
GLUCOSE BLD-MCNC: 164 MG/DL (ref 70–110)
GLUCOSE UR STRIP-MCNC: NEGATIVE MG/DL
HCO3 BLDA-SCNC: 15.6 MMOL/L (ref 22–26)
HCO3 BLDA-SCNC: 21.4 MMOL/L (ref 22–26)
HCT VFR BLD AUTO: 35.8 % (ref 37–47)
HCT VFR BLD AUTO: 36 % (ref 37–47)
HCT VFR BLD CALC: 32 % (ref 37–47)
HCT VFR BLD CALC: 33 % (ref 37–47)
HGB BLD-MCNC: 10.7 G/DL (ref 12–16)
HGB BLD-MCNC: 11.1 G/DL (ref 12–16)
HGB BLDA-MCNC: 11 G/DL (ref 12–16)
HGB BLDA-MCNC: 11.2 G/DL (ref 12–16)
HGB UR QL STRIP.AUTO: ABNORMAL
HOROWITZ INDEX BLD+IHG-RTO: 21 %
HOROWITZ INDEX BLD+IHG-RTO: 28 %
HYALINE CASTS UR QL AUTO: ABNORMAL /LPF
IMM GRANULOCYTES # BLD AUTO: 0.07 10*3/MM3 (ref 0–0.03)
IMM GRANULOCYTES # BLD AUTO: 0.09 10*3/MM3 (ref 0–0.03)
IMM GRANULOCYTES NFR BLD AUTO: 0.7 % (ref 0–0.5)
IMM GRANULOCYTES NFR BLD AUTO: 0.8 % (ref 0–0.5)
KETONES UR QL STRIP: NEGATIVE
LEUKOCYTE ESTERASE UR QL STRIP.AUTO: NEGATIVE
LIPASE SERPL-CCNC: 2489 U/L (ref 13–60)
LYMPHOCYTES # BLD AUTO: 2.04 10*3/MM3 (ref 1–3)
LYMPHOCYTES # BLD AUTO: 4.82 10*3/MM3 (ref 1–3)
LYMPHOCYTES NFR BLD AUTO: 17.6 % (ref 16–46)
LYMPHOCYTES NFR BLD AUTO: 49.8 % (ref 16–46)
MCH RBC QN AUTO: 27.9 PG (ref 27–33)
MCH RBC QN AUTO: 28.7 PG (ref 27–33)
MCHC RBC AUTO-ENTMCNC: 29.7 G/DL (ref 33–37)
MCHC RBC AUTO-ENTMCNC: 31 G/DL (ref 33–37)
MCV RBC AUTO: 92.5 FL (ref 80–94)
MCV RBC AUTO: 94 FL (ref 80–94)
METHGB BLD QL: 0.3 % (ref 0–3)
METHGB BLD QL: 0.3 % (ref 0–3)
MODALITY: ABNORMAL
MODALITY: ABNORMAL
MONOCYTES # BLD AUTO: 0.36 10*3/MM3 (ref 0.1–0.9)
MONOCYTES # BLD AUTO: 1.29 10*3/MM3 (ref 0.1–0.9)
MONOCYTES NFR BLD AUTO: 13.3 % (ref 0–12)
MONOCYTES NFR BLD AUTO: 3.1 % (ref 0–12)
NEUTROPHILS # BLD AUTO: 3.05 10*3/MM3 (ref 1.4–6.5)
NEUTROPHILS # BLD AUTO: 8.96 10*3/MM3 (ref 1.4–6.5)
NEUTROPHILS NFR BLD AUTO: 31.6 % (ref 40–75)
NEUTROPHILS NFR BLD AUTO: 77.5 % (ref 40–75)
NITRITE UR QL STRIP: NEGATIVE
NOTE: ABNORMAL
OSMOLALITY SERPL CALC.SUM OF ELEC: 274.6 MOSM/KG (ref 273–305)
OSMOLALITY SERPL CALC.SUM OF ELEC: 285 MOSM/KG (ref 273–305)
OXYHGB MFR BLDV: 95.2 % (ref 85–100)
OXYHGB MFR BLDV: 96.1 % (ref 85–100)
PCO2 BLDA: 18.7 MM HG (ref 35–45)
PCO2 BLDA: 28.2 MM HG (ref 35–45)
PCO2 TEMP ADJ BLD: ABNORMAL MM HG (ref 35–45)
PH BLDA: 7.5 PH UNITS (ref 7.35–7.45)
PH BLDA: 7.54 PH UNITS (ref 7.35–7.45)
PH UR STRIP.AUTO: 7 [PH] (ref 5–8)
PH, TEMP CORRECTED: ABNORMAL PH UNITS (ref 7.35–7.45)
PLATELET # BLD AUTO: 299 10*3/MM3 (ref 130–400)
PLATELET # BLD AUTO: 363 10*3/MM3 (ref 130–400)
PMV BLD AUTO: 9.2 FL (ref 6–10)
PMV BLD AUTO: 9.8 FL (ref 6–10)
PO2 BLDA: 85.1 MM HG (ref 80–100)
PO2 BLDA: 90.6 MM HG (ref 80–100)
PO2 TEMP ADJ BLD: ABNORMAL MM HG (ref 83–108)
POTASSIUM BLD-SCNC: 3.7 MMOL/L (ref 3.5–5.3)
POTASSIUM BLD-SCNC: 5 MMOL/L (ref 3.5–5.3)
PROT SERPL-MCNC: 6.3 G/DL (ref 6–8)
PROT SERPL-MCNC: 6.6 G/DL (ref 6–8)
PROT UR QL STRIP: ABNORMAL
RBC # BLD AUTO: 3.83 10*6/MM3 (ref 4.2–5.4)
RBC # BLD AUTO: 3.87 10*6/MM3 (ref 4.2–5.4)
RBC # UR: ABNORMAL /HPF
REF LAB TEST METHOD: ABNORMAL
SAO2 % BLDCOA: 96.2 % (ref 90–100)
SAO2 % BLDCOA: 97.5 % (ref 90–100)
SODIUM BLD-SCNC: 137 MMOL/L (ref 135–153)
SODIUM BLD-SCNC: 141 MMOL/L (ref 135–153)
SP GR UR STRIP: 1.01 (ref 1–1.03)
SQUAMOUS #/AREA URNS HPF: ABNORMAL /HPF
TROPONIN I SERPL-MCNC: 0.01 NG/ML
TROPONIN I SERPL-MCNC: <0.006 NG/ML
UROBILINOGEN UR QL STRIP: ABNORMAL
WBC NRBC COR # BLD: 11.56 10*3/MM3 (ref 4.5–12.5)
WBC NRBC COR # BLD: 9.68 10*3/MM3 (ref 4.5–12.5)
WBC UR QL AUTO: ABNORMAL /HPF

## 2019-01-14 PROCEDURE — 83050 HGB METHEMOGLOBIN QUAN: CPT | Performed by: EMERGENCY MEDICINE

## 2019-01-14 PROCEDURE — 36415 COLL VENOUS BLD VENIPUNCTURE: CPT

## 2019-01-14 PROCEDURE — 36600 WITHDRAWAL OF ARTERIAL BLOOD: CPT | Performed by: PHYSICIAN ASSISTANT

## 2019-01-14 PROCEDURE — 93010 ELECTROCARDIOGRAM REPORT: CPT | Performed by: INTERNAL MEDICINE

## 2019-01-14 PROCEDURE — 84484 ASSAY OF TROPONIN QUANT: CPT | Performed by: EMERGENCY MEDICINE

## 2019-01-14 PROCEDURE — 85025 COMPLETE CBC W/AUTO DIFF WBC: CPT | Performed by: PHYSICIAN ASSISTANT

## 2019-01-14 PROCEDURE — 83690 ASSAY OF LIPASE: CPT | Performed by: EMERGENCY MEDICINE

## 2019-01-14 PROCEDURE — 36600 WITHDRAWAL OF ARTERIAL BLOOD: CPT | Performed by: EMERGENCY MEDICINE

## 2019-01-14 PROCEDURE — 71045 X-RAY EXAM CHEST 1 VIEW: CPT

## 2019-01-14 PROCEDURE — 85025 COMPLETE CBC W/AUTO DIFF WBC: CPT | Performed by: EMERGENCY MEDICINE

## 2019-01-14 PROCEDURE — 82375 ASSAY CARBOXYHB QUANT: CPT | Performed by: EMERGENCY MEDICINE

## 2019-01-14 PROCEDURE — 83050 HGB METHEMOGLOBIN QUAN: CPT | Performed by: PHYSICIAN ASSISTANT

## 2019-01-14 PROCEDURE — 83880 ASSAY OF NATRIURETIC PEPTIDE: CPT | Performed by: PHYSICIAN ASSISTANT

## 2019-01-14 PROCEDURE — 93005 ELECTROCARDIOGRAM TRACING: CPT | Performed by: EMERGENCY MEDICINE

## 2019-01-14 PROCEDURE — 80053 COMPREHEN METABOLIC PANEL: CPT | Performed by: PHYSICIAN ASSISTANT

## 2019-01-14 PROCEDURE — 36415 COLL VENOUS BLD VENIPUNCTURE: CPT | Performed by: PHYSICIAN ASSISTANT

## 2019-01-14 PROCEDURE — 80053 COMPREHEN METABOLIC PANEL: CPT | Performed by: EMERGENCY MEDICINE

## 2019-01-14 PROCEDURE — 81001 URINALYSIS AUTO W/SCOPE: CPT | Performed by: EMERGENCY MEDICINE

## 2019-01-14 PROCEDURE — 71045 X-RAY EXAM CHEST 1 VIEW: CPT | Performed by: RADIOLOGY

## 2019-01-14 PROCEDURE — 82805 BLOOD GASES W/O2 SATURATION: CPT | Performed by: EMERGENCY MEDICINE

## 2019-01-14 PROCEDURE — 99285 EMERGENCY DEPT VISIT HI MDM: CPT

## 2019-01-14 PROCEDURE — 82375 ASSAY CARBOXYHB QUANT: CPT | Performed by: PHYSICIAN ASSISTANT

## 2019-01-14 PROCEDURE — 84484 ASSAY OF TROPONIN QUANT: CPT | Performed by: PHYSICIAN ASSISTANT

## 2019-01-14 PROCEDURE — 82805 BLOOD GASES W/O2 SATURATION: CPT | Performed by: PHYSICIAN ASSISTANT

## 2019-01-14 PROCEDURE — 93005 ELECTROCARDIOGRAM TRACING: CPT | Performed by: PHYSICIAN ASSISTANT

## 2019-01-14 PROCEDURE — 99283 EMERGENCY DEPT VISIT LOW MDM: CPT

## 2019-01-14 RX ORDER — SODIUM CHLORIDE 0.9 % (FLUSH) 0.9 %
10 SYRINGE (ML) INJECTION AS NEEDED
Status: DISCONTINUED | OUTPATIENT
Start: 2019-01-14 | End: 2019-01-14 | Stop reason: HOSPADM

## 2019-01-14 RX ORDER — SODIUM CHLORIDE 0.9 % (FLUSH) 0.9 %
10 SYRINGE (ML) INJECTION AS NEEDED
Status: DISCONTINUED | OUTPATIENT
Start: 2019-01-14 | End: 2019-01-21 | Stop reason: HOSPADM

## 2019-01-14 NOTE — ED NOTES
Called [facility] dispatch for a transport back to the [facility]     Fore, Brady  01/1940

## 2019-01-14 NOTE — ED NOTES
Consent for treatment taken via telephone from Ernestina, patient granddaughter/POA.      Елена Kc RN  01/14/19 2092

## 2019-01-14 NOTE — ED PROVIDER NOTES
Subjective   78-year-old female presents to the ED today from [facility] and [facility] for possible shortness of breath.  The nursing home states that the patient had O2 saturations in the 70s today.  They state she did not appear to be in any distress.  The patient currently denies any chest pain or shortness breath.  The patient was recently discharged from the hospital due to sepsis from a UTI.  Currently the patient is on room air with a sat of 98%.  She is in no distress.        History provided by:  Nursing home  Shortness of Breath   Severity:  Mild  Onset quality:  Sudden  Duration: earlier today.  Timing:  Sporadic  Progression:  Resolved  Chronicity:  New  Relieved by:  Nothing  Worsened by:  Nothing  Associated symptoms: no chest pain and no wheezing        Review of Systems   Constitutional: Negative.    HENT: Negative.    Eyes: Negative.    Respiratory: Positive for shortness of breath. Negative for wheezing.    Cardiovascular: Negative for chest pain and leg swelling.   Gastrointestinal: Negative.    Genitourinary: Negative.    Musculoskeletal: Negative.    Skin: Negative.    Neurological: Negative.    Psychiatric/Behavioral: Negative.    All other systems reviewed and are negative.      Past Medical History:   Diagnosis Date   • Anemia    • Closed wedge compression fracture of first lumbar vertebra (CMS/[facility])    • COPD (chronic obstructive pulmonary disease) (CMS/[facility])    • CVA (cerebral vascular accident) with left hemiparesis. 12/6/2017   • Dementia    • Diabetes mellitus (CMS/[facility])    • Dysarthria following cerebral infarction    • Dysphagia, oropharyngeal phase    • Dysphagia, pharyngoesophageal phase    • GERD (gastroesophageal reflux disease)    • Heart disease    • Hyperlipidemia    • Hypertension    • Hypothyroidism    • Insomnia    • Obesity    • Other sleep apnea 11/13/2018   • Paroxysmal atrial fibrillation (CMS/[facility]) 12/6/2017       Allergies   Allergen Reactions   • Bactrim  [Sulfamethoxazole-Trimethoprim]    • Codeine    • Penicillins        Past Surgical History:   Procedure Laterality Date   • APPENDECTOMY     • BLADDER SURGERY     • CARDIAC CATHETERIZATION     • CHOLECYSTECTOMY         Family History   Problem Relation Age of Onset   • Heart disease Mother    • Heart attack Mother    • Heart disease Father    • Heart attack Father    • Heart attack Brother    • Heart attack Brother        Social History     Socioeconomic History   • Marital status: Single     Spouse name: Not on file   • Number of children: Not on file   • Years of education: Not on file   • Highest education level: Not on file   Tobacco Use   • Smoking status: Never Smoker   • Smokeless tobacco: Never Used   Substance and Sexual Activity   • Alcohol use: No   • Drug use: No   • Sexual activity: Defer           Objective   Physical Exam   Constitutional: She appears well-developed and well-nourished. No distress.   HENT:   Head: Normocephalic and atraumatic.   Right Ear: External ear normal.   Left Ear: External ear normal.   Nose: Nose normal.   Mouth/Throat: Oropharynx is clear and moist.   Eyes: Conjunctivae and EOM are normal. Pupils are equal, round, and reactive to light.   Neck: Normal range of motion. Neck supple.   Cardiovascular: Normal rate, regular rhythm, normal heart sounds and intact distal pulses.   Pulmonary/Chest: Effort normal and breath sounds normal. No respiratory distress. She has no wheezes. She exhibits no tenderness.   Abdominal: Soft. Bowel sounds are normal. There is no tenderness.   Musculoskeletal: Normal range of motion. She exhibits no edema.   Neurological: She is alert.   Skin: Skin is warm and dry. Capillary refill takes less than 2 seconds.   Nursing note and vitals reviewed.      Procedures           ED Course  ED Course as of Jan 14 1753 Mon Jan 14, 2019   1210 11:05 - sinus rhythm, rate of 99, nonspecific ST-T wave abnormality, baseline artifact, no acute ischemia, reviewed  by Dr. Lopez ECG 12 Lead []   1320 Patient's ED workup is unremarkable, she has never been in any distress.  Her O2 sat has remained in the upper 90's for her entire ED stay.  She denies any complaints at this time.  She will be discharged back to [facility] and will follow up as needed.  [AH]      ED Course User Index  [AH] Saadia Kessler PA                  MDM  Number of Diagnoses or Management Options  Dyspnea, unspecified type:      Amount and/or Complexity of Data Reviewed  Clinical lab tests: reviewed  Tests in the radiology section of CPT®: reviewed  Tests in the medicine section of CPT®: reviewed    Patient Progress  Patient progress: resolved        Final diagnoses:   Dyspnea, unspecified type            Saadia Kessler PA  01/14/19 3170

## 2019-01-15 ENCOUNTER — APPOINTMENT (OUTPATIENT)
Dept: CT IMAGING | Facility: HOSPITAL | Age: 79
End: 2019-01-15

## 2019-01-15 PROBLEM — K85.90 ACUTE PANCREATITIS: Status: ACTIVE | Noted: 2019-01-15

## 2019-01-15 LAB
A-A DO2: 22 MMHG (ref 0–300)
ALBUMIN SERPL-MCNC: 2.8 G/DL (ref 3.4–4.8)
ALBUMIN/GLOB SERPL: 0.9 G/DL (ref 1.5–2.5)
ALP SERPL-CCNC: 379 U/L (ref 35–104)
ALT SERPL W P-5'-P-CCNC: 84 U/L (ref 10–36)
ANION GAP SERPL CALCULATED.3IONS-SCNC: 5.9 MMOL/L (ref 3.6–11.2)
ARTERIAL PATENCY WRIST A: ABNORMAL
AST SERPL-CCNC: 223 U/L (ref 10–30)
ATMOSPHERIC PRESS: 733 MMHG
BACTERIA BLD CULT: ABNORMAL
BASE EXCESS BLDA CALC-SCNC: -3.6 MMOL/L
BASOPHILS # BLD AUTO: 0.06 10*3/MM3 (ref 0–0.3)
BASOPHILS NFR BLD AUTO: 0.4 % (ref 0–2)
BDY SITE: ABNORMAL
BILIRUB SERPL-MCNC: 0.5 MG/DL (ref 0.2–1.8)
BODY TEMPERATURE: 98.6 C
BUN BLD-MCNC: 16 MG/DL (ref 7–21)
BUN/CREAT SERPL: 21.9 (ref 7–25)
CALCIUM SPEC-SCNC: 8 MG/DL (ref 7.7–10)
CHLORIDE SERPL-SCNC: 113 MMOL/L (ref 99–112)
CO2 SERPL-SCNC: 22.1 MMOL/L (ref 24.3–31.9)
COHGB MFR BLD: 0.7 % (ref 0–5)
CREAT BLD-MCNC: 0.73 MG/DL (ref 0.43–1.29)
D-LACTATE SERPL-SCNC: 1.2 MMOL/L (ref 0.5–2)
D-LACTATE SERPL-SCNC: 3.1 MMOL/L (ref 0.5–2)
D-LACTATE SERPL-SCNC: 4.2 MMOL/L (ref 0.5–2)
DEPRECATED RDW RBC AUTO: 62.6 FL (ref 37–54)
EOSINOPHIL # BLD AUTO: 0.12 10*3/MM3 (ref 0–0.7)
EOSINOPHIL NFR BLD AUTO: 0.7 % (ref 0–7)
ERYTHROCYTE [DISTWIDTH] IN BLOOD BY AUTOMATED COUNT: 19.6 % (ref 11.5–14.5)
GFR SERPL CREATININE-BSD FRML MDRD: 77 ML/MIN/1.73
GLOBULIN UR ELPH-MCNC: 3.2 GM/DL
GLUCOSE BLD-MCNC: 97 MG/DL (ref 70–110)
GLUCOSE BLDC GLUCOMTR-MCNC: 111 MG/DL (ref 70–130)
HBA1C MFR BLD: 5.9 % (ref 4.5–5.7)
HCO3 BLDA-SCNC: 19.2 MMOL/L (ref 22–26)
HCT VFR BLD AUTO: 31.4 % (ref 37–47)
HCT VFR BLD CALC: 29 % (ref 37–47)
HGB BLD-MCNC: 9.3 G/DL (ref 12–16)
HGB BLDA-MCNC: 9.9 G/DL (ref 12–16)
HOLD SPECIMEN: NORMAL
HOROWITZ INDEX BLD+IHG-RTO: 21 %
IMM GRANULOCYTES # BLD AUTO: 0.05 10*3/MM3 (ref 0–0.03)
IMM GRANULOCYTES NFR BLD AUTO: 0.3 % (ref 0–0.5)
LIPASE SERPL-CCNC: 91 U/L (ref 13–60)
LYMPHOCYTES # BLD AUTO: 6.11 10*3/MM3 (ref 1–3)
LYMPHOCYTES NFR BLD AUTO: 36 % (ref 16–46)
MCH RBC QN AUTO: 27.3 PG (ref 27–33)
MCHC RBC AUTO-ENTMCNC: 29.6 G/DL (ref 33–37)
MCV RBC AUTO: 92.1 FL (ref 80–94)
METHGB BLD QL: 0.3 % (ref 0–3)
MODALITY: ABNORMAL
MONOCYTES # BLD AUTO: 1.88 10*3/MM3 (ref 0.1–0.9)
MONOCYTES NFR BLD AUTO: 11.1 % (ref 0–12)
NEUTROPHILS # BLD AUTO: 8.74 10*3/MM3 (ref 1.4–6.5)
NEUTROPHILS NFR BLD AUTO: 51.5 % (ref 40–75)
OSMOLALITY SERPL CALC.SUM OF ELEC: 282.4 MOSM/KG (ref 273–305)
OXYHGB MFR BLDV: 95 % (ref 85–100)
PCO2 BLDA: 27.4 MM HG (ref 35–45)
PH BLDA: 7.46 PH UNITS (ref 7.35–7.45)
PLATELET # BLD AUTO: 329 10*3/MM3 (ref 130–400)
PMV BLD AUTO: 9.3 FL (ref 6–10)
PO2 BLDA: 89.3 MM HG (ref 80–100)
POTASSIUM BLD-SCNC: 4 MMOL/L (ref 3.5–5.3)
PROT SERPL-MCNC: 6 G/DL (ref 6–8)
RBC # BLD AUTO: 3.41 10*6/MM3 (ref 4.2–5.4)
SAO2 % BLDCOA: 96 % (ref 90–100)
SODIUM BLD-SCNC: 141 MMOL/L (ref 135–153)
TRIGL SERPL-MCNC: 82 MG/DL (ref 0–150)
TROPONIN I SERPL-MCNC: 0.05 NG/ML
TROPONIN I SERPL-MCNC: 0.05 NG/ML
TSH SERPL DL<=0.05 MIU/L-ACNC: 6.47 MIU/ML (ref 0.55–4.78)
WBC NRBC COR # BLD: 16.96 10*3/MM3 (ref 4.5–12.5)

## 2019-01-15 PROCEDURE — 99223 1ST HOSP IP/OBS HIGH 75: CPT | Performed by: INTERNAL MEDICINE

## 2019-01-15 PROCEDURE — 36415 COLL VENOUS BLD VENIPUNCTURE: CPT | Performed by: EMERGENCY MEDICINE

## 2019-01-15 PROCEDURE — 82375 ASSAY CARBOXYHB QUANT: CPT | Performed by: EMERGENCY MEDICINE

## 2019-01-15 PROCEDURE — 25010000002 HALOPERIDOL LACTATE PER 5 MG: Performed by: NURSE PRACTITIONER

## 2019-01-15 PROCEDURE — 74177 CT ABD & PELVIS W/CONTRAST: CPT | Performed by: RADIOLOGY

## 2019-01-15 PROCEDURE — 51702 INSERT TEMP BLADDER CATH: CPT

## 2019-01-15 PROCEDURE — 25010000002 VANCOMYCIN 5 G RECONSTITUTED SOLUTION 5,000 MG VIAL: Performed by: EMERGENCY MEDICINE

## 2019-01-15 PROCEDURE — 93010 ELECTROCARDIOGRAM REPORT: CPT | Performed by: INTERNAL MEDICINE

## 2019-01-15 PROCEDURE — 83605 ASSAY OF LACTIC ACID: CPT | Performed by: EMERGENCY MEDICINE

## 2019-01-15 PROCEDURE — 80053 COMPREHEN METABOLIC PANEL: CPT | Performed by: EMERGENCY MEDICINE

## 2019-01-15 PROCEDURE — 25010000002 IOPAMIDOL 61 % SOLUTION: Performed by: EMERGENCY MEDICINE

## 2019-01-15 PROCEDURE — 87186 SC STD MICRODIL/AGAR DIL: CPT | Performed by: EMERGENCY MEDICINE

## 2019-01-15 PROCEDURE — 83690 ASSAY OF LIPASE: CPT | Performed by: EMERGENCY MEDICINE

## 2019-01-15 PROCEDURE — 25010000002 ENOXAPARIN PER 10 MG: Performed by: NURSE PRACTITIONER

## 2019-01-15 PROCEDURE — 36600 WITHDRAWAL OF ARTERIAL BLOOD: CPT | Performed by: EMERGENCY MEDICINE

## 2019-01-15 PROCEDURE — 87040 BLOOD CULTURE FOR BACTERIA: CPT | Performed by: EMERGENCY MEDICINE

## 2019-01-15 PROCEDURE — 94640 AIRWAY INHALATION TREATMENT: CPT

## 2019-01-15 PROCEDURE — 84443 ASSAY THYROID STIM HORMONE: CPT | Performed by: NURSE PRACTITIONER

## 2019-01-15 PROCEDURE — 83036 HEMOGLOBIN GLYCOSYLATED A1C: CPT | Performed by: NURSE PRACTITIONER

## 2019-01-15 PROCEDURE — 84478 ASSAY OF TRIGLYCERIDES: CPT | Performed by: INTERNAL MEDICINE

## 2019-01-15 PROCEDURE — 74177 CT ABD & PELVIS W/CONTRAST: CPT

## 2019-01-15 PROCEDURE — 85025 COMPLETE CBC W/AUTO DIFF WBC: CPT | Performed by: EMERGENCY MEDICINE

## 2019-01-15 PROCEDURE — 84484 ASSAY OF TROPONIN QUANT: CPT | Performed by: EMERGENCY MEDICINE

## 2019-01-15 PROCEDURE — 25010000002 LORAZEPAM PER 2 MG: Performed by: EMERGENCY MEDICINE

## 2019-01-15 PROCEDURE — 25010000003 CEFAZOLIN SODIUM-DEXTROSE 2-3 GM-%(50ML) RECONSTITUTED SOLUTION

## 2019-01-15 PROCEDURE — 82805 BLOOD GASES W/O2 SATURATION: CPT | Performed by: EMERGENCY MEDICINE

## 2019-01-15 PROCEDURE — 94799 UNLISTED PULMONARY SVC/PX: CPT

## 2019-01-15 PROCEDURE — 87150 DNA/RNA AMPLIFIED PROBE: CPT | Performed by: EMERGENCY MEDICINE

## 2019-01-15 PROCEDURE — 87077 CULTURE AEROBIC IDENTIFY: CPT | Performed by: EMERGENCY MEDICINE

## 2019-01-15 PROCEDURE — 25010000002 HALOPERIDOL LACTATE PER 5 MG: Performed by: EMERGENCY MEDICINE

## 2019-01-15 PROCEDURE — 83050 HGB METHEMOGLOBIN QUAN: CPT | Performed by: EMERGENCY MEDICINE

## 2019-01-15 PROCEDURE — 93005 ELECTROCARDIOGRAM TRACING: CPT | Performed by: EMERGENCY MEDICINE

## 2019-01-15 PROCEDURE — 82962 GLUCOSE BLOOD TEST: CPT

## 2019-01-15 RX ORDER — BUDESONIDE 0.5 MG/2ML
0.5 INHALANT ORAL EVERY 12 HOURS
Status: CANCELLED | OUTPATIENT
Start: 2019-01-15

## 2019-01-15 RX ORDER — DEXTROSE MONOHYDRATE 25 G/50ML
25 INJECTION, SOLUTION INTRAVENOUS
Status: DISCONTINUED | OUTPATIENT
Start: 2019-01-15 | End: 2019-01-21 | Stop reason: HOSPADM

## 2019-01-15 RX ORDER — ALBUTEROL SULFATE 2.5 MG/3ML
2.5 SOLUTION RESPIRATORY (INHALATION) EVERY 4 HOURS PRN
Status: CANCELLED | OUTPATIENT
Start: 2019-01-15

## 2019-01-15 RX ORDER — QUETIAPINE FUMARATE 25 MG/1
25 TABLET, FILM COATED ORAL 2 TIMES DAILY PRN
Status: DISCONTINUED | OUTPATIENT
Start: 2019-01-15 | End: 2019-01-21 | Stop reason: HOSPADM

## 2019-01-15 RX ORDER — FLUTICASONE PROPIONATE 50 MCG
2 SPRAY, SUSPENSION (ML) NASAL DAILY
Status: DISCONTINUED | OUTPATIENT
Start: 2019-01-16 | End: 2019-01-21 | Stop reason: HOSPADM

## 2019-01-15 RX ORDER — BUDESONIDE AND FORMOTEROL FUMARATE DIHYDRATE 160; 4.5 UG/1; UG/1
2 AEROSOL RESPIRATORY (INHALATION)
Status: DISCONTINUED | OUTPATIENT
Start: 2019-01-15 | End: 2019-01-21 | Stop reason: HOSPADM

## 2019-01-15 RX ORDER — VENLAFAXINE 37.5 MG/1
37.5 TABLET ORAL 2 TIMES DAILY WITH MEALS
Status: DISCONTINUED | OUTPATIENT
Start: 2019-01-15 | End: 2019-01-21 | Stop reason: HOSPADM

## 2019-01-15 RX ORDER — SODIUM CHLORIDE 0.9 % (FLUSH) 0.9 %
3-10 SYRINGE (ML) INJECTION AS NEEDED
Status: DISCONTINUED | OUTPATIENT
Start: 2019-01-15 | End: 2019-01-21 | Stop reason: HOSPADM

## 2019-01-15 RX ORDER — CEFAZOLIN SODIUM 2 G/50ML
2 SOLUTION INTRAVENOUS EVERY 8 HOURS
Status: DISCONTINUED | OUTPATIENT
Start: 2019-01-15 | End: 2019-01-16

## 2019-01-15 RX ORDER — L.ACID,PARA/B.BIFIDUM/S.THERM 8B CELL
1 CAPSULE ORAL DAILY
Status: DISCONTINUED | OUTPATIENT
Start: 2019-01-15 | End: 2019-01-20

## 2019-01-15 RX ORDER — QUETIAPINE FUMARATE 25 MG/1
25 TABLET, FILM COATED ORAL NIGHTLY
Status: DISCONTINUED | OUTPATIENT
Start: 2019-01-15 | End: 2019-01-21 | Stop reason: HOSPADM

## 2019-01-15 RX ORDER — FAMOTIDINE 20 MG/1
20 TABLET, FILM COATED ORAL 2 TIMES DAILY
Status: DISCONTINUED | OUTPATIENT
Start: 2019-01-15 | End: 2019-01-21 | Stop reason: HOSPADM

## 2019-01-15 RX ORDER — HYDROCODONE BITARTRATE AND ACETAMINOPHEN 5; 325 MG/1; MG/1
1 TABLET ORAL EVERY 6 HOURS PRN
Status: CANCELLED | OUTPATIENT
Start: 2019-01-15

## 2019-01-15 RX ORDER — DOCUSATE SODIUM 100 MG/1
100 CAPSULE, LIQUID FILLED ORAL 2 TIMES DAILY
Status: DISCONTINUED | OUTPATIENT
Start: 2019-01-15 | End: 2019-01-21 | Stop reason: HOSPADM

## 2019-01-15 RX ORDER — SODIUM CHLORIDE 0.9 % (FLUSH) 0.9 %
3 SYRINGE (ML) INJECTION EVERY 12 HOURS SCHEDULED
Status: DISCONTINUED | OUTPATIENT
Start: 2019-01-15 | End: 2019-01-21 | Stop reason: HOSPADM

## 2019-01-15 RX ORDER — SODIUM CHLORIDE 9 MG/ML
125 INJECTION, SOLUTION INTRAVENOUS CONTINUOUS
Status: DISCONTINUED | OUTPATIENT
Start: 2019-01-15 | End: 2019-01-17

## 2019-01-15 RX ORDER — IPRATROPIUM BROMIDE AND ALBUTEROL SULFATE 2.5; .5 MG/3ML; MG/3ML
3 SOLUTION RESPIRATORY (INHALATION)
Status: DISCONTINUED | OUTPATIENT
Start: 2019-01-15 | End: 2019-01-16

## 2019-01-15 RX ORDER — HALOPERIDOL 5 MG/ML
1 INJECTION INTRAMUSCULAR ONCE
Status: COMPLETED | OUTPATIENT
Start: 2019-01-15 | End: 2019-01-15

## 2019-01-15 RX ORDER — LORAZEPAM 2 MG/ML
0.5 INJECTION INTRAMUSCULAR ONCE
Status: COMPLETED | OUTPATIENT
Start: 2019-01-15 | End: 2019-01-15

## 2019-01-15 RX ORDER — MONTELUKAST SODIUM 10 MG/1
10 TABLET ORAL NIGHTLY
Status: DISCONTINUED | OUTPATIENT
Start: 2019-01-15 | End: 2019-01-21 | Stop reason: HOSPADM

## 2019-01-15 RX ORDER — NICOTINE POLACRILEX 4 MG
15 LOZENGE BUCCAL
Status: DISCONTINUED | OUTPATIENT
Start: 2019-01-15 | End: 2019-01-21 | Stop reason: HOSPADM

## 2019-01-15 RX ORDER — DOCUSATE SODIUM 100 MG/1
100 CAPSULE, LIQUID FILLED ORAL 2 TIMES DAILY
COMMUNITY

## 2019-01-15 RX ORDER — HALOPERIDOL 5 MG/ML
2 INJECTION INTRAMUSCULAR ONCE
Status: COMPLETED | OUTPATIENT
Start: 2019-01-15 | End: 2019-01-15

## 2019-01-15 RX ORDER — CEFAZOLIN SODIUM 2 G/50ML
2 SOLUTION INTRAVENOUS EVERY 8 HOURS
Status: CANCELLED | OUTPATIENT
Start: 2019-01-15 | End: 2019-01-21

## 2019-01-15 RX ORDER — LEVOTHYROXINE SODIUM 0.1 MG/1
100 TABLET ORAL DAILY
Status: DISCONTINUED | OUTPATIENT
Start: 2019-01-15 | End: 2019-01-16

## 2019-01-15 RX ADMIN — LORAZEPAM 0.5 MG: 2 INJECTION INTRAMUSCULAR; INTRAVENOUS at 02:30

## 2019-01-15 RX ADMIN — AZTREONAM 2 G: 2 INJECTION, POWDER, FOR SOLUTION INTRAMUSCULAR; INTRAVENOUS at 03:00

## 2019-01-15 RX ADMIN — IOPAMIDOL 95 ML: 612 INJECTION, SOLUTION INTRAVENOUS at 01:54

## 2019-01-15 RX ADMIN — METRONIDAZOLE 500 MG: 500 INJECTION, SOLUTION INTRAVENOUS at 14:20

## 2019-01-15 RX ADMIN — VANCOMYCIN HYDROCHLORIDE 1500 MG: 5 INJECTION, POWDER, LYOPHILIZED, FOR SOLUTION INTRAVENOUS at 03:30

## 2019-01-15 RX ADMIN — SODIUM CHLORIDE 2352 ML: 9 INJECTION, SOLUTION INTRAVENOUS at 01:45

## 2019-01-15 RX ADMIN — CEFAZOLIN SODIUM 2 G: 2 SOLUTION INTRAVENOUS at 17:53

## 2019-01-15 RX ADMIN — IPRATROPIUM BROMIDE AND ALBUTEROL SULFATE 3 ML: .5; 3 SOLUTION RESPIRATORY (INHALATION) at 19:20

## 2019-01-15 RX ADMIN — HALOPERIDOL LACTATE 1 MG: 5 INJECTION, SOLUTION INTRAMUSCULAR at 22:15

## 2019-01-15 RX ADMIN — METRONIDAZOLE 500 MG: 500 INJECTION, SOLUTION INTRAVENOUS at 01:45

## 2019-01-15 RX ADMIN — AZTREONAM 1 G: 1 INJECTION, POWDER, FOR SOLUTION INTRAMUSCULAR; INTRAVENOUS at 15:11

## 2019-01-15 RX ADMIN — HALOPERIDOL LACTATE 2 MG: 5 INJECTION, SOLUTION INTRAMUSCULAR at 02:26

## 2019-01-15 RX ADMIN — SODIUM CHLORIDE 125 ML/HR: 9 INJECTION, SOLUTION INTRAVENOUS at 12:36

## 2019-01-15 RX ADMIN — VANCOMYCIN HYDROCHLORIDE 1000 MG: 5 INJECTION, POWDER, LYOPHILIZED, FOR SOLUTION INTRAVENOUS at 15:44

## 2019-01-15 RX ADMIN — ENOXAPARIN SODIUM 80 MG: 80 INJECTION SUBCUTANEOUS at 22:15

## 2019-01-15 NOTE — ED NOTES
Pt alert, skin pwd, no respiratory distress, pt repositioned, oral care provided, pt remains in ns rhythm, pt remains on 2lpm nc, pt remains npo, waiting on admit bed. Fall precautions maintained.     Aminah Hoskins, RN  01/15/19 1200

## 2019-01-15 NOTE — PLAN OF CARE
Problem: Patient Care Overview  Goal: Individualization and Mutuality  Outcome: Ongoing (interventions implemented as appropriate)    Goal: Discharge Needs Assessment  Outcome: Ongoing (interventions implemented as appropriate)      Problem: Fall Risk (Adult)  Goal: Absence of Fall  Outcome: Ongoing (interventions implemented as appropriate)      Problem: Pain, Acute (Adult)  Goal: Identify Related Risk Factors and Signs and Symptoms  Outcome: Ongoing (interventions implemented as appropriate)      Problem: Skin Injury Risk (Adult)  Goal: Identify Related Risk Factors and Signs and Symptoms  Outcome: Ongoing (interventions implemented as appropriate)      Problem: Nausea/Vomiting (Adult)  Goal: Identify Related Risk Factors and Signs and Symptoms  Outcome: Ongoing (interventions implemented as appropriate)

## 2019-01-15 NOTE — ED PROVIDER NOTES
Subjective   Pt sent for immediate return from [facility].  NH reports SOA, shaking and vomiting.  Pt was seen at this ED earlier for SOA and shaking.  It is documented that patient has a shaking tremor.          History limited by:  Patient nonverbal and dementia  Shortness of Breath   Severity:  Severe  Onset quality:  Unable to specify  Timing:  Constant  Progression:  Unchanged  Chronicity:  Chronic  Context: not fumes, not pollens and not smoke exposure    Relieved by:  Nothing  Worsened by:  Activity  Ineffective treatments:  None tried  Associated symptoms: vomiting    Associated symptoms: no diaphoresis, no fever, no rash, no sputum production and no wheezing    Risk factors: no recent alcohol use, no oral contraceptive use, no recent surgery and no tobacco use        Review of Systems   Unable to perform ROS: Patient nonverbal   Constitutional: Negative.  Negative for diaphoresis and fever.   HENT: Negative.  Negative for nosebleeds and trouble swallowing.    Eyes: Negative.  Negative for discharge and redness.   Respiratory: Positive for shortness of breath. Negative for sputum production, chest tightness and wheezing.    Cardiovascular: Negative.  Negative for leg swelling.   Gastrointestinal: Positive for vomiting. Negative for abdominal distention and diarrhea.   Endocrine: Negative.    Musculoskeletal: Negative.    Skin: Negative.  Negative for rash.   Allergic/Immunologic: Negative.    Neurological: Positive for tremors.   Hematological: Negative.    Psychiatric/Behavioral: Negative.        Past Medical History:   Diagnosis Date   • Anemia    • Closed wedge compression fracture of first lumbar vertebra (CMS/HCC)    • COPD (chronic obstructive pulmonary disease) (CMS/HCC)    • CVA (cerebral vascular accident) with left hemiparesis. 12/6/2017   • Dementia    • Diabetes mellitus (CMS/HCC)    • Dysarthria following cerebral infarction    • Dysphagia, oropharyngeal phase    • Dysphagia,  pharyngoesophageal phase    • GERD (gastroesophageal reflux disease)    • Heart disease    • Hyperlipidemia    • Hypertension    • Hypothyroidism    • Insomnia    • Obesity    • Other sleep apnea 11/13/2018   • Paroxysmal atrial fibrillation (CMS/HCC) 12/6/2017       Allergies   Allergen Reactions   • Bactrim [Sulfamethoxazole-Trimethoprim]    • Codeine    • Penicillins        Past Surgical History:   Procedure Laterality Date   • APPENDECTOMY     • BLADDER SURGERY     • CARDIAC CATHETERIZATION     • CHOLECYSTECTOMY         Family History   Problem Relation Age of Onset   • Heart disease Mother    • Heart attack Mother    • Heart disease Father    • Heart attack Father    • Heart attack Brother    • Heart attack Brother        Social History     Socioeconomic History   • Marital status: Single     Spouse name: Not on file   • Number of children: Not on file   • Years of education: Not on file   • Highest education level: Not on file   Tobacco Use   • Smoking status: Never Smoker   • Smokeless tobacco: Never Used   Substance and Sexual Activity   • Alcohol use: No   • Drug use: No   • Sexual activity: Defer           Objective   Physical Exam   Constitutional: She appears well-developed and well-nourished.  Non-toxic appearance. She does not appear ill. No distress.   Thin, Emmaciated   HENT:   Head: Normocephalic and atraumatic.   Mouth/Throat: Oropharynx is clear and moist.   Eyes: EOM are normal.   Neck: Normal range of motion. No tracheal deviation present.   Cardiovascular: Normal rate, regular rhythm, normal heart sounds and intact distal pulses.   Pulmonary/Chest: No stridor. Tachypnea noted. She has decreased breath sounds. She has no wheezes. She has no rhonchi. She has no rales.   Tachypneic   Abdominal: Soft. She exhibits no distension. There is no guarding.   Neurological: She is alert.   Non-verbal, Gross shaking tremor, chronic LUE contractures   Skin: Skin is warm and dry. Capillary refill takes less  than 2 seconds. No pallor.   Nursing note and vitals reviewed.      Procedures  Results for orders placed or performed during the hospital encounter of 01/14/19   Blood Culture - Blood, Arm, Right   Result Value Ref Range    Blood Culture Abnormal Stain (A)     Gram Stain Gram negative bacilli    Blood Culture - Blood, Arm, Left   Result Value Ref Range    Blood Culture Abnormal Stain (A)     Gram Stain Gram negative bacilli    Blood Culture ID, PCR - Blood, Arm, Left   Result Value Ref Range    BCID, PCR Escherichia coli. Identification by BCID PCR. (C) No organism detected by BCID PCR.   Comprehensive Metabolic Panel   Result Value Ref Range    Glucose 164 (H) 70 - 110 mg/dL    BUN 13 7 - 21 mg/dL    Creatinine 0.93 0.43 - 1.29 mg/dL    Sodium 141 135 - 153 mmol/L    Potassium 5.0 3.5 - 5.3 mmol/L    Chloride 108 99 - 112 mmol/L    CO2 19.5 (L) 24.3 - 31.9 mmol/L    Calcium 8.8 7.7 - 10.0 mg/dL    Total Protein 6.6 6.0 - 8.0 g/dL    Albumin 3.00 (L) 3.40 - 4.80 g/dL    ALT (SGPT) 104 (H) 10 - 36 U/L    AST (SGOT) 514 (H) 10 - 30 U/L    Alkaline Phosphatase 503 (H) 35 - 104 U/L    Total Bilirubin 1.1 0.2 - 1.8 mg/dL    eGFR Non African Amer 58 (L) >60 mL/min/1.73    Globulin 3.6 gm/dL    A/G Ratio 0.8 (L) 1.5 - 2.5 g/dL    BUN/Creatinine Ratio 14.0 7.0 - 25.0    Anion Gap 13.5 (H) 3.6 - 11.2 mmol/L   Lipase   Result Value Ref Range    Lipase 2,489 (H) 13 - 60 U/L   Urinalysis With Microscopic If Indicated (No Culture) - Urine, Catheter In/Out   Result Value Ref Range    Color, UA Dark Yellow (A) Yellow, Straw    Appearance, UA Clear Clear    pH, UA 7.0 5.0 - 8.0    Specific Gravity, UA 1.011 1.005 - 1.030    Glucose, UA Negative Negative    Ketones, UA Negative Negative    Bilirubin, UA Negative Negative    Blood, UA Moderate (2+) (A) Negative    Protein, UA Trace (A) Negative    Leuk Esterase, UA Negative Negative    Nitrite, UA Negative Negative    Urobilinogen, UA 1.0 E.U./dL 0.2 - 1.0 E.U./dL   Blood Gas,  Arterial   Result Value Ref Range    Site Arterial: right brachial     Sagar's Test Positive     pH, Arterial 7.539 (C) 7.350 - 7.450 pH units    pCO2, Arterial 18.7 (C) 35.0 - 45.0 mm Hg    pO2, Arterial 90.6 80.0 - 100.0 mm Hg    HCO3, Arterial 15.6 (C) 22.0 - 26.0 mmol/L    Base Excess, Arterial -5.0 mmol/L    O2 Saturation, Arterial 97.5 90.0 - 100.0 %    Hemoglobin, Blood Gas 11.2 (L) 12 - 16 g/dL    Hematocrit, Blood Gas 33.0 (L) 37.0 - 47.0 %    Oxyhemoglobin 96.1 85 - 100 %    Methemoglobin 0.30 0.00 - 3.00 %    Carboxyhemoglobin 1.1 0 - 5 %    A-a Gradiant 79.4 0.0 - 300.0 mmHg    Temperature 98.6 C    Barometric Pressure for Blood Gas 733 mmHg    Modality Cannula - Nasal     FIO2 28 %   Troponin   Result Value Ref Range    Troponin I 0.011 <=0.040 ng/mL   CBC Auto Differential   Result Value Ref Range    WBC 11.56 4.50 - 12.50 10*3/mm3    RBC 3.83 (L) 4.20 - 5.40 10*6/mm3    Hemoglobin 10.7 (L) 12.0 - 16.0 g/dL    Hematocrit 36.0 (L) 37.0 - 47.0 %    MCV 94.0 80.0 - 94.0 fL    MCH 27.9 27.0 - 33.0 pg    MCHC 29.7 (L) 33.0 - 37.0 g/dL    RDW 19.3 (H) 11.5 - 14.5 %    RDW-SD 63.4 (H) 37.0 - 54.0 fl    MPV 9.2 6.0 - 10.0 fL    Platelets 363 130 - 400 10*3/mm3    Neutrophil % 77.5 (H) 40.0 - 75.0 %    Lymphocyte % 17.6 16.0 - 46.0 %    Monocyte % 3.1 0.0 - 12.0 %    Eosinophil % 0.7 0.0 - 7.0 %    Basophil % 0.3 0.0 - 2.0 %    Immature Grans % 0.8 (H) 0.0 - 0.5 %    Neutrophils, Absolute 8.96 (H) 1.40 - 6.50 10*3/mm3    Lymphocytes, Absolute 2.04 1.00 - 3.00 10*3/mm3    Monocytes, Absolute 0.36 0.10 - 0.90 10*3/mm3    Eosinophils, Absolute 0.08 0.00 - 0.70 10*3/mm3    Basophils, Absolute 0.03 0.00 - 0.30 10*3/mm3    Immature Grans, Absolute 0.09 (H) 0.00 - 0.03 10*3/mm3   Osmolality, Calculated   Result Value Ref Range    Osmolality Calc 285.0 273.0 - 305.0 mOsm/kg   Urinalysis, Microscopic Only - Urine, Catheter In/Out   Result Value Ref Range    RBC, UA 21-30 (A) None Seen, 0-2 /HPF    WBC, UA 0-2 None Seen,  0-2 /HPF    Bacteria, UA Trace (A) None Seen /HPF    Squamous Epithelial Cells, UA 3-6 (A) None Seen, 0-2 /HPF    Hyaline Casts, UA None Seen None Seen /LPF    Methodology Automated Microscopy    Troponin   Result Value Ref Range    Troponin I 0.053 (H) <=0.040 ng/mL   Lactic Acid, Plasma   Result Value Ref Range    Lactate 4.2 (C) 0.5 - 2.0 mmol/L   Lactic Acid, Reflex Timer (This will reflex a repeat order 3-3:15 hours after ordered.)   Result Value Ref Range    Extra Tube Hold for add-ons.    Lactic Acid, Reflex   Result Value Ref Range    Lactate 3.1 (C) 0.5 - 2.0 mmol/L   Troponin   Result Value Ref Range    Troponin I 0.048 (H) <=0.040 ng/mL   Comprehensive Metabolic Panel   Result Value Ref Range    Glucose 97 70 - 110 mg/dL    BUN 16 7 - 21 mg/dL    Creatinine 0.73 0.43 - 1.29 mg/dL    Sodium 141 135 - 153 mmol/L    Potassium 4.0 3.5 - 5.3 mmol/L    Chloride 113 (H) 99 - 112 mmol/L    CO2 22.1 (L) 24.3 - 31.9 mmol/L    Calcium 8.0 7.7 - 10.0 mg/dL    Total Protein 6.0 6.0 - 8.0 g/dL    Albumin 2.80 (L) 3.40 - 4.80 g/dL    ALT (SGPT) 84 (H) 10 - 36 U/L    AST (SGOT) 223 (H) 10 - 30 U/L    Alkaline Phosphatase 379 (H) 35 - 104 U/L    Total Bilirubin 0.5 0.2 - 1.8 mg/dL    eGFR Non African Amer 77 >60 mL/min/1.73    Globulin 3.2 gm/dL    A/G Ratio 0.9 (L) 1.5 - 2.5 g/dL    BUN/Creatinine Ratio 21.9 7.0 - 25.0    Anion Gap 5.9 3.6 - 11.2 mmol/L   Blood Gas, Arterial   Result Value Ref Range    Site Arterial: right brachial     Sagar's Test N/A     pH, Arterial 7.464 (H) 7.350 - 7.450 pH units    pCO2, Arterial 27.4 (C) 35.0 - 45.0 mm Hg    pO2, Arterial 89.3 80.0 - 100.0 mm Hg    HCO3, Arterial 19.2 (C) 22.0 - 26.0 mmol/L    Base Excess, Arterial -3.6 mmol/L    O2 Saturation, Arterial 96.0 90.0 - 100.0 %    Hemoglobin, Blood Gas 9.9 (L) 12 - 16 g/dL    Hematocrit, Blood Gas 29.0 (L) 37.0 - 47.0 %    Oxyhemoglobin 95.0 85 - 100 %    Methemoglobin 0.30 0.00 - 3.00 %    Carboxyhemoglobin 0.7 0 - 5 %    A-a Gradiant  22.0 0.0 - 300.0 mmHg    Temperature 98.6 C    Barometric Pressure for Blood Gas 733 mmHg    Modality Room Air     FIO2 21 %   Lactic Acid, Plasma   Result Value Ref Range    Lactate 1.2 0.5 - 2.0 mmol/L   Lipase   Result Value Ref Range    Lipase 91 (H) 13 - 60 U/L   CBC Auto Differential   Result Value Ref Range    WBC 16.96 (H) 4.50 - 12.50 10*3/mm3    RBC 3.41 (L) 4.20 - 5.40 10*6/mm3    Hemoglobin 9.3 (L) 12.0 - 16.0 g/dL    Hematocrit 31.4 (L) 37.0 - 47.0 %    MCV 92.1 80.0 - 94.0 fL    MCH 27.3 27.0 - 33.0 pg    MCHC 29.6 (L) 33.0 - 37.0 g/dL    RDW 19.6 (H) 11.5 - 14.5 %    RDW-SD 62.6 (H) 37.0 - 54.0 fl    MPV 9.3 6.0 - 10.0 fL    Platelets 329 130 - 400 10*3/mm3    Neutrophil % 51.5 40.0 - 75.0 %    Lymphocyte % 36.0 16.0 - 46.0 %    Monocyte % 11.1 0.0 - 12.0 %    Eosinophil % 0.7 0.0 - 7.0 %    Basophil % 0.4 0.0 - 2.0 %    Immature Grans % 0.3 0.0 - 0.5 %    Neutrophils, Absolute 8.74 (H) 1.40 - 6.50 10*3/mm3    Lymphocytes, Absolute 6.11 (H) 1.00 - 3.00 10*3/mm3    Monocytes, Absolute 1.88 (H) 0.10 - 0.90 10*3/mm3    Eosinophils, Absolute 0.12 0.00 - 0.70 10*3/mm3    Basophils, Absolute 0.06 0.00 - 0.30 10*3/mm3    Immature Grans, Absolute 0.05 (H) 0.00 - 0.03 10*3/mm3   Osmolality, Calculated   Result Value Ref Range    Osmolality Calc 282.4 273.0 - 305.0 mOsm/kg     Ct Soft Tissue Neck With Contrast    Result Date: 1/7/2019  Narrative: EXAMINATION: CT SOFT TISSUE NECK W CONTRAST-  Technique: multiple CT axial images obtained through the neck, following IV contrast administration. Coronal and sagittal reformatted images obtained from the original axial data set to facilitate diagnosis and surgical planning.  Radiation dose reduction techniques were utilized per ALARA protocol. Automated exposure control was initiated through either or [employer] or DoseRight software packages by  protocol.   225.34 mGy.cm  CLINICAL INDICATION:     Neck mass, nonpulsatile, solitary  COMPARISON:    None.   FINDINGS:    There is motion artifact. The right-sided parotid gland is slightly more hyperdense some prominent in compared to the left side that may represent parotid gland inflammation.. No definite lymphadenopathy. Incidentally imaged blood vessels are unremarkable. Bony structures are unremarkable.    Impression: There is motion artifact. The right-sided parotid gland is slightly more hyperdense some prominent in compared to the left side that may represent parotid gland inflammation..  This report was finalized on 1/7/2019 7:37 AM by Dr. Giovanni Martin MD.      Ct Abdomen Pelvis With Contrast    Result Date: 1/15/2019  Narrative: CT ABDOMEN PELVIS WITH CONTRAST-  CLINICAL INDICATION: Abdominal pain, unspecified     COMPARISON: None available  TECHNIQUE: Axial images were acquired from the lung bases through the pubic symphysis after IV , but without oral contrast. Reformatted images were created in both the coronal and sagittal planes.  Radiation dose reduction techniques were utilized per ALARA protocol. Automated exposure control was initiated through either or CareDose or DoseRigPicolight software packages by  protocol.     FINDINGS: There is minimal bibasilar atelectasis.  There is a hiatal hernia.  The liver is homogeneous. There is no evidence of focal hepatic mass  The spleen is homogeneous  There is no peripancreatic stranding or pancreatic head mass.  There is no adrenal enlargement.  The kidneys show no evidence of hydronephrosis or hydroureter. I do not see any distal ureteral stones.  Otherwise I do not see any free fluid or walled off fluid collections.  There is no bowel wall thickening or mesenteric stranding.          Impression: 1. Moderate sized hiatal hernia. 2. Mild compression of superior endplate of L1.        This report was finalized on 1/15/2019 1:05 PM by Dr. Ulysses Rojas MD.      Us Liver    Result Date: 1/8/2019  Narrative: EXAMINATION: US LIVER-   CLINICAL INDICATION:      Elevated liver enzymes; A41.9-Sepsis, unspecified organism; N39.0-Urinary tract infection, site not specified; K11.21-Acute sialoadenitis  TECHNIQUE: Multiplanar gray scale ultrasound of the abdomen.  COMPARISON: NONE  FINDINGS: Visualized pancreas is unremarkable.     The CBD measures 4.4349399198 mm    . The liver demonstrates normal echogenicity without focal lesion.  No ascites demonstrated.         Impression: Appearance of liver is unremarkable.  This report was finalized on 1/8/2019 3:57 PM by Dr. Giovanni Martin MD.      Xr Chest 1 View    Result Date: 1/15/2019  Narrative: XR CHEST 1 VIEW-  CLINICAL INDICATION: SOA     COMPARISON: 01/14/2019  TECHNIQUE: Single frontal view of the chest.  FINDINGS:  Right-sided PICC line tip in the superior vena cava. The cardiac silhouette is normal. The pulmonary vasculature is unremarkable. There is no evidence of an acute osseous abnormality. There are no suspicious-appearing parenchymal soft tissue nodules.         Impression: No evidence of active or acute cardiopulmonary disease on today's chest radiograph.     This report was finalized on 1/15/2019 12:58 PM by Dr. Ulysses Rojas MD.      Xr Chest 1 View    Result Date: 1/14/2019  Narrative: XR CHEST 1 VW-  CLINICAL INDICATION: sob     COMPARISON: 1/11/2019  TECHNIQUE: Single frontal view of the chest.  FINDINGS:  PICC line tip in the superior vena cava The cardiac silhouette is normal. The pulmonary vasculature is unremarkable. There is no evidence of an acute osseous abnormality. There are no suspicious-appearing parenchymal soft tissue nodules.         Impression: No evidence of active or acute cardiopulmonary disease on today's chest radiograph.     This report was finalized on 1/14/2019 1:02 PM by Dr. Ulysses Rojas MD.      Xr Chest 1 View    Result Date: 1/12/2019  Narrative: EXAMINATION:  XR CHEST 1 VW-  CLINICAL INDICATION:     Picc line placement; A41.9-Sepsis, unspecified organism; N39.0-Urinary tract  infection, site not specified; K11.21-Acute sialoadenitis  TECHNIQUE:  XR CHEST 1 VW-   COMPARISON: January 8, 2019  FINDINGS: Right PICC with tip in region of SVC The lungs remain aerated. Heart size is stable. No pneumothorax. No pleural effusion. Bony and soft tissue structures are unremarkable.         Impression: Right PICC with tip in region of SVC  This report was finalized on 1/12/2019 10:44 AM by Dr. Giovanni Martin MD.      Us Venous Doppler Upper Extremity Bilateral (duplex)    Result Date: 1/8/2019  Narrative: EXAMINATION: US VENOUS DOPPLER UPPER EXTREMITY BILATERAL (DUPLEX)-  CLINICAL INDICATION:  left jugular septic phelibtis; A41.9-Sepsis, unspecified organism; N39.0-Urinary tract infection, site not specified; K11.21-Acute sialoadenitis  TECHNIQUE: Multiplanar gray scale and Doppler vascular sonographic imaging of the deep veins of BILATERAL lower extremity, without and with compression.  COMPARISON: NONE  FINDINGS: The visualized deep veins demonstrate flow and are compressible. No evidence of deep venous thrombosis.         Impression: No DVT.  This report was finalized on 1/8/2019 10:34 AM by Dr. Antony Moffett MD.      Xr Chest Ap    Result Date: 1/8/2019  Narrative: EXAMINATION: XR CHEST AP-  CLINICAL INDICATION:     SEDATION IN INTUBATED PATIENTS  TECHNIQUE: Single frontal view of chest.  COMPARISON: NONE  FINDINGS: There is bibasilar atelectasis. Lungs are otherwise aerated. Heart and mediastinal contours are unremarkable. No pneumothorax. No pleural effusion. Bony and soft tissue structures are unremarkable.      Impression: No radiographic evidence of acute cardiac or  This report was finalized on 1/8/2019 8:26 AM by Dr. Giovanni Martin MD.               ED Course  ED Course as of Billy 15 1708   Tue Billy 15, 2019   0520 Twelve-lead EKG performed at 2133 hrs.  Motion artifact.  Tachycardia 146.  QRS duration 76.  .  QTc 517.  No overt criteria for STEMI. ECG 12 Lead [TZ]   0542 Twelve-lead  EKG performed at 0539 hrs.  Interpreted by me at 0542 hrs.  Sinus tachycardia.  Ventricular rate 110.  ID interval 198.  QRS duration 82.  .  .  Baseline motion artifact.  No pathologic blocks.  No sustained dysrhythmia.  No acute ischemic ST segment elevation.  No pathologic T-wave inversions.  No overt criteria for acute infarct/STEMI. ECG 12 Lead [TZ]   1000 Confused.  Appears comfortable.  Hemodynamically stable.  Awaiting medical bed here.  [BC]   1451 Discussed with Dr. Livingston.  Patient admitted, see orders.  [BC]      ED Course User Index  [BC] Elmo Lopez MD  [TZ] Gilmer Allan MD    SEPTIC SHOCK FOCUSED EXAM  @0430h  *Must be completed by a licensed independent Practitioner within 6 hours of diagnosis for the following conditions -- Septic Shock or Severe Sepsis with Lactate >/= 4.    Fluid bolus must be completed prior to assessment.      Diagnosis: Severe sepsis     Vital Signs (Attestation) Reviewed Temp, HR, RR, BP, SpO2   Shock Index (HR/SBP) (Attestation) Reviewed    Urine Output (Attestation) Reviewed   General resting comfortably, no distress   Respiratory lungs clear, normal breath sounds and no respiratory distress   Cardiac/Chest regular rate, rhythm   Skin (documentation of skin color is required) warm/dry   Capillary Refill <3 seconds   Peripheral Pulses Checked                          radial  palpable     † Septic shock is defined by CMS as severe sepsis plus one of the following: persistent hypotension after fluid bolus OR tissue hypoperfusion (Lactic Acid ?4)  †† ONE OF THE FOLLOWING can be done in lieu of the Focused Exam: Measure CVP; Measure ScVO2; Echocardiogram (cardiac echo or cardiac ultrasound); Dynamic assessment of fluid responsiveness with passive leg raise or fluid challenge.    Elmo Lopez MD  01/15/19  5:08 PM                  MDM  Number of Diagnoses or Management Options     Amount and/or Complexity of Data Reviewed  Decide to obtain previous  medical records or to obtain history from someone other than the patient: yes  Independent visualization of images, tracings, or specimens: yes          Final diagnoses:   Acute pancreatitis, unspecified complication status, unspecified pancreatitis type   Urinary tract infection without hematuria, site unspecified            Elmo Lopez MD  01/15/19 1458       Elmo Lopez MD  01/15/19 1450       Elmo Lopez MD  01/15/19 8090

## 2019-01-15 NOTE — ED NOTES
Pt alert, skin pwd, no respiratory distress, pt repositioned, oral care provided, pt remains in ns rhythm, pt remains on 2lpm nc, pt remains npo, waiting on admit bed. Fall precautions maintained.       Aminah Hoskins, RUFUS  01/15/19 6438

## 2019-01-15 NOTE — ED NOTES
Pt repositioned with pillows, oral care provided, pt alert and nonverbal, skin pwd, pt remains on 2lpm nc, 18 fr indwelling olivas catheter remains patent draining dark yellow urine.      Aminah Hoskins, RN  01/15/19 5972

## 2019-01-15 NOTE — ED NOTES
Called lab spoke Amrita, asked if she can send someone up to stick patient for Troponin.     Symes, Heather  01/15/19 9890

## 2019-01-15 NOTE — ED NOTES
Karla in lab called positive blood culture results on pt, reports bc drawn at 0017 are positive for gram negative rods with the bcid of ecoli. Dr cabral made aware and verbalized understanding.      Aminah Hoskins, RN  01/15/19 3043

## 2019-01-15 NOTE — ED NOTES
Patient pulling at all lines, continuously moves in bed. Provider aware.      Robby Mack RN  01/15/19 0814

## 2019-01-15 NOTE — ED NOTES
EKG done at 2133 by myself, given to Dr. Allan for review.     Esmer Christianson RN  01/14/19 2136

## 2019-01-15 NOTE — H&P
[facility] Medicine Services  History & Physical          Patient Identification:  Name:  Jeanna Flower  Age:  78 y.o.  Sex:  female  :  1940  MRN:  [id number]   Visit Number:  [id number]  Primary Care Physician:  Raul Robert MD    I have seen the patient in conjunction with AMELIE Beatty and I agree with the following statements:     Subjective     Chief complaint: low 02 at the nursing home    History of presenting illness:    Mrs. Flower is a 78 year old female who presented to [facility] ED on 1/15/19 with low Sp02 at the nursing home. She was initially seen in the ED on 19 for low Sp02 and then sent back on 1/15/19 for low Sp02. She is s/p CVA and is almost non-verbal, she mumbles some words but I am unable to obtain any information from the patient due to this. She does not appear to bein any pain or discomfort. She has had 3 arterial blood gases done here and none of them show hypoxia on room air.     Her work up in the ED showed a troponin of 0.011, trended up to 0.053, but has since trended down to 0.048, on arrival her blood gas showed a pH of 7.498, C02 28, p02 85.1, HCO 21.4 on room air, repeated at 2130 on  showed pH 7.539, C02 18.7, p02 90, HCO 15.6, on 28% NC, it has since been repeated today at 1230 showing pH of 7.464, C02 27.4, P02 89.3, HCO 19.2, on room air. Her BNP was 41, initial alkaline phosphatase was 169 but has increased today up to 379 with ALT/AST 84/223, lactic acid was 4.2 early this morning but has since normalized to 1.2. WBC was 9.68 on arrival but this am shows 16.96, PLT count has remained normal, H/H of 9.3/31.4. Urine not concerning for UTI. Chest xray did not appreciate any pneumonia. CT of the abdomen and pelvis shows minimal bibasilar atelectasis, moderate sized hiatal hernia, mild compression of superior endplate of L1.    It is of note she was recently a patient at our facility from 19-19 for  Severe Sepsis with  Metabolic Encephalopathy,  MSSA bacteremia, UTI She underwent a JAYE on 1/9/2019 which did not show any definite evidence of endocarditis. It was recommended to continue Ancef through 1/21/19 by ID and she was discharged to the nursing home with a PICC line in place.     Her medical history consist of recent E.Coli UTI on 1/7/19, MSSA bacteremia, HTN, DM, hx of CVA, dysphagia, GERD, Dementia COPD and Hypothyroidism, paroxysmal a-fib, debility. Her baseline diet there is pureed, thin liquids. I have discussed via phone with Ernestina Chamberlain, her POA ([facility]) that she is a DNR/DNI, code status changed at this time.       ---------------------------------------------------------------------------------------------------------------------   Review of Systems   Reason unable to perform ROS: unable to assess due to mental status       ---------------------------------------------------------------------------------------------------------------------   Past Medical History:   Diagnosis Date   • Anemia    • Closed wedge compression fracture of first lumbar vertebra (CMS/HCC)    • COPD (chronic obstructive pulmonary disease) (CMS/[facility])    • CVA (cerebral vascular accident) with left hemiparesis. 12/6/2017   • Dementia    • Diabetes mellitus (CMS/[facility])    • Dysarthria following cerebral infarction    • Dysphagia, oropharyngeal phase    • Dysphagia, pharyngoesophageal phase    • GERD (gastroesophageal reflux disease)    • Heart disease    • Hyperlipidemia    • Hypertension    • Hypothyroidism    • Insomnia    • Obesity    • Other sleep apnea 11/13/2018   • Paroxysmal atrial fibrillation (CMS/HCC) 12/6/2017     Past Surgical History:   Procedure Laterality Date   • APPENDECTOMY     • BLADDER SURGERY     • CARDIAC CATHETERIZATION     • CHOLECYSTECTOMY       Family History   Problem Relation Age of Onset   • Heart disease Mother    • Heart attack Mother    • Heart disease Father    • Heart attack Father    • Heart attack Brother     • Heart attack Brother      Social History     Socioeconomic History   • Marital status: Single     Spouse name: Not on file   • Number of children: Not on file   • Years of education: Not on file   • Highest education level: Not on file   Tobacco Use   • Smoking status: Never Smoker   • Smokeless tobacco: Never Used   Substance and Sexual Activity   • Alcohol use: No   • Drug use: No   • Sexual activity: Defer     ---------------------------------------------------------------------------------------------------------------------   Allergies:  Bactrim [sulfamethoxazole-trimethoprim]; Codeine; and Penicillins  ---------------------------------------------------------------------------------------------------------------------     Medications below are reported home medications pulling from within the system; at this time, these medications have not been reconciled unless otherwise specified and are in the verification process for further verifcation as current home medications.    Prior to Admission Medications     Prescriptions Last Dose Informant Patient Reported? Taking?    albuterol sulfate  (90 Base) MCG/ACT inhaler  Care Giver Yes No    Inhale 2 puffs Every 4 (Four) Hours As Needed for Wheezing or Shortness of Air.    apixaban (ELIQUIS) 5 MG tablet tablet  Care Giver No No    Take 1 tablet by mouth Every 12 (Twelve) Hours.    budesonide (PULMICORT) 0.5 MG/2ML nebulizer solution  Care Giver Yes No    Take 0.5 mg by nebulization 2 (Two) Times a Day.    castor oil-balsam peru (VENELEX) ointment  Care Giver Yes No    Apply 1 each topically to the appropriate area as directed 2 (Two) Times a Day.    CeFAZolin Sodium-Dextrose (ANCEF) 2-3 GM-%(50ML) IVPB   No No    Infuse 2,000 mg into a venous catheter Every 8 (Eight) Hours for 10 days.    cycloSPORINE (RESTASIS) 0.05 % ophthalmic emulsion  Care Giver Yes No    1 drop 2 (Two) Times a Day.    docusate sodium (COLACE) 150 MG/15ML liquid  Care Giver Yes No     100 mg by Per G Tube route 2 (Two) Times a Day.    famotidine (PEPCID) 20 MG tablet  Care Giver Yes No    20 mg by Per G Tube route 2 (Two) Times a Day.    fluticasone (FLONASE) 50 MCG/ACT nasal spray  Care Giver Yes No    2 sprays into the nostril(s) as directed by provider Daily.    fluticasone-salmeterol (ADVAIR DISKUS) 250-50 MCG/DOSE DISKUS  Care Giver Yes No    Inhale 1 puff 2 (Two) Times a Day.    HYDROcodone-acetaminophen (NORCO) 5-325 MG per tablet   No No    Take 1 tablet by mouth Every 6 (Six) Hours As Needed for Moderate Pain .    lactobacillus acidophilus (RISAQUAD) capsule capsule   No No    Take 1 capsule by mouth Daily for 9 days.    levothyroxine (SYNTHROID, LEVOTHROID) 100 MCG tablet  Care Giver Yes No    Take 100 mcg by mouth Daily.    miconazole (MICOTIN) 2 % powder  Care Giver Yes No    Apply 1 application topically to the appropriate area as directed 3 (Three) Times a Day As Needed for Itching (skin folds).    montelukast (SINGULAIR) 10 MG tablet  Care Giver Yes No    Take 10 mg by mouth Every Night.    QUEtiapine (SEROquel) 25 MG tablet   No No    Take 1 tablet by mouth Every Night.    QUEtiapine (SEROquel) 25 MG tablet   No No    Take 1 tablet by mouth 2 (Two) Times a Day As Needed (Agitation, halllucinations).    venlafaxine (EFFEXOR) 37.5 MG tablet  Care Giver Yes No    37.5 mg by Per G Tube route 2 (Two) Times a Day.        Hospital Scheduled Meds:    aztreonam 1 g Intravenous Once   [START ON 1/16/2019] aztreonam 1 g Intravenous Q8H   metroNIDAZOLE 500 mg Intravenous Once   vancomycin 1,000 mg Intravenous Once       Sodium chloride 125 mL/hr Last Rate: 125 mL/hr (01/15/19 1441)     ---------------------------------------------------------------------------------------------------------------------   Objective       Vital Signs:  Temp:  [97.1 °F (36.2 °C)-97.8 °F (36.6 °C)] 97.8 °F (36.6 °C)  Heart Rate:  [] 103  Resp:  [18-20] 18  BP: ()/() 132/61      01/14/19  7717    Weight: 78.4 kg (172 lb 13.5 oz)     Body mass index is 28.76 kg/m².  ---------------------------------------------------------------------------------------------------------------------       Physical Exam    Physical Exam:  Constitutional:  Elderly female, alert in no distress, restless     HENT:  Head: Normocephalic and atraumatic.  Mouth:  Moist mucous membranes.    Eyes: Pupils are equal, round, and reactive to light.    Neck:  Neck supple.      Cardiovascular:  Normal rate, regular rhythm.  with no murmur.  Pulmonary/Chest:  No respiratory distress, no wheezes, no crackles, with normal breath sounds and good air movement.  Abdominal:  Soft.  Bowel sounds are present.  No distension, she does not grimace on palpation.  Musculoskeletal:  No edema, no tenderness, and left hand/arm contracted.  No red or swollen joints anywhere.    Neurological:  Alert mumbles random words, does not follow commands.   Skin:  Skin is warm and dry.  No rash noted.  No pallor.   Peripheral vascular:  No edema and strong pulses on all 4 extremities.  ---------------------------------------------------------------------------------------------------------------------  EKG:          ---------------------------------------------------------------------------------------------------------------------   Results from last 7 days   Lab Units  01/15/19   1227  01/15/19   0457  01/15/19   0018  01/14/19   2153  01/14/19   1156   01/11/19   0450  01/10/19   0536   01/09/19   0637   CRP mg/dL   --    --    --    --    --    --   5.10*  9.75*   --   15.00*   LACTATE mmol/L  1.2  3.1*  4.2*   --    --    --    --    --    --    --    WBC 10*3/mm3  16.96*   --    --   11.56  9.68   < >   --   8.42   < >   --    HEMOGLOBIN g/dL  9.3*   --    --   10.7*  11.1*   < >   --   10.8*   < >   --    HEMATOCRIT %  31.4*   --    --   36.0*  35.8*   < >   --   36.1*   < >   --    MCV fL  92.1   --    --   94.0  92.5   < >   --   93.0   < >   --    MCHC g/dL   29.6*   --    --   29.7*  31.0*   < >   --   29.9*   < >   --    PLATELETS 10*3/mm3  329   --    --   363  299   < >   --   236   < >   --     < > = values in this interval not displayed.     Results from last 7 days   Lab Units  01/15/19   1328   PH, ARTERIAL pH units  7.464*   PO2 ART mm Hg  89.3   PCO2, ARTERIAL mm Hg  27.4*   HCO3 ART mmol/L  19.2*     Results from last 7 days   Lab Units  01/15/19   1227  01/14/19   2153  01/14/19   1156  01/11/19   0450   01/10/19   0536  01/09/19   0637   SODIUM mmol/L  141  141  137  138   --   139  142   POTASSIUM mmol/L  4.0  5.0  3.7  4.4   < >  3.5  3.7   MAGNESIUM mg/dL   --    --    --   1.9   --   1.6*  2.2   CHLORIDE mmol/L  113*  108  108  109   --   110  112   CO2 mmol/L  22.1*  19.5*  23.1*  21.7*   --   21.2*  22.6*   BUN mg/dL  16  13  10  12   --   12  12   CREATININE mg/dL  0.73  0.93  0.73  0.68   --   0.73  0.74   EGFR IF NONAFRICN AM mL/min/1.73  77  58*  77  84   --   77  76   CALCIUM mg/dL  8.0  8.8  8.7  8.3   --   8.4  8.2   GLUCOSE mg/dL  97  164*  129*  107   --   83  80   ALBUMIN g/dL  2.80*  3.00*  2.70*  2.90*   --   2.90*  2.80*   BILIRUBIN mg/dL  0.5  1.1  0.3  0.4   --   0.5  0.5   ALK PHOS U/L  379*  503*  169*  246*   --   288*  294*   AST (SGOT) U/L  223*  514*  30  49*   --   89*  149*   ALT (SGPT) U/L  84*  104*  10  45*   --   102*  192*    < > = values in this interval not displayed.   Estimated Creatinine Clearance: 61 mL/min (by C-G formula based on SCr of 0.73 mg/dL).  No results found for: AMMONIA  Results from last 7 days   Lab Units  01/15/19   0549  01/15/19   0018  01/14/19   2153   TROPONIN I ng/mL  0.048*  0.053*  0.011         Lab Results   Component Value Date    HGBA1C 6.2 (H) 03/24/2014     Lab Results   Component Value Date    TSH 1.626 09/15/2018     No results found for: PREGTESTUR, PREGSERUM, HCG, HCGQUANT  Pain Management Panel     Pain Management Panel Latest Ref Rng & Units 9/16/2018    AMPHETAMINES SCREEN, URINE  Negative Negative    BARBITURATES SCREEN Negative Negative    BENZODIAZEPINE SCREEN, URINE Negative Negative    BUPRENORPHINE Negative Negative    COCAINE SCREEN, URINE Negative Negative    METHADONE SCREEN, URINE Negative Negative        Blood Culture   Date Value Ref Range Status   01/15/2019 No growth at less than 24 hours  Preliminary   01/15/2019 No growth at less than 24 hours  Preliminary                    ---------------------------------------------------------------------------------------------------------------------  Imaging Results (last 7 days)     Procedure Component Value Units Date/Time    CT Abdomen Pelvis With Contrast [[id number]] Collected:  01/15/19 0701     Updated:  01/15/19 1308    Narrative:       CT ABDOMEN PELVIS WITH CONTRAST-     CLINICAL INDICATION: Abdominal pain, unspecified          COMPARISON: None available     TECHNIQUE: Axial images were acquired from the lung bases through the  pubic symphysis after IV , but without oral contrast.  Reformatted images were created in both the coronal and sagittal planes.     Radiation dose reduction techniques were utilized per ALARA protocol.  Automated exposure control was initiated through either or [employer] or  DoseRight software packages by  protocol.           FINDINGS:   There is minimal bibasilar atelectasis.     There is a hiatal hernia.     The liver is homogeneous. There is no evidence of focal hepatic mass     The spleen is homogeneous     There is no peripancreatic stranding or pancreatic head mass.     There is no adrenal enlargement.     The kidneys show no evidence of hydronephrosis or hydroureter. I do not  see any distal ureteral stones.      Otherwise I do not see any free fluid or walled off fluid collections.     There is no bowel wall thickening or mesenteric stranding.               Impression:       1. Moderate sized hiatal hernia.  2. Mild compression of superior endplate of L1.                   This report  was finalized on 1/15/2019 1:05 PM by Dr. Ulysses Rojas MD.       XR Chest 1 View [[id number]] Collected:  01/15/19 0701     Updated:  01/15/19 1300    Narrative:       XR CHEST 1 VIEW-     CLINICAL INDICATION: SOA          COMPARISON: 01/14/2019      TECHNIQUE: Single frontal view of the chest.     FINDINGS:     Right-sided PICC line tip in the superior vena cava.  The cardiac silhouette is normal. The pulmonary vasculature is  unremarkable.  There is no evidence of an acute osseous abnormality.   There are no suspicious-appearing parenchymal soft tissue nodules.            Impression:       No evidence of active or acute cardiopulmonary disease on today's chest  radiograph.         This report was finalized on 1/15/2019 12:58 PM by Dr. Ulysses Rojas MD.             Cultures:  Blood Culture   Date Value Ref Range Status   01/15/2019 No growth at less than 24 hours  Preliminary   01/15/2019 No growth at less than 24 hours  Preliminary         I have personally reviewed the radiology images and read the final radiology report.  ---------------------------------------------------------------------------------------------------------------------  Assessment / Plan       Assessment and Plan:    SIRS r/t Pancreatitis  Lactic acidosis, resolved   Elevated Alkaline Phosphatase   Elevated Transaminases   MSSA Bacteremia   DM Type 2, non-insulin dependent   Paroxysmal A-Fib  Essential HTN  COPD  Normocytic Anemia   Hx of CVA with left sided hemiplegia   Hypothyroidism   Dementia     SIRS likely r/t Pancreatitis: lipase has already trended down from yesterday from 2489 to 91 today, will give clear liquids as tolerated, WBC was normal yesterday at 11.56 and increased to 16.96 today. Repeat chest xray in the am to re-evaluate for any aspiration pna. She has been afebrile. She was given flagyl, azactam and vancomycin in the ED. Urine and Chest xray does not appreciate any acute processes.     MSSA Bacteremia: at [place] was  recommended to continue ancef through 1/21/19. Blood cultures repeated today. Will continue ancef for now, re-evaluate labs and clinical status in the AM.     Elevated Alkaline phosphatase, Elevated Transaminases: she had sudden elevated of LFTs and alkaline phosphatase, could be related to Ancef. Will repeat in the AM and re-evaluate. Hepatitis panel was checked on 1/18/19 and was negative, Liver US was done on 1/8/19 was unremarkable.     Paroxysmal A-Fib, HTN: Will continue eliquis for stroke prevention, she is not on any medications for rate control or BP, will monitor on telemetry.     DM type 2: A1c ordered, hypoglycemia protocol initiated, accu checks ac/hs and prn, diabetic diet, no sliding scale ordered at this time, last glucose 97.    COPD: not felt to be in exacerbation, nebulizer's. Supplemental oxygen.      Normocytic Anemia: H/H is 9.3/31.4, will monitor trend, repeat CBC in the AM.     Hypothyroidism: TSH ordered, will resume home meds when available by pharmacy.     Dementia, Debility: patient does not ambulate, will order PT/OT. She has her eyes open and mumbles random words, does not follow commands, her grand daughter Ernestina reports this as her baseline.     DVT prophylaxis: on eliquis   GI prophylaxis: continue pepcid   Lines/Tubes/Drains: Right arm PICC in place on arrival 1/14/19, olivas cath   Activity: bedrest, turn q2hr   Diet: NPO until evaluated by SLP    Code status: DNR/DNI     Reyna Doyle, AMELIE  01/15/19  2:57 PM  ---------------------------------------------------------------------------------------------------------------------   The patient was seen and examined at bedside. No history is obtainable from the patient. She is moving around in the bed and has her leg on the rail. She does not follow commands. Her HEENT exam appears overall unremarkable but patient does not allow for an oral examination. Lungs are diminished at the bases but clear. Heart is without murmur. Abdominal  exam is benign. No appreciable organomegaly. Olivas is anchored. Skin is without obvious rash. RUE PICC noted with minimal erythema in the superior aspect.     Keep NPO for treatment of pancreatitis; question if medication induced. Repeat lipase in the am. Obtain triglyceride level. Repeat CMP. Transaminitis may be due to Ancef. Consider ID consult if they remain significantly elevated. Follow up blood culture results.   Patient to have a sitter as she is a high risk for falls and pulling out olivas, picc, etc.     Discussed with: AMELIE Beatty and RUFUS Negron.

## 2019-01-15 NOTE — ED NOTES
Pt resting with eyes closed on stretcher, pt pulling at monitor lines, awakens to verbal stimuli, pt nonverbal and does not follow commands, skin pwd, no respiratory distress.      Aminah Hoskins, RUFUS  01/15/19 0801

## 2019-01-15 NOTE — ED NOTES
Pt incontinent of moderate amount of brown soft stool, brief, gown, and linens changed. Pt placed on hospital bed at this time, pt positioned onto right side with pillows, fall precautions maintained, pt waiting on admit bed.      Aminah Hoskins RN  01/15/19 0900

## 2019-01-15 NOTE — ED NOTES
Pt ready for transport to floor, pt remains in ns rhythm, pt remains on 2lpm nc and continued to floor, picc line remains patent in right upper arm with ns infusing at 125ml/hr and vancomycin infusing at 250ml/hr and continued to floor. Pt to floor on hospital bed with Aminah Bailey, RUFUS  01/15/19 1409

## 2019-01-15 NOTE — ED NOTES
Pt repositioned with pillows, cardiac monitoring continued, pt remains in ns rhythm, remains on 2lpm nc, remains alert and nonverbal. Pt skin pwd, no respiratory distress. Fall precautions maintained.      Aminah Hoskins, RUFUS  01/15/19 8380

## 2019-01-16 ENCOUNTER — APPOINTMENT (OUTPATIENT)
Dept: GENERAL RADIOLOGY | Facility: HOSPITAL | Age: 79
End: 2019-01-16

## 2019-01-16 LAB
ALBUMIN SERPL-MCNC: 2.6 G/DL (ref 3.4–4.8)
ALBUMIN/GLOB SERPL: 0.8 G/DL (ref 1.5–2.5)
ALP SERPL-CCNC: 298 U/L (ref 35–104)
ALT SERPL W P-5'-P-CCNC: 51 U/L (ref 10–36)
ANION GAP SERPL CALCULATED.3IONS-SCNC: 7 MMOL/L (ref 3.6–11.2)
AST SERPL-CCNC: 108 U/L (ref 10–30)
BASOPHILS # BLD AUTO: 0.07 10*3/MM3 (ref 0–0.3)
BASOPHILS NFR BLD AUTO: 0.7 % (ref 0–2)
BILIRUB SERPL-MCNC: 0.3 MG/DL (ref 0.2–1.8)
BUN BLD-MCNC: 14 MG/DL (ref 7–21)
BUN/CREAT SERPL: 18.2 (ref 7–25)
CALCIUM SPEC-SCNC: 7.7 MG/DL (ref 7.7–10)
CHLORIDE SERPL-SCNC: 117 MMOL/L (ref 99–112)
CO2 SERPL-SCNC: 20 MMOL/L (ref 24.3–31.9)
CREAT BLD-MCNC: 0.77 MG/DL (ref 0.43–1.29)
DEPRECATED RDW RBC AUTO: 68.3 FL (ref 37–54)
EOSINOPHIL # BLD AUTO: 0.17 10*3/MM3 (ref 0–0.7)
EOSINOPHIL NFR BLD AUTO: 1.7 % (ref 0–7)
ERYTHROCYTE [DISTWIDTH] IN BLOOD BY AUTOMATED COUNT: 20 % (ref 11.5–14.5)
GFR SERPL CREATININE-BSD FRML MDRD: 73 ML/MIN/1.73
GLOBULIN UR ELPH-MCNC: 3.2 GM/DL
GLUCOSE BLD-MCNC: 101 MG/DL (ref 70–110)
GLUCOSE BLDC GLUCOMTR-MCNC: 104 MG/DL (ref 70–130)
GLUCOSE BLDC GLUCOMTR-MCNC: 108 MG/DL (ref 70–130)
GLUCOSE BLDC GLUCOMTR-MCNC: 121 MG/DL (ref 70–130)
GLUCOSE BLDC GLUCOMTR-MCNC: 157 MG/DL (ref 70–130)
HCT VFR BLD AUTO: 28.9 % (ref 37–47)
HGB BLD-MCNC: 8.6 G/DL (ref 12–16)
IMM GRANULOCYTES # BLD AUTO: 0.03 10*3/MM3 (ref 0–0.03)
IMM GRANULOCYTES NFR BLD AUTO: 0.3 % (ref 0–0.5)
LIPASE SERPL-CCNC: 22 U/L (ref 13–60)
LYMPHOCYTES # BLD AUTO: 3.8 10*3/MM3 (ref 1–3)
LYMPHOCYTES NFR BLD AUTO: 38.7 % (ref 16–46)
MAGNESIUM SERPL-MCNC: 1.4 MG/DL (ref 1.7–2.6)
MCH RBC QN AUTO: 28.2 PG (ref 27–33)
MCHC RBC AUTO-ENTMCNC: 29.8 G/DL (ref 33–37)
MCV RBC AUTO: 94.8 FL (ref 80–94)
MONOCYTES # BLD AUTO: 1.37 10*3/MM3 (ref 0.1–0.9)
MONOCYTES NFR BLD AUTO: 13.9 % (ref 0–12)
NEUTROPHILS # BLD AUTO: 4.39 10*3/MM3 (ref 1.4–6.5)
NEUTROPHILS NFR BLD AUTO: 44.7 % (ref 40–75)
OSMOLALITY SERPL CALC.SUM OF ELEC: 287.5 MOSM/KG (ref 273–305)
PLATELET # BLD AUTO: 326 10*3/MM3 (ref 130–400)
PMV BLD AUTO: 9.5 FL (ref 6–10)
POTASSIUM BLD-SCNC: 3.4 MMOL/L (ref 3.5–5.3)
PROT SERPL-MCNC: 5.8 G/DL (ref 6–8)
RBC # BLD AUTO: 3.05 10*6/MM3 (ref 4.2–5.4)
SODIUM BLD-SCNC: 144 MMOL/L (ref 135–153)
WBC NRBC COR # BLD: 9.83 10*3/MM3 (ref 4.5–12.5)

## 2019-01-16 PROCEDURE — 74230 X-RAY XM SWLNG FUNCJ C+: CPT | Performed by: RADIOLOGY

## 2019-01-16 PROCEDURE — 85025 COMPLETE CBC W/AUTO DIFF WBC: CPT | Performed by: INTERNAL MEDICINE

## 2019-01-16 PROCEDURE — 92610 EVALUATE SWALLOWING FUNCTION: CPT

## 2019-01-16 PROCEDURE — 83690 ASSAY OF LIPASE: CPT | Performed by: INTERNAL MEDICINE

## 2019-01-16 PROCEDURE — 99233 SBSQ HOSP IP/OBS HIGH 50: CPT | Performed by: NURSE PRACTITIONER

## 2019-01-16 PROCEDURE — 94799 UNLISTED PULMONARY SVC/PX: CPT

## 2019-01-16 PROCEDURE — 25010000002 CEFTRIAXONE: Performed by: PHYSICIAN ASSISTANT

## 2019-01-16 PROCEDURE — 25010000002 MAGNESIUM SULFATE 2 GM/50ML SOLUTION: Performed by: NURSE PRACTITIONER

## 2019-01-16 PROCEDURE — 25010000002 GENTAMICIN PER 80 MG: Performed by: PHYSICIAN ASSISTANT

## 2019-01-16 PROCEDURE — 71045 X-RAY EXAM CHEST 1 VIEW: CPT

## 2019-01-16 PROCEDURE — 82962 GLUCOSE BLOOD TEST: CPT

## 2019-01-16 PROCEDURE — 80053 COMPREHEN METABOLIC PANEL: CPT | Performed by: INTERNAL MEDICINE

## 2019-01-16 PROCEDURE — 74230 X-RAY XM SWLNG FUNCJ C+: CPT

## 2019-01-16 PROCEDURE — 25010000003 CEFAZOLIN SODIUM-DEXTROSE 2-3 GM-%(50ML) RECONSTITUTED SOLUTION

## 2019-01-16 PROCEDURE — 83735 ASSAY OF MAGNESIUM: CPT | Performed by: NURSE PRACTITIONER

## 2019-01-16 PROCEDURE — 71045 X-RAY EXAM CHEST 1 VIEW: CPT | Performed by: RADIOLOGY

## 2019-01-16 PROCEDURE — 92611 MOTION FLUOROSCOPY/SWALLOW: CPT

## 2019-01-16 RX ORDER — POTASSIUM CHLORIDE 20 MEQ/1
40 TABLET, EXTENDED RELEASE ORAL EVERY 4 HOURS
Status: COMPLETED | OUTPATIENT
Start: 2019-01-16 | End: 2019-01-16

## 2019-01-16 RX ORDER — POTASSIUM CHLORIDE 7.45 MG/ML
10 INJECTION INTRAVENOUS
Status: DISCONTINUED | OUTPATIENT
Start: 2019-01-16 | End: 2019-01-21 | Stop reason: HOSPADM

## 2019-01-16 RX ORDER — POTASSIUM CHLORIDE 1.5 G/1.77G
40 POWDER, FOR SOLUTION ORAL AS NEEDED
Status: DISCONTINUED | OUTPATIENT
Start: 2019-01-16 | End: 2019-01-21 | Stop reason: HOSPADM

## 2019-01-16 RX ORDER — POTASSIUM CHLORIDE 750 MG/1
40 CAPSULE, EXTENDED RELEASE ORAL AS NEEDED
Status: DISCONTINUED | OUTPATIENT
Start: 2019-01-16 | End: 2019-01-21 | Stop reason: HOSPADM

## 2019-01-16 RX ORDER — LEVOTHYROXINE SODIUM ANHYDROUS 100 UG/5ML
50 INJECTION, POWDER, LYOPHILIZED, FOR SOLUTION INTRAVENOUS
Status: DISCONTINUED | OUTPATIENT
Start: 2019-01-16 | End: 2019-01-16

## 2019-01-16 RX ORDER — MAGNESIUM SULFATE HEPTAHYDRATE 40 MG/ML
2 INJECTION, SOLUTION INTRAVENOUS AS NEEDED
Status: DISCONTINUED | OUTPATIENT
Start: 2019-01-16 | End: 2019-01-21 | Stop reason: HOSPADM

## 2019-01-16 RX ORDER — MAGNESIUM SULFATE HEPTAHYDRATE 40 MG/ML
2 INJECTION, SOLUTION INTRAVENOUS
Status: COMPLETED | OUTPATIENT
Start: 2019-01-16 | End: 2019-01-16

## 2019-01-16 RX ORDER — MAGNESIUM SULFATE HEPTAHYDRATE 40 MG/ML
4 INJECTION, SOLUTION INTRAVENOUS AS NEEDED
Status: DISCONTINUED | OUTPATIENT
Start: 2019-01-16 | End: 2019-01-21 | Stop reason: HOSPADM

## 2019-01-16 RX ORDER — LEVOTHYROXINE SODIUM 0.1 MG/1
100 TABLET ORAL
Status: DISCONTINUED | OUTPATIENT
Start: 2019-01-16 | End: 2019-01-21 | Stop reason: HOSPADM

## 2019-01-16 RX ORDER — IPRATROPIUM BROMIDE AND ALBUTEROL SULFATE 2.5; .5 MG/3ML; MG/3ML
3 SOLUTION RESPIRATORY (INHALATION) EVERY 6 HOURS PRN
Status: DISCONTINUED | OUTPATIENT
Start: 2019-01-16 | End: 2019-01-21 | Stop reason: HOSPADM

## 2019-01-16 RX ADMIN — VENLAFAXINE HYDROCHLORIDE 37.5 MG: 37.5 TABLET ORAL at 17:18

## 2019-01-16 RX ADMIN — SODIUM CHLORIDE, PRESERVATIVE FREE 3 ML: 5 INJECTION INTRAVENOUS at 20:17

## 2019-01-16 RX ADMIN — APIXABAN 5 MG: 5 TABLET, FILM COATED ORAL at 11:57

## 2019-01-16 RX ADMIN — FAMOTIDINE 20 MG: 20 TABLET, FILM COATED ORAL at 20:17

## 2019-01-16 RX ADMIN — GENTAMICIN SULFATE 170 MG: 40 INJECTION, SOLUTION INTRAMUSCULAR; INTRAVENOUS at 17:18

## 2019-01-16 RX ADMIN — IPRATROPIUM BROMIDE AND ALBUTEROL SULFATE 3 ML: .5; 3 SOLUTION RESPIRATORY (INHALATION) at 00:29

## 2019-01-16 RX ADMIN — AZTREONAM 1 G: 1 INJECTION, POWDER, FOR SOLUTION INTRAMUSCULAR; INTRAVENOUS at 01:11

## 2019-01-16 RX ADMIN — MAGNESIUM SULFATE IN WATER 2 G: 40 INJECTION, SOLUTION INTRAVENOUS at 13:23

## 2019-01-16 RX ADMIN — CEFAZOLIN SODIUM 2 G: 2 SOLUTION INTRAVENOUS at 09:03

## 2019-01-16 RX ADMIN — CEFAZOLIN SODIUM 2 G: 2 SOLUTION INTRAVENOUS at 01:08

## 2019-01-16 RX ADMIN — QUETIAPINE FUMARATE 25 MG: 25 TABLET, FILM COATED ORAL at 20:17

## 2019-01-16 RX ADMIN — POTASSIUM CHLORIDE 40 MEQ: 1500 TABLET, EXTENDED RELEASE ORAL at 13:23

## 2019-01-16 RX ADMIN — LEVOTHYROXINE SODIUM 100 MCG: 100 TABLET ORAL at 16:31

## 2019-01-16 RX ADMIN — APIXABAN 5 MG: 5 TABLET, FILM COATED ORAL at 20:17

## 2019-01-16 RX ADMIN — MAGNESIUM SULFATE IN WATER 2 G: 40 INJECTION, SOLUTION INTRAVENOUS at 17:22

## 2019-01-16 RX ADMIN — FLUTICASONE PROPIONATE 2 SPRAY: 50 SPRAY, METERED NASAL at 09:37

## 2019-01-16 RX ADMIN — SODIUM CHLORIDE, PRESERVATIVE FREE 3 ML: 5 INJECTION INTRAVENOUS at 08:58

## 2019-01-16 RX ADMIN — MAGNESIUM SULFATE IN WATER 2 G: 40 INJECTION, SOLUTION INTRAVENOUS at 15:34

## 2019-01-16 RX ADMIN — AZTREONAM 1 G: 1 INJECTION, POWDER, FOR SOLUTION INTRAMUSCULAR; INTRAVENOUS at 07:32

## 2019-01-16 RX ADMIN — DOCUSATE SODIUM 100 MG: 100 CAPSULE ORAL at 20:17

## 2019-01-16 RX ADMIN — POTASSIUM CHLORIDE 40 MEQ: 1500 TABLET, EXTENDED RELEASE ORAL at 16:31

## 2019-01-16 RX ADMIN — MONTELUKAST SODIUM 10 MG: 10 TABLET, COATED ORAL at 20:17

## 2019-01-16 RX ADMIN — CEFTRIAXONE 2 G: 2 INJECTION, POWDER, FOR SOLUTION INTRAMUSCULAR; INTRAVENOUS at 16:30

## 2019-01-16 NOTE — PROGRESS NOTES
Discharge Planning Assessment  [facility]     Patient Name: Jeanna Flower  MRN: [id number]  Today's Date: 1/16/2019    Admit Date: 1/14/2019    Discharge Needs Assessment     Row Name 01/16/19 1241       Discharge Needs Assessment    Discharge Facility/Level of Care Needs  -- SS contacted [facility] [phone] per Maria L who states pt does not have a bed hold.         Discharge Plan     Row Name 01/16/19 1242       Plan    Plan  Pt was admitted from [facility] and does not have a bed hold. SS will need advance notice of discharge due to pt having no bed hold at [facility]. SS to follow and assist as needed with discharge planning.         Destination      Service Provider Request Status Selected Services Address Phone Number Fax Number    [facility] Pending - No Request Sent N/A [address] [facility] [address] [phone]     Demographic Summary     Row Name 01/16/19 1241       General Information    Referral Source  nursing    Reason for Consult  -- SS received consult [facility] resident.      Lakia Alatorre

## 2019-01-16 NOTE — PLAN OF CARE
Problem: Patient Care Overview  Goal: Plan of Care Review  Outcome: Outcome(s) achieved Date Met: 01/16/19 01/16/19 9581   Coping/Psychosocial   Plan of Care Reviewed With patient   Plan of Care Review   Progress improving   OTHER   Outcome Summary Clinical dysphagia assessment w/o overt s/s aspiration. Recommend initiation least restrictive premorbid baseline po diet of puree, thin liquids, however, pt is admitted w/ pancreatitis w/ RN reporting clear liquid diet. SLP to initiate clear liquid po diet. No further formal f/u recommended.

## 2019-01-16 NOTE — NURSING NOTE
Patient noted to have a small, dry scab to the left gluteal that measures 0.3x0.1cm.  No evidence of pressure relation.  Ilda wound skin is pink and blanchable.    Left lateral ankle is red, intact, and blanchable a this time.

## 2019-01-16 NOTE — THERAPY EVALUATION
Acute Care - Speech Language Pathology   Swallow Initial Evaluation  Tom   CLINICAL DYSPHAGIA ASSESSMENT     Patient Name: Jeanna Flower  : 1940  MRN: [id number]  Today's Date: 2019             Admit Date: 2019     Jeanna Flower  is seen at bedside this pm on 3N to assess safety/efficacy of swallowing fnx, determine safest/least restrictive diet tolerance. Her RN, Kortney, is present at bedside. She is the assigned sitter today as well.     Ms. Flower was admitted from local SNF 19 2/2 acute pancreatitis. She is familiar to SLP from recent clinical dysphagia assessment during her recent admit to this facility 19-19. She was recommended w/ what was felt to be her new baseline least restrictive po diet 2/2 dementia of puree, thin liquids. She was tolerating this well upon discharge.     Social History     Socioeconomic History   • Marital status: Single     Spouse name: Not on file   • Number of children: Not on file   • Years of education: Not on file   • Highest education level: Not on file   Social Needs   • Financial resource strain: Not on file   • Food insecurity - worry: Not on file   • Food insecurity - inability: Not on file   • Transportation needs - medical: Not on file   • Transportation needs - non-medical: Not on file   Occupational History   • Not on file   Tobacco Use   • Smoking status: Never Smoker   • Smokeless tobacco: Never Used   Substance and Sexual Activity   • Alcohol use: No   • Drug use: No   • Sexual activity: Defer   Other Topics Concern   • Not on file   Social History Narrative   • Not on file      Chest xray 19 revealed no evidence  Of acute cardiopulmonary disease.     Diet Orders (active) (From admission, onward)    Start     Ordered    19 1254  Diet Clear Liquid; Thin  Diet Effective Now      19 1254        Observed on O2 via NC 2L.    Ms. Flower is positioned upright and centered in bed to accept multiple po presentations of ice  chip, puree and thin liquids via spoon, cup and straw.  Solids deferred. She does not self feed 2/2 advanced dementia.     Facial/oral structures are symmetrical upon observation. Lingual protrusion reveals no deviation. Oral mucosa are moist, pink, and clean. Secretions are clear, thin, and controlled. OROM/ANDREA is weak w/ delay to imitate oral postures, inconsistently. Gag is not assessed. Volitional cough is intact w/ adequate  intensity, clear in quality, non-productive w/ max cues to elicit. Voice is adequate in intensity, clear in quality w/ intelligible speech. She is oriented to herself, follows simple commands, and does participate in simple conversational exchanges w/ max cues. Interactions are much improved today in comparison to previous assessments.     Upon po presentations, adequate bolus anticipation and acceptance w/ good labial seal for bolus clearance via spoon bowl, cup rim stability and suction via straw. Bolus formation, manipulation and control are w/timely  A-p transit  w/o oral residue.     Pharyngeal swallow is timely w/ adequate hyolaryngeal elevation per palpation. No overt s/s aspiration evidenced across this evaluation. No silent aspiration suspected as pt is w/o changes in vocal quality, respirations or secretions post po presentations. She denies odynophagia. She is noted w/ isolated cough response x2 w/ initial presentation of cold ice water, suspect sensation response vs. Overt s/s aspiration, as this is not elicited or reproduced across this assessment.     She accepts crushed medications in puree, as provided per RN, w/o overt s/s aspiration. Tolerated well w/o oral holding.     Visit Dx:     ICD-10-CM ICD-9-CM   1. Acute pancreatitis, unspecified complication status, unspecified pancreatitis type K85.90 577.0   2. 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   • Other sleep apnea   • Severe sepsis (CMS/[facility])   • MSSA bacteremia   • E. coli UTI   • Acute pancreatitis     Past Medical History:   Diagnosis Date   • Anemia    • Closed wedge compression fracture of first lumbar vertebra (CMS/[facility])    • COPD (chronic obstructive pulmonary disease) (CMS/[facility])    • CVA (cerebral vascular accident) with left hemiparesis. 12/6/2017   • Dementia    • Diabetes mellitus (CMS/[facility])    • Dysarthria following cerebral infarction    • Dysphagia, oropharyngeal phase    • Dysphagia, pharyngoesophageal phase    • GERD (gastroesophageal reflux disease)    • Heart disease    • Hyperlipidemia    • Hypertension    • Hypothyroidism    • Insomnia    • Obesity    • Other sleep apnea 11/13/2018   • Paroxysmal atrial fibrillation (CMS/[facility]) 12/6/2017     Past Surgical History:   Procedure Laterality Date   • APPENDECTOMY     • BLADDER SURGERY     • CARDIAC CATHETERIZATION     • CHOLECYSTECTOMY       EDUCATION  The patient has been educated in the following areas:   Dysphagia (Swallowing Impairment) Oral Care/Hydration Modified Diet Instruction.    Impression: Ms. Flower presents w/ mild oral dysphagia 2/2 advanced dementia. She is able to tolerate premorbid baseline least restrictive modified po diet of puree, thin liquids w/o s/s aspiration. No s/s indicative of silent aspiration.  No odynophagia reported.  She is slightly more interactive today. She continues unable to self feed 2/2 advanced dementia.     She is felt to most benefit from initiation of premorbid baseline least restrictive modified po diet of puree, thin liquids. However, she will be initiated on clear liquid diet per her current pancreatitis diagnosis. Please advance as tolerated.     SLP Recommendation and Plan    1. Clear liquid diet, thin consistency. Please advance, as tolerated w/  resolvement of acute pancreatitis, to premorbid least restrictive po diet puree, thin liquids.    2. Medications crushed in puree/thins.   3. Upright and centered for all po intake. 1:1 assistance w/ po intake.   4. SAGRARIO precautions.  5. Oral care protocol.  No further formal SLP f/u warranted at this time.    D/w pt results and family, who present to bedside as SLP is exiting, and recommendations w/ verbal agreement.    D/w RN results and recommendations w/ verbal agreement.    Thank you for allowing me to participate in the care of your patient-  Ivanna Kuo M.S., CCC/SLP                                       Plan of Care Reviewed With: patient  Plan of Care Review  Plan of Care Reviewed With: patient  Progress: improving  Outcome Summary: Clinical dysphagia assessment w/o overt s/s aspiration. Recommend initiation least restrictive premorbid baseline po diet of puree, thin liquids, however, pt is admitted w/ pancreatitis w/ RN reporting clear liquid diet. SLP to initiate clear liquid po diet. No further formal f/u recommended.   Time Calculation:     Therapy Charges for Today     Code Description Service Date Service Provider Modifiers Qty    90747456176 HC ST SWALLOWING CURRENT STATUS 1/16/2019 Ivanna Kuo, MS CCC-SLP  1    45311016644 HC ST SWALLOWING PROJECTED 1/16/2019 Ivanna Kuo MS CCC-SLP  1    [id number] HC ST SWALLOWING DISCHARGE 1/16/2019 Ivanna Kuo MS CCC-SLP  1    [id number] HC ST EVAL ORAL PHARYNG SWALLOW 3 1/16/2019 Ivanna Kuo MS CCC-SLP GN 1        SLP G-Codes  SLP NOMS Used?: Yes  Functional Limitations: Swallowing  Swallow Current Status (): At least 1 percent but less than 20 percent impaired, limited or restricted  Swallow Goal Status (): At least 1 percent but less than 20 percent impaired, limited or restricted  Swallow Discharge Status (): At least 1 percent but less than 20 percent impaired, limited or restricted    Ivanna Kuo  MS CCC-SLP  1/16/2019

## 2019-01-16 NOTE — PROGRESS NOTES
Patient Identification:  Name:  Jeanna Flower  Age:  78 y.o.  Sex:  female  :  1940  MRN:  [id number]  Visit Number:  [id number]  Primary Care Provider:  Raul Robert MD    Length of stay:  1    Chief Complaint: low Sp02 at the nursing home    HPI:     Patient presented to [facility] ED on 19 with low Sp02 via pulse ox from the nursing home. Due to her mental state she was unable to give a ROS on admission. Her granddaughter reports this is her baseline, she mumbles words, and is bed bound.     Hospital Course:    On admission she was found to have acute pancreatitis, initial lipase level was 2489, repeat on 1/15/19 showed 91 and resolved to normal on 19. She underwent a CT on admission which showed a moderate sized hiatal hernia and mild compression fx of L1.Chest xray did not show any Pneumonia. She has been spitting out medications and the nursing staff has had difficulty in getting PO meds administered. She was given a 1 time dose of full dose lovenox on 1/15/19 in place of her eliquis.     Subjective:     On exam today she is on 2LNC, she is lying in bed with RUFUS Sheets as her sitter at bedside. Due to her dementia at baseline, I am unable to obtain a ROS from her. She is restless in bed but not currently trying to get out of bed.     ----------------------------------------------------------------------------------------------------------------------  Current [facility] Meds:    apixaban 5 mg Oral Q12H   aztreonam 1 g Intravenous Q8H   budesonide-formoterol 2 puff Inhalation BID - RT   ceFAZolin 2 g Intravenous Q8H   docusate sodium 100 mg Oral BID   famotidine 20 mg Oral BID   fluticasone 2 spray Nasal Daily   lactobacillus acidophilus 1 capsule Oral Daily   levothyroxine 100 mcg Oral Daily   montelukast 10 mg Oral Nightly   QUEtiapine 25 mg Oral Nightly   sodium chloride 3 mL Intravenous Q12H   venlafaxine 37.5 mg Oral BID With Meals       Pharmacy Consult - Pharmacy to dose     Sodium  chloride 125 mL/hr Last Rate: 125 mL/hr (01/16/19 0732)     ----------------------------------------------------------------------------------------------------------------------  Vital Signs:  Temp:  [97.8 °F (36.6 °C)-98.8 °F (37.1 °C)] 98.1 °F (36.7 °C)  Heart Rate:  [] 68  Resp:  [18-22] 18  BP: (100-164)/(55-96) 152/96      01/14/19  2126 01/15/19  1627   Weight: 78.4 kg (172 lb 13.5 oz) 78.5 kg (173 lb)     Body mass index is 28.79 kg/m².    Intake/Output Summary (Last 24 hours) at 1/16/2019 1055  Last data filed at 1/16/2019 0300  Gross per 24 hour   Intake 3100 ml   Output 650 ml   Net 2450 ml     No intake/output data recorded.  NPO Diet  ----------------------------------------------------------------------------------------------------------------------  Physical exam:  Constitutional:  Well-developed and well-nourished.  No respiratory distress.      HENT:  Head:  Normocephalic and atraumatic.  Mouth:  Moist mucous membranes.    Eyes:  Conjunctivae and EOM are normal.  Pupils are equal, round, and reactive to light.  No scleral icterus.    Neck:  Neck supple.  No JVD present.    Cardiovascular:  Normal rate, regular rhythm and normal heart sounds with no murmur.  Pulmonary/Chest:  No respiratory distress, no wheezes, no crackles, with normal breath sounds and good air movement.  Abdominal:  Soft.  Bowel sounds are normal.  No distension and no tenderness.   Musculoskeletal:  No edema, no tenderness, and no deformity.  No red or swollen joints anywhere.    Neurological:  Alert and oriented to person, place, and time.  No cranial nerve deficit.  No tongue deviation.  No facial droop.  No slurred speech.   Skin:  Skin is warm and dry. No rash noted. No pallor.   ----------------------------------------------------------------------------------------------------------------------  Tele:     ----------------------------------------------------------------------------------------------------------------------  Results from last 7 days   Lab Units 01/15/19  0549 01/15/19  0018 01/14/19  2153   TROPONIN I ng/mL 0.048* 0.053* 0.011     Results from last 7 days   Lab Units 01/16/19  0700 01/15/19  1227 01/15/19  0457 01/15/19  0018 01/14/19  2153  01/11/19  0450 01/10/19  0536   CRP mg/dL  --   --   --   --   --   --  5.10* 9.75*   LACTATE mmol/L  --  1.2 3.1* 4.2*  --   --   --   --    WBC 10*3/mm3 9.83 16.96*  --   --  11.56   < >  --  8.42   HEMOGLOBIN g/dL 8.6* 9.3*  --   --  10.7*   < >  --  10.8*   HEMATOCRIT % 28.9* 31.4*  --   --  36.0*   < >  --  36.1*   MCV fL 94.8* 92.1  --   --  94.0   < >  --  93.0   MCHC g/dL 29.8* 29.6*  --   --  29.7*   < >  --  29.9*   PLATELETS 10*3/mm3 326 329  --   --  363   < >  --  236    < > = values in this interval not displayed.     Results from last 7 days   Lab Units 01/15/19  1328   PH, ARTERIAL pH units 7.464*   PO2 ART mm Hg 89.3   PCO2, ARTERIAL mm Hg 27.4*   HCO3 ART mmol/L 19.2*     Results from last 7 days   Lab Units 01/16/19  0700 01/15/19  1227 01/14/19  2153  01/11/19  0450  01/10/19  0536   SODIUM mmol/L 144 141 141   < > 138  --  139   POTASSIUM mmol/L 3.4* 4.0 5.0   < > 4.4   < > 3.5   MAGNESIUM mg/dL  --   --   --   --  1.9  --  1.6*   CHLORIDE mmol/L 117* 113* 108   < > 109  --  110   CO2 mmol/L 20.0* 22.1* 19.5*   < > 21.7*  --  21.2*   BUN mg/dL 14 16 13   < > 12  --  12   CREATININE mg/dL 0.77 0.73 0.93   < > 0.68  --  0.73   EGFR IF NONAFRICN AM mL/min/1.73 73 77 58*   < > 84  --  77   CALCIUM mg/dL 7.7 8.0 8.8   < > 8.3  --  8.4   GLUCOSE mg/dL 101 97 164*   < > 107  --  83   ALBUMIN g/dL 2.60* 2.80* 3.00*   < > 2.90*  --  2.90*   BILIRUBIN mg/dL 0.3 0.5 1.1   < > 0.4  --  0.5   ALK PHOS U/L 298* 379* 503*   < > 246*  --  288*   AST (SGOT) U/L 108* 223* 514*   < > 49*  --  89*   ALT (SGPT) U/L 51* 84* 104*   < > 45*  --  102*    < > = values  in this interval not displayed.   Estimated Creatinine Clearance: 61 mL/min (by C-G formula based on SCr of 0.77 mg/dL).  No results found for: AMMONIA  Results from last 7 days   Lab Units 01/15/19  1227   TRIGLYCERIDES mg/dL 82     Blood Culture   Date Value Ref Range Status   01/15/2019 Abnormal Stain (A)  Preliminary   01/15/2019 (A)  Preliminary    Gram Negative Bacilli, Morphology consistent with Escherichia / Citrobacter   01/15/2019 Abnormal Stain (A)  Preliminary   01/15/2019 (A)  Preliminary    Gram Negative Bacilli, Morphology consistent with Escherichia / Citrobacter              ----------------------------------------------------------------------------------------------------------------------  Imaging Results (last 24 hours)     Procedure Component Value Units Date/Time    XR Chest 1 View [[id number]] Collected:  01/16/19 0901     Updated:  01/16/19 0903    Narrative:       XR CHEST 1 VW-     CLINICAL INDICATION: leukocytosis r/t pneumonia; K85.90-Acute  pancreatitis without necrosis or infection, unspecified; N39.0-Urinary  tract infection, site not specified          COMPARISON: 1/14/2019      TECHNIQUE: Single frontal view of the chest.     FINDINGS:     There is no focal alveolar infiltrate or effusion.  The cardiac silhouette is normal. The pulmonary vasculature is  unremarkable.  There is no evidence of an acute osseous abnormality.   There are no suspicious-appearing parenchymal soft tissue nodules.            Impression:       No evidence of active or acute cardiopulmonary disease on today's chest  radiograph.         This report was finalized on 1/16/2019 9:01 AM by Dr. Ulysses Rojas MD.       CT Abdomen Pelvis With Contrast [[id number]] Collected:  01/15/19 0701     Updated:  01/15/19 1308    Narrative:       CT ABDOMEN PELVIS WITH CONTRAST-     CLINICAL INDICATION: Abdominal pain, unspecified          COMPARISON: None available     TECHNIQUE: Axial images were acquired from the lung bases  through the  pubic symphysis after IV , but without oral contrast.  Reformatted images were created in both the coronal and sagittal planes.     Radiation dose reduction techniques were utilized per ALARA protocol.  Automated exposure control was initiated through either or [email or web address] or  DoseRight software packages by  protocol.           FINDINGS:   There is minimal bibasilar atelectasis.     There is a hiatal hernia.     The liver is homogeneous. There is no evidence of focal hepatic mass     The spleen is homogeneous     There is no peripancreatic stranding or pancreatic head mass.     There is no adrenal enlargement.     The kidneys show no evidence of hydronephrosis or hydroureter. I do not  see any distal ureteral stones.      Otherwise I do not see any free fluid or walled off fluid collections.     There is no bowel wall thickening or mesenteric stranding.               Impression:       1. Moderate sized hiatal hernia.  2. Mild compression of superior endplate of L1.                   This report was finalized on 1/15/2019 1:05 PM by Dr. Ulysses Rojas MD.       XR Chest 1 View [[id number]] Collected:  01/15/19 0701     Updated:  01/15/19 1300    Narrative:       XR CHEST 1 VIEW-     CLINICAL INDICATION: SOA          COMPARISON: 01/14/2019      TECHNIQUE: Single frontal view of the chest.     FINDINGS:     Right-sided PICC line tip in the superior vena cava.  The cardiac silhouette is normal. The pulmonary vasculature is  unremarkable.  There is no evidence of an acute osseous abnormality.   There are no suspicious-appearing parenchymal soft tissue nodules.            Impression:       No evidence of active or acute cardiopulmonary disease on today's chest  radiograph.         This report was finalized on 1/15/2019 12:58 PM by Dr. Ulysses Rojas MD.           ----------------------------------------------------------------------------------------------------------------------  Assessment and  Plan:    SIRS r/t Pancreatitis  Lactic acidosis, resolved   Elevated Alkaline Phosphatase   Elevated Transaminases   MSSA Bacteremia  2/2 + blood cultures for E.Coli   DM Type 2, non-insulin dependent   Paroxysmal A-Fib  Essential HTN  COPD  Normocytic Anemia   Hx of CVA with left sided hemiplegia   Hypothyroidism   Hypokalemia   Hypomagnesemia   Dementia      SIRS likely r/t Pancreatitis: lipase 22.  WBC 9.83. Repeat chest xray did not show any pneumonia or aspiration. She has been afebrile. Triglycerides were 82.      MSSA Bacteremia with no E.Coli Bacteremia: ancef was continued yesterday, blood cultures today show E.Coli. Infectious disease consulted for further recommendations.      Elevated Alkaline phosphatase, Elevated Transaminases: she had sudden elevated of LFTs and alkaline phosphatase, they have improved today to AST//51, repeat labs in the AM. Hepatitis panel was checked on 1/18/19 and was negative, Liver US was done on 1/8/19 was unremarkable. Continue to monitor trend.      Paroxysmal A-Fib, HTN: she is on eliquis but spitting out PO meds. She was given a one time Lovenox injection last night. Will discuss with Dr. Livingston.     1452: patient passed swallow evaluation and is taking her PO meds.     DM type 2: A1c is 5.9, hypoglycemia protocol initiated, accu checks ac/hs and prn, diabetic diet, no sliding scale ordered at this time, last glucose 104-111.     COPD: not felt to be in exacerbation, nebulizer's. Supplemental oxygen.       Normocytic Anemia: H/H is 8.6/28.9, likely dilutional, will monitor trend, repeat CBC in the AM.      Hypothyroidism: TSH is 6.470, would recommend repeat in 4 weeks. She is not taking PO meds she is spitting them out, changed to IV synthroid for now.     Hypokalemia: level is 3.4, protocol in place.       Hypomagnesemia: level is 1.4, protocol replacement ordered.      Dementia, Debility: patient does not ambulate, will order PT/OT. She has her eyes open and  mumbles random words, does not follow commands, her grand daughter Ernestina reports this as her baseline.          DVT prophylaxis: on eliquis   GI prophylaxis: continue pepcid   Lines/Tubes/Drains: Right arm PICC in place on arrival 1/14/19, olivas cath   Activity: bedrest, turn q2hr   Diet: Consistent carb clear liquids advance as tolerated.     I have spoke with her POA, Ernestina, and discussed with her that Mrs. Flower is spitting out her PO meds, we are having SLP come see her today if she will participate in a swallow evaluation, however, if she wont swallow them or take in food for nutrition we would need to discuss further options. She states Mrs. Flower has had a G-Tube in the past and it stayed infected where she pulled and tugged on it all the time and had stated in the past she would not ever want one again, and Ernestina wants to honor those wishes.      Code status: DNR/DNI     The patient is high risk due to the following diagnoses/reasons:  SIRS r/t pancreatitis, DM type 2, Normocytic Anemia, Paroxysmal A-Fib on Anticoagulation.     Reyna Doyle, APRN  01/16/19  10:55 AM

## 2019-01-16 NOTE — PROGRESS NOTES
Called by the nurse. He states she will not swallow any pills even in apple sauce or other foods. She is very agitated and restless. QTc is WNL haldol 1mg IV given. She is on eliquis for stroke prevention, discussed with Dr. Medel. Will hold eliquis tonight as she will not take her med's and give a one time dose of full dose Lovenox and re-evaluate in the AM. Called and discussed the plan with RUFUS Kingsley.

## 2019-01-16 NOTE — PLAN OF CARE
Problem: Fall Risk (Adult)  Goal: Absence of Fall  Outcome: Ongoing (interventions implemented as appropriate)   01/16/19 0043   Fall Risk (Adult)   Absence of Fall making progress toward outcome       Problem: Nausea/Vomiting (Adult)  Goal: Symptom Relief  Outcome: Ongoing (interventions implemented as appropriate)   01/16/19 0043   Nausea/Vomiting (Adult)   Symptom Relief making progress toward outcome     Goal: Adequate Hydration  Outcome: Ongoing (interventions implemented as appropriate)   01/16/19 0043   Nausea/Vomiting (Adult)   Adequate Hydration making progress toward outcome

## 2019-01-16 NOTE — PROGRESS NOTES
Pharmacy was consulted for a single dose full dose of lovenox for tonight due to the patient refusing PO medications. Based on an estimated CrCl of 61mL/min, and an actual body weight of 79kg, a dose of 80mg has been ordered. Thank you for the consult.     Thank you,   Barbara Rider [facility]  9:20 PM

## 2019-01-16 NOTE — PLAN OF CARE
Problem: Patient Care Overview  Goal: Plan of Care Review  Outcome: Ongoing (interventions implemented as appropriate)      Problem: Fall Risk (Adult)  Goal: Identify Related Risk Factors and Signs and Symptoms  Outcome: Ongoing (interventions implemented as appropriate)    Goal: Absence of Fall  Outcome: Ongoing (interventions implemented as appropriate)      Problem: Pain, Acute (Adult)  Goal: Identify Related Risk Factors and Signs and Symptoms  Outcome: Ongoing (interventions implemented as appropriate)    Goal: Acceptable Pain Control/Comfort Level  Outcome: Ongoing (interventions implemented as appropriate)      Problem: Skin Injury Risk (Adult)  Goal: Identify Related Risk Factors and Signs and Symptoms  Outcome: Ongoing (interventions implemented as appropriate)    Goal: Skin Health and Integrity  Outcome: Ongoing (interventions implemented as appropriate)      Problem: Nausea/Vomiting (Adult)  Goal: Identify Related Risk Factors and Signs and Symptoms  Outcome: Ongoing (interventions implemented as appropriate)    Goal: Symptom Relief  Outcome: Ongoing (interventions implemented as appropriate)    Goal: Adequate Hydration  Outcome: Ongoing (interventions implemented as appropriate)      Problem: Wound (Includes Pressure Injury) (Adult)  Goal: Signs and Symptoms of Listed Potential Problems Will be Absent, Minimized or Managed (Wound)  Outcome: Ongoing (interventions implemented as appropriate)

## 2019-01-16 NOTE — NURSING NOTE
Patient son at bedside requesting information regarding patient status. Informed the patients son without the password that was set up by the POA I would be unable to provide information at this time.

## 2019-01-16 NOTE — CONSULTS
INFECTIOUS DISEASE CONSULTATION REPORT        Patient Identification:  Name:  Jeanna Flower  Age:  78 y.o.  Sex:  female  :  1940  MRN:  [id number]   Visit Number:  [id number]  Primary Care Physician:  Raul Robert MD         Subjective       Subjective     History of present illness:      Thank you Dr. Livingston for allowing us to participate in the care of your patient.  As you well know, Ms. Jeanna Flower is a 78 y.o. female with past medical history significantof recent E.Coli UTI on 19, MSSA bacteremia, HTN, DM, hx of CVA, dysphagia, GERD, Dementia COPD and Hypothyroidism, paroxysmal a-fib, debility, who presented to [facility] Emergency Department on 2019 for low SpO2. Patient is a poor historian. She was seen in the ED on 19 and 1/15/19 for similar complaint.     Today she has improving lactic acid of 1.2 from 4.2 on admission and resolution of leukocytosis. Chest radiograph is unremarkable. CT of Abdomen reveals minimal bibasilar atelectasis. Blood cultures 2 of 2 from 1/15 reveal E.coli. Urine cultures reveal >100,000 of E.coli.     Infectious Disease consultation was requested for antimicrobial management.      ---------------------------------------------------------------------------------------------------------------------     Review Of Systems:    Unable to obtain due to confusion     ---------------------------------------------------------------------------------------------------------------------     Past Medical History    Past Medical History:   Diagnosis Date   • Anemia    • Closed wedge compression fracture of first lumbar vertebra (CMS/[facility])    • COPD (chronic obstructive pulmonary disease) (CMS/[facility])    • CVA (cerebral vascular accident) with left hemiparesis. 2017   • Dementia    • Diabetes mellitus (CMS/[facility])    • Dysarthria following cerebral infarction    • Dysphagia, oropharyngeal phase    • Dysphagia, pharyngoesophageal phase    • GERD  (gastroesophageal reflux disease)    • Heart disease    • Hyperlipidemia    • Hypertension    • Hypothyroidism    • Insomnia    • Obesity    • Other sleep apnea 11/13/2018   • Paroxysmal atrial fibrillation (CMS/HCC) 12/6/2017       Past Surgical History    Past Surgical History:   Procedure Laterality Date   • APPENDECTOMY     • BLADDER SURGERY     • CARDIAC CATHETERIZATION     • CHOLECYSTECTOMY         Family History    Family History   Problem Relation Age of Onset   • Heart disease Mother    • Heart attack Mother    • Heart disease Father    • Heart attack Father    • Heart attack Brother    • Heart attack Brother        Social History    Social History     Tobacco Use   • Smoking status: Never Smoker   • Smokeless tobacco: Never Used   Substance Use Topics   • Alcohol use: No   • Drug use: No       Allergies    Bactrim [sulfamethoxazole-trimethoprim]; Codeine; and Penicillins  ---------------------------------------------------------------------------------------------------------------------     Home Medications:    Prior to Admission Medications     Prescriptions Last Dose Informant Patient Reported? Taking?    apixaban (ELIQUIS) 5 MG tablet tablet 1/14/2019 Nursing Home No Yes    Take 1 tablet by mouth Every 12 (Twelve) Hours.    budesonide (PULMICORT) 0.5 MG/2ML nebulizer solution 1/14/2019 Nursing Home Yes Yes    Take 0.5 mg by nebulization Every 12 (Twelve) Hours.    CeFAZolin Sodium-Dextrose (ANCEF) 2-3 GM-%(50ML) IVPB 1/14/2019 Nursing Home No Yes    Infuse 2,000 mg into a venous catheter Every 8 (Eight) Hours for 10 days.    cycloSPORINE (RESTASIS) 0.05 % ophthalmic emulsion 1/14/2019 Nursing Home Yes Yes    Administer 1 drop to both eyes 2 (Two) Times a Day.    docusate sodium (COLACE) 100 MG capsule 1/14/2019 Nursing Home Yes Yes    Take 100 mg by mouth 2 (Two) Times a Day.    famotidine (PEPCID) 20 MG tablet 1/14/2019 Nursing Home Yes Yes    Take 20 mg by mouth 2 (Two) Times a Day.    fluticasone  (FLONASE) 50 MCG/ACT nasal spray 1/14/2019 Nursing Home Yes Yes    2 sprays into the nostril(s) as directed by provider Daily.    fluticasone-salmeterol (ADVAIR DISKUS) 250-50 MCG/DOSE DISKUS 1/14/2019 Nursing Home Yes Yes    Inhale 1 puff 2 (Two) Times a Day.    lactobacillus acidophilus (RISAQUAD) capsule capsule 1/14/2019 Nursing Home No Yes    Take 1 capsule by mouth Daily for 9 days.    levothyroxine (SYNTHROID, LEVOTHROID) 100 MCG tablet 1/14/2019 Nursing Home Yes Yes    Take 100 mcg by mouth Daily.    venlafaxine (EFFEXOR) 37.5 MG tablet 1/14/2019 Nursing Home Yes Yes    Take 37.5 mg by mouth 2 (Two) Times a Day.    albuterol sulfate  (90 Base) MCG/ACT inhaler Unknown Nursing Home Yes No    Inhale 2 puffs Every 4 (Four) Hours As Needed for Wheezing or Shortness of Air.    HYDROcodone-acetaminophen (NORCO) 5-325 MG per tablet Unknown Nursing Home No No    Take 1 tablet by mouth Every 6 (Six) Hours As Needed for Moderate Pain .    montelukast (SINGULAIR) 10 MG tablet 1/13/2019 Nursing Home Yes No    Take 10 mg by mouth Every Night.    QUEtiapine (SEROquel) 25 MG tablet 1/13/2019 Nursing Home No No    Take 1 tablet by mouth Every Night.    QUEtiapine (SEROquel) 25 MG tablet Unknown Nursing Home No No    Take 1 tablet by mouth 2 (Two) Times a Day As Needed (Agitation, halllucinations).        ---------------------------------------------------------------------------------------------------------------------    Objective       Objective     Hospital Scheduled Meds:    apixaban 5 mg Oral Q12H   aztreonam 1 g Intravenous Q8H   budesonide-formoterol 2 puff Inhalation BID - RT   ceFAZolin 2 g Intravenous Q8H   docusate sodium 100 mg Oral BID   famotidine 20 mg Oral BID   fluticasone 2 spray Nasal Daily   lactobacillus acidophilus 1 capsule Oral Daily   levothyroxine 100 mcg Oral Daily   montelukast 10 mg Oral Nightly   potassium chloride 40 mEq Oral Q4H   QUEtiapine 25 mg Oral Nightly   sodium chloride 3 mL  Intravenous Q12H   venlafaxine 37.5 mg Oral BID With Meals       Pharmacy Consult - Pharmacy to dose     Sodium chloride 125 mL/hr Last Rate: 125 mL/hr (01/16/19 0732)     ---------------------------------------------------------------------------------------------------------------------   Vital Signs:  Temp:  [97.8 °F (36.6 °C)-98.8 °F (37.1 °C)] 98.1 °F (36.7 °C)  Heart Rate:  [] 62  Resp:  [18-22] 18  BP: (100-156)/(55-96) 146/82  Mean Arterial Pressure (Non-Invasive) for the past 24 hrs (Last 3 readings):   Noninvasive MAP (mmHg)   01/15/19 1548 78   01/15/19 1517 91   01/15/19 1502 87     SpO2 Percentage    01/16/19 0029 01/16/19 0700 01/16/19 0706   SpO2: 98% 92% 98%     SpO2:  [92 %-98 %] 98 %  on  Flow (L/min):  [2] 2;   Device (Oxygen Therapy): nasal cannula    Body mass index is 28.79 kg/m².  Wt Readings from Last 3 Encounters:   01/15/19 78.5 kg (173 lb)   01/14/19 78.5 kg (173 lb)   01/07/19 76.8 kg (169 lb 6.4 oz)     ---------------------------------------------------------------------------------------------------------------------     Physical Exam:    Constitutional:  Well-developed and well-nourished.  No respiratory distress.      HENT:  Head: Normocephalic and atraumatic.  Mouth:  Moist mucous membranes.    Eyes:  Conjunctivae and EOM are normal.  No scleral icterus.  Neck:  Neck supple.  No JVD present.    Cardiovascular:  Normal rate, regular rhythm and normal heart sounds with no murmur. No edema.  Pulmonary/Chest:  No respiratory distress, no wheezes, no crackles, with normal breath sounds and good air movement.  Abdominal:  Soft.  Bowel sounds are normal.  No distension and no tenderness.   Musculoskeletal:  No edema, no tenderness, and no deformity.  No swelling or redness of joints.  Neurological:  Confused  Skin:  Skin is warm and dry.  No rash noted.  No pallor.      ---------------------------------------------------------------------------------------------------------------------    Results from last 7 days   Lab Units 01/15/19  0549 01/15/19  0018 01/14/19  2153   TROPONIN I ng/mL 0.048* 0.053* 0.011       Results from last 7 days   Lab Units 01/15/19  1227   TRIGLYCERIDES mg/dL 82     Results from last 7 days   Lab Units 01/15/19  1328   PH, ARTERIAL pH units 7.464*   PO2 ART mm Hg 89.3   PCO2, ARTERIAL mm Hg 27.4*   HCO3 ART mmol/L 19.2*     Results from last 7 days   Lab Units 01/16/19  0700 01/15/19  1227 01/15/19  0457 01/15/19  0018 01/14/19 2153 01/11/19 0450 01/10/19  0536   CRP mg/dL  --   --   --   --   --   --  5.10* 9.75*   LACTATE mmol/L  --  1.2 3.1* 4.2*  --   --   --   --    WBC 10*3/mm3 9.83 16.96*  --   --  11.56   < >  --  8.42   HEMOGLOBIN g/dL 8.6* 9.3*  --   --  10.7*   < >  --  10.8*   HEMATOCRIT % 28.9* 31.4*  --   --  36.0*   < >  --  36.1*   MCV fL 94.8* 92.1  --   --  94.0   < >  --  93.0   MCHC g/dL 29.8* 29.6*  --   --  29.7*   < >  --  29.9*   PLATELETS 10*3/mm3 326 329  --   --  363   < >  --  236    < > = values in this interval not displayed.     Results from last 7 days   Lab Units 01/16/19  0700 01/15/19  1227 01/14/19  2153  01/11/19  0450  01/10/19  0536   SODIUM mmol/L 144 141 141   < > 138  --  139   POTASSIUM mmol/L 3.4* 4.0 5.0   < > 4.4   < > 3.5   MAGNESIUM mg/dL  --   --   --   --  1.9  --  1.6*   CHLORIDE mmol/L 117* 113* 108   < > 109  --  110   CO2 mmol/L 20.0* 22.1* 19.5*   < > 21.7*  --  21.2*   BUN mg/dL 14 16 13   < > 12  --  12   CREATININE mg/dL 0.77 0.73 0.93   < > 0.68  --  0.73   EGFR IF NONAFRICN AM mL/min/1.73 73 77 58*   < > 84  --  77   CALCIUM mg/dL 7.7 8.0 8.8   < > 8.3  --  8.4   GLUCOSE mg/dL 101 97 164*   < > 107  --  83   ALBUMIN g/dL 2.60* 2.80* 3.00*   < > 2.90*  --  2.90*   BILIRUBIN mg/dL 0.3 0.5 1.1   < > 0.4  --  0.5   ALK PHOS U/L 298* 379* 503*   < > 246*  --  288*   AST (SGOT) U/L 108* 223*  514*   < > 49*  --  89*   ALT (SGPT) U/L 51* 84* 104*   < > 45*  --  102*    < > = values in this interval not displayed.   Estimated Creatinine Clearance: 61 mL/min (by C-G formula based on SCr of 0.77 mg/dL).  No results found for: AMMONIA    Hemoglobin A1C   Date/Time Value Ref Range Status   01/15/2019 1227 5.90 (H) 4.50 - 5.70 % Final     Glucose   Date/Time Value Ref Range Status   01/16/2019 1047 104 70 - 130 mg/dL Final   01/16/2019 0731 108 70 - 130 mg/dL Final   01/15/2019 2122 111 70 - 130 mg/dL Final     Lab Results   Component Value Date    HGBA1C 5.90 (H) 01/15/2019     Lab Results   Component Value Date    TSH 6.470 (H) 01/15/2019       Blood Culture   Date Value Ref Range Status   01/15/2019 Abnormal Stain (A)  Preliminary   01/15/2019 (A)  Preliminary    Gram Negative Bacilli, Morphology consistent with Escherichia / Citrobacter   01/15/2019 Abnormal Stain (A)  Preliminary   01/15/2019 (A)  Preliminary    Gram Negative Bacilli, Morphology consistent with Escherichia / Citrobacter                 Pain Management Panel     Pain Management Panel Latest Ref Rng & Units 9/16/2018    AMPHETAMINES SCREEN, URINE Negative Negative    BARBITURATES SCREEN Negative Negative    BENZODIAZEPINE SCREEN, URINE Negative Negative    BUPRENORPHINE Negative Negative    COCAINE SCREEN, URINE Negative Negative    METHADONE SCREEN, URINE Negative Negative        I have personally reviewed the above laboratory results.   ---------------------------------------------------------------------------------------------------------------------  Imaging Results (last 7 days)     Procedure Component Value Units Date/Time    XR Chest 1 View [[id number]] Collected:  01/16/19 0901     Updated:  01/16/19 0903    Narrative:       XR CHEST 1 VW-     CLINICAL INDICATION: leukocytosis r/t pneumonia; K85.90-Acute  pancreatitis without necrosis or infection, unspecified; N39.0-Urinary  tract infection, site not specified          COMPARISON:  1/14/2019      TECHNIQUE: Single frontal view of the chest.     FINDINGS:     There is no focal alveolar infiltrate or effusion.  The cardiac silhouette is normal. The pulmonary vasculature is  unremarkable.  There is no evidence of an acute osseous abnormality.   There are no suspicious-appearing parenchymal soft tissue nodules.            Impression:       No evidence of active or acute cardiopulmonary disease on today's chest  radiograph.         This report was finalized on 1/16/2019 9:01 AM by Dr. Ulysses Rojas MD.       CT Abdomen Pelvis With Contrast [[id number]] Collected:  01/15/19 0701     Updated:  01/15/19 1308    Narrative:       CT ABDOMEN PELVIS WITH CONTRAST-     CLINICAL INDICATION: Abdominal pain, unspecified          COMPARISON: None available     TECHNIQUE: Axial images were acquired from the lung bases through the  pubic symphysis after IV , but without oral contrast.  Reformatted images were created in both the coronal and sagittal planes.     Radiation dose reduction techniques were utilized per ALARA protocol.  Automated exposure control was initiated through either or Sensopia or  DoseRight software packages by  protocol.           FINDINGS:   There is minimal bibasilar atelectasis.     There is a hiatal hernia.     The liver is homogeneous. There is no evidence of focal hepatic mass     The spleen is homogeneous     There is no peripancreatic stranding or pancreatic head mass.     There is no adrenal enlargement.     The kidneys show no evidence of hydronephrosis or hydroureter. I do not  see any distal ureteral stones.      Otherwise I do not see any free fluid or walled off fluid collections.     There is no bowel wall thickening or mesenteric stranding.               Impression:       1. Moderate sized hiatal hernia.  2. Mild compression of superior endplate of L1.                   This report was finalized on 1/15/2019 1:05 PM by Dr. Ulysses Rojas MD.       XR Chest 1 View  [[id number]] Collected:  01/15/19 0701     Updated:  01/15/19 1300    Narrative:       XR CHEST 1 VIEW-     CLINICAL INDICATION: SOA          COMPARISON: 01/14/2019      TECHNIQUE: Single frontal view of the chest.     FINDINGS:     Right-sided PICC line tip in the superior vena cava.  The cardiac silhouette is normal. The pulmonary vasculature is  unremarkable.  There is no evidence of an acute osseous abnormality.   There are no suspicious-appearing parenchymal soft tissue nodules.            Impression:       No evidence of active or acute cardiopulmonary disease on today's chest  radiograph.         This report was finalized on 1/15/2019 12:58 PM by Dr. Ulysses Rojas MD.           I have personally reviewed the above radiology results.   ---------------------------------------------------------------------------------------------------------------------      Assessment & Plan        Assessment/Plan       ASSESSMENT:    1. Septic shock with lactic acid >4 on admission  2. E. Coli Bacteremia  3. MSSA bacteremia   4. Acute Pyelonephritis       PLAN:    Thank you Dr. Livingston for allowing us to participate in the care of your patient.  As you well know, Ms. Jeanna Flower is a 78 y.o. female with past medical history significantof recent E.Coli UTI on 1/7/19, MSSA bacteremia, HTN, DM, hx of CVA, dysphagia, GERD, Dementia COPD and Hypothyroidism, paroxysmal a-fib, debility, who presented to [facility] Emergency Department on 1/14/2019 for low SpO2. Patient is a poor historian. She was seen in the ED on 1/14/19 and 1/15/19 for similar complaint.     Today she has improving lactic acid of 1.2 from 4.2 on admission and resolution of leukocytosis. Chest radiograph is unremarkable. CT of Abdomen reveals minimal bibasilar atelectasis. Blood cultures 2 of 2 from 1/15 reveal E.coli. Urine cultures reveal >100,000 of E.coli.     At this time patient shows no sign of relapsing MSSA bacteremia. It is unusual for the  patient to present with E. Coli bacteremia and E. Coli uremia with susceptibilities to Cefazolin in the setting of current treatment with Cefazolin. For now we are awaiting culture susceptibilities. Coverage changes to Rocephin 2g IV Q24hrs and Gentamycin 3mg/kg IV Q24hrs. Patient has allergy to Penicillin however patient tolerated Cefazolin without complaint.     Current antimicrobials   Rocephin 2g IV Q24hrs  Gentamycin 3mg/kg IV Q24hrs      Code Status:   Code Status and Medical Interventions:   Ordered at: 01/15/19 1552     Limited Support to NOT Include:    Intubation     Code Status:    No CPR     Medical Interventions (Level of Support Prior to Arrest):    Limited           Osman Rajan PA-C  01/16/19  12:07 PM

## 2019-01-16 NOTE — MBS/VFSS/FEES
Acute Care - Speech Language Pathology   Swallow MODIFIED BARIUM SWALLOW STUDY  Tom     Patient Name: Jeanna Flower  : 1940  MRN: [id number]  Today's Date: 2019             Admit Date: 2019    Jeanna Flower  presents to the radiology suite this am from 3342/2S to participate in an instrumental MBS to evaluate safety/efficacy of swallowing fnx, determine safest/least restrictive diet. She is s/p clinical dysphagia assessment this am w/ recommendation of premorbid least restrictive po diet puree, thin liquids. She is re-evaluated 2/2 reports from RN w/ overt s/s aspiration w/ lunch broth. RN accompanies her to this evaluation.       Social History     Socioeconomic History   • Marital status: Single     Spouse name: Not on file   • Number of children: Not on file   • Years of education: Not on file   • Highest education level: Not on file   Social Needs   • Financial resource strain: Not on file   • Food insecurity - worry: Not on file   • Food insecurity - inability: Not on file   • Transportation needs - medical: Not on file   • Transportation needs - non-medical: Not on file   Occupational History   • Not on file   Tobacco Use   • Smoking status: Never Smoker   • Smokeless tobacco: Never Used   Substance and Sexual Activity   • Alcohol use: No   • Drug use: No   • Sexual activity: Defer   Other Topics Concern   • Not on file   Social History Narrative   • Not on file      Chest xray 19 and 19 revealed no evidence of acute cardiopulmonary disease.     Diet Orders (active) (From admission, onward)    Start     Ordered    19 1254  Diet Clear Liquid; Thin  Diet Effective Now      19 1254        Observed on O2 via NC.     Risks and benefits of the procedure are explained w/ verbalizing understanding/agreement to participate. Proceed per protocol.     Ms. Flower is positioned upright and centered in a soft strap supportive chair to accept multiple po presentations of  "puree, honey thick, nectar thick, and thin liquids via spoon and cup. She does not self feed 2/2 advanced dementia.      All views are from the lateral plane.     Facial/oral structures are symmetrical upon observation. Lingual protrusion reveals no deviation. Oral mucosa are moist, pink, and clean. Secretions are clear, thin, and controlled. OROM/ANDREA is weak w/ delay to imitate oral postures, inconsistently. Gag is not assessed. Volitional cough is intact w/ adequate  intensity, clear in quality, non-productive w/ max cues to elicit. Voice is adequate in intensity, clear in quality w/ intelligible speech. She is evidenced w/ fluent, wernicke's like prodcutions w/ perseverations of \"cheeseburger.\" She is oriented to herself, follows simple commands.     Upon po presentations, adequate bolus anticipation w/ good labial seal for bolus clearance via spoon bowl, cup rim stability and suction via straw.  Bolus formation, manipulation,  and control are generally weak w/ oral holding of all consistencies w/ lingual pumping, delayed transit w/ cues to manipulate. Pt continues to produce verbal strings, even with po presentations in her oral cavity, negativley impacting swallowing function. Cues to elicit transit are effective. Tongue base retraction and linguavelar seal are delayed, leading to significant premature loss of all consistencies to the vallecular space and bilateral pyriforms. Aspiration of nectar thick and thin liquids observed before the swallow. Although no direct observation of other consistencies before the swallow, pt is felt to be at HIGH RISK for aspiration before the swallow 2/2 unprotected airway w/ significnat pooled premature spillage of all consistencies.     Pharyngeal swallow is significantly delayed in trigger 2/2 significance of oral phase dysphagia. Hyolaryngeal elevation and epiglottic inversion are efficient, once elicited. Pharyngeal contraction is adequate w/o significant residue. " Aspiration during the swallow w/ nectar thick and thin liquids from bolus already present wihthin the vestibular space from premature loss. Cough is not elicited w/ aspiration. Cued cough is ineffective to clear aspirated material.     Visit Dx:      ICD-10-CM ICD-9-CM   1. Acute pancreatitis, unspecified complication status, unspecified pancreatitis type K85.90 577.0   2. Urinary tract infection without hematuria, site unspecified N39.0 599.0     Patient Active Problem List   Diagnosis   • Paroxysmal atrial fibrillation (CMS/HCC)   • Essential hypertension   • Type 2 diabetes mellitus without complication (CMS/[facility])   • CVA (cerebral vascular accident) with left hemiparesis.   • Other sleep apnea   • Severe sepsis (CMS/[facility])   • MSSA bacteremia   • E. coli UTI   • Acute pancreatitis        EDUCATION  The patient has been educated in the following areas:   Dysphagia (Swallowing Impairment) Oral Care/Hydration Modified Diet Instruction.    Impression: Ms. Flower presents w/ a moderate-severe oral dysphagia w/ oral holding , poor bolus control, delayed transit, premature loss of bolus. Mild pharyngeal dysphagia w/ aspiration of nectar thick and thin liquids before and during the swallow. Cued cough ineffective to clear aspirated material.     Although no direct observation of aspiration w/ puree, honey consistencies, Pt is felt to be at HIGH RISK for intermittent aspiration w/any po intake 2/2 observed premature loss, poor bolus control, resulting in bolus presence within the pharynx w/o airway protection.     She is a noted DNR/DNI w/ family indicating in previous hospitalization, as well as today that they have no desire for consideration of alternative method of nutrition/hydration. D/w Dr. Livingston via telephone w/ understanding of results. She agrees that in this pt's case and considering no desire for alternative method of nutrition, hydration, risk of aspiration w/ any po consistency, pt is felt to most benefit from  least restrictive po diet of puree, thin liquids. Family is aware of risks of aspiration.     SLP Recommendation and Plan    1. Puree, thin liquids. 1:1 feedings.   2. Medications crushed in puree/thins.   3. Universal aspiration precautions.   4. Abdifatah precautions.   5. Oral care protocol.   No further SLP f/u warranted at this time.     D/w Kortney HOOPER, results and recommendations w/ verbal understanding and agreement.     Thank you for allowing me to participate in the care of your patient-  Ivanna Kuo M.S., CCC/SLP                                       Plan of Care Reviewed With: (P) patient  Plan of Care Review  Plan of Care Reviewed With: (P) patient  Progress: improving  Outcome Summary: (P) MBS this pm w/ aspiration of nectar thick and thin liquids. High risk of aspiration w/ any po consistency 2/2 advanced dementia, fluent wernicke's like verbalizations, interfering w/ swallow function. D/w Dr. Livingston who agrees pt least restrictive puree diet, thin liquids to be continued 2/2 DNR/DNI, family w/o wishes for alternative nutrition. Family understands risks of aspiration. Plan: Puree diet, thin liquids.            Time Calculation:       Therapy Charges for Today     Code Description Service Date Service Provider Modifiers Qty    50083572811 HC ST EVAL ORAL PHARYNG SWALLOW 3 1/16/2019 Ivanna Kuo, MS CCC-SLP GN 1    [id number] HC ST MOTION FLUORO EVAL SWALLOW 8 1/16/2019 Ivanna Kuo MS CCC-SLP GN 1          SLP G-Codes  SLP NOMS Used?: Yes  Functional Limitations: Swallowing  Swallow Current Status (): At least 1 percent but less than 20 percent impaired, limited or restricted  Swallow Goal Status (): At least 1 percent but less than 20 percent impaired, limited or restricted  Swallow Discharge Status (): At least 1 percent but less than 20 percent impaired, limited or restricted      Ivanna Kuo MS CCC-SLP  1/16/2019

## 2019-01-17 LAB
ALBUMIN SERPL-MCNC: 2.5 G/DL (ref 3.4–4.8)
ALBUMIN/GLOB SERPL: 0.8 G/DL (ref 1.5–2.5)
ALP SERPL-CCNC: 402 U/L (ref 35–104)
ALT SERPL W P-5'-P-CCNC: 40 U/L (ref 10–36)
ANION GAP SERPL CALCULATED.3IONS-SCNC: 3.7 MMOL/L (ref 3.6–11.2)
AST SERPL-CCNC: 96 U/L (ref 10–30)
BACTERIA SPEC AEROBE CULT: ABNORMAL
BACTERIA SPEC AEROBE CULT: ABNORMAL
BASOPHILS # BLD AUTO: 0.06 10*3/MM3 (ref 0–0.3)
BASOPHILS NFR BLD AUTO: 0.5 % (ref 0–2)
BILIRUB SERPL-MCNC: 0.4 MG/DL (ref 0.2–1.8)
BUN BLD-MCNC: 10 MG/DL (ref 7–21)
BUN/CREAT SERPL: 14.9 (ref 7–25)
CALCIUM SPEC-SCNC: 7.8 MG/DL (ref 7.7–10)
CHLORIDE SERPL-SCNC: 118 MMOL/L (ref 99–112)
CO2 SERPL-SCNC: 20.3 MMOL/L (ref 24.3–31.9)
CREAT BLD-MCNC: 0.67 MG/DL (ref 0.43–1.29)
CRP SERPL-MCNC: 5.82 MG/DL (ref 0–0.99)
DEPRECATED RDW RBC AUTO: 68.8 FL (ref 37–54)
EOSINOPHIL # BLD AUTO: 0.21 10*3/MM3 (ref 0–0.7)
EOSINOPHIL NFR BLD AUTO: 1.7 % (ref 0–7)
ERYTHROCYTE [DISTWIDTH] IN BLOOD BY AUTOMATED COUNT: 20.8 % (ref 11.5–14.5)
GFR SERPL CREATININE-BSD FRML MDRD: 85 ML/MIN/1.73
GLOBULIN UR ELPH-MCNC: 3 GM/DL
GLUCOSE BLD-MCNC: 107 MG/DL (ref 70–110)
GLUCOSE BLDC GLUCOMTR-MCNC: 101 MG/DL (ref 70–130)
GLUCOSE BLDC GLUCOMTR-MCNC: 104 MG/DL (ref 70–130)
GLUCOSE BLDC GLUCOMTR-MCNC: 104 MG/DL (ref 70–130)
GLUCOSE BLDC GLUCOMTR-MCNC: 95 MG/DL (ref 70–130)
GRAM STN SPEC: ABNORMAL
GRAM STN SPEC: ABNORMAL
HCT VFR BLD AUTO: 27.7 % (ref 37–47)
HGB BLD-MCNC: 8.4 G/DL (ref 12–16)
IMM GRANULOCYTES # BLD AUTO: 0.03 10*3/MM3 (ref 0–0.03)
IMM GRANULOCYTES NFR BLD AUTO: 0.2 % (ref 0–0.5)
ISOLATED FROM: ABNORMAL
ISOLATED FROM: ABNORMAL
LYMPHOCYTES # BLD AUTO: 4.89 10*3/MM3 (ref 1–3)
LYMPHOCYTES NFR BLD AUTO: 38.7 % (ref 16–46)
MAGNESIUM SERPL-MCNC: 2.4 MG/DL (ref 1.7–2.6)
MCH RBC QN AUTO: 27.6 PG (ref 27–33)
MCHC RBC AUTO-ENTMCNC: 30.3 G/DL (ref 33–37)
MCV RBC AUTO: 91.1 FL (ref 80–94)
MONOCYTES # BLD AUTO: 0.76 10*3/MM3 (ref 0.1–0.9)
MONOCYTES NFR BLD AUTO: 6 % (ref 0–12)
NEUTROPHILS # BLD AUTO: 6.67 10*3/MM3 (ref 1.4–6.5)
NEUTROPHILS NFR BLD AUTO: 52.9 % (ref 40–75)
OSMOLALITY SERPL CALC.SUM OF ELEC: 282.6 MOSM/KG (ref 273–305)
PLATELET # BLD AUTO: 343 10*3/MM3 (ref 130–400)
PMV BLD AUTO: 9.2 FL (ref 6–10)
POTASSIUM BLD-SCNC: 4.2 MMOL/L (ref 3.5–5.3)
PROT SERPL-MCNC: 5.5 G/DL (ref 6–8)
RBC # BLD AUTO: 3.04 10*6/MM3 (ref 4.2–5.4)
SODIUM BLD-SCNC: 142 MMOL/L (ref 135–153)
T4 FREE SERPL-MCNC: 0.96 NG/DL (ref 0.89–1.76)
WBC NRBC COR # BLD: 12.62 10*3/MM3 (ref 4.5–12.5)

## 2019-01-17 PROCEDURE — 25010000002 GENTAMICIN PER 80 MG: Performed by: PHYSICIAN ASSISTANT

## 2019-01-17 PROCEDURE — 83735 ASSAY OF MAGNESIUM: CPT | Performed by: NURSE PRACTITIONER

## 2019-01-17 PROCEDURE — 86140 C-REACTIVE PROTEIN: CPT | Performed by: NURSE PRACTITIONER

## 2019-01-17 PROCEDURE — 84439 ASSAY OF FREE THYROXINE: CPT | Performed by: PHYSICIAN ASSISTANT

## 2019-01-17 PROCEDURE — 82962 GLUCOSE BLOOD TEST: CPT

## 2019-01-17 PROCEDURE — 94799 UNLISTED PULMONARY SVC/PX: CPT

## 2019-01-17 PROCEDURE — 85025 COMPLETE CBC W/AUTO DIFF WBC: CPT | Performed by: NURSE PRACTITIONER

## 2019-01-17 PROCEDURE — 25010000002 CEFTRIAXONE: Performed by: PHYSICIAN ASSISTANT

## 2019-01-17 PROCEDURE — 80053 COMPREHEN METABOLIC PANEL: CPT | Performed by: NURSE PRACTITIONER

## 2019-01-17 PROCEDURE — 99233 SBSQ HOSP IP/OBS HIGH 50: CPT | Performed by: PHYSICIAN ASSISTANT

## 2019-01-17 RX ORDER — SODIUM CHLORIDE 0.9 % (FLUSH) 0.9 %
10 SYRINGE (ML) INJECTION EVERY 12 HOURS SCHEDULED
Status: DISCONTINUED | OUTPATIENT
Start: 2019-01-17 | End: 2019-01-21 | Stop reason: HOSPADM

## 2019-01-17 RX ORDER — SODIUM CHLORIDE 0.9 % (FLUSH) 0.9 %
10 SYRINGE (ML) INJECTION AS NEEDED
Status: DISCONTINUED | OUTPATIENT
Start: 2019-01-17 | End: 2019-01-21 | Stop reason: HOSPADM

## 2019-01-17 RX ORDER — SODIUM CHLORIDE 0.9 % (FLUSH) 0.9 %
20 SYRINGE (ML) INJECTION AS NEEDED
Status: DISCONTINUED | OUTPATIENT
Start: 2019-01-17 | End: 2019-01-21 | Stop reason: HOSPADM

## 2019-01-17 RX ADMIN — MONTELUKAST SODIUM 10 MG: 10 TABLET, COATED ORAL at 21:44

## 2019-01-17 RX ADMIN — APIXABAN 5 MG: 5 TABLET, FILM COATED ORAL at 08:42

## 2019-01-17 RX ADMIN — SODIUM CHLORIDE 125 ML/HR: 9 INJECTION, SOLUTION INTRAVENOUS at 01:22

## 2019-01-17 RX ADMIN — FLUTICASONE PROPIONATE 2 SPRAY: 50 SPRAY, METERED NASAL at 08:44

## 2019-01-17 RX ADMIN — SODIUM CHLORIDE 125 ML/HR: 9 INJECTION, SOLUTION INTRAVENOUS at 09:22

## 2019-01-17 RX ADMIN — SODIUM CHLORIDE, PRESERVATIVE FREE 10 ML: 5 INJECTION INTRAVENOUS at 12:32

## 2019-01-17 RX ADMIN — Medication 1 CAPSULE: at 08:42

## 2019-01-17 RX ADMIN — FAMOTIDINE 20 MG: 20 TABLET, FILM COATED ORAL at 21:44

## 2019-01-17 RX ADMIN — GENTAMICIN SULFATE 170 MG: 40 INJECTION, SOLUTION INTRAMUSCULAR; INTRAVENOUS at 17:16

## 2019-01-17 RX ADMIN — FAMOTIDINE 20 MG: 20 TABLET, FILM COATED ORAL at 08:42

## 2019-01-17 RX ADMIN — CEFTRIAXONE 2 G: 2 INJECTION, POWDER, FOR SOLUTION INTRAMUSCULAR; INTRAVENOUS at 15:06

## 2019-01-17 RX ADMIN — LEVOTHYROXINE SODIUM 100 MCG: 100 TABLET ORAL at 05:18

## 2019-01-17 RX ADMIN — DOCUSATE SODIUM 100 MG: 100 CAPSULE ORAL at 08:43

## 2019-01-17 RX ADMIN — QUETIAPINE FUMARATE 25 MG: 25 TABLET, FILM COATED ORAL at 21:44

## 2019-01-17 RX ADMIN — VENLAFAXINE HYDROCHLORIDE 37.5 MG: 37.5 TABLET ORAL at 17:16

## 2019-01-17 RX ADMIN — APIXABAN 5 MG: 5 TABLET, FILM COATED ORAL at 21:44

## 2019-01-17 RX ADMIN — VENLAFAXINE HYDROCHLORIDE 37.5 MG: 37.5 TABLET ORAL at 08:42

## 2019-01-17 RX ADMIN — SODIUM CHLORIDE, PRESERVATIVE FREE 10 ML: 5 INJECTION INTRAVENOUS at 21:45

## 2019-01-17 RX ADMIN — DOCUSATE SODIUM 100 MG: 100 CAPSULE ORAL at 21:44

## 2019-01-17 RX ADMIN — SODIUM CHLORIDE, PRESERVATIVE FREE 3 ML: 5 INJECTION INTRAVENOUS at 08:44

## 2019-01-17 NOTE — PLAN OF CARE
Problem: Fall Risk (Adult)  Goal: Identify Related Risk Factors and Signs and Symptoms  Outcome: Ongoing (interventions implemented as appropriate)   01/17/19 1337   Fall Risk (Adult)   Related Risk Factors (Fall Risk) age-related changes;confusion/agitation;gait/mobility problems;environment unfamiliar   Signs and Symptoms (Fall Risk) presence of risk factors     Goal: Absence of Fall  Outcome: Ongoing (interventions implemented as appropriate)   01/17/19 1337   Fall Risk (Adult)   Absence of Fall making progress toward outcome       Problem: Pain, Acute (Adult)  Goal: Identify Related Risk Factors and Signs and Symptoms  Outcome: Ongoing (interventions implemented as appropriate)   01/17/19 1337   Pain, Acute (Adult)   Related Risk Factors (Acute Pain) communication barrier;disease process;infection;positioning   Signs and Symptoms (Acute Pain) BADLs/IADLs reluctance/inability to perform;fatigue/weakness;sleep pattern alteration     Goal: Acceptable Pain Control/Comfort Level  Outcome: Ongoing (interventions implemented as appropriate)   01/17/19 1337   Pain, Acute (Adult)   Acceptable Pain Control/Comfort Level making progress toward outcome       Problem: Skin Injury Risk (Adult)  Goal: Identify Related Risk Factors and Signs and Symptoms  Outcome: Ongoing (interventions implemented as appropriate)   01/17/19 1337   Skin Injury Risk (Adult)   Related Risk Factors (Skin Injury Risk) advanced age;infection;cognitive impairment;mobility impaired     Goal: Skin Health and Integrity  Outcome: Ongoing (interventions implemented as appropriate)   01/17/19 1337   Skin Injury Risk (Adult)   Skin Health and Integrity making progress toward outcome       Problem: Nausea/Vomiting (Adult)  Goal: Identify Related Risk Factors and Signs and Symptoms  Outcome: Ongoing (interventions implemented as appropriate)   01/17/19 1337   Nausea/Vomiting (Adult)   Related Risk Factors (Nausea/Vomiting) gastric irritation   Signs and  Symptoms (Nausea/Vomiting) dry mucous membranes;weakness     Goal: Symptom Relief  Outcome: Ongoing (interventions implemented as appropriate)   01/17/19 1337   Nausea/Vomiting (Adult)   Symptom Relief making progress toward outcome     Goal: Adequate Hydration  Outcome: Ongoing (interventions implemented as appropriate)   01/17/19 1337   Nausea/Vomiting (Adult)   Adequate Hydration making progress toward outcome       Problem: Wound (Includes Pressure Injury) (Adult)  Goal: Signs and Symptoms of Listed Potential Problems Will be Absent, Minimized or Managed (Wound)   01/17/19 1337   Goal/Outcome Evaluation   Problems Assessed (Wound) all   Problems Present (Wound) infection

## 2019-01-17 NOTE — PROGRESS NOTES
Patient Identification:  Name:  Jeanna Flower  Age:  78 y.o.  Sex:  female  :  1940  MRN:  [id number]  Visit Number:  [id number]  Primary Care Provider:  Raul Robert MD    Length of stay:  2    Chief Complaint: low Sp02 at the nursing home    HPI:     Patient presented to [facility] ED on 19 with low Sp02 via pulse ox from the nursing home. Due to her mental state she was unable to give a ROS on admission. Her granddaughter reported this is her baseline, she mumbles words, and is bed bound.     Hospital Course:    On admission she was found to have acute pancreatitis, initial lipase level was 2489, repeat on 1/15/19 showed 91 and resolved to normal on 19. She underwent a CT on admission which showed a moderate sized hiatal hernia and mild compression fx of L1. Chest xray did not show any Pneumonia. She has been spitting out medications and the nursing staff has had difficulty in getting PO meds administered. She was given a 1 time dose of full dose lovenox on 1/15/19 in place of her eliquis. She failed a swallow evaluation. It was discussed with MAGGI Do, who did not want any short or long term feeding tubes and was agreeable to least restrictive diet.     Subjective:      Today the patient did take morning her oral medications. She rambles at baseline and is doing this today. Discussed with RN, Lori who reports that she has been trying to get out of the bed today and is coughing intermittently. Repeat CXR yesterday was unremarkable. She has been afebrile.      ----------------------------------------------------------------------------------------------------------------------  Current Hospital Meds:    apixaban 5 mg Oral Q12H   budesonide-formoterol 2 puff Inhalation BID - RT   ceftriaxone 2 g Intravenous Q24H   docusate sodium 100 mg Oral BID   famotidine 20 mg Oral BID   fluticasone 2 spray Nasal Daily   gentamicin 3 mg/kg (Ideal) Intravenous Q24H   lactobacillus acidophilus 1 capsule Oral  Daily   levothyroxine 100 mcg Oral Q AM   montelukast 10 mg Oral Nightly   QUEtiapine 25 mg Oral Nightly   sodium chloride 10 mL Intravenous Q12H   sodium chloride 3 mL Intravenous Q12H   venlafaxine 37.5 mg Oral BID With Meals       Pharmacy Consult - Pharmacy to dose     sodium chloride 125 mL/hr Last Rate: 125 mL/hr (01/17/19 0922)     ----------------------------------------------------------------------------------------------------------------------  Vital Signs:  Temp:  [98.1 °F (36.7 °C)-99.1 °F (37.3 °C)] 98.5 °F (36.9 °C)  Heart Rate:  [] 87  Resp:  [16-19] 16  BP: (123-140)/(50-56) 129/54      01/14/19  2126 01/15/19  1627   Weight: 78.4 kg (172 lb 13.5 oz) 78.5 kg (173 lb)     Body mass index is 28.79 kg/m².    Intake/Output Summary (Last 24 hours) at 1/17/2019 1522  Last data filed at 1/17/2019 1506  Gross per 24 hour   Intake 2160 ml   Output 500 ml   Net 1660 ml     I/O this shift:  In: 1100 [I.V.:1000; IV Piggyback:100]  Out: -   Diet Pureed; Thin  ----------------------------------------------------------------------------------------------------------------------  Physical exam:  Constitutional:  Well-developed and well-nourished.  No respiratory distress. Rambling.      HENT:  Head:  Normocephalic and atraumatic.  Mouth:  Moist mucous membranes.    Eyes:  Conjunctivae and EOM are normal. No scleral icterus.    Neck:  Neck supple.  No JVD present.    Cardiovascular:  Normal rate, regular rhythm and normal heart sounds with no murmur.  Pulmonary/Chest:  No respiratory distress, no wheezes, no crackles, with normal breath sounds and good air movement.  Abdominal:  Soft.  Bowel sounds are present x4.  No distension and no tenderness.   Musculoskeletal:  No edema, no tenderness, and no deformity.  No red or swollen joints anywhere.    Neurological:  Alert. She rambles and does not respond appropriately to questioning. No tongue deviation.  No facial droop.  No slurred speech.   Skin:  Skin is  warm and dry. No rash noted. No pallor.   ----------------------------------------------------------------------------------------------------------------------  Tele:  Not on telemetry monitoring.  ----------------------------------------------------------------------------------------------------------------------  Results from last 7 days   Lab Units 01/15/19  0549 01/15/19  0018 01/14/19  2153   TROPONIN I ng/mL 0.048* 0.053* 0.011     Results from last 7 days   Lab Units 01/17/19  0702 01/16/19  0700 01/15/19  1227 01/15/19  0457 01/15/19  0018  01/11/19  0450   CRP mg/dL 5.82*  --   --   --   --   --  5.10*   LACTATE mmol/L  --   --  1.2 3.1* 4.2*  --   --    WBC 10*3/mm3 12.62* 9.83 16.96*  --   --    < >  --    HEMOGLOBIN g/dL 8.4* 8.6* 9.3*  --   --    < >  --    HEMATOCRIT % 27.7* 28.9* 31.4*  --   --    < >  --    MCV fL 91.1 94.8* 92.1  --   --    < >  --    MCHC g/dL 30.3* 29.8* 29.6*  --   --    < >  --    PLATELETS 10*3/mm3 343 326 329  --   --    < >  --     < > = values in this interval not displayed.     Results from last 7 days   Lab Units 01/15/19  1328   PH, ARTERIAL pH units 7.464*   PO2 ART mm Hg 89.3   PCO2, ARTERIAL mm Hg 27.4*   HCO3 ART mmol/L 19.2*     Results from last 7 days   Lab Units 01/17/19  0702 01/16/19  0700 01/15/19  1227  01/11/19  0450   SODIUM mmol/L 142 144 141   < > 138   POTASSIUM mmol/L 4.2 3.4* 4.0   < > 4.4   MAGNESIUM mg/dL 2.4 1.4*  --   --  1.9   CHLORIDE mmol/L 118* 117* 113*   < > 109   CO2 mmol/L 20.3* 20.0* 22.1*   < > 21.7*   BUN mg/dL 10 14 16   < > 12   CREATININE mg/dL 0.67 0.77 0.73   < > 0.68   EGFR IF NONAFRICN AM mL/min/1.73 85 73 77   < > 84   CALCIUM mg/dL 7.8 7.7 8.0   < > 8.3   GLUCOSE mg/dL 107 101 97   < > 107   ALBUMIN g/dL 2.50* 2.60* 2.80*   < > 2.90*   BILIRUBIN mg/dL 0.4 0.3 0.5   < > 0.4   ALK PHOS U/L 402* 298* 379*   < > 246*   AST (SGOT) U/L 96* 108* 223*   < > 49*   ALT (SGPT) U/L 40* 51* 84*   < > 45*    < > = values in this interval not  displayed.   Estimated Creatinine Clearance: 61 mL/min (by C-G formula based on SCr of 0.67 mg/dL).    Results from last 7 days   Lab Units 01/15/19  1227   TRIGLYCERIDES mg/dL 82     Blood Culture   Date Value Ref Range Status   01/15/2019 Abnormal Stain (A)  Preliminary   01/15/2019 (A)  Preliminary    Gram Negative Bacilli, Morphology consistent with Escherichia / Citrobacter   01/15/2019 Abnormal Stain (A)  Preliminary   01/15/2019 (A)  Preliminary    Gram Negative Bacilli, Morphology consistent with Escherichia / Citrobacter      ----------------------------------------------------------------------------------------------------------------------  Imaging Results (last 24 hours)     Procedure Component Value Units Date/Time    FL Video Swallow [[id number]] Collected:  01/16/19 1521     Updated:  01/16/19 1528    Narrative:       FL VIDEO SWALLOW-     HISTORY:   Aspiration; K85.90-Acute pancreatitis without necrosis or  infection, unspecified; N39.0-Urinary tract infection, site not  specified          TECHNIQUE:   Speech therapy service was present. The patient was examined in the  sitting lateral position and was given several consistencies of barium.     FINDINGS: Poor oral control w/ delayed transit, oral holding, resulting  in premature loss of all consistencies. Silent aspiration of nectar  thick and thin liquids before and during the swallow. Cued cough  ineffective to clear aspirated material. No other aspiration observed,  however, per delayed pharyngeal swallow w/ pharyngeal bolus present,  unprotected airway, she is felt to be at high risk for aspiration w/ any  consistency.      FLUOROSCOPY TIME: 1.3 minutes.     IMAGES: Cine loop was acquired       Impression:       Aspiration of nectar and thin liquids.     For additional information, please see the report provided by the speech  therapy service     This report was finalized on 1/16/2019 3:26 PM by Dr. Ulysses Rojas MD.            ----------------------------------------------------------------------------------------------------------------------  Assessment and Plan:     Septic shock, present on admission, as noted by tachycardia and lactic acid >4, secondary to acute pancreatitis and E. Coli bacteremia: Pancreatitis appears to have resolved, likely secondary to recent antibiotic therapy with Ancef.  Infectious disease was consulted and changed antibiotic therapy to Gentamicin and high dose Rocephin. Appreciate their recommendations. Repeat CXR yesterday showed no pneumonia. WBC count and CRP noted to be mildly elevated today, will continue current therapy and repeat labs in AM. Lactic acidosis and pancreatitis resolved, will discontinue IV fluids since she is tolerating oral intake.     Moderate to severe oral dysphagia with aspiration: SLP evaluation was positive for high risk of aspiration. The case was discussed with Ernestina TAY yesterday and she did not want any short or long term feeding tubes. Family is aware of risks of aspiration and she was placed on least restrictive diet with puree, thin liquids. Universal aspiration precautions in place. RN reports cough today. CXR yesterday was unremarkable. Will monitor labs and consider adjusting antibiotic therapy to cover to aspiration if WBC count and CRP continue to rise. Monitor continuous O2sat.    Grayzone troponin: Noted at admission. Likely secondary to septic shock and bacteremia. Nonspecific ST/T abnormalities on EKG at admission. Recent echocardiogram showed EF > 70%. No abnormalities noted on JAYE.     Elevated Alkaline phosphatase, Elevated Transaminases: Hepatitis panel was checked on 1/18/19 and was negative, Liver US was done on 1/8/19 was unremarkable. Alkaline phosphatase elevated today, AST and ALT trending down. Continue to monitor trend.       Paroxysmal A-Fib: On eliquis for stroke prevention, which she has taken yesterday and today. Heart rate controlled.       Normocytic Anemia: Stable. No signs of bleeding. Felt to be dilutional. Will monitor trend, repeat CBC in the AM.      Hypothyroidism: TSH is 6.470. Check Free T4. Patient has been refusing her oral medications intermittently here and likely has at the nursing home as well. Will follow up on Free T4 and recommend repeating as an outpatient, hopefully when the patient has been more compliant with her medications.     Hypokalemia: Improved with replacement. Continue protocol.     Hypomagnesemia: Improved with replacement. Continue protocol.      Dementia, Debility, History of CVA with left side hemiplegia: PT/OT consulted. At baseline, she opens her eyes and mumbles random words, does not follow commands, per daughter Ernestina.    Essential hypertension: Controlled. Not currently on any oral antihypertensive medications.     DM type 2: A1c is 5.9, hypoglycemia protocol initiated, accu checks ac/hs and prn, diabetic diet, no sliding scale ordered at this time, glucose .      COPD: not felt to be in exacerbation, PRN nebulizer's. Supplemental oxygen as needed.      DVT prophylaxis: on eliquis   GI prophylaxis: continue pepcid.  Lines/Tubes/Drains: Right arm PICC in place on arrival 1/14/19, olivas cath   Activity: bedrest, turn q2hr   Diet: Consistent carb clear liquids advance as tolerated.     Code status: DNR/DNI     The patient is high risk due to the following diagnoses/reasons:  SIRS r/t pancreatitis, DM type 2, Normocytic Anemia, Paroxysmal A-Fib on Anticoagulation.     I have discussed the case with RNLori and attending physician, Dr. Livingston.     TAE Parker  01/17/19  3:22 PM

## 2019-01-17 NOTE — PROGRESS NOTES
PROGRESS NOTE         Patient Identification:  Name:  Jeanna Flower  Age:  78 y.o.  Sex:  female  :  1940  MRN:  [id number]  Visit Number:  [id number]  Primary Care Provider:  Raul Robert MD      ----------------------------------------------------------------------------------------------------------------------  Subjective       Chief Complaints:    Shortness of Breath and Vomiting        Interval History:      Primary nurse at bedside this morning and reports no changes overnight. CRP level is 5.82 white count 12.62. No fever or diarrhea reported.    Review of Systems:    Constitutional: no fever, chills and night sweats. No appetite change or unexpected weight change. No fatigue.  Eyes: no eye drainage, itching or redness.  HEENT: no mouth sores, dysphagia or nose bleed.  Respiratory: no for shortness of breath, cough or production of sputum.  Cardiovascular: no chest pain, no palpitations, no orthopnea.  Gastrointestinal: no nausea, vomiting or diarrhea. No abdominal pain, hematemesis or rectal bleeding.  Genitourinary: no dysuria or polyuria.  Hematologic/lymphatic: no lymph node abnormalities, no easy bruising or easy bleeding.  Musculoskeletal: no muscle or joint pain.  Skin: No rash and no itching.  Neurological: no loss of consciousness, no seizure, no headache.  Behavioral/Psych: no depression or suicidal ideation.  Endocrine: no hot flashes.  Immunologic: negative.  ----------------------------------------------------------------------------------------------------------------------      Objective       Current Hospital Meds:    apixaban 5 mg Oral Q12H   budesonide-formoterol 2 puff Inhalation BID - RT   ceftriaxone 2 g Intravenous Q24H   docusate sodium 100 mg Oral BID   famotidine 20 mg Oral BID   fluticasone 2 spray Nasal Daily   gentamicin 3 mg/kg (Ideal) Intravenous Q24H   lactobacillus acidophilus 1 capsule Oral Daily   levothyroxine 100 mcg Oral Q AM   montelukast 10 mg  Oral Nightly   QUEtiapine 25 mg Oral Nightly   sodium chloride 10 mL Intravenous Q12H   sodium chloride 3 mL Intravenous Q12H   venlafaxine 37.5 mg Oral BID With Meals       Pharmacy Consult - Pharmacy to dose     sodium chloride 125 mL/hr Last Rate: 125 mL/hr (01/17/19 0922)     ----------------------------------------------------------------------------------------------------------------------    Vital Signs:  Temp:  [98.1 °F (36.7 °C)-99.1 °F (37.3 °C)] 98.3 °F (36.8 °C)  Heart Rate:  [] 90  Resp:  [18-19] 18  BP: (123-140)/(50-56) 140/50  No data found.  SpO2 Percentage    01/16/19 2228 01/17/19 0146 01/17/19 0734   SpO2: 98% 97% 96%     SpO2:  [96 %-98 %] 96 %  on  Flow (L/min):  [2] 2;   Device (Oxygen Therapy): nasal cannula    Body mass index is 28.79 kg/m².  Wt Readings from Last 3 Encounters:   01/15/19 78.5 kg (173 lb)   01/14/19 78.5 kg (173 lb)   01/07/19 76.8 kg (169 lb 6.4 oz)        Intake/Output Summary (Last 24 hours) at 1/17/2019 1501  Last data filed at 1/17/2019 0922  Gross per 24 hour   Intake 2060 ml   Output 500 ml   Net 1560 ml     Diet Pureed; Thin  ----------------------------------------------------------------------------------------------------------------------    Physical exam:    Constitutional:  Well-developed and well-nourished.  No respiratory distress.      HENT:  Head:  Normocephalic and atraumatic.  Mouth:  Moist mucous membranes.    Eyes:  Conjunctivae and EOM are normal.  No scleral icterus.    Neck:  Neck supple.  No JVD present.    Cardiovascular:  Normal rate, regular rhythm and normal heart sounds with no murmur. No edema.  Pulmonary/Chest:  No respiratory distress, no wheezes, no crackles, with normal breath sounds and good air movement.  Abdominal:  Soft.  Bowel sounds are normal.  No distension and no tenderness.   Musculoskeletal:  No edema, no tenderness, and no deformity.  No red or swollen joints anywhere.    Neurological:  Alert and oriented to person,  place, and time. No facial droop.  No slurred speech.   Skin:  Skin is warm and dry. No rash noted. No pallor.     ----------------------------------------------------------------------------------------------------------------------  I have personally reviewed the EKG/Telemetry strips   ----------------------------------------------------------------------------------------------------------------------  Results from last 7 days   Lab Units 01/15/19  0549 01/15/19  0018 01/14/19  2153   TROPONIN I ng/mL 0.048* 0.053* 0.011       Results from last 7 days   Lab Units 01/15/19  1227   TRIGLYCERIDES mg/dL 82     Results from last 7 days   Lab Units 01/15/19  1328   PH, ARTERIAL pH units 7.464*   PO2 ART mm Hg 89.3   PCO2, ARTERIAL mm Hg 27.4*   HCO3 ART mmol/L 19.2*     Results from last 7 days   Lab Units 01/17/19  0702 01/16/19  0700 01/15/19  1227 01/15/19  0457 01/15/19  0018  01/11/19  0450   CRP mg/dL 5.82*  --   --   --   --   --  5.10*   LACTATE mmol/L  --   --  1.2 3.1* 4.2*  --   --    WBC 10*3/mm3 12.62* 9.83 16.96*  --   --    < >  --    HEMOGLOBIN g/dL 8.4* 8.6* 9.3*  --   --    < >  --    HEMATOCRIT % 27.7* 28.9* 31.4*  --   --    < >  --    MCV fL 91.1 94.8* 92.1  --   --    < >  --    MCHC g/dL 30.3* 29.8* 29.6*  --   --    < >  --    PLATELETS 10*3/mm3 343 326 329  --   --    < >  --     < > = values in this interval not displayed.     Results from last 7 days   Lab Units 01/17/19  0702 01/16/19  0700 01/15/19  1227  01/11/19  0450   SODIUM mmol/L 142 144 141   < > 138   POTASSIUM mmol/L 4.2 3.4* 4.0   < > 4.4   MAGNESIUM mg/dL 2.4 1.4*  --   --  1.9   CHLORIDE mmol/L 118* 117* 113*   < > 109   CO2 mmol/L 20.3* 20.0* 22.1*   < > 21.7*   BUN mg/dL 10 14 16   < > 12   CREATININE mg/dL 0.67 0.77 0.73   < > 0.68   EGFR IF NONAFRICN AM mL/min/1.73 85 73 77   < > 84   CALCIUM mg/dL 7.8 7.7 8.0   < > 8.3   GLUCOSE mg/dL 107 101 97   < > 107   ALBUMIN g/dL 2.50* 2.60* 2.80*   < > 2.90*   BILIRUBIN mg/dL 0.4  0.3 0.5   < > 0.4   ALK PHOS U/L 402* 298* 379*   < > 246*   AST (SGOT) U/L 96* 108* 223*   < > 49*   ALT (SGPT) U/L 40* 51* 84*   < > 45*    < > = values in this interval not displayed.   Estimated Creatinine Clearance: 61 mL/min (by C-G formula based on SCr of 0.67 mg/dL).  No results found for: AMMONIA    Hemoglobin A1C   Date/Time Value Ref Range Status   01/15/2019 1227 5.90 (H) 4.50 - 5.70 % Final     Glucose   Date/Time Value Ref Range Status   01/17/2019 1147 95 70 - 130 mg/dL Final   01/17/2019 0713 104 70 - 130 mg/dL Final   01/16/2019 2016 157 (H) 70 - 130 mg/dL Final   01/16/2019 1615 121 70 - 130 mg/dL Final   01/16/2019 1047 104 70 - 130 mg/dL Final   01/16/2019 0731 108 70 - 130 mg/dL Final   01/15/2019 2122 111 70 - 130 mg/dL Final     Lab Results   Component Value Date    HGBA1C 5.90 (H) 01/15/2019     Lab Results   Component Value Date    TSH 6.470 (H) 01/15/2019       Blood Culture   Date Value Ref Range Status   01/15/2019 Escherichia coli (A)  Final   01/15/2019 Escherichia coli (A)  Final                 Pain Management Panel     Pain Management Panel Latest Ref Rng & Units 9/16/2018    AMPHETAMINES SCREEN, URINE Negative Negative    BARBITURATES SCREEN Negative Negative    BENZODIAZEPINE SCREEN, URINE Negative Negative    BUPRENORPHINE Negative Negative    COCAINE SCREEN, URINE Negative Negative    METHADONE SCREEN, URINE Negative Negative          I have personally reviewed the above laboratory results.   ----------------------------------------------------------------------------------------------------------------------  Imaging Results (last 24 hours)     Procedure Component Value Units Date/Time    FL Video Swallow [[id number]] Collected:  01/16/19 1521     Updated:  01/16/19 1528    Narrative:       FL VIDEO SWALLOW-     HISTORY:   Aspiration; K85.90-Acute pancreatitis without necrosis or  infection, unspecified; N39.0-Urinary tract infection, site not  specified          TECHNIQUE:    Speech therapy service was present. The patient was examined in the  sitting lateral position and was given several consistencies of barium.     FINDINGS: Poor oral control w/ delayed transit, oral holding, resulting  in premature loss of all consistencies. Silent aspiration of nectar  thick and thin liquids before and during the swallow. Cued cough  ineffective to clear aspirated material. No other aspiration observed,  however, per delayed pharyngeal swallow w/ pharyngeal bolus present,  unprotected airway, she is felt to be at high risk for aspiration w/ any  consistency.      FLUOROSCOPY TIME: 1.3 minutes.     IMAGES: Cine loop was acquired       Impression:       Aspiration of nectar and thin liquids.     For additional information, please see the report provided by the speech  therapy service     This report was finalized on 1/16/2019 3:26 PM by Dr. Ulysses Rojas MD.           I have personally reviewed the above radiology results.   ----------------------------------------------------------------------------------------------------------------------    Assessment/Plan       Assessment/Plan     ASSESSMENT:    1. Septic shock with lactic acid >4 on admission  2. E. Coli Bacteremia  3. MSSA bacteremia   4. Acute Pyelonephritis       PLAN:    Primary nurse at bedside this morning and reports no changes overnight. CRP level is 5.82 white count 12.62. No fever or diarrhea reported. Apparently she has a history of MSSA bacteremia but shows no indication of presence at this time.    Blood cultures collected on 1/15/19 finalized  2 of 2 E.coli.     Urine cultures collected on 1/7/19 reports >100,000 of E.coli.     At this time patient shows no sign of relapsing MSSA bacteremia. It is unusual for the patient to present with E. Coli bacteremia and E. Coli uremia with susceptibilities to Cefazolin in the setting of current treatment with Cefazolin.     In the setting of culture finalization reporting susceptibility to  Rocephin will continue with Rocephin 2 gm IV q 24 hrs and Gentamycin 3mg/kg IV q 24hrs for now. Hope to de-escalate soon.     CRP in the am.    Antibiotic therapy:    Rocephin 2 gm IV q 24 hrs    Gentamycin 3mg/kg IV q 24hrs    Code Status:   Code Status and Medical Interventions:   Ordered at: 01/15/19 1552     Limited Support to NOT Include:    Intubation     Code Status:    No CPR     Medical Interventions (Level of Support Prior to Arrest):    Limited       AMELIE Larsen  01/17/19  3:01 PM

## 2019-01-17 NOTE — PLAN OF CARE
Problem: Fall Risk (Adult)  Goal: Identify Related Risk Factors and Signs and Symptoms  Outcome: Ongoing (interventions implemented as appropriate)   01/16/19 1053   Fall Risk (Adult)   Related Risk Factors (Fall Risk) age-related changes;confusion/agitation;gait/mobility problems;environment unfamiliar   Signs and Symptoms (Fall Risk) presence of risk factors     Goal: Absence of Fall  Outcome: Ongoing (interventions implemented as appropriate)   01/17/19 0111   Fall Risk (Adult)   Absence of Fall making progress toward outcome       Problem: Pain, Acute (Adult)  Goal: Identify Related Risk Factors and Signs and Symptoms  Outcome: Ongoing (interventions implemented as appropriate)   01/16/19 1053   Pain, Acute (Adult)   Related Risk Factors (Acute Pain) communication barrier;disease process;infection;persistent pain;positioning   Signs and Symptoms (Acute Pain) BADLs/IADLs reluctance/inability to perform;fatigue/weakness;impaired thought process/concentration;moaning;sleep pattern alteration     Goal: Acceptable Pain Control/Comfort Level  Outcome: Ongoing (interventions implemented as appropriate)   01/17/19 0111   Pain, Acute (Adult)   Acceptable Pain Control/Comfort Level making progress toward outcome       Problem: Skin Injury Risk (Adult)  Goal: Identify Related Risk Factors and Signs and Symptoms  Outcome: Ongoing (interventions implemented as appropriate)   01/16/19 1053   Skin Injury Risk (Adult)   Related Risk Factors (Skin Injury Risk) advanced age;infection;mobility impaired;tissue perfusion altered     Goal: Skin Health and Integrity  Outcome: Ongoing (interventions implemented as appropriate)   01/17/19 0111   Skin Injury Risk (Adult)   Skin Health and Integrity making progress toward outcome

## 2019-01-18 LAB
ALBUMIN SERPL-MCNC: 2.3 G/DL (ref 3.4–4.8)
ALBUMIN/GLOB SERPL: 0.7 G/DL (ref 1.5–2.5)
ALP SERPL-CCNC: 344 U/L (ref 35–104)
ALT SERPL W P-5'-P-CCNC: 23 U/L (ref 10–36)
ANION GAP SERPL CALCULATED.3IONS-SCNC: 4.9 MMOL/L (ref 3.6–11.2)
ANISOCYTOSIS BLD QL: ABNORMAL
AST SERPL-CCNC: 52 U/L (ref 10–30)
BASOPHILS # BLD AUTO: 0.08 10*3/MM3 (ref 0–0.3)
BASOPHILS NFR BLD AUTO: 0.9 % (ref 0–2)
BILIRUB SERPL-MCNC: 0.3 MG/DL (ref 0.2–1.8)
BUN BLD-MCNC: 8 MG/DL (ref 7–21)
BUN/CREAT SERPL: 12.3 (ref 7–25)
CALCIUM SPEC-SCNC: 7.8 MG/DL (ref 7.7–10)
CHLORIDE SERPL-SCNC: 116 MMOL/L (ref 99–112)
CO2 SERPL-SCNC: 20.1 MMOL/L (ref 24.3–31.9)
CREAT BLD-MCNC: 0.65 MG/DL (ref 0.43–1.29)
CRP SERPL-MCNC: 4.6 MG/DL (ref 0–0.99)
DEPRECATED RDW RBC AUTO: 68.1 FL (ref 37–54)
EOSINOPHIL # BLD AUTO: 0.46 10*3/MM3 (ref 0–0.7)
EOSINOPHIL # BLD MANUAL: 0.46 10*3/MM3 (ref 0–0.7)
EOSINOPHIL NFR BLD AUTO: 5 % (ref 0–7)
EOSINOPHIL NFR BLD MANUAL: 5 % (ref 0–7)
ERYTHROCYTE [DISTWIDTH] IN BLOOD BY AUTOMATED COUNT: 21 % (ref 11.5–14.5)
FERRITIN SERPL-MCNC: 237 NG/ML (ref 10–290.3)
FOLATE SERPL-MCNC: 7.36 NG/ML (ref 5.4–20)
GENTAMICIN SERPL-MCNC: 1.2 MCG/ML (ref 0.5–2)
GFR SERPL CREATININE-BSD FRML MDRD: 88 ML/MIN/1.73
GLOBULIN UR ELPH-MCNC: 3.1 GM/DL
GLUCOSE BLD-MCNC: 100 MG/DL (ref 70–110)
GLUCOSE BLDC GLUCOMTR-MCNC: 119 MG/DL (ref 70–130)
GLUCOSE BLDC GLUCOMTR-MCNC: 165 MG/DL (ref 70–130)
GLUCOSE BLDC GLUCOMTR-MCNC: 92 MG/DL (ref 70–130)
GLUCOSE BLDC GLUCOMTR-MCNC: 99 MG/DL (ref 70–130)
HCT VFR BLD AUTO: 28.5 % (ref 37–47)
HGB BLD-MCNC: 8.4 G/DL (ref 12–16)
HYPOCHROMIA BLD QL: ABNORMAL
IMM GRANULOCYTES # BLD AUTO: 0.02 10*3/MM3 (ref 0–0.03)
IMM GRANULOCYTES NFR BLD AUTO: 0.2 % (ref 0–0.5)
IRON 24H UR-MRATE: 27 MCG/DL (ref 49–151)
IRON SATN MFR SERPL: 18 % (ref 15–50)
LYMPHOCYTES # BLD AUTO: 5.1 10*3/MM3 (ref 1–3)
LYMPHOCYTES # BLD MANUAL: 4.98 10*3/MM3 (ref 1–3)
LYMPHOCYTES NFR BLD AUTO: 55.3 % (ref 16–46)
LYMPHOCYTES NFR BLD MANUAL: 54 % (ref 16–46)
LYMPHOCYTES NFR BLD MANUAL: 6 % (ref 0–12)
MAGNESIUM SERPL-MCNC: 1.8 MG/DL (ref 1.7–2.6)
MCH RBC QN AUTO: 27.7 PG (ref 27–33)
MCHC RBC AUTO-ENTMCNC: 29.5 G/DL (ref 33–37)
MCV RBC AUTO: 94.1 FL (ref 80–94)
MONOCYTES # BLD AUTO: 0.55 10*3/MM3 (ref 0.1–0.9)
MONOCYTES # BLD AUTO: 0.68 10*3/MM3 (ref 0.1–0.9)
MONOCYTES NFR BLD AUTO: 7.4 % (ref 0–12)
NEUTROPHILS # BLD AUTO: 2.88 10*3/MM3 (ref 1.4–6.5)
NEUTROPHILS # BLD AUTO: 3.23 10*3/MM3 (ref 1.4–6.5)
NEUTROPHILS NFR BLD AUTO: 31.2 % (ref 40–75)
NEUTROPHILS NFR BLD MANUAL: 35 % (ref 40–75)
OSMOLALITY SERPL CALC.SUM OF ELEC: 279.7 MOSM/KG (ref 273–305)
PLAT MORPH BLD: NORMAL
PLATELET # BLD AUTO: 348 10*3/MM3 (ref 130–400)
PMV BLD AUTO: 9.2 FL (ref 6–10)
POTASSIUM BLD-SCNC: 4.1 MMOL/L (ref 3.5–5.3)
PROT SERPL-MCNC: 5.4 G/DL (ref 6–8)
RBC # BLD AUTO: 3.03 10*6/MM3 (ref 4.2–5.4)
SODIUM BLD-SCNC: 141 MMOL/L (ref 135–153)
TIBC SERPL-MCNC: 148 MCG/DL (ref 241–421)
VIT B12 BLD-MCNC: 793 PG/ML (ref 211–911)
WBC NRBC COR # BLD: 9.22 10*3/MM3 (ref 4.5–12.5)

## 2019-01-18 PROCEDURE — 99232 SBSQ HOSP IP/OBS MODERATE 35: CPT | Performed by: PHYSICIAN ASSISTANT

## 2019-01-18 PROCEDURE — 82962 GLUCOSE BLOOD TEST: CPT

## 2019-01-18 PROCEDURE — 25010000002 GENTAMICIN PER 80 MG: Performed by: PHYSICIAN ASSISTANT

## 2019-01-18 PROCEDURE — 83550 IRON BINDING TEST: CPT | Performed by: PHYSICIAN ASSISTANT

## 2019-01-18 PROCEDURE — 83735 ASSAY OF MAGNESIUM: CPT | Performed by: PHYSICIAN ASSISTANT

## 2019-01-18 PROCEDURE — 82728 ASSAY OF FERRITIN: CPT | Performed by: PHYSICIAN ASSISTANT

## 2019-01-18 PROCEDURE — 85007 BL SMEAR W/DIFF WBC COUNT: CPT | Performed by: PHYSICIAN ASSISTANT

## 2019-01-18 PROCEDURE — 25010000002 CEFTRIAXONE: Performed by: PHYSICIAN ASSISTANT

## 2019-01-18 PROCEDURE — 82607 VITAMIN B-12: CPT | Performed by: PHYSICIAN ASSISTANT

## 2019-01-18 PROCEDURE — 80053 COMPREHEN METABOLIC PANEL: CPT | Performed by: PHYSICIAN ASSISTANT

## 2019-01-18 PROCEDURE — 86140 C-REACTIVE PROTEIN: CPT | Performed by: NURSE PRACTITIONER

## 2019-01-18 PROCEDURE — 80170 ASSAY OF GENTAMICIN: CPT

## 2019-01-18 PROCEDURE — 83540 ASSAY OF IRON: CPT | Performed by: PHYSICIAN ASSISTANT

## 2019-01-18 PROCEDURE — 94799 UNLISTED PULMONARY SVC/PX: CPT

## 2019-01-18 PROCEDURE — 85025 COMPLETE CBC W/AUTO DIFF WBC: CPT | Performed by: PHYSICIAN ASSISTANT

## 2019-01-18 PROCEDURE — 82746 ASSAY OF FOLIC ACID SERUM: CPT | Performed by: PHYSICIAN ASSISTANT

## 2019-01-18 RX ORDER — MAGNESIUM SULFATE HEPTAHYDRATE 40 MG/ML
4 INJECTION, SOLUTION INTRAVENOUS ONCE
Status: COMPLETED | OUTPATIENT
Start: 2019-01-18 | End: 2019-01-18

## 2019-01-18 RX ADMIN — APIXABAN 5 MG: 5 TABLET, FILM COATED ORAL at 20:01

## 2019-01-18 RX ADMIN — BUDESONIDE AND FORMOTEROL FUMARATE DIHYDRATE 2 PUFF: 160; 4.5 AEROSOL RESPIRATORY (INHALATION) at 19:54

## 2019-01-18 RX ADMIN — SODIUM CHLORIDE, PRESERVATIVE FREE 3 ML: 5 INJECTION INTRAVENOUS at 08:51

## 2019-01-18 RX ADMIN — QUETIAPINE FUMARATE 25 MG: 25 TABLET, FILM COATED ORAL at 20:01

## 2019-01-18 RX ADMIN — CEFTRIAXONE 2 G: 2 INJECTION, POWDER, FOR SOLUTION INTRAMUSCULAR; INTRAVENOUS at 15:10

## 2019-01-18 RX ADMIN — DOCUSATE SODIUM 100 MG: 100 CAPSULE ORAL at 20:01

## 2019-01-18 RX ADMIN — APIXABAN 5 MG: 5 TABLET, FILM COATED ORAL at 08:50

## 2019-01-18 RX ADMIN — LEVOTHYROXINE SODIUM 100 MCG: 100 TABLET ORAL at 05:15

## 2019-01-18 RX ADMIN — BUDESONIDE AND FORMOTEROL FUMARATE DIHYDRATE 2 PUFF: 160; 4.5 AEROSOL RESPIRATORY (INHALATION) at 07:26

## 2019-01-18 RX ADMIN — FAMOTIDINE 20 MG: 20 TABLET, FILM COATED ORAL at 08:50

## 2019-01-18 RX ADMIN — DOCUSATE SODIUM 100 MG: 100 CAPSULE ORAL at 08:50

## 2019-01-18 RX ADMIN — MAGNESIUM SULFATE HEPTAHYDRATE 4 G: 40 INJECTION, SOLUTION INTRAVENOUS at 05:15

## 2019-01-18 RX ADMIN — FAMOTIDINE 20 MG: 20 TABLET, FILM COATED ORAL at 20:01

## 2019-01-18 RX ADMIN — SODIUM CHLORIDE, PRESERVATIVE FREE 10 ML: 5 INJECTION INTRAVENOUS at 21:41

## 2019-01-18 RX ADMIN — Medication 1 CAPSULE: at 08:50

## 2019-01-18 RX ADMIN — SODIUM CHLORIDE, PRESERVATIVE FREE 10 ML: 5 INJECTION INTRAVENOUS at 08:51

## 2019-01-18 RX ADMIN — VENLAFAXINE HYDROCHLORIDE 37.5 MG: 37.5 TABLET ORAL at 08:50

## 2019-01-18 RX ADMIN — GENTAMICIN SULFATE 170 MG: 40 INJECTION, SOLUTION INTRAMUSCULAR; INTRAVENOUS at 16:30

## 2019-01-18 RX ADMIN — VENLAFAXINE HYDROCHLORIDE 37.5 MG: 37.5 TABLET ORAL at 17:03

## 2019-01-18 RX ADMIN — FLUTICASONE PROPIONATE 2 SPRAY: 50 SPRAY, METERED NASAL at 08:50

## 2019-01-18 RX ADMIN — MONTELUKAST SODIUM 10 MG: 10 TABLET, COATED ORAL at 20:01

## 2019-01-18 NOTE — PROGRESS NOTES
Patient Identification:  Name:  Jeanna Flower  Age:  78 y.o.  Sex:  female  :  1940  MRN:  [id number]  Visit Number:  [id number]  Primary Care Provider:  Raul Robert MD    Length of stay:  3    Chief Complaint: low Sp02 at the nursing home    HPI:     Patient presented to [facility] ED on 19 with low Sp02 via pulse ox from the nursing home. Due to her mental state she was unable to give a ROS on admission. Her granddaughter reported this is her baseline, she mumbles words, and is bed bound.     Hospital Course:    On admission she was found to have acute pancreatitis, initial lipase level was 2489, repeat on 1/15/19 showed 91 and resolved to normal on 19. She underwent a CT on admission which showed a moderate sized hiatal hernia and mild compression fx of L1. Chest xray did not show any Pneumonia. She has been spitting out medications and the nursing staff has had difficulty in getting PO meds administered. She was given a 1 time dose of full dose lovenox on 1/15/19 in place of her eliquis. She failed a swallow evaluation. It was discussed with MAGGI Do, who did not want any short or long term feeding tubes and was agreeable to least restrictive diet.     Subjective:      Today the patient is sitting up in bed, alert to person only. Rambles about cheeseburgers and hot ham and cheese sandwiches. RNKortney is sitting with her at bedside and reports intermittent cough after eating. Otherwise she is doing well. She has a chronic olivas catheter and is making good urine. She had a small BM yesterday and a larger one last evening.     ----------------------------------------------------------------------------------------------------------------------  Current Hospital Meds:    apixaban 5 mg Oral Q12H   budesonide-formoterol 2 puff Inhalation BID - RT   ceftriaxone 2 g Intravenous Q24H   docusate sodium 100 mg Oral BID   famotidine 20 mg Oral BID   fluticasone 2 spray Nasal Daily   gentamicin 3 mg/kg  (Ideal) Intravenous Q24H   lactobacillus acidophilus 1 capsule Oral Daily   levothyroxine 100 mcg Oral Q AM   montelukast 10 mg Oral Nightly   QUEtiapine 25 mg Oral Nightly   sodium chloride 10 mL Intravenous Q12H   sodium chloride 3 mL Intravenous Q12H   venlafaxine 37.5 mg Oral BID With Meals       Pharmacy Consult - Pharmacy to dose      ----------------------------------------------------------------------------------------------------------------------  Vital Signs:  Temp:  [96.1 °F (35.6 °C)-98.9 °F (37.2 °C)] 96.1 °F (35.6 °C)  Heart Rate:  [71-91] 71  Resp:  [16-20] 16  BP: (106-165)/(43-64) 133/43      01/14/19  2126 01/15/19  1627   Weight: 78.4 kg (172 lb 13.5 oz) 78.5 kg (173 lb)     Body mass index is 28.79 kg/m².    Intake/Output Summary (Last 24 hours) at 1/18/2019 1410  Last data filed at 1/18/2019 1300  Gross per 24 hour   Intake 380 ml   Output 1650 ml   Net -1270 ml     I/O this shift:  In: 120 [P.O.:120]  Out: -   Diet Pureed; Thin  ----------------------------------------------------------------------------------------------------------------------  Physical exam:  Constitutional:  Well-developed and well-nourished.  No respiratory distress. Rambling intermittently.    HENT:  Head:  Normocephalic and atraumatic.  Mouth:  Moist mucous membranes.    Eyes:  Conjunctivae and EOM are normal. No scleral icterus.    Neck:  Neck supple.  No JVD present.    Cardiovascular:  Normal rate, regular rhythm and normal heart sounds with no murmur.  Pulmonary/Chest:  No respiratory distress, no wheezes, no crackles, with normal breath sounds and good air movement.  Abdominal:  Soft.  Bowel sounds are present x4.  No distension and no tenderness.   Musculoskeletal:  No edema, no tenderness, and no deformity.  No red or swollen joints anywhere.    Neurological:  Alert to person only. She rambles and does not respond appropriately to questioning. No tongue deviation.  No facial droop.  No slurred speech.   Skin:   Skin is warm and dry. No rash noted. No pallor.   Genitourinary: Hendrix catheter in place with pale yellow urine in the collection container..  ----------------------------------------------------------------------------------------------------------------------  Tele:  Not on telemetry monitoring.  ----------------------------------------------------------------------------------------------------------------------  Results from last 7 days   Lab Units 01/15/19  0549 01/15/19  0018 01/14/19  2153   TROPONIN I ng/mL 0.048* 0.053* 0.011     Results from last 7 days   Lab Units 01/18/19  0055 01/17/19  0702 01/16/19  0700 01/15/19  1227 01/15/19  0457 01/15/19  0018   CRP mg/dL 4.60* 5.82*  --   --   --   --    LACTATE mmol/L  --   --   --  1.2 3.1* 4.2*   WBC 10*3/mm3 9.22 12.62* 9.83 16.96*  --   --    HEMOGLOBIN g/dL 8.4* 8.4* 8.6* 9.3*  --   --    HEMATOCRIT % 28.5* 27.7* 28.9* 31.4*  --   --    MCV fL 94.1* 91.1 94.8* 92.1  --   --    MCHC g/dL 29.5* 30.3* 29.8* 29.6*  --   --    PLATELETS 10*3/mm3 348 343 326 329  --   --      Results from last 7 days   Lab Units 01/15/19  1328   PH, ARTERIAL pH units 7.464*   PO2 ART mm Hg 89.3   PCO2, ARTERIAL mm Hg 27.4*   HCO3 ART mmol/L 19.2*     Results from last 7 days   Lab Units 01/18/19  0055 01/17/19  0702 01/16/19  0700   SODIUM mmol/L 141 142 144   POTASSIUM mmol/L 4.1 4.2 3.4*   MAGNESIUM mg/dL 1.8 2.4 1.4*   CHLORIDE mmol/L 116* 118* 117*   CO2 mmol/L 20.1* 20.3* 20.0*   BUN mg/dL 8 10 14   CREATININE mg/dL 0.65 0.67 0.77   EGFR IF NONAFRICN AM mL/min/1.73 88 85 73   CALCIUM mg/dL 7.8 7.8 7.7   GLUCOSE mg/dL 100 107 101   ALBUMIN g/dL 2.30* 2.50* 2.60*   BILIRUBIN mg/dL 0.3 0.4 0.3   ALK PHOS U/L 344* 402* 298*   AST (SGOT) U/L 52* 96* 108*   ALT (SGPT) U/L 23 40* 51*   Estimated Creatinine Clearance: 61 mL/min (by C-G formula based on SCr of 0.65 mg/dL).    Results from last 7 days   Lab Units 01/15/19  1227   TRIGLYCERIDES mg/dL 82     Blood Culture   Date Value  Ref Range Status   01/15/2019 Abnormal Stain (A)  Preliminary   01/15/2019 (A)  Preliminary    Gram Negative Bacilli, Morphology consistent with Escherichia / Citrobacter   01/15/2019 Abnormal Stain (A)  Preliminary   01/15/2019 (A)  Preliminary    Gram Negative Bacilli, Morphology consistent with Escherichia / Citrobacter      ----------------------------------------------------------------------------------------------------------------------  Imaging Results (last 24 hours)     ** No results found for the last 24 hours. **        ----------------------------------------------------------------------------------------------------------------------  Assessment and Plan:     Septic shock, present on admission, as noted by tachycardia and lactic acid >4, secondary to acute pancreatitis and E. Coli bacteremia: Pancreatitis appears to have resolved, likely secondary to recent antibiotic therapy with Ancef.  Infectious disease was consulted and changed antibiotic therapy to Gentamicin and high dose Rocephin (day #3). Appreciate their recommendations. WBC count back down today and CRP improving. Awaiting final ID recommendations regarding duration of antibiotic therapy.    Moderate to severe oral dysphagia with aspiration: SLP evaluation was positive for high risk of aspiration. The case was discussed with Ernestina TAY and she did not want any short or long term feeding tubes. Family is aware of risks of aspiration and she was placed on least restrictive diet with puree, thin liquids. Universal aspiration precautions in place. Monitor continuous O2sat.    Grayzone troponin: Noted at admission. Likely secondary to septic shock and bacteremia. Nonspecific ST/T abnormalities on EKG at admission. Recent echocardiogram showed EF > 70%. No abnormalities noted on JAYE.     Elevated Alkaline phosphatase, Elevated Transaminases: Hepatitis panel was checked on 1/18/19 and was negative, Liver US was done on 1/8/19 was unremarkable.  Alkaline phosphatase, AST, and ALT trending down. Continue to monitor trend.     Hypoalbuminemia: Consult nutrition to recommend dietary supplements if needed.       Paroxysmal A-Fib: On eliquis for stroke prevention which she is now taking. Heart rate controlled. H/H stable.      Normocytic Anemia: Stable. No signs of bleeding. Possibly dilutional. Check anemia studies. Will monitor trend, repeat CBC in the AM.      Hypothyroidism: TSH is 6.470. Free T4 within normal limits. Continue levothyroxine.     Hypokalemia: Improved with replacement. Continue protocol.     Hypomagnesemia: Improved with replacement. Continue protocol.     Olivas catheter in place: Apparently came from the NH with olivas in place, possibly chronic.      Dementia, Debility, History of CVA with left side hemiplegia: PT/OT consulted. At baseline, she opens her eyes and mumbles random words, does not follow commands, per daughter Ernestina.    Essential hypertension: Controlled. Not currently on any oral antihypertensive medications.     DM type 2: A1c is 5.9, hypoglycemia protocol initiated, accu checks ac/hs and prn, diabetic diet, no sliding scale ordered at this time, glucose .      COPD: not felt to be in exacerbation, PRN nebulizer's. Supplemental oxygen as needed.      DVT prophylaxis: on eliquis   Lines/Tubes/Drains: Right arm PICC in place on arrival 1/14/19, olivas cath   Diet: Consistent carb clear liquids advance as tolerated.     Code status: DNR/DNI     Disposition: Patient will be discharged back to SNF once completed antibiotic therapy per ID recommendations. She does not have a bed hold at [facility].     The patient is high risk due to the following diagnoses/reasons:  SIRS r/t pancreatitis, DM type 2, Normocytic Anemia, Paroxysmal A-Fib on Anticoagulation.     I have discussed the case with RNKortney ,and attending physician, Dr. Livingston.     TAE Parker  01/18/19  2:10 PM

## 2019-01-18 NOTE — PLAN OF CARE
Problem: Fall Risk (Adult)  Goal: Absence of Fall  Outcome: Ongoing (interventions implemented as appropriate)   01/18/19 0018   Fall Risk (Adult)   Absence of Fall making progress toward outcome       Problem: Pain, Acute (Adult)  Goal: Acceptable Pain Control/Comfort Level  Outcome: Ongoing (interventions implemented as appropriate)   01/18/19 0018   Pain, Acute (Adult)   Acceptable Pain Control/Comfort Level making progress toward outcome       Problem: Skin Injury Risk (Adult)  Goal: Skin Health and Integrity  Outcome: Ongoing (interventions implemented as appropriate)   01/18/19 0018   Skin Injury Risk (Adult)   Skin Health and Integrity making progress toward outcome       Problem: Nausea/Vomiting (Adult)  Goal: Symptom Relief  Outcome: Ongoing (interventions implemented as appropriate)   01/18/19 0018   Nausea/Vomiting (Adult)   Symptom Relief making progress toward outcome     Goal: Adequate Hydration  Outcome: Ongoing (interventions implemented as appropriate)   01/18/19 0018   Nausea/Vomiting (Adult)   Adequate Hydration making progress toward outcome       Problem: Wound (Includes Pressure Injury) (Adult)  Goal: Signs and Symptoms of Listed Potential Problems Will be Absent, Minimized or Managed (Wound)  Outcome: Ongoing (interventions implemented as appropriate)   01/18/19 0018   Goal/Outcome Evaluation   Problems Assessed (Wound) all   Problems Present (Wound) infection

## 2019-01-18 NOTE — PLAN OF CARE
Problem: Fall Risk (Adult)  Goal: Identify Related Risk Factors and Signs and Symptoms  Outcome: Ongoing (interventions implemented as appropriate)    Goal: Absence of Fall  Outcome: Ongoing (interventions implemented as appropriate)      Problem: Pain, Acute (Adult)  Goal: Identify Related Risk Factors and Signs and Symptoms  Outcome: Ongoing (interventions implemented as appropriate)    Goal: Acceptable Pain Control/Comfort Level  Outcome: Ongoing (interventions implemented as appropriate)      Problem: Skin Injury Risk (Adult)  Goal: Identify Related Risk Factors and Signs and Symptoms  Outcome: Ongoing (interventions implemented as appropriate)    Goal: Skin Health and Integrity  Outcome: Ongoing (interventions implemented as appropriate)      Problem: Nausea/Vomiting (Adult)  Goal: Identify Related Risk Factors and Signs and Symptoms  Outcome: Ongoing (interventions implemented as appropriate)    Goal: Symptom Relief  Outcome: Ongoing (interventions implemented as appropriate)    Goal: Adequate Hydration  Outcome: Ongoing (interventions implemented as appropriate)      Problem: Wound (Includes Pressure Injury) (Adult)  Goal: Signs and Symptoms of Listed Potential Problems Will be Absent, Minimized or Managed (Wound)  Outcome: Ongoing (interventions implemented as appropriate)

## 2019-01-18 NOTE — PROGRESS NOTES
Discharge Planning Assessment  [facility]     Patient Name: Jeanna Flower  MRN: [id number]  Today's Date: 1/18/2019    Admit Date: 1/14/2019      Discharge Plan     Row Name 01/18/19 1636       Plan    Plan  Pt was admitted from [facility] and has no bed hold. SS spoke to , Carlee at [facility] who states pt can be admitted back on Saturday or Sunday if she is medically stable. SS made Dr. Livingston aware. SS to follow.         Destination      Service Provider Request Status Selected Services Address Phone Number Fax Number    [facility] - No Request Sent N/A [address] [facility] [id number] [phone]     Lakia Alatorre

## 2019-01-18 NOTE — PROGRESS NOTES
PROGRESS NOTE         Patient Identification:  Name:  Jeanna Flower  Age:  78 y.o.  Sex:  female  :  1940  MRN:  [id number]  Visit Number:  [id number]  Primary Care Provider:  Raul Robert MD      ----------------------------------------------------------------------------------------------------------------------  Subjective       Chief Complaints:    Shortness of Breath and Vomiting        Interval History:        She was sleeping this morning upon rounds, sitter is at bedside. CRP level is down from 5.82 to 4.60, white count is normal. Blood culture collected on 1/15/19 finalized as Ecoli susceptible to Rocephin.     Review of Systems:    Constitutional: no fever, chills and night sweats. No appetite change or unexpected weight change. No fatigue.  Eyes: no eye drainage, itching or redness.  HEENT: no mouth sores, dysphagia or nose bleed.  Respiratory: no for shortness of breath, cough or production of sputum.  Cardiovascular: no chest pain, no palpitations, no orthopnea.  Gastrointestinal: no nausea, vomiting or diarrhea. No abdominal pain, hematemesis or rectal bleeding.  Genitourinary: no dysuria or polyuria.  Hematologic/lymphatic: no lymph node abnormalities, no easy bruising or easy bleeding.  Musculoskeletal: no muscle or joint pain.  Skin: No rash and no itching.  Neurological: no loss of consciousness, no seizure, no headache.  Behavioral/Psych: no depression or suicidal ideation.  Endocrine: no hot flashes.  Immunologic: negative.  ----------------------------------------------------------------------------------------------------------------------      Objective       [facility] Meds:    apixaban 5 mg Oral Q12H   budesonide-formoterol 2 puff Inhalation BID - RT   ceftriaxone 2 g Intravenous Q24H   docusate sodium 100 mg Oral BID   famotidine 20 mg Oral BID   fluticasone 2 spray Nasal Daily   gentamicin 3 mg/kg (Ideal) Intravenous Q24H   lactobacillus acidophilus 1  capsule Oral Daily   levothyroxine 100 mcg Oral Q AM   montelukast 10 mg Oral Nightly   QUEtiapine 25 mg Oral Nightly   sodium chloride 10 mL Intravenous Q12H   sodium chloride 3 mL Intravenous Q12H   venlafaxine 37.5 mg Oral BID With Meals       Pharmacy Consult - Pharmacy to dose      ----------------------------------------------------------------------------------------------------------------------    Vital Signs:  Temp:  [96.1 °F (35.6 °C)-98.9 °F (37.2 °C)] 96.1 °F (35.6 °C)  Heart Rate:  [71-91] 71  Resp:  [16-20] 16  BP: (106-165)/(43-64) 133/43  Mean Arterial Pressure (Non-Invasive) for the past 24 hrs (Last 3 readings):   Noninvasive MAP (mmHg)   01/17/19 1501 70     SpO2 Percentage    01/18/19 0700 01/18/19 0726 01/18/19 1049   SpO2: 99% 98% 99%     SpO2:  [98 %-99 %] 99 %  on  Flow (L/min):  [2] 2;   Device (Oxygen Therapy): nasal cannula    Body mass index is 28.79 kg/m².  Wt Readings from Last 3 Encounters:   01/15/19 78.5 kg (173 lb)   01/14/19 78.5 kg (173 lb)   01/07/19 76.8 kg (169 lb 6.4 oz)        Intake/Output Summary (Last 24 hours) at 1/18/2019 1111  Last data filed at 1/18/2019 0300  Gross per 24 hour   Intake 260 ml   Output 1650 ml   Net -1390 ml     Diet Pureed; Thin  ----------------------------------------------------------------------------------------------------------------------    Physical exam:    Constitutional:  Well-developed and well-nourished.  No respiratory distress.      HENT:  Head:  Normocephalic and atraumatic.  Mouth:  Moist mucous membranes.    Eyes:  Conjunctivae and EOM are normal.  No scleral icterus.    Neck:  Neck supple.  No JVD present.    Cardiovascular:  Normal rate, regular rhythm and normal heart sounds with no murmur. No edema.  Pulmonary/Chest:  No respiratory distress, no wheezes, no crackles, with normal breath sounds and good air movement.  Abdominal:  Soft.  Bowel sounds are normal.  No distension and no tenderness.   Musculoskeletal:  No edema, no  tenderness, and no deformity.  No red or swollen joints anywhere.    Neurological:  Alert and oriented to person, place, and time. No facial droop.  No slurred speech.   Skin:  Skin is warm and dry. No rash noted. No pallor.     ----------------------------------------------------------------------------------------------------------------------  I have personally reviewed the EKG/Telemetry strips   ----------------------------------------------------------------------------------------------------------------------  Results from last 7 days   Lab Units 01/15/19  0549 01/15/19  0018 01/14/19  2153   TROPONIN I ng/mL 0.048* 0.053* 0.011       Results from last 7 days   Lab Units 01/15/19  1227   TRIGLYCERIDES mg/dL 82     Results from last 7 days   Lab Units 01/15/19  1328   PH, ARTERIAL pH units 7.464*   PO2 ART mm Hg 89.3   PCO2, ARTERIAL mm Hg 27.4*   HCO3 ART mmol/L 19.2*     Results from last 7 days   Lab Units 01/18/19  0055 01/17/19  0702 01/16/19  0700 01/15/19  1227 01/15/19  0457 01/15/19  0018   CRP mg/dL 4.60* 5.82*  --   --   --   --    LACTATE mmol/L  --   --   --  1.2 3.1* 4.2*   WBC 10*3/mm3 9.22 12.62* 9.83 16.96*  --   --    HEMOGLOBIN g/dL 8.4* 8.4* 8.6* 9.3*  --   --    HEMATOCRIT % 28.5* 27.7* 28.9* 31.4*  --   --    MCV fL 94.1* 91.1 94.8* 92.1  --   --    MCHC g/dL 29.5* 30.3* 29.8* 29.6*  --   --    PLATELETS 10*3/mm3 348 343 326 329  --   --      Results from last 7 days   Lab Units 01/18/19  0055 01/17/19  0702 01/16/19  0700   SODIUM mmol/L 141 142 144   POTASSIUM mmol/L 4.1 4.2 3.4*   MAGNESIUM mg/dL 1.8 2.4 1.4*   CHLORIDE mmol/L 116* 118* 117*   CO2 mmol/L 20.1* 20.3* 20.0*   BUN mg/dL 8 10 14   CREATININE mg/dL 0.65 0.67 0.77   EGFR IF NONAFRICN AM mL/min/1.73 88 85 73   CALCIUM mg/dL 7.8 7.8 7.7   GLUCOSE mg/dL 100 107 101   ALBUMIN g/dL 2.30* 2.50* 2.60*   BILIRUBIN mg/dL 0.3 0.4 0.3   ALK PHOS U/L 344* 402* 298*   AST (SGOT) U/L 52* 96* 108*   ALT (SGPT) U/L 23 40* 51*   Estimated  Creatinine Clearance: 61 mL/min (by C-G formula based on SCr of 0.65 mg/dL).  No results found for: AMMONIA    Hemoglobin A1C   Date/Time Value Ref Range Status   01/15/2019 1227 5.90 (H) 4.50 - 5.70 % Final     Glucose   Date/Time Value Ref Range Status   01/18/2019 0706 92 70 - 130 mg/dL Final   01/17/2019 1955 104 70 - 130 mg/dL Final   01/17/2019 1635 101 70 - 130 mg/dL Final   01/17/2019 1147 95 70 - 130 mg/dL Final   01/17/2019 0713 104 70 - 130 mg/dL Final   01/16/2019 2016 157 (H) 70 - 130 mg/dL Final   01/16/2019 1615 121 70 - 130 mg/dL Final   01/16/2019 1047 104 70 - 130 mg/dL Final     Lab Results   Component Value Date    HGBA1C 5.90 (H) 01/15/2019     Lab Results   Component Value Date    TSH 6.470 (H) 01/15/2019    FREET4 0.96 01/17/2019       Blood Culture   Date Value Ref Range Status   01/15/2019 Escherichia coli (A)  Final   01/15/2019 Escherichia coli (A)  Final                 Pain Management Panel     Pain Management Panel Latest Ref Rng & Units 9/16/2018    AMPHETAMINES SCREEN, URINE Negative Negative    BARBITURATES SCREEN Negative Negative    BENZODIAZEPINE SCREEN, URINE Negative Negative    BUPRENORPHINE Negative Negative    COCAINE SCREEN, URINE Negative Negative    METHADONE SCREEN, URINE Negative Negative          I have personally reviewed the above laboratory results.   ----------------------------------------------------------------------------------------------------------------------  Imaging Results (last 24 hours)     ** No results found for the last 24 hours. **        I have personally reviewed the above radiology results.   ----------------------------------------------------------------------------------------------------------------------    Assessment/Plan       Assessment/Plan     ASSESSMENT:    1. Septic shock with lactic acid >4 on admission  2. E. Coli Bacteremia  3. MSSA bacteremia   4. Acute Pyelonephritis       PLAN:    She was sleeping this morning upon rounds,  sitter is at bedside. CRP level is down from 5.82 to 4.60, white count is normal. Blood culture collected on 1/15/19 finalized as Ecoli susceptible to Rocephin.    Blood cultures collected on 1/15/19 finalized  2 of 2 E.coli.     Urine cultures collected on 1/7/19 reports >100,000 of E.coli.     At this time patient shows no sign of relapsing MSSA bacteremia. It is unusual for the patient to present with E. Coli bacteremia and E. Coli uremia with susceptibilities to Cefazolin in the setting of current treatment with Cefazolin.     In the setting of culture finalization reporting susceptibility to Rocephin will continue with Rocephin 2 gm IV q 24 hrs and Gentamycin 3mg/kg IV q 24hrs for now. Hope to de-escalate soon.     CRP in the am.    Antibiotic therapy:    Rocephin 2 gm IV q 24 hrs    Gentamycin 3mg/kg IV q 24hrs    Code Status:   Code Status and Medical Interventions:   Ordered at: 01/15/19 4836     Limited Support to NOT Include:    Intubation     Code Status:    No CPR     Medical Interventions (Level of Support Prior to Arrest):    Limited       AMELIE Larsen  01/18/19  11:11 AM

## 2019-01-19 LAB
ALBUMIN SERPL-MCNC: 2.6 G/DL (ref 3.4–4.8)
ALBUMIN/GLOB SERPL: 0.8 G/DL (ref 1.5–2.5)
ALP SERPL-CCNC: 311 U/L (ref 35–104)
ALT SERPL W P-5'-P-CCNC: 26 U/L (ref 10–36)
ANION GAP SERPL CALCULATED.3IONS-SCNC: 6.2 MMOL/L (ref 3.6–11.2)
AST SERPL-CCNC: 42 U/L (ref 10–30)
BASOPHILS # BLD AUTO: 0.05 10*3/MM3 (ref 0–0.3)
BASOPHILS NFR BLD AUTO: 0.4 % (ref 0–2)
BILIRUB SERPL-MCNC: 0.4 MG/DL (ref 0.2–1.8)
BUN BLD-MCNC: 7 MG/DL (ref 7–21)
BUN/CREAT SERPL: 10.9 (ref 7–25)
CALCIUM SPEC-SCNC: 7.9 MG/DL (ref 7.7–10)
CHLORIDE SERPL-SCNC: 106 MMOL/L (ref 99–112)
CO2 SERPL-SCNC: 21.8 MMOL/L (ref 24.3–31.9)
CREAT BLD-MCNC: 0.64 MG/DL (ref 0.43–1.29)
CRP SERPL-MCNC: 2.98 MG/DL (ref 0–0.99)
DEPRECATED RDW RBC AUTO: 67.3 FL (ref 37–54)
EOSINOPHIL # BLD AUTO: 0.38 10*3/MM3 (ref 0–0.7)
EOSINOPHIL NFR BLD AUTO: 3.2 % (ref 0–7)
ERYTHROCYTE [DISTWIDTH] IN BLOOD BY AUTOMATED COUNT: 21.2 % (ref 11.5–14.5)
GFR SERPL CREATININE-BSD FRML MDRD: 90 ML/MIN/1.73
GLOBULIN UR ELPH-MCNC: 3.3 GM/DL
GLUCOSE BLD-MCNC: 109 MG/DL (ref 70–110)
GLUCOSE BLDC GLUCOMTR-MCNC: 107 MG/DL (ref 70–130)
GLUCOSE BLDC GLUCOMTR-MCNC: 111 MG/DL (ref 70–130)
GLUCOSE BLDC GLUCOMTR-MCNC: 122 MG/DL (ref 70–130)
HCT VFR BLD AUTO: 31 % (ref 37–47)
HGB BLD-MCNC: 9.5 G/DL (ref 12–16)
IMM GRANULOCYTES # BLD AUTO: 0.03 10*3/MM3 (ref 0–0.03)
IMM GRANULOCYTES NFR BLD AUTO: 0.3 % (ref 0–0.5)
LYMPHOCYTES # BLD AUTO: 4.77 10*3/MM3 (ref 1–3)
LYMPHOCYTES NFR BLD AUTO: 40.2 % (ref 16–46)
MAGNESIUM SERPL-MCNC: 1.8 MG/DL (ref 1.7–2.6)
MCH RBC QN AUTO: 28.4 PG (ref 27–33)
MCHC RBC AUTO-ENTMCNC: 30.6 G/DL (ref 33–37)
MCV RBC AUTO: 92.8 FL (ref 80–94)
MONOCYTES # BLD AUTO: 0.87 10*3/MM3 (ref 0.1–0.9)
MONOCYTES NFR BLD AUTO: 7.3 % (ref 0–12)
NEUTROPHILS # BLD AUTO: 5.77 10*3/MM3 (ref 1.4–6.5)
NEUTROPHILS NFR BLD AUTO: 48.6 % (ref 40–75)
OSMOLALITY SERPL CALC.SUM OF ELEC: 266.8 MOSM/KG (ref 273–305)
PLATELET # BLD AUTO: 380 10*3/MM3 (ref 130–400)
PMV BLD AUTO: 9.4 FL (ref 6–10)
POTASSIUM BLD-SCNC: 3.9 MMOL/L (ref 3.5–5.3)
PROT SERPL-MCNC: 5.9 G/DL (ref 6–8)
RBC # BLD AUTO: 3.34 10*6/MM3 (ref 4.2–5.4)
SODIUM BLD-SCNC: 134 MMOL/L (ref 135–153)
WBC NRBC COR # BLD: 11.87 10*3/MM3 (ref 4.5–12.5)

## 2019-01-19 PROCEDURE — 25010000002 GENTAMICIN PER 80 MG: Performed by: PHYSICIAN ASSISTANT

## 2019-01-19 PROCEDURE — 94799 UNLISTED PULMONARY SVC/PX: CPT

## 2019-01-19 PROCEDURE — 99232 SBSQ HOSP IP/OBS MODERATE 35: CPT | Performed by: PHYSICIAN ASSISTANT

## 2019-01-19 PROCEDURE — 82962 GLUCOSE BLOOD TEST: CPT

## 2019-01-19 PROCEDURE — 83735 ASSAY OF MAGNESIUM: CPT | Performed by: PHYSICIAN ASSISTANT

## 2019-01-19 PROCEDURE — 86140 C-REACTIVE PROTEIN: CPT | Performed by: NURSE PRACTITIONER

## 2019-01-19 PROCEDURE — 63710000001 DIPHENHYDRAMINE PER 50 MG: Performed by: PHYSICIAN ASSISTANT

## 2019-01-19 PROCEDURE — 85025 COMPLETE CBC W/AUTO DIFF WBC: CPT | Performed by: PHYSICIAN ASSISTANT

## 2019-01-19 PROCEDURE — 80053 COMPREHEN METABOLIC PANEL: CPT | Performed by: PHYSICIAN ASSISTANT

## 2019-01-19 PROCEDURE — 25010000002 CEFTRIAXONE: Performed by: PHYSICIAN ASSISTANT

## 2019-01-19 RX ORDER — DIPHENHYDRAMINE HCL 25 MG
25 CAPSULE ORAL ONCE
Status: COMPLETED | OUTPATIENT
Start: 2019-01-19 | End: 2019-01-19

## 2019-01-19 RX ORDER — DIPHENHYDRAMINE HCL 25 MG
CAPSULE ORAL
Status: DISPENSED
Start: 2019-01-19 | End: 2019-01-20

## 2019-01-19 RX ADMIN — Medication 1 CAPSULE: at 08:35

## 2019-01-19 RX ADMIN — APIXABAN 5 MG: 5 TABLET, FILM COATED ORAL at 08:35

## 2019-01-19 RX ADMIN — VENLAFAXINE HYDROCHLORIDE 37.5 MG: 37.5 TABLET ORAL at 08:35

## 2019-01-19 RX ADMIN — MONTELUKAST SODIUM 10 MG: 10 TABLET, COATED ORAL at 19:48

## 2019-01-19 RX ADMIN — LEVOTHYROXINE SODIUM 100 MCG: 100 TABLET ORAL at 04:28

## 2019-01-19 RX ADMIN — BUDESONIDE AND FORMOTEROL FUMARATE DIHYDRATE 2 PUFF: 160; 4.5 AEROSOL RESPIRATORY (INHALATION) at 07:18

## 2019-01-19 RX ADMIN — FAMOTIDINE 20 MG: 20 TABLET, FILM COATED ORAL at 19:48

## 2019-01-19 RX ADMIN — DIPHENHYDRAMINE HYDROCHLORIDE 25 MG: 25 CAPSULE ORAL at 21:24

## 2019-01-19 RX ADMIN — SODIUM CHLORIDE, PRESERVATIVE FREE 3 ML: 5 INJECTION INTRAVENOUS at 19:57

## 2019-01-19 RX ADMIN — VENLAFAXINE HYDROCHLORIDE 37.5 MG: 37.5 TABLET ORAL at 17:17

## 2019-01-19 RX ADMIN — SODIUM CHLORIDE, PRESERVATIVE FREE 3 ML: 5 INJECTION INTRAVENOUS at 08:36

## 2019-01-19 RX ADMIN — GENTAMICIN SULFATE 170 MG: 40 INJECTION, SOLUTION INTRAMUSCULAR; INTRAVENOUS at 17:17

## 2019-01-19 RX ADMIN — CEFTRIAXONE 2 G: 2 INJECTION, POWDER, FOR SOLUTION INTRAMUSCULAR; INTRAVENOUS at 16:27

## 2019-01-19 RX ADMIN — SODIUM CHLORIDE, PRESERVATIVE FREE 3 ML: 5 INJECTION INTRAVENOUS at 19:56

## 2019-01-19 RX ADMIN — QUETIAPINE FUMARATE 25 MG: 25 TABLET, FILM COATED ORAL at 19:49

## 2019-01-19 RX ADMIN — FAMOTIDINE 20 MG: 20 TABLET, FILM COATED ORAL at 08:35

## 2019-01-19 RX ADMIN — FLUTICASONE PROPIONATE 2 SPRAY: 50 SPRAY, METERED NASAL at 08:35

## 2019-01-19 RX ADMIN — APIXABAN 5 MG: 5 TABLET, FILM COATED ORAL at 19:49

## 2019-01-19 RX ADMIN — SODIUM CHLORIDE, PRESERVATIVE FREE 10 ML: 5 INJECTION INTRAVENOUS at 08:36

## 2019-01-19 RX ADMIN — DOCUSATE SODIUM 100 MG: 100 CAPSULE ORAL at 08:35

## 2019-01-19 RX ADMIN — DOCUSATE SODIUM 100 MG: 100 CAPSULE ORAL at 19:48

## 2019-01-19 NOTE — PROGRESS NOTES
PROGRESS NOTE         Patient Identification:  Name:  Jeanna Flower  Age:  78 y.o.  Sex:  female  :  1940  MRN:  [id number]  Visit Number:  [id number]  Primary Care Provider:  Raul Robert MD      ----------------------------------------------------------------------------------------------------------------------  Subjective       Chief Complaints:    Shortness of Breath and Vomiting        Interval History:        She was awake this morning, pleasantly confused with sitter at beside. Significantly improved CRP level down from 4.60 to 2.98. Temp 99.1.      Review of Systems:    Constitutional: no fever, chills and night sweats. No appetite change or unexpected weight change. No fatigue.  Eyes: no eye drainage, itching or redness.  HEENT: no mouth sores, dysphagia or nose bleed.  Respiratory: no for shortness of breath, cough or production of sputum.  Cardiovascular: no chest pain, no palpitations, no orthopnea.  Gastrointestinal: no nausea, vomiting or diarrhea. No abdominal pain, hematemesis or rectal bleeding.  Genitourinary: no dysuria or polyuria.  Hematologic/lymphatic: no lymph node abnormalities, no easy bruising or easy bleeding.  Musculoskeletal: no muscle or joint pain.  Skin: No rash and no itching.  Neurological: no loss of consciousness, no seizure, no headache.  Behavioral/Psych: no depression or suicidal ideation.  Endocrine: no hot flashes.  Immunologic: negative.  ----------------------------------------------------------------------------------------------------------------------      Objective       Current Hospital Meds:    apixaban 5 mg Oral Q12H   budesonide-formoterol 2 puff Inhalation BID - RT   ceftriaxone 2 g Intravenous Q24H   docusate sodium 100 mg Oral BID   famotidine 20 mg Oral BID   fluticasone 2 spray Nasal Daily   gentamicin 3 mg/kg (Ideal) Intravenous Q24H   lactobacillus acidophilus 1 capsule Oral Daily   levothyroxine 100 mcg Oral Q AM   montelukast 10  mg Oral Nightly   QUEtiapine 25 mg Oral Nightly   sodium chloride 10 mL Intravenous Q12H   sodium chloride 3 mL Intravenous Q12H   venlafaxine 37.5 mg Oral BID With Meals       Pharmacy Consult - Pharmacy to dose      ----------------------------------------------------------------------------------------------------------------------    Vital Signs:  Temp:  [96.7 °F (35.9 °C)-99.1 °F (37.3 °C)] 99.1 °F (37.3 °C)  Heart Rate:  [80-90] 86  Resp:  [18-20] 20  BP: (118-142)/(50-61) 142/52  No data found.  SpO2 Percentage    01/18/19 1049 01/18/19 1954 01/19/19 0718   SpO2: 99% 98% 98%     SpO2:  [98 %] 98 %  on  Flow (L/min):  [2] 2;   Device (Oxygen Therapy): nasal cannula    Body mass index is 28.79 kg/m².  Wt Readings from Last 3 Encounters:   01/15/19 78.5 kg (173 lb)   01/14/19 78.5 kg (173 lb)   01/07/19 76.8 kg (169 lb 6.4 oz)        Intake/Output Summary (Last 24 hours) at 1/19/2019 1416  Last data filed at 1/19/2019 1300  Gross per 24 hour   Intake 360 ml   Output 1850 ml   Net -1490 ml     Diet Pureed; Thin  ----------------------------------------------------------------------------------------------------------------------    Physical exam:    Constitutional:  Well-developed and well-nourished.  No respiratory distress.      HENT:  Head:  Normocephalic and atraumatic.  Mouth:  Moist mucous membranes.    Eyes:  Conjunctivae and EOM are normal.  No scleral icterus.    Neck:  Neck supple.  No JVD present.    Cardiovascular:  Normal rate, regular rhythm and normal heart sounds with no murmur. No edema.  Pulmonary/Chest:  No respiratory distress, no wheezes, no crackles, with normal breath sounds and good air movement.  Abdominal:  Soft.  Bowel sounds are normal.  No distension and no tenderness.   Musculoskeletal:  No edema, no tenderness, and no deformity.  No red or swollen joints anywhere.    Neurological:  Alert and oriented to person, place, and time. No facial droop.  No slurred speech.   Skin:  Skin is  warm and dry. No rash noted. No pallor.     ----------------------------------------------------------------------------------------------------------------------  I have personally reviewed the EKG/Telemetry strips   ----------------------------------------------------------------------------------------------------------------------  Results from last 7 days   Lab Units 01/15/19  0549 01/15/19  0018 01/14/19  2153   TROPONIN I ng/mL 0.048* 0.053* 0.011       Results from last 7 days   Lab Units 01/15/19  1227   TRIGLYCERIDES mg/dL 82     Results from last 7 days   Lab Units 01/15/19  1328   PH, ARTERIAL pH units 7.464*   PO2 ART mm Hg 89.3   PCO2, ARTERIAL mm Hg 27.4*   HCO3 ART mmol/L 19.2*     Results from last 7 days   Lab Units 01/19/19  0422 01/18/19  0055 01/17/19  0702  01/15/19  1227 01/15/19  0457 01/15/19  0018   CRP mg/dL 2.98* 4.60* 5.82*  --   --   --   --    LACTATE mmol/L  --   --   --   --  1.2 3.1* 4.2*   WBC 10*3/mm3 11.87 9.22 12.62*   < > 16.96*  --   --    HEMOGLOBIN g/dL 9.5* 8.4* 8.4*   < > 9.3*  --   --    HEMATOCRIT % 31.0* 28.5* 27.7*   < > 31.4*  --   --    MCV fL 92.8 94.1* 91.1   < > 92.1  --   --    MCHC g/dL 30.6* 29.5* 30.3*   < > 29.6*  --   --    PLATELETS 10*3/mm3 380 348 343   < > 329  --   --     < > = values in this interval not displayed.     Results from last 7 days   Lab Units 01/19/19  0422 01/18/19  0055 01/17/19  0702   SODIUM mmol/L 134* 141 142   POTASSIUM mmol/L 3.9 4.1 4.2   MAGNESIUM mg/dL 1.8 1.8 2.4   CHLORIDE mmol/L 106 116* 118*   CO2 mmol/L 21.8* 20.1* 20.3*   BUN mg/dL 7 8 10   CREATININE mg/dL 0.64 0.65 0.67   EGFR IF NONAFRICN AM mL/min/1.73 90 88 85   CALCIUM mg/dL 7.9 7.8 7.8   GLUCOSE mg/dL 109 100 107   ALBUMIN g/dL 2.60* 2.30* 2.50*   BILIRUBIN mg/dL 0.4 0.3 0.4   ALK PHOS U/L 311* 344* 402*   AST (SGOT) U/L 42* 52* 96*   ALT (SGPT) U/L 26 23 40*   Estimated Creatinine Clearance: 61 mL/min (by C-G formula based on SCr of 0.64 mg/dL).  No results found  for: AMMONIA    Glucose   Date/Time Value Ref Range Status   01/19/2019 1053 122 70 - 130 mg/dL Final   01/19/2019 0719 107 70 - 130 mg/dL Final   01/18/2019 1936 165 (H) 70 - 130 mg/dL Final   01/18/2019 1626 119 70 - 130 mg/dL Final   01/18/2019 1105 99 70 - 130 mg/dL Final   01/18/2019 0706 92 70 - 130 mg/dL Final   01/17/2019 1955 104 70 - 130 mg/dL Final   01/17/2019 1635 101 70 - 130 mg/dL Final     Lab Results   Component Value Date    HGBA1C 5.90 (H) 01/15/2019     Lab Results   Component Value Date    TSH 6.470 (H) 01/15/2019    FREET4 0.96 01/17/2019       Blood Culture   Date Value Ref Range Status   01/15/2019 Escherichia coli (A)  Final   01/15/2019 Escherichia coli (A)  Final                 Pain Management Panel     Pain Management Panel Latest Ref Rng & Units 9/16/2018    AMPHETAMINES SCREEN, URINE Negative Negative    BARBITURATES SCREEN Negative Negative    BENZODIAZEPINE SCREEN, URINE Negative Negative    BUPRENORPHINE Negative Negative    COCAINE SCREEN, URINE Negative Negative    METHADONE SCREEN, URINE Negative Negative          I have personally reviewed the above laboratory results.   ----------------------------------------------------------------------------------------------------------------------  Imaging Results (last 24 hours)     ** No results found for the last 24 hours. **        I have personally reviewed the above radiology results.   ----------------------------------------------------------------------------------------------------------------------    Assessment/Plan       Assessment/Plan     ASSESSMENT:    1. Septic shock with lactic acid >4 on admission  2. E. Coli Bacteremia  3. MSSA bacteremia   4. Acute Pyelonephritis       PLAN:    She was awake this morning, pleasantly confused with sitter at beside. Significantly improved CRP level down from 4.60 to 2.98. Temp 99.1.     Blood culture collected on 1/15/19 finalized as Ecoli susceptible to Rocephin.    Urine cultures  collected on 1/7/19 reports >100,000 of E.coli.     At this time patient shows no sign of relapsing MSSA bacteremia. It is unusual for the patient to present with E. Coli bacteremia and E. Coli uremia with susceptibilities to Cefazolin in the setting of current treatment with Cefazolin.     In the setting of culture finalization reporting susceptibility to Rocephin will continue with Rocephin 2 gm IV q 24 hrs and Gentamycin 3mg/kg IV q 24hrs for now.     Hope to de-escalate soon.     CRP in the am.    Antibiotic therapy:    Rocephin 2 gm IV q 24 hrs    Gentamycin 3mg/kg IV q 24hrs    Code Status:   Code Status and Medical Interventions:   Ordered at: 01/15/19 1552     Limited Support to NOT Include:    Intubation     Code Status:    No CPR     Medical Interventions (Level of Support Prior to Arrest):    Limited       AMELIE Larsen  01/19/19  2:16 PM

## 2019-01-19 NOTE — PROGRESS NOTES
Nutrition Services    Patient Name:  Jeanna Flower  YOB: 1940  MRN: [id number]  Admit Date:  1/14/2019    Consult received due to low albumin.  Albumin no longer used as an indicator of nutrition status.  Intake remains poor.  Will increase supplements tid Boost plus shake tid.  Will continue to follow.     Electronically signed by:  Radha Allen RD  01/19/19 6:12 PM

## 2019-01-19 NOTE — NURSING NOTE
Bedside report given to home helper sitter at bedside. Informed sitter of aspiration risk, feeder, and POA with password, not to give out patient information.

## 2019-01-19 NOTE — PLAN OF CARE
Problem: Fall Risk (Adult)  Goal: Identify Related Risk Factors and Signs and Symptoms   01/18/19 1052   Fall Risk (Adult)   Related Risk Factors (Fall Risk) age-related changes;confusion/agitation;gait/mobility problems;environment unfamiliar   Signs and Symptoms (Fall Risk) presence of risk factors     Goal: Absence of Fall   01/18/19 1052   Fall Risk (Adult)   Absence of Fall making progress toward outcome       Problem: Pain, Acute (Adult)  Goal: Identify Related Risk Factors and Signs and Symptoms   01/18/19 1052   Pain, Acute (Adult)   Related Risk Factors (Acute Pain) communication barrier;positioning   Signs and Symptoms (Acute Pain) BADLs/IADLs reluctance/inability to perform;fatigue/weakness;impaired thought process/concentration     Goal: Acceptable Pain Control/Comfort Level   01/18/19 1052   Pain, Acute (Adult)   Acceptable Pain Control/Comfort Level making progress toward outcome       Problem: Skin Injury Risk (Adult)  Goal: Identify Related Risk Factors and Signs and Symptoms   01/18/19 1052   Skin Injury Risk (Adult)   Related Risk Factors (Skin Injury Risk) advanced age;cognitive impairment;hospitalization prolonged;infection;mobility impaired

## 2019-01-19 NOTE — PROGRESS NOTES
Patient Identification:  Name:  Jeanna Flower  Age:  78 y.o.  Sex:  female  :  1940  MRN:  [id number]  Visit Number:  [id number]  Primary Care Provider:  Raul Robert MD    Length of stay:  4    Chief Complaint: low Sp02 at the nursing home    HPI:     Patient presented to [facility] ED on 19 with low Sp02 via pulse ox from the nursing home. Due to her mental state she was unable to give a ROS on admission. Her granddaughter reported this is her baseline, she mumbles words, and is bed bound.     Hospital Course:    On admission she was found to have acute pancreatitis, initial lipase level was 2489, repeat on 1/15/19 showed 91 and resolved to normal on 19. She underwent a CT on admission which showed a moderate sized hiatal hernia and mild compression fx of L1. Chest xray did not show any Pneumonia. She has been spitting out medications and the nursing staff has had difficulty in getting PO meds administered. She was given a 1 time dose of full dose lovenox on 1/15/19 in place of her eliquis. She failed a swallow evaluation. It was discussed with MAGGI Do, who did not want any short or long term feeding tubes and was agreeable to least restrictive diet.     Subjective:      Today the patient is sitting up in bed. She is alert. She denies any complaints. No chest pains or shortness of breath. Discussed with RNNeha who reports that patient will intermittently complain of leg pains, but hasn't in the last few hours. Will continue to monitor for discomfort.     ----------------------------------------------------------------------------------------------------------------------  Current Hospital Meds:    apixaban 5 mg Oral Q12H   budesonide-formoterol 2 puff Inhalation BID - RT   ceftriaxone 2 g Intravenous Q24H   docusate sodium 100 mg Oral BID   famotidine 20 mg Oral BID   fluticasone 2 spray Nasal Daily   gentamicin 3 mg/kg (Ideal) Intravenous Q24H   lactobacillus acidophilus 1 capsule Oral Daily    levothyroxine 100 mcg Oral Q AM   montelukast 10 mg Oral Nightly   QUEtiapine 25 mg Oral Nightly   sodium chloride 10 mL Intravenous Q12H   sodium chloride 3 mL Intravenous Q12H   venlafaxine 37.5 mg Oral BID With Meals       Pharmacy Consult - Pharmacy to dose      ----------------------------------------------------------------------------------------------------------------------  Vital Signs:  Temp:  [98.2 °F (36.8 °C)-99.1 °F (37.3 °C)] 99.1 °F (37.3 °C)  Heart Rate:  [80-90] 86  Resp:  [18-20] 20  BP: (118-142)/(52-61) 142/52      01/14/19  2126 01/15/19  1627   Weight: 78.4 kg (172 lb 13.5 oz) 78.5 kg (173 lb)     Body mass index is 28.79 kg/m².    Intake/Output Summary (Last 24 hours) at 1/19/2019 1735  Last data filed at 1/19/2019 1633  Gross per 24 hour   Intake 460 ml   Output 1150 ml   Net -690 ml     I/O this shift:  In: 400 [P.O.:300; IV Piggyback:100]  Out: 600 [Urine:600]  Diet Pureed; Thin  ----------------------------------------------------------------------------------------------------------------------  Physical exam:  Constitutional:  Well-developed and well-nourished.  No respiratory distress. Rambling intermittently.    HENT:  Head:  Normocephalic and atraumatic.  Mouth:  Moist mucous membranes.    Eyes:  Conjunctivae and EOM are normal. No scleral icterus.    Neck:  Neck supple.  No JVD present.    Cardiovascular:  Normal rate, regular rhythm and normal heart sounds with no murmur.  Pulmonary/Chest:  No respiratory distress, no wheezes, no crackles, with normal breath sounds and good air movement.  Abdominal:  Soft.  Bowel sounds are present x4.  No distension and no tenderness.   Musculoskeletal:  No edema, no tenderness, and no deformity.  No red or swollen joints anywhere.    Neurological:  Alert to person only. She rambles and does not respond appropriately to questioning. No tongue deviation.  No facial droop.  No slurred speech.   Skin:  Skin is warm and dry. No rash noted. No  jay.   Genitourinary: Hendrix catheter in place with pale yellow urine in the collection container.  ----------------------------------------------------------------------------------------------------------------------  Tele:  Not on telemetry monitoring.  ----------------------------------------------------------------------------------------------------------------------  Results from last 7 days   Lab Units 01/15/19  0549 01/15/19  0018 01/14/19  2153   TROPONIN I ng/mL 0.048* 0.053* 0.011     Results from last 7 days   Lab Units 01/19/19  0422 01/18/19  0055 01/17/19  0702  01/15/19  1227 01/15/19  0457 01/15/19  0018   CRP mg/dL 2.98* 4.60* 5.82*  --   --   --   --    LACTATE mmol/L  --   --   --   --  1.2 3.1* 4.2*   WBC 10*3/mm3 11.87 9.22 12.62*   < > 16.96*  --   --    HEMOGLOBIN g/dL 9.5* 8.4* 8.4*   < > 9.3*  --   --    HEMATOCRIT % 31.0* 28.5* 27.7*   < > 31.4*  --   --    MCV fL 92.8 94.1* 91.1   < > 92.1  --   --    MCHC g/dL 30.6* 29.5* 30.3*   < > 29.6*  --   --    PLATELETS 10*3/mm3 380 348 343   < > 329  --   --     < > = values in this interval not displayed.     Results from last 7 days   Lab Units 01/15/19  1328   PH, ARTERIAL pH units 7.464*   PO2 ART mm Hg 89.3   PCO2, ARTERIAL mm Hg 27.4*   HCO3 ART mmol/L 19.2*     Results from last 7 days   Lab Units 01/19/19  0422 01/18/19  0055 01/17/19  0702   SODIUM mmol/L 134* 141 142   POTASSIUM mmol/L 3.9 4.1 4.2   MAGNESIUM mg/dL 1.8 1.8 2.4   CHLORIDE mmol/L 106 116* 118*   CO2 mmol/L 21.8* 20.1* 20.3*   BUN mg/dL 7 8 10   CREATININE mg/dL 0.64 0.65 0.67   EGFR IF NONAFRICN AM mL/min/1.73 90 88 85   CALCIUM mg/dL 7.9 7.8 7.8   GLUCOSE mg/dL 109 100 107   ALBUMIN g/dL 2.60* 2.30* 2.50*   BILIRUBIN mg/dL 0.4 0.3 0.4   ALK PHOS U/L 311* 344* 402*   AST (SGOT) U/L 42* 52* 96*   ALT (SGPT) U/L 26 23 40*   Estimated Creatinine Clearance: 61 mL/min (by C-G formula based on SCr of 0.64 mg/dL).    Results from last 7 days   Lab Units 01/15/19  1227    TRIGLYCERIDES mg/dL 82     Blood Culture   Date Value Ref Range Status   01/15/2019 Abnormal Stain (A)  Preliminary   01/15/2019 (A)  Preliminary    Gram Negative Bacilli, Morphology consistent with Escherichia / Citrobacter   01/15/2019 Abnormal Stain (A)  Preliminary   01/15/2019 (A)  Preliminary    Gram Negative Bacilli, Morphology consistent with Escherichia / Citrobacter      ----------------------------------------------------------------------------------------------------------------------  Imaging Results (last 24 hours)     ** No results found for the last 24 hours. **        ----------------------------------------------------------------------------------------------------------------------  Assessment and Plan:     Septic shock, present on admission, as noted by tachycardia and lactic acid >4, secondary to acute pancreatitis and E. Coli bacteremia: Pancreatitis appears to have resolved, likely secondary to recent antibiotic therapy with Ancef.  Infectious disease was consulted and changed antibiotic therapy to Gentamicin and high dose Rocephin (day #4). Appreciate their recommendations. WBC WNL and CRP improving. Awaiting final ID recommendations regarding duration of antibiotic therapy. Discussed with Raina with ID who will update recommendations in AM.     Moderate to severe oral dysphagia with aspiration: SLP evaluation was positive for high risk of aspiration. The case was discussed with Ernestina TAY and she did not want any short or long term feeding tubes. Family is aware of risks of aspiration and she was placed on least restrictive diet with puree, thin liquids. Universal aspiration precautions in place. Monitor continuous O2sat.    Grayzone troponin: Noted at admission. Likely secondary to septic shock and bacteremia. Nonspecific ST/T abnormalities on EKG at admission. Recent echocardiogram showed EF > 70%. No abnormalities noted on JAYE.     Elevated Alkaline phosphatase, Elevated  Transaminases: Hepatitis panel was checked on 1/18/19 and was negative, Liver US was done on 1/8/19 was unremarkable. Alkaline phosphatase and AST trending down, ALT normal. Continue to monitor trend.     Hypoalbuminemia: Nutrition consult placed to recommend dietary supplements if needed.       Paroxysmal A-Fib: On eliquis for stroke prevention. Heart rate controlled. H/H stable.      Normocytic Anemia: Stable. No signs of bleeding. Possibly dilutional. Vitamin B12 and folate are WNL. Iron is low, but iron saturation and ferritin normal. Recommend outpatient follow up when infection has resolved.      Hypothyroidism: TSH is 6.470. Free T4 within normal limits. Continue levothyroxine.     Hypokalemia: Improved with replacement. Continue protocol.     Hypomagnesemia: Improved with replacement. Continue protocol.     Acute hyponatremia: Mild, continue to monitor.     Olivas catheter in place: Apparently came from the NH with olivas in place, possibly chronic.      Dementia, Debility, History of CVA with left side hemiplegia: PT/OT consulted. At baseline, she opens her eyes and mumbles random words, does not follow commands, per daughter Ernestina.     Essential hypertension: Controlled. Not currently on any oral antihypertensive medications.     DM type 2: A1c is 5.9, hypoglycemia protocol initiated, accu checks ac/hs and prn, diabetic diet, no sliding scale ordered at this time, glucose .      COPD: not felt to be in exacerbation, PRN nebulizer's. Supplemental oxygen as needed.      DVT prophylaxis: on eliquis   Lines/Tubes/Drains: Right arm PICC in place on arrival 1/14/19, olivas cath   Diet: Consistent carb clear liquids advance as tolerated.     Code status: DNR/DNI     Disposition: Possible discharge to [facility] in AM if continued IV antibiotics can be arranged there and if ok with infectious disease. Raina with ID to review in AM. Case management reports that patient could go back today or  tomorrow if medically stable.     The patient is high risk due to the following diagnoses/reasons: SIRS r/t pancreatitis, DM type 2, Normocytic Anemia, Paroxysmal A-Fib on Anticoagulation.     I have discussed the case with the patient, RNNeha, and attending physician, Dr. Livingston.     TAE Parker  01/19/19  5:35 PM

## 2019-01-19 NOTE — PLAN OF CARE
Problem: Patient Care Overview  Goal: Individualization and Mutuality  Outcome: Ongoing (interventions implemented as appropriate)    Goal: Discharge Needs Assessment  Outcome: Ongoing (interventions implemented as appropriate)   01/15/19 1654 01/16/19 1241   Discharge Needs Assessment   Patient/Family Anticipates Transition to long term care facility --    Patient/Family Anticipated Services at Transition skilled nursing --    Transportation Concerns other (see comments)  (ambulance) --    Transportation Anticipated other (see comments)  (ambulance) --    Discharge Facility/Level of Care Needs --  (SS contacted [facility] [phone] per Maria L who states pt does not have a bed hold. )   Disability   Equipment Currently Used at Home other (see comments)  (pt from NH) --        Problem: Fall Risk (Adult)  Goal: Identify Related Risk Factors and Signs and Symptoms  Outcome: Ongoing (interventions implemented as appropriate)   01/18/19 1052   Fall Risk (Adult)   Related Risk Factors (Fall Risk) age-related changes;confusion/agitation;gait/mobility problems;environment unfamiliar   Signs and Symptoms (Fall Risk) presence of risk factors     Goal: Absence of Fall  Outcome: Ongoing (interventions implemented as appropriate)   01/19/19 1112   Fall Risk (Adult)   Absence of Fall making progress toward outcome       Problem: Pain, Acute (Adult)  Goal: Identify Related Risk Factors and Signs and Symptoms  Outcome: Ongoing (interventions implemented as appropriate)   01/18/19 1052   Pain, Acute (Adult)   Related Risk Factors (Acute Pain) communication barrier;positioning   Signs and Symptoms (Acute Pain) BADLs/IADLs reluctance/inability to perform;fatigue/weakness;impaired thought process/concentration     Goal: Acceptable Pain Control/Comfort Level  Outcome: Ongoing (interventions implemented as appropriate)   01/19/19 1112   Pain, Acute (Adult)   Acceptable Pain Control/Comfort Level making progress toward outcome        Problem: Skin Injury Risk (Adult)  Goal: Identify Related Risk Factors and Signs and Symptoms  Outcome: Ongoing (interventions implemented as appropriate)   01/18/19 1052   Skin Injury Risk (Adult)   Related Risk Factors (Skin Injury Risk) advanced age;cognitive impairment;hospitalization prolonged;infection;mobility impaired     Goal: Skin Health and Integrity  Outcome: Ongoing (interventions implemented as appropriate)   01/19/19 1112   Skin Injury Risk (Adult)   Skin Health and Integrity making progress toward outcome       Problem: Nausea/Vomiting (Adult)  Goal: Identify Related Risk Factors and Signs and Symptoms  Outcome: Ongoing (interventions implemented as appropriate)   01/18/19 1052   Nausea/Vomiting (Adult)   Related Risk Factors (Nausea/Vomiting) gastric irritation   Signs and Symptoms (Nausea/Vomiting) electrolyte changes;weakness     Goal: Symptom Relief  Outcome: Ongoing (interventions implemented as appropriate)   01/19/19 1112   Nausea/Vomiting (Adult)   Symptom Relief making progress toward outcome     Goal: Adequate Hydration  Outcome: Ongoing (interventions implemented as appropriate)   01/19/19 1112   Nausea/Vomiting (Adult)   Adequate Hydration making progress toward outcome       Problem: Wound (Includes Pressure Injury) (Adult)  Goal: Signs and Symptoms of Listed Potential Problems Will be Absent, Minimized or Managed (Wound)  Outcome: Ongoing (interventions implemented as appropriate)   01/18/19 1052 01/19/19 1112   Goal/Outcome Evaluation   Problems Assessed (Wound) --  all   Problems Present (Wound) infection;situational response --

## 2019-01-20 LAB
A-A DO2: 21.9 MMHG (ref 0–300)
ACANTHOCYTES BLD QL SMEAR: ABNORMAL
ALBUMIN SERPL-MCNC: 2.6 G/DL (ref 3.4–4.8)
ALBUMIN/GLOB SERPL: 0.8 G/DL (ref 1.5–2.5)
ALP SERPL-CCNC: 256 U/L (ref 35–104)
ALT SERPL W P-5'-P-CCNC: 15 U/L (ref 10–36)
ANION GAP SERPL CALCULATED.3IONS-SCNC: 6.2 MMOL/L (ref 3.6–11.2)
ANISOCYTOSIS BLD QL: ABNORMAL
ARTERIAL PATENCY WRIST A: ABNORMAL
AST SERPL-CCNC: 28 U/L (ref 10–30)
ATMOSPHERIC PRESS: 723 MMHG
BASE EXCESS BLDA CALC-SCNC: -0.8 MMOL/L
BASOPHILS # BLD MANUAL: 0.16 10*3/MM3 (ref 0–0.3)
BASOPHILS NFR BLD AUTO: 2 % (ref 0–2)
BDY SITE: ABNORMAL
BILIRUB SERPL-MCNC: 0.4 MG/DL (ref 0.2–1.8)
BODY TEMPERATURE: 98.6 C
BUN BLD-MCNC: 7 MG/DL (ref 7–21)
BUN/CREAT SERPL: 10.3 (ref 7–25)
C3 FRG RBC-MCNC: ABNORMAL
CALCIUM SPEC-SCNC: 8.2 MG/DL (ref 7.7–10)
CHLORIDE SERPL-SCNC: 107 MMOL/L (ref 99–112)
CO2 SERPL-SCNC: 25.8 MMOL/L (ref 24.3–31.9)
COHGB MFR BLD: 0.4 % (ref 0–5)
CREAT BLD-MCNC: 0.68 MG/DL (ref 0.43–1.29)
CRP SERPL-MCNC: 5.01 MG/DL (ref 0–0.99)
DEPRECATED RDW RBC AUTO: 67.5 FL (ref 37–54)
EOSINOPHIL # BLD MANUAL: 0.56 10*3/MM3 (ref 0–0.7)
EOSINOPHIL NFR BLD MANUAL: 7 % (ref 0–7)
ERYTHROCYTE [DISTWIDTH] IN BLOOD BY AUTOMATED COUNT: 21.1 % (ref 11.5–14.5)
GFR SERPL CREATININE-BSD FRML MDRD: 84 ML/MIN/1.73
GLOBULIN UR ELPH-MCNC: 3.3 GM/DL
GLUCOSE BLD-MCNC: 101 MG/DL (ref 70–110)
GLUCOSE BLDC GLUCOMTR-MCNC: 101 MG/DL (ref 70–130)
GLUCOSE BLDC GLUCOMTR-MCNC: 119 MG/DL (ref 70–130)
GLUCOSE BLDC GLUCOMTR-MCNC: 128 MG/DL (ref 70–130)
GLUCOSE BLDC GLUCOMTR-MCNC: 187 MG/DL (ref 70–130)
GLUCOSE BLDC GLUCOMTR-MCNC: 98 MG/DL (ref 70–130)
HCO3 BLDA-SCNC: 23.1 MMOL/L (ref 22–26)
HCT VFR BLD AUTO: 30.9 % (ref 37–47)
HCT VFR BLD CALC: 30 % (ref 37–47)
HGB BLD-MCNC: 9.5 G/DL (ref 12–16)
HGB BLDA-MCNC: 10.3 G/DL (ref 12–16)
HOROWITZ INDEX BLD+IHG-RTO: 21 %
HYPOCHROMIA BLD QL: ABNORMAL
LYMPHOCYTES # BLD MANUAL: 4.3 10*3/MM3 (ref 1–3)
LYMPHOCYTES NFR BLD MANUAL: 54 % (ref 16–46)
LYMPHOCYTES NFR BLD MANUAL: 7 % (ref 0–12)
MAGNESIUM SERPL-MCNC: 1.5 MG/DL (ref 1.7–2.6)
MCH RBC QN AUTO: 28.4 PG (ref 27–33)
MCHC RBC AUTO-ENTMCNC: 30.7 G/DL (ref 33–37)
MCV RBC AUTO: 92.2 FL (ref 80–94)
METHGB BLD QL: 0.3 % (ref 0–3)
MODALITY: ABNORMAL
MONOCYTES # BLD AUTO: 0.56 10*3/MM3 (ref 0.1–0.9)
NEUTROPHILS # BLD AUTO: 2.39 10*3/MM3 (ref 1.4–6.5)
NEUTROPHILS NFR BLD MANUAL: 30 % (ref 40–75)
OSMOLALITY SERPL CALC.SUM OF ELEC: 275.7 MOSM/KG (ref 273–305)
OXYHGB MFR BLDV: 94.1 % (ref 85–100)
PCO2 BLDA: 35.1 MM HG (ref 35–45)
PH BLDA: 7.44 PH UNITS (ref 7.35–7.45)
PLAT MORPH BLD: NORMAL
PLATELET # BLD AUTO: 388 10*3/MM3 (ref 130–400)
PMV BLD AUTO: 9.2 FL (ref 6–10)
PO2 BLDA: 78 MM HG (ref 80–100)
POTASSIUM BLD-SCNC: 3.8 MMOL/L (ref 3.5–5.3)
PROT SERPL-MCNC: 5.9 G/DL (ref 6–8)
RBC # BLD AUTO: 3.35 10*6/MM3 (ref 4.2–5.4)
SAO2 % BLDCOA: 94.8 % (ref 90–100)
SCAN SLIDE: NORMAL
SODIUM BLD-SCNC: 139 MMOL/L (ref 135–153)
WBC NRBC COR # BLD: 7.97 10*3/MM3 (ref 4.5–12.5)

## 2019-01-20 PROCEDURE — 25010000002 CEFTRIAXONE: Performed by: PHYSICIAN ASSISTANT

## 2019-01-20 PROCEDURE — 83050 HGB METHEMOGLOBIN QUAN: CPT | Performed by: PHYSICIAN ASSISTANT

## 2019-01-20 PROCEDURE — 85025 COMPLETE CBC W/AUTO DIFF WBC: CPT | Performed by: PHYSICIAN ASSISTANT

## 2019-01-20 PROCEDURE — 99233 SBSQ HOSP IP/OBS HIGH 50: CPT | Performed by: PHYSICIAN ASSISTANT

## 2019-01-20 PROCEDURE — 83735 ASSAY OF MAGNESIUM: CPT | Performed by: INTERNAL MEDICINE

## 2019-01-20 PROCEDURE — 82962 GLUCOSE BLOOD TEST: CPT

## 2019-01-20 PROCEDURE — 94799 UNLISTED PULMONARY SVC/PX: CPT

## 2019-01-20 PROCEDURE — 80053 COMPREHEN METABOLIC PANEL: CPT | Performed by: PHYSICIAN ASSISTANT

## 2019-01-20 PROCEDURE — 85007 BL SMEAR W/DIFF WBC COUNT: CPT | Performed by: PHYSICIAN ASSISTANT

## 2019-01-20 PROCEDURE — 82375 ASSAY CARBOXYHB QUANT: CPT | Performed by: PHYSICIAN ASSISTANT

## 2019-01-20 PROCEDURE — 86140 C-REACTIVE PROTEIN: CPT | Performed by: NURSE PRACTITIONER

## 2019-01-20 PROCEDURE — 36600 WITHDRAWAL OF ARTERIAL BLOOD: CPT | Performed by: PHYSICIAN ASSISTANT

## 2019-01-20 PROCEDURE — 25010000002 MAGNESIUM SULFATE 2 GM/50ML SOLUTION: Performed by: NURSE PRACTITIONER

## 2019-01-20 PROCEDURE — 82805 BLOOD GASES W/O2 SATURATION: CPT | Performed by: PHYSICIAN ASSISTANT

## 2019-01-20 RX ORDER — MAGNESIUM SULFATE HEPTAHYDRATE 40 MG/ML
2 INJECTION, SOLUTION INTRAVENOUS
Status: COMPLETED | OUTPATIENT
Start: 2019-01-20 | End: 2019-01-20

## 2019-01-20 RX ORDER — CEFDINIR 300 MG/1
300 CAPSULE ORAL EVERY 12 HOURS SCHEDULED
Status: DISCONTINUED | OUTPATIENT
Start: 2019-01-20 | End: 2019-01-21 | Stop reason: HOSPADM

## 2019-01-20 RX ORDER — SODIUM CHLORIDE 9 MG/ML
75 INJECTION, SOLUTION INTRAVENOUS CONTINUOUS
Status: DISCONTINUED | OUTPATIENT
Start: 2019-01-20 | End: 2019-01-21 | Stop reason: HOSPADM

## 2019-01-20 RX ADMIN — LEVOTHYROXINE SODIUM 100 MCG: 100 TABLET ORAL at 08:48

## 2019-01-20 RX ADMIN — MONTELUKAST SODIUM 10 MG: 10 TABLET, COATED ORAL at 21:10

## 2019-01-20 RX ADMIN — SODIUM CHLORIDE, PRESERVATIVE FREE 10 ML: 5 INJECTION INTRAVENOUS at 21:10

## 2019-01-20 RX ADMIN — CEFDINIR 300 MG: 300 CAPSULE ORAL at 21:09

## 2019-01-20 RX ADMIN — QUETIAPINE FUMARATE 25 MG: 25 TABLET, FILM COATED ORAL at 01:14

## 2019-01-20 RX ADMIN — FAMOTIDINE 20 MG: 20 TABLET, FILM COATED ORAL at 08:48

## 2019-01-20 RX ADMIN — Medication 1 CAPSULE: at 08:48

## 2019-01-20 RX ADMIN — SODIUM CHLORIDE, PRESERVATIVE FREE 3 ML: 5 INJECTION INTRAVENOUS at 21:10

## 2019-01-20 RX ADMIN — SODIUM CHLORIDE, PRESERVATIVE FREE 3 ML: 5 INJECTION INTRAVENOUS at 08:49

## 2019-01-20 RX ADMIN — FLUTICASONE PROPIONATE 2 SPRAY: 50 SPRAY, METERED NASAL at 08:48

## 2019-01-20 RX ADMIN — SODIUM CHLORIDE, PRESERVATIVE FREE 10 ML: 5 INJECTION INTRAVENOUS at 08:49

## 2019-01-20 RX ADMIN — APIXABAN 5 MG: 5 TABLET, FILM COATED ORAL at 08:48

## 2019-01-20 RX ADMIN — MAGNESIUM SULFATE IN WATER 2 G: 40 INJECTION, SOLUTION INTRAVENOUS at 13:47

## 2019-01-20 RX ADMIN — VENLAFAXINE HYDROCHLORIDE 37.5 MG: 37.5 TABLET ORAL at 08:48

## 2019-01-20 RX ADMIN — VENLAFAXINE HYDROCHLORIDE 37.5 MG: 37.5 TABLET ORAL at 17:02

## 2019-01-20 RX ADMIN — QUETIAPINE FUMARATE 25 MG: 25 TABLET, FILM COATED ORAL at 16:42

## 2019-01-20 RX ADMIN — SODIUM CHLORIDE 250 ML: 9 INJECTION, SOLUTION INTRAVENOUS at 11:02

## 2019-01-20 RX ADMIN — SODIUM CHLORIDE 75 ML/HR: 9 INJECTION, SOLUTION INTRAVENOUS at 11:23

## 2019-01-20 RX ADMIN — APIXABAN 5 MG: 5 TABLET, FILM COATED ORAL at 21:09

## 2019-01-20 RX ADMIN — CEFTRIAXONE 2 G: 2 INJECTION, POWDER, FOR SOLUTION INTRAMUSCULAR; INTRAVENOUS at 15:40

## 2019-01-20 RX ADMIN — DOCUSATE SODIUM 100 MG: 100 CAPSULE ORAL at 08:48

## 2019-01-20 RX ADMIN — MAGNESIUM SULFATE IN WATER 2 G: 40 INJECTION, SOLUTION INTRAVENOUS at 11:39

## 2019-01-20 RX ADMIN — QUETIAPINE FUMARATE 25 MG: 25 TABLET, FILM COATED ORAL at 21:10

## 2019-01-20 RX ADMIN — MAGNESIUM SULFATE IN WATER 2 G: 40 INJECTION, SOLUTION INTRAVENOUS at 16:15

## 2019-01-20 RX ADMIN — FAMOTIDINE 20 MG: 20 TABLET, FILM COATED ORAL at 21:10

## 2019-01-20 RX ADMIN — DOCUSATE SODIUM 100 MG: 100 CAPSULE ORAL at 21:10

## 2019-01-20 NOTE — PROGRESS NOTES
PROGRESS NOTE         Patient Identification:  Name:  Jeanna Flower  Age:  78 y.o.  Sex:  female  :  1940  MRN:  [id number]  Visit Number:  [id number]  Primary Care Provider:  Raul Robert MD      ----------------------------------------------------------------------------------------------------------------------  Subjective       Chief Complaints:    Shortness of Breath and Vomiting        Interval History:        She was sleeping this morning, sitter at bedside. CRP level continues to significantly improve down from 4.60 to 2.98 with a normal white count and afebrile.    Review of Systems:  Unable to obtain  ----------------------------------------------------------------------------------------------------------------------      Objective       Current [facility] Meds:    apixaban 5 mg Oral Q12H   budesonide-formoterol 2 puff Inhalation BID - RT   cefdinir 300 mg Oral Q12H   docusate sodium 100 mg Oral BID   famotidine 20 mg Oral BID   fluticasone 2 spray Nasal Daily   lactobacillus acidophilus 1 capsule Oral Daily   levothyroxine 100 mcg Oral Q AM   magnesium sulfate 2 g Intravenous Q2H   montelukast 10 mg Oral Nightly   QUEtiapine 25 mg Oral Nightly   sodium chloride 10 mL Intravenous Q12H   sodium chloride 3 mL Intravenous Q12H   venlafaxine 37.5 mg Oral BID With Meals       Pharmacy Consult - Pharmacy to dose     sodium chloride 75 mL/hr Last Rate: 75 mL/hr (19 1143)     ----------------------------------------------------------------------------------------------------------------------    Vital Signs:  Temp:  [97.7 °F (36.5 °C)-98.1 °F (36.7 °C)] 98 °F (36.7 °C)  Heart Rate:  [84-98] 85  Resp:  [14-20] 18  BP: ()/(45-78) 104/54  Mean Arterial Pressure (Non-Invasive) for the past 24 hrs (Last 3 readings):   Noninvasive MAP (mmHg)   19 1605 83     SpO2 Percentage    19 0650 19 0725 19 1033   SpO2: 99% 99% 99%     SpO2:  [96 %-99 %] 99 %  on  Flow  (L/min):  [2] 2;   Device (Oxygen Therapy): nasal cannula    Body mass index is 28.79 kg/m².  Wt Readings from Last 3 Encounters:   01/15/19 78.5 kg (173 lb)   01/14/19 78.5 kg (173 lb)   01/07/19 76.8 kg (169 lb 6.4 oz)        Intake/Output Summary (Last 24 hours) at 1/20/2019 1606  Last data filed at 1/20/2019 1442  Gross per 24 hour   Intake 460 ml   Output 2550 ml   Net -2090 ml     Diet Pureed; Thin  ----------------------------------------------------------------------------------------------------------------------    Physical exam:    Constitutional:  Well-developed and well-nourished.  No respiratory distress.      HENT:  Head:  Normocephalic and atraumatic.  Mouth:  Moist mucous membranes.    Eyes:  Conjunctivae and EOM are normal.  No scleral icterus.    Neck:  Neck supple.  No JVD present.    Cardiovascular:  Normal rate, regular rhythm and normal heart sounds with no murmur. No edema.  Pulmonary/Chest:  No respiratory distress, no wheezes, no crackles, with normal breath sounds and good air movement.  Abdominal:  Soft.  Bowel sounds are normal.  No distension and no tenderness.   Musculoskeletal:  No edema, no tenderness, and no deformity.  No red or swollen joints anywhere.    Neurological:  Alert and oriented to person, place, and time. No facial droop.  No slurred speech.   Skin:  Skin is warm and dry. No rash noted. No pallor.     ----------------------------------------------------------------------------------------------------------------------  I have personally reviewed the EKG/Telemetry strips   ----------------------------------------------------------------------------------------------------------------------  Results from last 7 days   Lab Units 01/15/19  0549 01/15/19  0018 01/14/19  2153   TROPONIN I ng/mL 0.048* 0.053* 0.011       Results from last 7 days   Lab Units 01/15/19  1227   TRIGLYCERIDES mg/dL 82     Results from last 7 days   Lab Units 01/20/19  1219   PH, ARTERIAL pH units  7.436   PO2 ART mm Hg 78.0*   PCO2, ARTERIAL mm Hg 35.1   HCO3 ART mmol/L 23.1     Results from last 7 days   Lab Units 01/20/19  0915 01/19/19  0422 01/18/19  0055  01/15/19  1227 01/15/19  0457 01/15/19  0018   CRP mg/dL 5.01* 2.98* 4.60*   < >  --   --   --    LACTATE mmol/L  --   --   --   --  1.2 3.1* 4.2*   WBC 10*3/mm3 7.97 11.87 9.22   < > 16.96*  --   --    HEMOGLOBIN g/dL 9.5* 9.5* 8.4*   < > 9.3*  --   --    HEMATOCRIT % 30.9* 31.0* 28.5*   < > 31.4*  --   --    MCV fL 92.2 92.8 94.1*   < > 92.1  --   --    MCHC g/dL 30.7* 30.6* 29.5*   < > 29.6*  --   --    PLATELETS 10*3/mm3 388 380 348   < > 329  --   --     < > = values in this interval not displayed.     Results from last 7 days   Lab Units 01/20/19  0915 01/19/19  0422 01/18/19  0055   SODIUM mmol/L 139 134* 141   POTASSIUM mmol/L 3.8 3.9 4.1   MAGNESIUM mg/dL 1.5* 1.8 1.8   CHLORIDE mmol/L 107 106 116*   CO2 mmol/L 25.8 21.8* 20.1*   BUN mg/dL 7 7 8   CREATININE mg/dL 0.68 0.64 0.65   EGFR IF NONAFRICN AM mL/min/1.73 84 90 88   CALCIUM mg/dL 8.2 7.9 7.8   GLUCOSE mg/dL 101 109 100   ALBUMIN g/dL 2.60* 2.60* 2.30*   BILIRUBIN mg/dL 0.4 0.4 0.3   ALK PHOS U/L 256* 311* 344*   AST (SGOT) U/L 28 42* 52*   ALT (SGPT) U/L 15 26 23   Estimated Creatinine Clearance: 61 mL/min (by C-G formula based on SCr of 0.68 mg/dL).  No results found for: AMMONIA    Glucose   Date/Time Value Ref Range Status   01/20/2019 1038 128 70 - 130 mg/dL Final   01/20/2019 0656 101 70 - 130 mg/dL Final   01/19/2019 1914 187 (H) 70 - 130 mg/dL Final   01/19/2019 1634 111 70 - 130 mg/dL Final   01/19/2019 1053 122 70 - 130 mg/dL Final   01/19/2019 0719 107 70 - 130 mg/dL Final   01/18/2019 1936 165 (H) 70 - 130 mg/dL Final   01/18/2019 1626 119 70 - 130 mg/dL Final     Lab Results   Component Value Date    HGBA1C 5.90 (H) 01/15/2019     Lab Results   Component Value Date    TSH 6.470 (H) 01/15/2019    FREET4 0.96 01/17/2019       Blood Culture   Date Value Ref Range Status    01/15/2019 Escherichia coli (A)  Final   01/15/2019 Escherichia coli (A)  Final                 Pain Management Panel     Pain Management Panel Latest Ref Rng & Units 9/16/2018    AMPHETAMINES SCREEN, URINE Negative Negative    BARBITURATES SCREEN Negative Negative    BENZODIAZEPINE SCREEN, URINE Negative Negative    BUPRENORPHINE Negative Negative    COCAINE SCREEN, URINE Negative Negative    METHADONE SCREEN, URINE Negative Negative          I have personally reviewed the above laboratory results.   ----------------------------------------------------------------------------------------------------------------------  Imaging Results (last 24 hours)     ** No results found for the last 24 hours. **        I have personally reviewed the above radiology results.   ----------------------------------------------------------------------------------------------------------------------    Assessment/Plan       Assessment/Plan     ASSESSMENT:    1. Septic shock with lactic acid >4 on admission  2. E. Coli Bacteremia  3. MSSA bacteremia   4. Acute Pyelonephritis       PLAN:    She was sleeping this morning, sitter at bedside. CRP level continues to significantly improve down from 4.60 to 2.98 with a normal white count and afebrile.    In the setting of significant clinical and laboratory improvement, Rocephin was de-escalated to Omnicef 300 mg by mouth twice daily through 1/25/19. Gentamycin was discontinued.    CRP in the am.    Antibiotic therapy:    Omnicef 300 mg by mouth twice daily through 1/25/19      Code Status:   Code Status and Medical Interventions:   Ordered at: 01/15/19 1552     Limited Support to NOT Include:    Intubation     Code Status:    No CPR     Medical Interventions (Level of Support Prior to Arrest):    Limited       AMELIE Larsen  01/20/19  4:06 PM

## 2019-01-20 NOTE — PROGRESS NOTES
Patient Identification:  Name:  Jeanna Flower  Age:  78 y.o.  Sex:  female  :  1940  MRN:  [id number]  Visit Number:  [id number]  Primary Care Provider:  Raul Robert MD    Length of stay:  5    Chief Complaint: low Sp02 at the nursing home    HPI:     Patient presented to [facility] ED on 19 with low Sp02 via pulse ox from the nursing home. Due to her mental state she was unable to give a ROS on admission. Her granddaughter reported this is her baseline, she mumbles words, and is bed bound.     Hospital Course:    On admission she was found to have acute pancreatitis, initial lipase level was 2489, repeat on 1/15/19 showed 91 and resolved to normal on 19. She underwent a CT on admission which showed a moderate sized hiatal hernia and mild compression fx of L1. Chest xray did not show any Pneumonia. She has been spitting out medications and the nursing staff has had difficulty in getting PO meds administered. She was given a 1 time dose of full dose lovenox on 1/15/19 in place of her eliquis. She failed a swallow evaluation. It was discussed with MAGGI Do, who did not want any short or long term feeding tubes and was agreeable to least restrictive diet.     Subjective:      Ms. Flower was reported to have been up most of the night and is now sleeping this morning. She did not eat her breakfast. She received a dose of Benadryl last evening. BP is running low at 90/60 manually. She is drowsy. Grimaces to repositioning or sternal rub, but will not open her eyes for me or answer my questions. She squeezes her eyes tightly when I try to open them. Afebrile, HR and O2sat stable. Glucose normal. No known choking spells. I came back by and patient did wake up some to talk with her family at bedside. Bp improved to 100/72.   ----------------------------------------------------------------------------------------------------------------------  Current Hospital Meds:    apixaban 5 mg Oral Q12H    budesonide-formoterol 2 puff Inhalation BID - RT   ceftriaxone 2 g Intravenous Q24H   docusate sodium 100 mg Oral BID   famotidine 20 mg Oral BID   fluticasone 2 spray Nasal Daily   gentamicin 3 mg/kg (Ideal) Intravenous Q24H   lactobacillus acidophilus 1 capsule Oral Daily   levothyroxine 100 mcg Oral Q AM   magnesium sulfate 2 g Intravenous Q2H   montelukast 10 mg Oral Nightly   QUEtiapine 25 mg Oral Nightly   sodium chloride 10 mL Intravenous Q12H   sodium chloride 3 mL Intravenous Q12H   venlafaxine 37.5 mg Oral BID With Meals       Pharmacy Consult - Pharmacy to dose     sodium chloride 75 mL/hr Last Rate: 75 mL/hr (01/20/19 1143)     ----------------------------------------------------------------------------------------------------------------------  Vital Signs:  Temp:  [97.7 °F (36.5 °C)-99.1 °F (37.3 °C)] 98 °F (36.7 °C)  Heart Rate:  [86-98] 94  Resp:  [14-20] 20  BP: ()/(52-78) 100/72      01/14/19  2126 01/15/19  1627   Weight: 78.4 kg (172 lb 13.5 oz) 78.5 kg (173 lb)     Body mass index is 28.79 kg/m².    Intake/Output Summary (Last 24 hours) at 1/20/2019 1149  Last data filed at 1/20/2019 0903  Gross per 24 hour   Intake 340 ml   Output 2300 ml   Net -1960 ml     I/O this shift:  In: -   Out: 700 [Urine:700]  Diet Pureed; Thin  ----------------------------------------------------------------------------------------------------------------------  Physical exam:  Constitutional:  Well-developed and well-nourished.  No respiratory distress. Drowsy, but arousable.   HENT:  Head:  Normocephalic and atraumatic.  Mouth:  Moist mucous membranes.    Eyes:  Conjunctivae and EOM are normal. No scleral icterus.    Neck:  Neck supple.  No JVD present.    Cardiovascular:  Normal rate, regular rhythm and normal heart sounds with no murmur.  Pulmonary/Chest:  No respiratory distress, no wheezes, no crackles, with normal breath sounds and good air movement.  Abdominal:  Soft.  Bowel sounds are present x4.  No  distension and no tenderness.   Musculoskeletal:  No edema, no tenderness, and no deformity.  No red or swollen joints anywhere.    Neurological:  Alert to person only. She rambles and does not respond appropriately to questioning. No tongue deviation.  No facial droop.  No slurred speech.   Skin:  Skin is warm and dry. No rash noted. No pallor.   Genitourinary: Hendrix catheter in place with pale yellow urine in the collection container.  ----------------------------------------------------------------------------------------------------------------------  Tele:  Not on telemetry monitoring.  ----------------------------------------------------------------------------------------------------------------------  Results from last 7 days   Lab Units 01/15/19  0549 01/15/19  0018 01/14/19  2153   TROPONIN I ng/mL 0.048* 0.053* 0.011     Results from last 7 days   Lab Units 01/20/19  0915 01/19/19  0422 01/18/19  0055  01/15/19  1227 01/15/19  0457 01/15/19  0018   CRP mg/dL 5.01* 2.98* 4.60*   < >  --   --   --    LACTATE mmol/L  --   --   --   --  1.2 3.1* 4.2*   WBC 10*3/mm3 7.97 11.87 9.22   < > 16.96*  --   --    HEMOGLOBIN g/dL 9.5* 9.5* 8.4*   < > 9.3*  --   --    HEMATOCRIT % 30.9* 31.0* 28.5*   < > 31.4*  --   --    MCV fL 92.2 92.8 94.1*   < > 92.1  --   --    MCHC g/dL 30.7* 30.6* 29.5*   < > 29.6*  --   --    PLATELETS 10*3/mm3 388 380 348   < > 329  --   --     < > = values in this interval not displayed.     Results from last 7 days   Lab Units 01/15/19  1328   PH, ARTERIAL pH units 7.464*   PO2 ART mm Hg 89.3   PCO2, ARTERIAL mm Hg 27.4*   HCO3 ART mmol/L 19.2*     Results from last 7 days   Lab Units 01/20/19  0915 01/19/19  0422 01/18/19  0055   SODIUM mmol/L 139 134* 141   POTASSIUM mmol/L 3.8 3.9 4.1   MAGNESIUM mg/dL 1.5* 1.8 1.8   CHLORIDE mmol/L 107 106 116*   CO2 mmol/L 25.8 21.8* 20.1*   BUN mg/dL 7 7 8   CREATININE mg/dL 0.68 0.64 0.65   EGFR IF NONAFRICN AM mL/min/1.73 84 90 88   CALCIUM mg/dL  8.2 7.9 7.8   GLUCOSE mg/dL 101 109 100   ALBUMIN g/dL 2.60* 2.60* 2.30*   BILIRUBIN mg/dL 0.4 0.4 0.3   ALK PHOS U/L 256* 311* 344*   AST (SGOT) U/L 28 42* 52*   ALT (SGPT) U/L 15 26 23   Estimated Creatinine Clearance: 61 mL/min (by C-G formula based on SCr of 0.68 mg/dL).    Results from last 7 days   Lab Units 01/15/19  1227   TRIGLYCERIDES mg/dL 82     Blood Culture   Date Value Ref Range Status   01/15/2019 Abnormal Stain (A)  Preliminary   01/15/2019 (A)  Preliminary    Gram Negative Bacilli, Morphology consistent with Escherichia / Citrobacter   01/15/2019 Abnormal Stain (A)  Preliminary   01/15/2019 (A)  Preliminary    Gram Negative Bacilli, Morphology consistent with Escherichia / Citrobacter      ----------------------------------------------------------------------------------------------------------------------  Imaging Results (last 24 hours)     ** No results found for the last 24 hours. **        ----------------------------------------------------------------------------------------------------------------------  Assessment and Plan:     Septic shock, present on admission, as noted by tachycardia and lactic acid >4, secondary to acute pancreatitis and E. Coli bacteremia: Pancreatitis resolved, likely secondary to recent therapy with Ancef.  Infectious disease following, on Gentamicin and high dose Rocephin (day #5). Appreciate their recommendations. WBC WNL. CRP mildly elevated today.     Drowsiness/low BP: She reportedly did not sleep much last night and is sleeping a lot this morning. She got a dose of Benadryl last evening for itching. BP improving with 250cc IV fluid bolus. Place on maintenance fluids at 75cc/hr. Glucose within normal limits. Check ABG on current settings.     Moderate to severe oral dysphagia with aspiration: SLP evaluation was positive for high risk of aspiration. The case was discussed with Ernestina TAY and she did not want any short or long term feeding tubes. Family is aware  of risks of aspiration and she was placed on least restrictive diet with puree, thin liquids. Universal aspiration precautions in place. Monitor continuous O2sat.    Grayzone troponin: Likely secondary to septic shock and bacteremia. Nonspecific ST/T abnormalities on EKG at admission. Recent echocardiogram showed EF > 70%. No abnormalities noted on JAYE.     Elevated Alkaline phosphatase, Elevated Transaminases: Hepatitis panel negative, Liver US was done on 1/8/19 was unremarkable. Improving.     Hypoalbuminemia: Dietary supplements with Boost TID per nutrition.       Paroxysmal A-Fib: On eliquis for stroke prevention. Heart rate controlled. H/H stable.      Normocytic Anemia: Stable. No signs of bleeding. Possibly dilutional. Vitamin B12 and folate are WNL. Iron is low, but iron saturation and ferritin normal. Recommend outpatient follow up when infection has resolved.      Hypothyroidism: TSH is 6.470. Free T4 within normal limits. Continue levothyroxine and follow as an outpatient.     Hypokalemia: Improved with replacement. Continue protocol.     Hypomagnesemia: Being replaced again this morning. Continue protocol.     Acute hyponatremia: Resolved.     Chronic indwelling olivas catheter: Continue olivas care.      Dementia, Debility, History of CVA with left side hemiplegia: PT/OT consulted. At baseline, she opens her eyes and mumbles random words, does not follow commands, per daughter Ernestina.     Essential hypertension: Patient is mildly hypotensive this morning. No medications given that should lower BP. Will give 250cc normal saline bolus and place on 75cc/hr drip. Repeat BP improved to 100/72. Check BP every 30 minutes for 2 hours, then resume per protocol if stable.     DM type 2: A1c is 5.9, hypoglycemia protocol initiated, accu checks ac/hs and prn, diabetic diet, no sliding scale ordered at this time, glucose .      COPD: not felt to be in exacerbation, PRN nebulizer's. Supplemental oxygen as needed.       DVT prophylaxis: on eliquis   Lines/Tubes/Drains: Right arm PICC in place on arrival 1/14/19, olivas cath   Diet: Consistent carb clear liquids advance as tolerated.     Code status: DNR/DNI     Disposition: Possible discharge to [facility] in AM if IV antibiotics can be arranged there and if BP/drowsiness improves.     The patient is high risk due to the following diagnoses/reasons: SIRS r/t pancreatitis, DM type 2, Normocytic Anemia, Paroxysmal A-Fib on Anticoagulation.     I have discussed the case with the patient, RN, Estefany, and attending physician, Dr. Livingston.     TAE Parker  01/20/19  11:49 AM

## 2019-01-20 NOTE — PLAN OF CARE
Problem: Fall Risk (Adult)  Goal: Absence of Fall  Outcome: Ongoing (interventions implemented as appropriate)   01/19/19 1920   Fall Risk (Adult)   Absence of Fall making progress toward outcome       Problem: Pain, Acute (Adult)  Goal: Acceptable Pain Control/Comfort Level  Outcome: Ongoing (interventions implemented as appropriate)   01/19/19 1920   Pain, Acute (Adult)   Acceptable Pain Control/Comfort Level making progress toward outcome       Problem: Skin Injury Risk (Adult)  Goal: Skin Health and Integrity  Outcome: Ongoing (interventions implemented as appropriate)   01/19/19 1920   Skin Injury Risk (Adult)   Skin Health and Integrity making progress toward outcome       Problem: Nausea/Vomiting (Adult)  Goal: Symptom Relief  Outcome: Ongoing (interventions implemented as appropriate)   01/19/19 1920   Nausea/Vomiting (Adult)   Symptom Relief making progress toward outcome     Goal: Adequate Hydration  Outcome: Ongoing (interventions implemented as appropriate)   01/19/19 1920   Nausea/Vomiting (Adult)   Adequate Hydration making progress toward outcome       Problem: Wound (Includes Pressure Injury) (Adult)  Goal: Signs and Symptoms of Listed Potential Problems Will be Absent, Minimized or Managed (Wound)  Outcome: Ongoing (interventions implemented as appropriate)   01/18/19 1052 01/19/19 1920   Goal/Outcome Evaluation   Problems Assessed (Wound) --  all   Problems Present (Wound) infection;situational response --

## 2019-01-20 NOTE — PLAN OF CARE
Problem: Fall Risk (Adult)  Goal: Identify Related Risk Factors and Signs and Symptoms  Outcome: Ongoing (interventions implemented as appropriate)   01/18/19 1052   Fall Risk (Adult)   Related Risk Factors (Fall Risk) age-related changes;confusion/agitation;gait/mobility problems;environment unfamiliar   Signs and Symptoms (Fall Risk) presence of risk factors     Goal: Absence of Fall  Outcome: Ongoing (interventions implemented as appropriate)   01/19/19 1920   Fall Risk (Adult)   Absence of Fall making progress toward outcome       Problem: Pain, Acute (Adult)  Goal: Identify Related Risk Factors and Signs and Symptoms  Outcome: Ongoing (interventions implemented as appropriate)   01/18/19 1052   Pain, Acute (Adult)   Related Risk Factors (Acute Pain) communication barrier;positioning   Signs and Symptoms (Acute Pain) BADLs/IADLs reluctance/inability to perform;fatigue/weakness;impaired thought process/concentration     Goal: Acceptable Pain Control/Comfort Level  Outcome: Ongoing (interventions implemented as appropriate)   01/19/19 1920   Pain, Acute (Adult)   Acceptable Pain Control/Comfort Level making progress toward outcome       Problem: Skin Injury Risk (Adult)  Goal: Identify Related Risk Factors and Signs and Symptoms  Outcome: Ongoing (interventions implemented as appropriate)   01/18/19 1052   Skin Injury Risk (Adult)   Related Risk Factors (Skin Injury Risk) advanced age;cognitive impairment;hospitalization prolonged;infection;mobility impaired     Goal: Skin Health and Integrity  Outcome: Ongoing (interventions implemented as appropriate)   01/19/19 1920   Skin Injury Risk (Adult)   Skin Health and Integrity making progress toward outcome       Problem: Nausea/Vomiting (Adult)  Goal: Identify Related Risk Factors and Signs and Symptoms  Outcome: Ongoing (interventions implemented as appropriate)   01/18/19 1052   Nausea/Vomiting (Adult)   Related Risk Factors (Nausea/Vomiting) gastric irritation    Signs and Symptoms (Nausea/Vomiting) electrolyte changes;weakness     Goal: Symptom Relief  Outcome: Ongoing (interventions implemented as appropriate)   01/19/19 1920   Nausea/Vomiting (Adult)   Symptom Relief making progress toward outcome     Goal: Adequate Hydration  Outcome: Ongoing (interventions implemented as appropriate)   01/19/19 1920   Nausea/Vomiting (Adult)   Adequate Hydration making progress toward outcome       Problem: Wound (Includes Pressure Injury) (Adult)  Goal: Signs and Symptoms of Listed Potential Problems Will be Absent, Minimized or Managed (Wound)  Outcome: Ongoing (interventions implemented as appropriate)   01/18/19 1052 01/19/19 1920   Goal/Outcome Evaluation   Problems Assessed (Wound) --  all   Problems Present (Wound) infection;situational response --

## 2019-01-21 VITALS
BODY MASS INDEX: 28.82 KG/M2 | SYSTOLIC BLOOD PRESSURE: 148 MMHG | TEMPERATURE: 97.8 F | WEIGHT: 173 LBS | DIASTOLIC BLOOD PRESSURE: 62 MMHG | HEART RATE: 88 BPM | OXYGEN SATURATION: 97 % | RESPIRATION RATE: 20 BRPM | HEIGHT: 65 IN

## 2019-01-21 LAB
ACANTHOCYTES BLD QL SMEAR: ABNORMAL
ALBUMIN SERPL-MCNC: 2.4 G/DL (ref 3.4–4.8)
ALBUMIN/GLOB SERPL: 0.8 G/DL (ref 1.5–2.5)
ALP SERPL-CCNC: 217 U/L (ref 35–104)
ALT SERPL W P-5'-P-CCNC: 21 U/L (ref 10–36)
ANION GAP SERPL CALCULATED.3IONS-SCNC: 5.5 MMOL/L (ref 3.6–11.2)
ANISOCYTOSIS BLD QL: ABNORMAL
AST SERPL-CCNC: 31 U/L (ref 10–30)
BASOPHILS # BLD MANUAL: 0.15 10*3/MM3 (ref 0–0.3)
BASOPHILS NFR BLD AUTO: 2 % (ref 0–2)
BILIRUB SERPL-MCNC: 0.3 MG/DL (ref 0.2–1.8)
BUN BLD-MCNC: 5 MG/DL (ref 7–21)
BUN/CREAT SERPL: 8.3 (ref 7–25)
CALCIUM SPEC-SCNC: 7.5 MG/DL (ref 7.7–10)
CHLORIDE SERPL-SCNC: 106 MMOL/L (ref 99–112)
CO2 SERPL-SCNC: 23.5 MMOL/L (ref 24.3–31.9)
CREAT BLD-MCNC: 0.6 MG/DL (ref 0.43–1.29)
CRP SERPL-MCNC: 3.15 MG/DL (ref 0–0.99)
DEPRECATED RDW RBC AUTO: 69.2 FL (ref 37–54)
EOSINOPHIL # BLD MANUAL: 0.74 10*3/MM3 (ref 0–0.7)
EOSINOPHIL NFR BLD MANUAL: 10 % (ref 0–7)
ERYTHROCYTE [DISTWIDTH] IN BLOOD BY AUTOMATED COUNT: 20.7 % (ref 11.5–14.5)
GFR SERPL CREATININE-BSD FRML MDRD: 97 ML/MIN/1.73
GLOBULIN UR ELPH-MCNC: 3.1 GM/DL
GLUCOSE BLD-MCNC: 116 MG/DL (ref 70–110)
GLUCOSE BLDC GLUCOMTR-MCNC: 105 MG/DL (ref 70–130)
HCT VFR BLD AUTO: 29.1 % (ref 37–47)
HGB BLD-MCNC: 9.1 G/DL (ref 12–16)
LYMPHOCYTES # BLD MANUAL: 3.09 10*3/MM3 (ref 1–3)
LYMPHOCYTES NFR BLD MANUAL: 42 % (ref 16–46)
LYMPHOCYTES NFR BLD MANUAL: 8 % (ref 0–12)
MAGNESIUM SERPL-MCNC: 2.3 MG/DL (ref 1.7–2.6)
MCH RBC QN AUTO: 28.8 PG (ref 27–33)
MCHC RBC AUTO-ENTMCNC: 31.3 G/DL (ref 33–37)
MCV RBC AUTO: 92.1 FL (ref 80–94)
MONOCYTES # BLD AUTO: 0.59 10*3/MM3 (ref 0.1–0.9)
MYELOCYTES NFR BLD MANUAL: 1 % (ref 0–0)
NEUTROPHILS # BLD AUTO: 2.72 10*3/MM3 (ref 1.4–6.5)
NEUTROPHILS NFR BLD MANUAL: 36 % (ref 40–75)
NEUTS BAND NFR BLD MANUAL: 1 % (ref 0–8)
OSMOLALITY SERPL CALC.SUM OF ELEC: 268.3 MOSM/KG (ref 273–305)
OVALOCYTES BLD QL SMEAR: ABNORMAL
PLAT MORPH BLD: NORMAL
PLATELET # BLD AUTO: 347 10*3/MM3 (ref 130–400)
PMV BLD AUTO: 9.1 FL (ref 6–10)
POTASSIUM BLD-SCNC: 3.6 MMOL/L (ref 3.5–5.3)
PROT SERPL-MCNC: 5.5 G/DL (ref 6–8)
RBC # BLD AUTO: 3.16 10*6/MM3 (ref 4.2–5.4)
SCAN SLIDE: NORMAL
SMUDGE CELLS BLD QL SMEAR: ABNORMAL
SODIUM BLD-SCNC: 135 MMOL/L (ref 135–153)
WBC NRBC COR # BLD: 7.35 10*3/MM3 (ref 4.5–12.5)

## 2019-01-21 PROCEDURE — 94799 UNLISTED PULMONARY SVC/PX: CPT

## 2019-01-21 PROCEDURE — 80053 COMPREHEN METABOLIC PANEL: CPT | Performed by: PHYSICIAN ASSISTANT

## 2019-01-21 PROCEDURE — 85007 BL SMEAR W/DIFF WBC COUNT: CPT | Performed by: PHYSICIAN ASSISTANT

## 2019-01-21 PROCEDURE — 86140 C-REACTIVE PROTEIN: CPT | Performed by: NURSE PRACTITIONER

## 2019-01-21 PROCEDURE — 83735 ASSAY OF MAGNESIUM: CPT | Performed by: INTERNAL MEDICINE

## 2019-01-21 PROCEDURE — 99239 HOSP IP/OBS DSCHRG MGMT >30: CPT | Performed by: PHYSICIAN ASSISTANT

## 2019-01-21 PROCEDURE — 85025 COMPLETE CBC W/AUTO DIFF WBC: CPT | Performed by: PHYSICIAN ASSISTANT

## 2019-01-21 PROCEDURE — 82962 GLUCOSE BLOOD TEST: CPT

## 2019-01-21 RX ORDER — CEFDINIR 300 MG/1
300 CAPSULE ORAL EVERY 12 HOURS SCHEDULED
Qty: 8 CAPSULE | Refills: 0
Start: 2019-01-21 | End: 2019-01-25

## 2019-01-21 RX ORDER — L.ACID,PARA/B.BIFIDUM/S.THERM 8B CELL
1 CAPSULE ORAL DAILY
Qty: 5 CAPSULE | Refills: 0
Start: 2019-01-21 | End: 2019-01-26

## 2019-01-21 RX ORDER — POTASSIUM CHLORIDE 1.5 G/1.77G
40 POWDER, FOR SOLUTION ORAL EVERY 4 HOURS
Status: COMPLETED | OUTPATIENT
Start: 2019-01-21 | End: 2019-01-21

## 2019-01-21 RX ORDER — HYDROCODONE BITARTRATE AND ACETAMINOPHEN 5; 325 MG/1; MG/1
1 TABLET ORAL EVERY 6 HOURS PRN
Qty: 12 TABLET | Refills: 0 | Status: ON HOLD | OUTPATIENT
Start: 2019-01-21 | End: 2019-02-06 | Stop reason: SDUPTHER

## 2019-01-21 RX ADMIN — DOCUSATE SODIUM 100 MG: 100 CAPSULE ORAL at 07:49

## 2019-01-21 RX ADMIN — APIXABAN 5 MG: 5 TABLET, FILM COATED ORAL at 07:49

## 2019-01-21 RX ADMIN — FAMOTIDINE 20 MG: 20 TABLET, FILM COATED ORAL at 07:50

## 2019-01-21 RX ADMIN — FLUTICASONE PROPIONATE 2 SPRAY: 50 SPRAY, METERED NASAL at 07:51

## 2019-01-21 RX ADMIN — VENLAFAXINE HYDROCHLORIDE 37.5 MG: 37.5 TABLET ORAL at 07:49

## 2019-01-21 RX ADMIN — POTASSIUM CHLORIDE 40 MEQ: 1.5 POWDER, FOR SOLUTION ORAL at 09:05

## 2019-01-21 RX ADMIN — CEFDINIR 300 MG: 300 CAPSULE ORAL at 07:50

## 2019-01-21 RX ADMIN — POTASSIUM CHLORIDE 40 MEQ: 1.5 POWDER, FOR SOLUTION ORAL at 12:01

## 2019-01-21 RX ADMIN — LEVOTHYROXINE SODIUM 100 MCG: 100 TABLET ORAL at 06:09

## 2019-01-21 NOTE — PLAN OF CARE
Problem: Fall Risk (Adult)  Goal: Absence of Fall  Outcome: Ongoing (interventions implemented as appropriate)   01/21/19 0135   Fall Risk (Adult)   Absence of Fall making progress toward outcome       Problem: Pain, Acute (Adult)  Goal: Acceptable Pain Control/Comfort Level  Outcome: Ongoing (interventions implemented as appropriate)   01/21/19 0135   Pain, Acute (Adult)   Acceptable Pain Control/Comfort Level making progress toward outcome       Problem: Skin Injury Risk (Adult)  Goal: Skin Health and Integrity  Outcome: Ongoing (interventions implemented as appropriate)   01/21/19 0135   Skin Injury Risk (Adult)   Skin Health and Integrity making progress toward outcome       Problem: Nausea/Vomiting (Adult)  Goal: Symptom Relief  Outcome: Ongoing (interventions implemented as appropriate)   01/21/19 0135   Nausea/Vomiting (Adult)   Symptom Relief making progress toward outcome     Goal: Adequate Hydration  Outcome: Ongoing (interventions implemented as appropriate)   01/21/19 0135   Nausea/Vomiting (Adult)   Adequate Hydration making progress toward outcome

## 2019-01-21 NOTE — DISCHARGE SUMMARY
[facility] HOSPITALIST DISCHARGE SUMMARY    Patient Identification:  Name:  Jeanna Flower  Age:  78 y.o.  Sex:  female  :  1940  MRN:  [id number]  Visit Number:  [id number]    Date of Admission: 2019  Date of Discharge: 2019    PCP: Raul Robert MD    Discharging Provider: TAE Parker    Discharge Diagnoses     Discharge Diagnoses:  1. Septic shock  2. E. Coli bacteremia   3. E. Coli UTI  4. Acute pancreatitis   5. Episode of hypotension  6. Moderate to severe oral dysphagia with aspiration   7. Grayzone troponin elevation   8. Elevated liver function tests  9. Hypoalbuminemia   10. Paroxysmal atrial fibrillation   11. Normocytic anemia   12. Acute hypokalemia   13. Acute hypomagnesemia   14. Acute hyponatremia     Secondary Diagnoses:  1. Hypothyroidism   2. Chronic indwelling olivas catheter   3. Dementia  4. Generalized debility  5. History of CVA with left side hemiplegia   6. Essential hypertension  7. DM type 2, non-insulin requiring   8. COPD without acute exacerbation    Consults/Procedures     Consults:   Consults     Date and Time Order Name Status Description    2019 1053 Inpatient Infectious Diseases Consult Completed     1/15/2019 1456 Hospitalist (on-call MD unless specified)      2019 0030 Inpatient Infectious Diseases Consult Completed     2019 0614 Inpatient Palliative Care MD Consult Completed         Procedures Performed:  1. Instrumental MBS swallow evaluation   2. CT Abdomen and Pelvis with contrast      History of Presenting Illness     Ms. Flower is a 78 y.o. female who is a resident of a local nursing home who was brought to [facility] due to low O2 saturations. She has baseline dementia and history of CVA with left hemiplegia. At baseline, she is bed bound with a chronic indwelling olivas catheter. She is alert to person only and most of her conversation is rambling and does not make sense. She had been receiving IV Ancef  at the nursing home for treatment of recently noted MSSA bacteremia. She was originally discharged back to the nursing home from the ED on 01/14/19, but was admitted when she was brought back on 01/15/19, again due to complaints of decreased O2sats. ABGs were normal on room air. Workup was initially positive for acute pancreatitis. She was continued on Ancef as her outpatient therapy was to continue through 01/21/19. Please see the admitting history and physical for further details.      Hospital Course     Patient was admitted to the medical/surgical unit for continued evaluation and therapy. Urine cultures returned positive for >100,000 E. Coli. Blood cultures also returned positive for E. Coli. Given her acute bacteremia and UTI, it was found that she met criteria for septic shock at admission with tachycardia and lactic acid >4.  Infectious disease was consulted given her recent MSSA bacteremia and now E. Coli bacteremia. It was also felt that her acute pancreatitis may be secondary to prolonged therapy with Ancef. Antibiotic therapy was changed to high dose Rocephin and Gentamycin.      She underwent an instrumental swallow evaluation due to concerns for possible aspiration. This was positive aspiration of nectar thick and thin liquids before and during swallow. This was discussed with the patient's POA and she did not want any alternative feeding options. She was explained the risk of aspiration and understood. She was placed on a pureed diet with thin liquids, 1:1 feedings with universal aspiration precautions. She developed intermittent cough with feedings and repeat CXR was unremarkable.     With continued IV antibiotic therapy, WBC count and CRP showed improvement. LFTs were noted to be elevated, but trending down. Hepatitis panel and recent liver US were unremarkable. Potassium and magnesemia were replaced per protocol with improvement noted. On the day prior to discharge, she had some borderline low BP  and drowsiness. She had received a dose of oral benadryl the night prior due to complaints of itching. Further benadryl was held and she received light IV fluids with improvement noted. Later in the day, she was awake/alert and back to baseline when visiting with family members. It was decided to keep her over night to monitor further and her status remained stable. ID transitioned to oral antibiotics with Omicef 300mg BID to be completed 01/25/19. Her PICC line was removed prior to discharge as she will no longer require IV antibiotics. It was felt that Ms. Flower was stable for discharge back to [facility] on 01/21/19.    Discharge Vitals/Physical Examination     Vital Signs:  Temp:  [97.7 °F (36.5 °C)-98.1 °F (36.7 °C)] 97.8 °F (36.6 °C)  Heart Rate:  [81-88] 88  Resp:  [17-20] 20  BP: (100-148)/(45-72) 148/62  Mean Arterial Pressure (Non-Invasive) for the past 24 hrs (Last 3 readings):   Noninvasive MAP (mmHg)   01/20/19 1605 83     SpO2 Percentage    01/20/19 2241 01/21/19 0620 01/21/19 0709   SpO2: 99% 99% 97%     SpO2:  [96 %-99 %] 97 %  on   ;   Device (Oxygen Therapy): room air    Body mass index is 28.79 kg/m².  Wt Readings from Last 3 Encounters:   01/15/19 78.5 kg (173 lb)   01/14/19 78.5 kg (173 lb)   01/07/19 76.8 kg (169 lb 6.4 oz)       Physical Exam   Constitutional:  Well-developed and well-nourished.  No respiratory distress. Awake, alert, but confused and rambling.   HENT:  Head:  Normocephalic and atraumatic.  Mouth:  Moist mucous membranes.    Eyes:  Conjunctivae and EOM are normal. No scleral icterus.    Neck:  Neck supple.  No JVD present.    Cardiovascular:  Normal rate, regular rhythm and normal heart sounds with no murmur.  Pulmonary/Chest:  No respiratory distress, no wheezes, no crackles, with normal breath sounds and good air movement.  Abdominal:  Soft.  Bowel sounds are present x4.  No distension and no tenderness.   Musculoskeletal:  No edema, no tenderness, and no  deformity.  No red or swollen joints anywhere.    Neurological:  Alert to person only. She rambles and does not respond appropriately to questioning. No tongue deviation.  No facial droop.  No slurred speech.   Skin:  Skin is warm and dry. No rash noted. No pallor.   Genitourinary: Hendrix catheter in place with pale yellow urine in the collection container.    Pertinent Laboratory/Radiology Results     Pertinent Laboratory Results:  Results from last 7 days   Lab Units 01/15/19  0549 01/15/19  0018 01/14/19  2153   TROPONIN I ng/mL 0.048* 0.053* 0.011       Results from last 7 days   Lab Units 01/15/19  1227   TRIGLYCERIDES mg/dL 82     Results from last 7 days   Lab Units 01/20/19  1219   PH, ARTERIAL pH units 7.436   PO2 ART mm Hg 78.0*   PCO2, ARTERIAL mm Hg 35.1   HCO3 ART mmol/L 23.1     Results from last 7 days   Lab Units 01/21/19  0442 01/20/19  0915 01/19/19  0422  01/15/19  1227 01/15/19  0457 01/15/19  0018   CRP mg/dL 3.15* 5.01* 2.98*   < >  --   --   --    LACTATE mmol/L  --   --   --   --  1.2 3.1* 4.2*   WBC 10*3/mm3 7.35 7.97 11.87   < > 16.96*  --   --    HEMOGLOBIN g/dL 9.1* 9.5* 9.5*   < > 9.3*  --   --    HEMATOCRIT % 29.1* 30.9* 31.0*   < > 31.4*  --   --    MCV fL 92.1 92.2 92.8   < > 92.1  --   --    MCHC g/dL 31.3* 30.7* 30.6*   < > 29.6*  --   --    PLATELETS 10*3/mm3 347 388 380   < > 329  --   --     < > = values in this interval not displayed.     Results from last 7 days   Lab Units 01/21/19  0442 01/20/19  0915 01/19/19  0422   SODIUM mmol/L 135 139 134*   POTASSIUM mmol/L 3.6 3.8 3.9   MAGNESIUM mg/dL 2.3 1.5* 1.8   CHLORIDE mmol/L 106 107 106   CO2 mmol/L 23.5* 25.8 21.8*   BUN mg/dL 5* 7 7   CREATININE mg/dL 0.60 0.68 0.64   EGFR IF NONAFRICN AM mL/min/1.73 97 84 90   CALCIUM mg/dL 7.5* 8.2 7.9   GLUCOSE mg/dL 116* 101 109   ALBUMIN g/dL 2.40* 2.60* 2.60*   BILIRUBIN mg/dL 0.3 0.4 0.4   ALK PHOS U/L 217* 256* 311*   AST (SGOT) U/L 31* 28 42*   ALT (SGPT) U/L 21 15 26   Estimated  Creatinine Clearance: 61 mL/min (by C-G formula based on SCr of 0.6 mg/dL).    Glucose   Date/Time Value Ref Range Status   01/21/2019 0709 105 70 - 130 mg/dL Final   01/20/2019 2047 98 70 - 130 mg/dL Final   01/20/2019 1552 119 70 - 130 mg/dL Final   01/20/2019 1038 128 70 - 130 mg/dL Final   01/20/2019 0656 101 70 - 130 mg/dL Final   01/19/2019 1914 187 (H) 70 - 130 mg/dL Final   01/19/2019 1634 111 70 - 130 mg/dL Final   01/19/2019 1053 122 70 - 130 mg/dL Final     Lab Results   Component Value Date    HGBA1C 5.90 (H) 01/15/2019     Lab Results   Component Value Date    TSH 6.470 (H) 01/15/2019    FREET4 0.96 01/17/2019     Blood Culture   Date Value Ref Range Status   01/15/2019 Escherichia coli (A)  Final   01/15/2019 Escherichia coli (A)  Final     Pain Management Panel     Pain Management Panel Latest Ref Rng & Units 9/16/2018    AMPHETAMINES SCREEN, URINE Negative Negative    BARBITURATES SCREEN Negative Negative    BENZODIAZEPINE SCREEN, URINE Negative Negative    BUPRENORPHINE Negative Negative    COCAINE SCREEN, URINE Negative Negative    METHADONE SCREEN, URINE Negative Negative        Pertinent Radiology Results:  Imaging Results (all)     Procedure Component Value Units Date/Time    FL Video Swallow [[id number]] Collected:  01/16/19 1521     Updated:  01/16/19 1528    Narrative:       FL VIDEO SWALLOW-     HISTORY:   Aspiration; K85.90-Acute pancreatitis without necrosis or  infection, unspecified; N39.0-Urinary tract infection, site not  specified          TECHNIQUE:   Speech therapy service was present. The patient was examined in the  sitting lateral position and was given several consistencies of barium.     FINDINGS: Poor oral control w/ delayed transit, oral holding, resulting  in premature loss of all consistencies. Silent aspiration of nectar  thick and thin liquids before and during the swallow. Cued cough  ineffective to clear aspirated material. No other aspiration observed,  however, per  delayed pharyngeal swallow w/ pharyngeal bolus present,  unprotected airway, she is felt to be at high risk for aspiration w/ any  consistency.      FLUOROSCOPY TIME: 1.3 minutes.     IMAGES: Cine loop was acquired       Impression:       Aspiration of nectar and thin liquids.     For additional information, please see the report provided by the speech  therapy service     This report was finalized on 1/16/2019 3:26 PM by Dr. Ulysses Rojas MD.       XR Chest 1 View [[id number]] Collected:  01/16/19 0901     Updated:  01/16/19 0903    Narrative:       XR CHEST 1 VW-     CLINICAL INDICATION: leukocytosis r/t pneumonia; K85.90-Acute  pancreatitis without necrosis or infection, unspecified; N39.0-Urinary  tract infection, site not specified          COMPARISON: 1/14/2019      TECHNIQUE: Single frontal view of the chest.     FINDINGS:     There is no focal alveolar infiltrate or effusion.  The cardiac silhouette is normal. The pulmonary vasculature is  unremarkable.  There is no evidence of an acute osseous abnormality.   There are no suspicious-appearing parenchymal soft tissue nodules.            Impression:       No evidence of active or acute cardiopulmonary disease on today's chest  radiograph.         This report was finalized on 1/16/2019 9:01 AM by Dr. Ulysses Rojas MD.       CT Abdomen Pelvis With Contrast [[id number]] Collected:  01/15/19 0701     Updated:  01/15/19 1308    Narrative:       CT ABDOMEN PELVIS WITH CONTRAST-     CLINICAL INDICATION: Abdominal pain, unspecified          COMPARISON: None available     TECHNIQUE: Axial images were acquired from the lung bases through the  pubic symphysis after IV , but without oral contrast.  Reformatted images were created in both the coronal and sagittal planes.     Radiation dose reduction techniques were utilized per ALARA protocol.  Automated exposure control was initiated through either or CareDose or  DoseRight software packages by  protocol.            FINDINGS:   There is minimal bibasilar atelectasis.     There is a hiatal hernia.     The liver is homogeneous. There is no evidence of focal hepatic mass     The spleen is homogeneous     There is no peripancreatic stranding or pancreatic head mass.     There is no adrenal enlargement.     The kidneys show no evidence of hydronephrosis or hydroureter. I do not  see any distal ureteral stones.      Otherwise I do not see any free fluid or walled off fluid collections.     There is no bowel wall thickening or mesenteric stranding.               Impression:       1. Moderate sized hiatal hernia.  2. Mild compression of superior endplate of L1.                   This report was finalized on 1/15/2019 1:05 PM by Dr. Ulysses Rojas MD.       XR Chest 1 View [[id number]] Collected:  01/15/19 0701     Updated:  01/15/19 1300    Narrative:       XR CHEST 1 VIEW-     CLINICAL INDICATION: SOA          COMPARISON: 01/14/2019      TECHNIQUE: Single frontal view of the chest.     FINDINGS:     Right-sided PICC line tip in the superior vena cava.  The cardiac silhouette is normal. The pulmonary vasculature is  unremarkable.  There is no evidence of an acute osseous abnormality.   There are no suspicious-appearing parenchymal soft tissue nodules.            Impression:       No evidence of active or acute cardiopulmonary disease on today's chest  radiograph.         This report was finalized on 1/15/2019 12:58 PM by Dr. Ulysses Rojas MD.           Test Results Pending at Discharge:  None.     Discharge Disposition/Discharge Medications/Discharge Appointments     Discharge Disposition:   Skilled Nursing Facility (DC - External)    Condition at Discharge:  Stable    Discharge Diet:  Diet Instructions     Diet: Dysphagia; Thin Liquids, No Restrictions; Pureed      Discharge Diet:  Dysphagia    Fluid Consistency:  Thin Liquids, No Restrictions    Pureed Options:  Pureed    Boost TID with meals, Magic cup with lunch and dinner.  Aspiration precautions.          Discharge Activity:  Activity Instructions     Activity as Tolerated      Patient bed bound, continue fall precautions.          Code Status While Inpatient:  Code Status and Medical Interventions:   Ordered at: 01/15/19 6185     Limited Support to NOT Include:    Intubation     Code Status:    No CPR     Medical Interventions (Level of Support Prior to Arrest):    Limited       Discharge Medications:     Discharge Medications      New Medications      Instructions Start Date   cefdinir 300 MG capsule  Commonly known as:  OMNICEF   300 mg, Oral, Every 12 Hours Scheduled         Continue These Medications      Instructions Start Date   ADVAIR DISKUS 250-50 MCG/DOSE DISKUS  Generic drug:  fluticasone-salmeterol   1 puff, Inhalation, 2 Times Daily - RT      albuterol sulfate  (90 Base) MCG/ACT inhaler  Commonly known as:  PROVENTIL HFA;VENTOLIN HFA;PROAIR HFA   2 puffs, Inhalation, Every 4 Hours PRN      apixaban 5 MG tablet tablet  Commonly known as:  ELIQUIS   5 mg, Oral, Every 12 Hours Scheduled      budesonide 0.5 MG/2ML nebulizer solution  Commonly known as:  PULMICORT   0.5 mg, Nebulization, Every 12 Hours      cycloSPORINE 0.05 % ophthalmic emulsion  Commonly known as:  RESTASIS   1 drop, Both Eyes, 2 Times Daily      docusate sodium 100 MG capsule  Commonly known as:  COLACE   100 mg, Oral, 2 Times Daily      famotidine 20 MG tablet  Commonly known as:  PEPCID   20 mg, Oral, 2 Times Daily      fluticasone 50 MCG/ACT nasal spray  Commonly known as:  FLONASE   2 sprays, Nasal, Daily      HYDROcodone-acetaminophen 5-325 MG per tablet  Commonly known as:  NORCO   1 tablet, Oral, Every 6 Hours PRN      lactobacillus acidophilus capsule capsule   1 capsule, Oral, Daily      levothyroxine 100 MCG tablet  Commonly known as:  SYNTHROID, LEVOTHROID   100 mcg, Oral, Daily      montelukast 10 MG tablet  Commonly known as:  SINGULAIR   10 mg, Oral, Nightly      QUEtiapine 25 MG  tablet  Commonly known as:  SEROquel   25 mg, Oral, Nightly      QUEtiapine 25 MG tablet  Commonly known as:  SEROquel   25 mg, Oral, 2 Times Daily PRN      venlafaxine 37.5 MG tablet  Commonly known as:  EFFEXOR   37.5 mg, Oral, 2 Times Daily         Stop These Medications    CeFAZolin Sodium-Dextrose 2-3 GM-%(50ML) IVPB  Commonly known as:  ANCEF            Discharge Appointments:  Your Scheduled Appointments    Feb 28, 2019  3:00 PM EST  Follow Up with Ta Garcia PA-C  [facility] CARDIOLOGY (--) [address] [place] [address] [phone]   Arrive 15 minutes prior to appointment.          Additional Instructions for the Follow-ups that You Need to Schedule     Discharge Follow-up with PCP   As directed       Currently Documented PCP:    Raul Robert MD    PCP Phone Number:    [phone]     Follow Up Details:  At nursing home within next 1 week.           Follow-up Information     Raul Robert MD .    Specialty:  Internal Medicine  Why:  At nursing home within next 1 week.  Contact information:  [address] [place], [place] [address]MEMO  [facility] [address]  [phone]                   Additional Instructions for the Follow-ups that You Need to Schedule     Discharge Follow-up with PCP   As directed       Currently Documented PCP:    Raul Robert MD    PCP Phone Number:    [phone]     Follow Up Details:  At nursing home within next 1 week.             Attestation: I personally discussed the patient's hospital course, disposition, discharge planning, and discharge medications with attending physician, Damian Suh MD, prior to time of discharge. Also discussed with the patient's RN, Jacqueline, who denied any new events of issues prior to discharge.     TAE Parker  01/21/19  11:20 AM    Time discharge orders placed: 1119.    Time to complete discharge: >30 minutes.     Please send a copy of this dictation to the following providers:  Raul Robert MD

## 2019-01-21 NOTE — DISCHARGE PLACEMENT REQUEST
"Jeanna Flower (78 y.o. Female)     Date of Birth Social Security Number Address Home Phone MRN    1940  [id number] [facility] [address] [phone] [id number]    Sikhism Marital Status          Non-Hoahaoism Single       Admission Date Admission Type Admitting Provider Attending Provider Department, Room/Bed    1/14/19 Emergency Anna Marie Livingston DO Heinss, Karl F, MD [address], 3342/2S    Discharge Date Discharge Disposition Discharge Destination         Skilled Nursing Facility (DC - External)              Attending Provider:  Damian Schuler MD    Allergies:  Bactrim [Sulfamethoxazole-trimethoprim], Codeine, Penicillins    Isolation:  None   Infection:  None   Code Status:  No CPR    Ht:  165.1 cm (65\")   Wt:  78.5 kg (173 lb)    Admission Cmt:  None   Principal Problem:  None                Active Insurance as of 1/14/2019     Primary Coverage     Payor Plan Insurance Group Employer/Plan Group    MEDICARE MEDICARE A & B      Payor Plan Address Payor Plan Phone Number Payor Plan Fax Number Effective Dates    [address] [phone]  1/1/2006 - None Entered    [facility] [id number]       Subscriber Name Subscriber Birth Date Member ID       JEANNA FLOWER 1940 [id number]           Secondary Coverage     Payor Plan Insurance Group Employer/Plan Group    [place]      Payor Plan Address Payor Plan Phone Number Payor Plan Fax Number Effective Dates    [address] [phone]  10/17/2017 - None Entered    [place] [address]       Subscriber Name Subscriber Birth Date Member ID       JEANNA FLOWER 1940 [id number]                 Emergency Contacts      (Rel.) Home Phone Work Phone Mobile Phone    Ernestina Chamberlain (Power of ) [phone] -- --               History & Physical      Anna Marie Livingston DO at 1/15/2019  2:56 PM            [facility] Medicine Services  History & " Physical          Patient Identification:  Name:  Jeanna Flower  Age:  78 y.o.  Sex:  female  :  1940  MRN:  [id number]   Visit Number:  [id number]  Primary Care Physician:  Raul Robert MD    I have seen the patient in conjunction with AMELIE Beatty and I agree with the following statements:     Subjective     Chief complaint: low 02 at the nursing home    History of presenting illness:    Mrs. Flower is a 78 year old female who presented to [facility] ED on 1/15/19 with low Sp02 at the nursing home. She was initially seen in the ED on 19 for low Sp02 and then sent back on 1/15/19 for low Sp02. She is s/p CVA and is almost non-verbal, she mumbles some words but I am unable to obtain any information from the patient due to this. She does not appear to bein any pain or discomfort. She has had 3 arterial blood gases done here and none of them show hypoxia on room air.     Her work up in the ED showed a troponin of 0.011, trended up to 0.053, but has since trended down to 0.048, on arrival her blood gas showed a pH of 7.498, C02 28, p02 85.1, HCO 21.4 on room air, repeated at 2130 on  showed pH 7.539, C02 18.7, p02 90, HCO 15.6, on 28% NC, it has since been repeated today at 1230 showing pH of 7.464, C02 27.4, P02 89.3, HCO 19.2, on room air. Her BNP was 41, initial alkaline phosphatase was 169 but has increased today up to 379 with ALT/AST 84/223, lactic acid was 4.2 early this morning but has since normalized to 1.2. WBC was 9.68 on arrival but this am shows 16.96, PLT count has remained normal, H/H of 9.3/31.4. Urine not concerning for UTI. Chest xray did not appreciate any pneumonia. CT of the abdomen and pelvis shows minimal bibasilar atelectasis, moderate sized hiatal hernia, mild compression of superior endplate of L1.    It is of note she was recently a patient at our facility from 19-19 for  Severe Sepsis with Metabolic Encephalopathy,  MSSA bacteremia, UTI She underwent a  JAYE on 1/9/2019 which did not show any definite evidence of endocarditis. It was recommended to continue Ancef through 1/21/19 by ID and she was discharged to the nursing home with a PICC line in place.     Her medical history consist of recent E.Coli UTI on 1/7/19, MSSA bacteremia, HTN, DM, hx of CVA, dysphagia, GERD, Dementia COPD and Hypothyroidism, paroxysmal a-fib, debility. Her baseline diet there is pureed, thin liquids. I have discussed via phone with Ernestina Chamberlain, her POA ([facility]) that she is a DNR/DNI, code status changed at this time.       ---------------------------------------------------------------------------------------------------------------------   Review of Systems   Reason unable to perform ROS: unable to assess due to mental status       ---------------------------------------------------------------------------------------------------------------------   Past Medical History:   Diagnosis Date   • Anemia    • Closed wedge compression fracture of first lumbar vertebra (CMS/HCC)    • COPD (chronic obstructive pulmonary disease) (CMS/[facility])    • CVA (cerebral vascular accident) with left hemiparesis. 12/6/2017   • Dementia    • Diabetes mellitus (CMS/[facility])    • Dysarthria following cerebral infarction    • Dysphagia, oropharyngeal phase    • Dysphagia, pharyngoesophageal phase    • GERD (gastroesophageal reflux disease)    • Heart disease    • Hyperlipidemia    • Hypertension    • Hypothyroidism    • Insomnia    • Obesity    • Other sleep apnea 11/13/2018   • Paroxysmal atrial fibrillation (CMS/HCC) 12/6/2017     Past Surgical History:   Procedure Laterality Date   • APPENDECTOMY     • BLADDER SURGERY     • CARDIAC CATHETERIZATION     • CHOLECYSTECTOMY       Family History   Problem Relation Age of Onset   • Heart disease Mother    • Heart attack Mother    • Heart disease Father    • Heart attack Father    • Heart attack Brother    • Heart attack Brother      Social History     Socioeconomic  History   • Marital status: Single     Spouse name: Not on file   • Number of children: Not on file   • Years of education: Not on file   • Highest education level: Not on file   Tobacco Use   • Smoking status: Never Smoker   • Smokeless tobacco: Never Used   Substance and Sexual Activity   • Alcohol use: No   • Drug use: No   • Sexual activity: Defer     ---------------------------------------------------------------------------------------------------------------------   Allergies:  Bactrim [sulfamethoxazole-trimethoprim]; Codeine; and Penicillins  ---------------------------------------------------------------------------------------------------------------------     Medications below are reported home medications pulling from within the system; at this time, these medications have not been reconciled unless otherwise specified and are in the verification process for further verifcation as current home medications.    Prior to Admission Medications     Prescriptions Last Dose Informant Patient Reported? Taking?    albuterol sulfate  (90 Base) MCG/ACT inhaler  Care Giver Yes No    Inhale 2 puffs Every 4 (Four) Hours As Needed for Wheezing or Shortness of Air.    apixaban (ELIQUIS) 5 MG tablet tablet  Care Giver No No    Take 1 tablet by mouth Every 12 (Twelve) Hours.    budesonide (PULMICORT) 0.5 MG/2ML nebulizer solution  Care Giver Yes No    Take 0.5 mg by nebulization 2 (Two) Times a Day.    castor oil-balsam peru (VENELEX) ointment  Care Giver Yes No    Apply 1 each topically to the appropriate area as directed 2 (Two) Times a Day.    CeFAZolin Sodium-Dextrose (ANCEF) 2-3 GM-%(50ML) IVPB   No No    Infuse 2,000 mg into a venous catheter Every 8 (Eight) Hours for 10 days.    cycloSPORINE (RESTASIS) 0.05 % ophthalmic emulsion  Care Giver Yes No    1 drop 2 (Two) Times a Day.    docusate sodium (COLACE) 150 MG/15ML liquid  Care Giver Yes No    100 mg by Per G Tube route 2 (Two) Times a Day.    famotidine  (PEPCID) 20 MG tablet  Care Giver Yes No    20 mg by Per G Tube route 2 (Two) Times a Day.    fluticasone (FLONASE) 50 MCG/ACT nasal spray  Care Giver Yes No    2 sprays into the nostril(s) as directed by provider Daily.    fluticasone-salmeterol (ADVAIR DISKUS) 250-50 MCG/DOSE DISKUS  Care Giver Yes No    Inhale 1 puff 2 (Two) Times a Day.    HYDROcodone-acetaminophen (NORCO) 5-325 MG per tablet   No No    Take 1 tablet by mouth Every 6 (Six) Hours As Needed for Moderate Pain .    lactobacillus acidophilus (RISAQUAD) capsule capsule   No No    Take 1 capsule by mouth Daily for 9 days.    levothyroxine (SYNTHROID, LEVOTHROID) 100 MCG tablet  Care Giver Yes No    Take 100 mcg by mouth Daily.    miconazole (MICOTIN) 2 % powder  Care Giver Yes No    Apply 1 application topically to the appropriate area as directed 3 (Three) Times a Day As Needed for Itching (skin folds).    montelukast (SINGULAIR) 10 MG tablet  Care Giver Yes No    Take 10 mg by mouth Every Night.    QUEtiapine (SEROquel) 25 MG tablet   No No    Take 1 tablet by mouth Every Night.    QUEtiapine (SEROquel) 25 MG tablet   No No    Take 1 tablet by mouth 2 (Two) Times a Day As Needed (Agitation, halllucinations).    venlafaxine (EFFEXOR) 37.5 MG tablet  Care Giver Yes No    37.5 mg by Per G Tube route 2 (Two) Times a Day.        Hospital Scheduled Meds:    aztreonam 1 g Intravenous Once   [START ON 1/16/2019] aztreonam 1 g Intravenous Q8H   metroNIDAZOLE 500 mg Intravenous Once   vancomycin 1,000 mg Intravenous Once       Sodium chloride 125 mL/hr Last Rate: 125 mL/hr (01/15/19 1441)     ---------------------------------------------------------------------------------------------------------------------   Objective       Vital Signs:  Temp:  [97.1 °F (36.2 °C)-97.8 °F (36.6 °C)] 97.8 °F (36.6 °C)  Heart Rate:  [] 103  Resp:  [18-20] 18  BP: ()/() 132/61      01/14/19 2126   Weight: 78.4 kg (172 lb 13.5 oz)     Body mass index is 28.76  kg/m².  ---------------------------------------------------------------------------------------------------------------------       Physical Exam    Physical Exam:  Constitutional:  Elderly female, alert in no distress, restless     HENT:  Head: Normocephalic and atraumatic.  Mouth:  Moist mucous membranes.    Eyes: Pupils are equal, round, and reactive to light.    Neck:  Neck supple.      Cardiovascular:  Normal rate, regular rhythm.  with no murmur.  Pulmonary/Chest:  No respiratory distress, no wheezes, no crackles, with normal breath sounds and good air movement.  Abdominal:  Soft.  Bowel sounds are present.  No distension, she does not grimace on palpation.  Musculoskeletal:  No edema, no tenderness, and left hand/arm contracted.  No red or swollen joints anywhere.    Neurological:  Alert mumbles random words, does not follow commands.   Skin:  Skin is warm and dry.  No rash noted.  No pallor.   Peripheral vascular:  No edema and strong pulses on all 4 extremities.  ---------------------------------------------------------------------------------------------------------------------  EKG:          ---------------------------------------------------------------------------------------------------------------------   Results from last 7 days   Lab Units  01/15/19   1227  01/15/19   0457  01/15/19   0018  01/14/19   2153  01/14/19   1156   01/11/19   0450  01/10/19   0536   01/09/19   0637   CRP mg/dL   --    --    --    --    --    --   5.10*  9.75*   --   15.00*   LACTATE mmol/L  1.2  3.1*  4.2*   --    --    --    --    --    --    --    WBC 10*3/mm3  16.96*   --    --   11.56  9.68   < >   --   8.42   < >   --    HEMOGLOBIN g/dL  9.3*   --    --   10.7*  11.1*   < >   --   10.8*   < >   --    HEMATOCRIT %  31.4*   --    --   36.0*  35.8*   < >   --   36.1*   < >   --    MCV fL  92.1   --    --   94.0  92.5   < >   --   93.0   < >   --    MCHC g/dL  29.6*   --    --   29.7*  31.0*   < >   --   29.9*   < >   --     PLATELETS 10*3/mm3  329   --    --   363  299   < >   --   236   < >   --     < > = values in this interval not displayed.     Results from last 7 days   Lab Units  01/15/19   1328   PH, ARTERIAL pH units  7.464*   PO2 ART mm Hg  89.3   PCO2, ARTERIAL mm Hg  27.4*   HCO3 ART mmol/L  19.2*     Results from last 7 days   Lab Units  01/15/19   1227  01/14/19   2153  01/14/19   1156  01/11/19   0450   01/10/19   0536  01/09/19   0637   SODIUM mmol/L  141  141  137  138   --   139  142   POTASSIUM mmol/L  4.0  5.0  3.7  4.4   < >  3.5  3.7   MAGNESIUM mg/dL   --    --    --   1.9   --   1.6*  2.2   CHLORIDE mmol/L  113*  108  108  109   --   110  112   CO2 mmol/L  22.1*  19.5*  23.1*  21.7*   --   21.2*  22.6*   BUN mg/dL  16  13  10  12   --   12  12   CREATININE mg/dL  0.73  0.93  0.73  0.68   --   0.73  0.74   EGFR IF NONAFRICN AM mL/min/1.73  77  58*  77  84   --   77  76   CALCIUM mg/dL  8.0  8.8  8.7  8.3   --   8.4  8.2   GLUCOSE mg/dL  97  164*  129*  107   --   83  80   ALBUMIN g/dL  2.80*  3.00*  2.70*  2.90*   --   2.90*  2.80*   BILIRUBIN mg/dL  0.5  1.1  0.3  0.4   --   0.5  0.5   ALK PHOS U/L  379*  503*  169*  246*   --   288*  294*   AST (SGOT) U/L  223*  514*  30  49*   --   89*  149*   ALT (SGPT) U/L  84*  104*  10  45*   --   102*  192*    < > = values in this interval not displayed.   Estimated Creatinine Clearance: 61 mL/min (by C-G formula based on SCr of 0.73 mg/dL).  No results found for: AMMONIA  Results from last 7 days   Lab Units  01/15/19   0549  01/15/19   0018  01/14/19   2153   TROPONIN I ng/mL  0.048*  0.053*  0.011         Lab Results   Component Value Date    HGBA1C 6.2 (H) 03/24/2014     Lab Results   Component Value Date    TSH 1.626 09/15/2018     No results found for: PREGTESTUR, PREGSERUM, HCG, HCGQUANT  Pain Management Panel     Pain Management Panel Latest Ref Rng & Units 9/16/2018    AMPHETAMINES SCREEN, URINE Negative Negative    BARBITURATES SCREEN Negative Negative     BENZODIAZEPINE SCREEN, URINE Negative Negative    BUPRENORPHINE Negative Negative    COCAINE SCREEN, URINE Negative Negative    METHADONE SCREEN, URINE Negative Negative        Blood Culture   Date Value Ref Range Status   01/15/2019 No growth at less than 24 hours  Preliminary   01/15/2019 No growth at less than 24 hours  Preliminary                    ---------------------------------------------------------------------------------------------------------------------  Imaging Results (last 7 days)     Procedure Component Value Units Date/Time    CT Abdomen Pelvis With Contrast [[id number]] Collected:  01/15/19 0701     Updated:  01/15/19 1308    Narrative:       CT ABDOMEN PELVIS WITH CONTRAST-     CLINICAL INDICATION: Abdominal pain, unspecified          COMPARISON: None available     TECHNIQUE: Axial images were acquired from the lung bases through the  pubic symphysis after IV , but without oral contrast.  Reformatted images were created in both the coronal and sagittal planes.     Radiation dose reduction techniques were utilized per ALARA protocol.  Automated exposure control was initiated through either or [employer] or  D[employer] software packages by  protocol.           FINDINGS:   There is minimal bibasilar atelectasis.     There is a hiatal hernia.     The liver is homogeneous. There is no evidence of focal hepatic mass     The spleen is homogeneous     There is no peripancreatic stranding or pancreatic head mass.     There is no adrenal enlargement.     The kidneys show no evidence of hydronephrosis or hydroureter. I do not  see any distal ureteral stones.      Otherwise I do not see any free fluid or walled off fluid collections.     There is no bowel wall thickening or mesenteric stranding.               Impression:       1. Moderate sized hiatal hernia.  2. Mild compression of superior endplate of L1.                   This report was finalized on 1/15/2019 1:05 PM by Dr. Ulysses Rojas MD.        XR Chest 1 View [[id number]] Collected:  01/15/19 0701     Updated:  01/15/19 1300    Narrative:       XR CHEST 1 VIEW-     CLINICAL INDICATION: SOA          COMPARISON: 01/14/2019      TECHNIQUE: Single frontal view of the chest.     FINDINGS:     Right-sided PICC line tip in the superior vena cava.  The cardiac silhouette is normal. The pulmonary vasculature is  unremarkable.  There is no evidence of an acute osseous abnormality.   There are no suspicious-appearing parenchymal soft tissue nodules.            Impression:       No evidence of active or acute cardiopulmonary disease on today's chest  radiograph.         This report was finalized on 1/15/2019 12:58 PM by Dr. Ulysses Rojas MD.             Cultures:  Blood Culture   Date Value Ref Range Status   01/15/2019 No growth at less than 24 hours  Preliminary   01/15/2019 No growth at less than 24 hours  Preliminary         I have personally reviewed the radiology images and read the final radiology report.  ---------------------------------------------------------------------------------------------------------------------  Assessment / Plan       Assessment and Plan:    SIRS r/t Pancreatitis  Lactic acidosis, resolved   Elevated Alkaline Phosphatase   Elevated Transaminases   MSSA Bacteremia   DM Type 2, non-insulin dependent   Paroxysmal A-Fib  Essential HTN  COPD  Normocytic Anemia   Hx of CVA with left sided hemiplegia   Hypothyroidism   Dementia     SIRS likely r/t Pancreatitis: lipase has already trended down from yesterday from 2489 to 91 today, will give clear liquids as tolerated, WBC was normal yesterday at 11.56 and increased to 16.96 today. Repeat chest xray in the am to re-evaluate for any aspiration pna. She has been afebrile. She was given flagyl, azactam and vancomycin in the ED. Urine and Chest xray does not appreciate any acute processes.     MSSA Bacteremia: at [place] was recommended to continue ancef through 1/21/19. Blood cultures repeated  today. Will continue ancef for now, re-evaluate labs and clinical status in the AM.     Elevated Alkaline phosphatase, Elevated Transaminases: she had sudden elevated of LFTs and alkaline phosphatase, could be related to Ancef. Will repeat in the AM and re-evaluate. Hepatitis panel was checked on 1/18/19 and was negative, Liver US was done on 1/8/19 was unremarkable.     Paroxysmal A-Fib, HTN: Will continue eliquis for stroke prevention, she is not on any medications for rate control or BP, will monitor on telemetry.     DM type 2: A1c ordered, hypoglycemia protocol initiated, accu checks ac/hs and prn, diabetic diet, no sliding scale ordered at this time, last glucose 97.    COPD: not felt to be in exacerbation, nebulizer's. Supplemental oxygen.      Normocytic Anemia: H/H is 9.3/31.4, will monitor trend, repeat CBC in the AM.     Hypothyroidism: TSH ordered, will resume home meds when available by pharmacy.     Dementia, Debility: patient does not ambulate, will order PT/OT. She has her eyes open and mumbles random words, does not follow commands, her grand daughter Ernestina reports this as her baseline.     DVT prophylaxis: on eliquis   GI prophylaxis: continue pepcid   Lines/Tubes/Drains: Right arm PICC in place on arrival 1/14/19, deisy wang   Activity: bedrest, turn q2hr   Diet: NPO until evaluated by SLP    Code status: DNR/DNI     Reyna Doyle, APRN  01/15/19  2:57 PM  ---------------------------------------------------------------------------------------------------------------------   The patient was seen and examined at bedside. No history is obtainable from the patient. She is moving around in the bed and has her leg on the rail. She does not follow commands. Her HEENT exam appears overall unremarkable but patient does not allow for an oral examination. Lungs are diminished at the bases but clear. Heart is without murmur. Abdominal exam is benign. No appreciable organomegaly. Hendrix is anchored. Skin  is without obvious rash. RUE PICC noted with minimal erythema in the superior aspect.     Keep NPO for treatment of pancreatitis; question if medication induced. Repeat lipase in the am. Obtain triglyceride level. Repeat CMP. Transaminitis may be due to Ancef. Consider ID consult if they remain significantly elevated. Follow up blood culture results.   Patient to have a sitter as she is a high risk for falls and pulling out olivas, picc, etc.     Discussed with: AMELIE Beatty and RUFUS Negron.      Electronically signed by Anna Marie Livingston DO at 1/15/2019  9:37 PM       ICU Vital Signs     Row Name 01/21/19 0924 01/21/19 0857 01/21/19 0709 01/21/19 0620 01/21/19 0300       Vitals    Temp  --  --  --  97.8 °F (36.6 °C)  97.9 °F (36.6 °C)    Temp src  --  --  --  Oral  Oral    Pulse  --  --  --  88  86    Heart Rate Source  --  --  --  Monitor  Monitor    Resp  --  --  --  20  18    Resp Rate Source  --  --  --  Visual  Visual    BP  148/62  --  --  102/60  117/55    BP Location  Right arm  --  --  Left arm  Left arm    BP Method  Manual  --  --  Manual  Automatic    Patient Position  --  --  --  Lying  Lying       Oxygen Therapy    SpO2  --  --  97 %  99 %  --    Pulse Oximetry Type  --  --  Intermittent  Intermittent  --    Device (Oxygen Therapy)  --  room air  room air  room air  --    Row Name 01/20/19 2241 01/20/19 1920 01/20/19 1900 01/20/19 1702 01/20/19 1605       Vitals    Temp  98.1 °F (36.7 °C)  --  97.7 °F (36.5 °C)  --  --    Temp src  Axillary  --  Oral  --  --    Pulse  81  88  85  83  85    Heart Rate Source  Monitor  Monitor  Monitor  --  Monitor    Resp  18  18  17  --  18    Resp Rate Source  Visual  Visual  Visual  --  Visual    BP  142/45  --  127/53  107/60  104/54    Noninvasive MAP (mmHg)  --  --  --  --  83    BP Location  Left arm  --  Left arm  --  Left arm    BP Method  Automatic  --  Automatic  --  Automatic    Patient Position  Lying  --  Lying  --  Lying       Oxygen  Therapy    SpO2  99 %  96 %  96 %  --  96 %    Pulse Oximetry Type  --  Intermittent  --  --  --    Device (Oxygen Therapy)  --  room air  --  --  room air    Row Name 01/20/19 1322 01/20/19 1247 01/20/19 1207             Vitals    Pulse  84  87  86      BP  107/54  122/45  127/53      BP Location  Left arm  Left arm  Left arm      BP Method  Automatic  Automatic  Automatic      Patient Position  Sitting  Sitting  Sitting          Intake & Output (last day)       01/20 0701 - 01/21 0700 01/21 0701 - 01/22 0700    P.O. 660     IV Piggyback      Total Intake(mL/kg) 660 (8.4)     Urine (mL/kg/hr) 1900 (1)     Stool 0     Total Output 1900     Net -1240           Stool Unmeasured Occurrence 0 x         Hospital Medications (active)       Dose Frequency Start End    apixaban (ELIQUIS) tablet 5 mg 5 mg Every 12 Hours Scheduled 1/15/2019     Sig - Route: Take 1 tablet by mouth Every 12 (Twelve) Hours. - Oral    budesonide-formoterol (SYMBICORT) 160-4.5 MCG/ACT inhaler 2 puff 2 puff 2 Times Daily - RT 1/15/2019     Sig - Route: Inhale 2 puffs 2 (Two) Times a Day. - Inhalation    cefdinir (OMNICEF) capsule 300 mg 300 mg Every 12 Hours Scheduled 1/20/2019 1/25/2019    Sig - Route: Take 1 capsule by mouth Every 12 (Twelve) Hours. - Oral    dextrose (D50W) 25 g/ 50mL Intravenous Solution 25 g 25 g Every 15 Minutes PRN 1/15/2019     Sig - Route: Infuse 50 mL into a venous catheter Every 15 (Fifteen) Minutes As Needed for Low Blood Sugar (Blood Sugar Less Than 70). - Intravenous    dextrose (GLUTOSE) oral gel 15 g 15 g Every 15 Minutes PRN 1/15/2019     Sig - Route: Take 15 g by mouth Every 15 (Fifteen) Minutes As Needed for Low Blood Sugar (Blood sugar less than 70). - Oral    docusate sodium (COLACE) capsule 100 mg 100 mg 2 Times Daily 1/15/2019     Sig - Route: Take 1 capsule by mouth 2 (Two) Times a Day. - Oral    famotidine (PEPCID) tablet 20 mg 20 mg 2 Times Daily 1/15/2019     Sig - Route: Take 1 tablet by mouth 2 (Two)  "Times a Day. - Oral    fluticasone (FLONASE) 50 MCG/ACT nasal spray 2 spray 2 spray Daily 1/16/2019     Sig - Route: 2 sprays into the nostril(s) as directed by provider Daily. - Nasal    glucagon (human recombinant) (GLUCAGEN DIAGNOSTIC) injection 1 mg 1 mg As Needed 1/15/2019     Sig - Route: Inject 1 mg under the skin into the appropriate area as directed As Needed (Blood Glucose Less Than 70). - Subcutaneous    ipratropium-albuterol (DUO-NEB) nebulizer solution 3 mL 3 mL Every 6 Hours PRN 1/16/2019     Sig - Route: Take 3 mL by nebulization Every 6 (Six) Hours As Needed for Shortness of Air. - Nebulization    Cosign for Ordering: Accepted by Anna Marie Livingston DO on 1/16/2019  1:42 PM    levothyroxine (SYNTHROID, LEVOTHROID) tablet 100 mcg 100 mcg Every Early Morning 1/16/2019     Sig - Route: Take 1 tablet by mouth Every Morning. - Oral    Magnesium Sulfate 2 gram / 50mL Infusion (GIVE X 3 BAGS TO EQUAL 6GM TOTAL DOSE) - Mg 1.1 - 1.5 mg/dl 2 g As Needed 1/16/2019     Sig - Route: Infuse 50 mL into a venous catheter As Needed (See Administration Instructions). - Intravenous    Linked Group 1:  \"Or\" Linked Group Details        Magnesium Sulfate 2 gram Bolus, followed by 8 gram infusion (total Mg dose 10 grams)- Mg less than or equal to 1mg/dL 2 g As Needed 1/16/2019     Sig - Route: Infuse 50 mL into a venous catheter As Needed (See Administration Instructions). - Intravenous    Linked Group 1:  \"Or\" Linked Group Details        magnesium sulfate 2g/50 mL (PREMIX) infusion 2 g Every 2 Hours 1/20/2019 1/20/2019    Sig - Route: Infuse 50 mL into a venous catheter Every 2 (Two) Hours. - Intravenous    Magnesium Sulfate 4 gram infusion- Mg 1.6-1.9 mg/dL 4 g As Needed 1/16/2019     Sig - Route: Infuse 100 mL into a venous catheter As Needed (See Administration Instructions). - Intravenous    Linked Group 1:  \"Or\" Linked Group Details        montelukast (SINGULAIR) tablet 10 mg 10 mg Nightly 1/15/2019     Sig - " "Route: Take 1 tablet by mouth Every Night. - Oral    potassium chloride (KLOR-CON) packet 40 mEq 40 mEq As Needed 1/16/2019     Sig - Route: Take 40 mEq by mouth As Needed (potassium replacement, see admin instructions). - Oral    Linked Group 2:  \"Or\" Linked Group Details        potassium chloride (KLOR-CON) packet 40 mEq 40 mEq Every 4 Hours 1/21/2019 1/21/2019    Sig - Route: Take 40 mEq by mouth Every 4 (Four) Hours. - Oral    potassium chloride (MICRO-K) CR capsule 40 mEq 40 mEq As Needed 1/16/2019     Sig - Route: Take 4 capsules by mouth As Needed (potassium replacement.  see admin instructions). - Oral    Linked Group 2:  \"Or\" Linked Group Details        potassium chloride 10 mEq in 100 mL IVPB 10 mEq Every 1 Hour PRN 1/16/2019     Sig - Route: Infuse 100 mL into a venous catheter Every 1 (One) Hour As Needed (potassium protocol PERIPHERAL - see admin instructions). - Intravenous    Linked Group 2:  \"Or\" Linked Group Details        QUEtiapine (SEROquel) tablet 25 mg 25 mg Nightly 1/15/2019     Sig - Route: Take 1 tablet by mouth Every Night. - Oral    QUEtiapine (SEROquel) tablet 25 mg 25 mg 2 Times Daily PRN 1/15/2019     Sig - Route: Take 1 tablet by mouth 2 (Two) Times a Day As Needed (Agitation, halllucinations). - Oral    sodium chloride 0.9 % flush 10 mL 10 mL As Needed 1/14/2019     Sig - Route: Infuse 10 mL into a venous catheter As Needed for Line Care. - Intravenous    Linked Group 3:  \"And\" Linked Group Details        sodium chloride 0.9 % flush 10 mL 10 mL Every 12 Hours Scheduled 1/17/2019     Sig - Route: Infuse 10 mL into a venous catheter Every 12 (Twelve) Hours. - Intravenous    Cosign for Ordering: Accepted by Anna Marie Livingston DO on 1/17/2019  4:06 PM    sodium chloride 0.9 % flush 10 mL 10 mL As Needed 1/17/2019     Sig - Route: Infuse 10 mL into a venous catheter As Needed for Line Care (After Medication Administration). - Intravenous    Cosign for Ordering: Accepted by Anna Marie Livingston " DO Noel on 1/17/2019  4:06 PM    sodium chloride 0.9 % flush 20 mL 20 mL As Needed 1/17/2019     Sig - Route: Infuse 20 mL into a venous catheter As Needed for Line Care (After Blood Draws or Blood Product Administration). - Intravenous    Cosign for Ordering: Accepted by Anna Marie Livingston DO on 1/17/2019  4:06 PM    sodium chloride 0.9 % flush 3 mL 3 mL Every 12 Hours Scheduled 1/15/2019     Sig - Route: Infuse 3 mL into a venous catheter Every 12 (Twelve) Hours. - Intravenous    sodium chloride 0.9 % flush 3-10 mL 3-10 mL As Needed 1/15/2019     Sig - Route: Infuse 3-10 mL into a venous catheter As Needed for Line Care. - Intravenous    sodium chloride 0.9 % infusion 75 mL/hr Continuous 1/20/2019     Sig - Route: Infuse 75 mL/hr into a venous catheter Continuous. - Intravenous    Cosign for Ordering: Accepted by Anna Marie Livingston DO on 1/20/2019  6:31 PM    venlafaxine (EFFEXOR) tablet 37.5 mg 37.5 mg 2 Times Daily With Meals 1/15/2019     Sig - Route: Take 1 tablet by mouth 2 (Two) Times a Day With Meals. - Oral    cefTRIAXone (ROCEPHIN) 2 g/100 mL 0.9% NS VTB (MAX) (Discontinued) 2 g Every 24 Hours 1/16/2019 1/20/2019    Sig - Route: Infuse 100 mL into a venous catheter Daily. - Intravenous    Cosign for Ordering: Accepted by Juan Cardoza MD on 1/18/2019  6:31 AM    gentamicin (GARAMYCIN) 170 mg in Sodium chloride 0.9 % 100 mL IVPB (Discontinued) 3 mg/kg × 57 kg (Ideal) Every 24 Hours 1/16/2019 1/20/2019    Sig - Route: Infuse 170 mg into a venous catheter Daily. - Intravenous    Cosign for Ordering: Accepted by Juan Cardoza MD on 1/18/2019  6:31 AM    lactobacillus acidophilus (RISAQUAD) capsule 1 capsule (Discontinued) 1 capsule Daily 1/15/2019 1/20/2019    Sig - Route: Take 1 capsule by mouth Daily. - Oral    Pharmacy Consult - Pharmacy to dose (Discontinued)  Continuous PRN 1/15/2019 1/21/2019    Sig - Route: Continuous As Needed for Consult. - Does not apply    Reason for  Discontinue: *Therapy completed            Lab Results (last 24 hours)     Procedure Component Value Units Date/Time    POC Glucose Once [[id number]]  (Normal) Collected:  01/21/19 0709    Specimen:  Blood Updated:  01/21/19 0715     Glucose 105 mg/dL     CBC & Differential [[id number]] Collected:  01/21/19 0442    Specimen:  Blood Updated:  01/21/19 0643    Narrative:       The following orders were created for panel order CBC & Differential.  Procedure                               Abnormality         Status                     ---------                               -----------         ------                     Manual Differential[[id number]]          Abnormal            Final result               Scan Slide[[id number]]                                       Final result               CBC Auto Differential[[id number]]        Abnormal            Final result                 Please view results for these tests on the individual orders.    Scan Slide [[id number]] Collected:  01/21/19 0442    Specimen:  Blood Updated:  01/21/19 0643     Scan Slide --     Comment: See Manual Differential Results       CBC Auto Differential [[id number]]  (Abnormal) Collected:  01/21/19 0442    Specimen:  Blood Updated:  01/21/19 0642     WBC 7.35 10*3/mm3      RBC 3.16 10*6/mm3      Hemoglobin 9.1 g/dL      Hematocrit 29.1 %      MCV 92.1 fL      MCH 28.8 pg      MCHC 31.3 g/dL      RDW 20.7 %      RDW-SD 69.2 fl      MPV 9.1 fL      Platelets 347 10*3/mm3     Manual Differential [[id number]]  (Abnormal) Collected:  01/21/19 0442    Specimen:  Blood Updated:  01/21/19 0642     Neutrophil % 36.0 %      Lymphocyte % 42.0 %      Monocyte % 8.0 %      Eosinophil % 10.0 %      Basophil % 2.0 %      Bands %  1.0 %      Myelocyte % 1.0 %      Neutrophils Absolute 2.72 10*3/mm3      Lymphocytes Absolute 3.09 10*3/mm3      Monocytes Absolute 0.59 10*3/mm3      Eosinophils Absolute 0.74 10*3/mm3      Basophils Absolute 0.15 10*3/mm3      Acanthocytes  Mod/2+     Anisocytosis Mod/2+     Ovalocytes Slight/1+     Smudge Cells Slight/1+     Platelet Morphology Normal    C-reactive Protein [[id number]]  (Abnormal) Collected:  01/21/19 0442    Specimen:  Blood Updated:  01/21/19 0629     C-Reactive Protein 3.15 mg/dL     Magnesium [[id number]]  (Normal) Collected:  01/21/19 0442    Specimen:  Blood Updated:  01/21/19 0620     Magnesium 2.3 mg/dL     Comprehensive Metabolic Panel [[id number]]  (Abnormal) Collected:  01/21/19 0442    Specimen:  Blood Updated:  01/21/19 0617     Glucose 116 mg/dL      BUN 5 mg/dL      Creatinine 0.60 mg/dL      Sodium 135 mmol/L      Potassium 3.6 mmol/L      Chloride 106 mmol/L      CO2 23.5 mmol/L      Calcium 7.5 mg/dL      Total Protein 5.5 g/dL      Albumin 2.40 g/dL      ALT (SGPT) 21 U/L      AST (SGOT) 31 U/L      Alkaline Phosphatase 217 U/L      Comment: Note New Reference Ranges        Total Bilirubin 0.3 mg/dL      eGFR Non African Amer 97 mL/min/1.73      Globulin 3.1 gm/dL      A/G Ratio 0.8 g/dL      BUN/Creatinine Ratio 8.3     Anion Gap 5.5 mmol/L     Narrative:       The MDRD GFR formula is only valid for adults with stable renal function between ages 18 and 70.    Osmolality, Calculated [[id number]]  (Abnormal) Collected:  01/21/19 0442    Specimen:  Blood Updated:  01/21/19 0617     Osmolality Calc 268.3 mOsm/kg     POC Glucose Once [[id number]]  (Normal) Collected:  01/20/19 2047    Specimen:  Blood Updated:  01/20/19 2054     Glucose 98 mg/dL     POC Glucose Once [[id number]]  (Normal) Collected:  01/20/19 1552    Specimen:  Blood Updated:  01/20/19 1608     Glucose 119 mg/dL     Blood Gas, Arterial [[id number]]  (Abnormal) Collected:  01/20/19 1219    Specimen:  Arterial Blood Updated:  01/20/19 1248     Site Arterial: right brachial     Sagar's Test N/A     pH, Arterial 7.436 pH units      pCO2, Arterial 35.1 mm Hg      pO2, Arterial 78.0 mm Hg      HCO3, Arterial 23.1 mmol/L      Base Excess, Arterial -0.8 mmol/L      O2  Saturation, Arterial 94.8 %      Hemoglobin, Blood Gas 10.3 g/dL      Hematocrit, Blood Gas 30.0 %      Oxyhemoglobin 94.1 %      Methemoglobin 0.30 %      Carboxyhemoglobin 0.4 %      A-a Gradiant 21.9 mmHg      Temperature 98.6 C      Barometric Pressure for Blood Gas 723 mmHg      Modality Room Air     FIO2 21 %         Imaging Results (last 24 hours)     ** No results found for the last 24 hours. **        Orders (last 24 hrs)     Start     Ordered    01/21/19 1119  Discharge patient  Once      01/21/19 1119    01/21/19 1119  Discontinue IV  Once      01/21/19 1119    01/21/19 1119  Discontinue Oxygen  Once      01/21/19 1119    01/21/19 1119  Discontinue Telemetry  Once      01/21/19 1119    01/21/19 1119  Discharge Prescription Status  Continuous      01/21/19 1119    01/21/19 1119  Discontinue PICC  Once      01/21/19 1119    01/21/19 0900  potassium chloride (KLOR-CON) packet 40 mEq  Every 4 Hours      01/21/19 0738    01/21/19 0716  POC Glucose Once  Once      01/21/19 0709    01/21/19 0638  Manual Differential  Once      01/21/19 0637    01/21/19 0618  Osmolality, Calculated  Once      01/21/19 0617    01/21/19 0600  Magnesium  Morning Draw      01/20/19 1327    01/21/19 0600  CBC Auto Differential  PROCEDURE ONCE      01/21/19 0002    01/21/19 0550  Scan Slide  Once      01/21/19 0549    01/21/19 0000  lactobacillus acidophilus (RISAQUAD) capsule capsule  Daily      01/21/19 0924    01/21/19 0000  cefdinir (OMNICEF) 300 MG capsule  Every 12 Hours Scheduled      01/21/19 0924    01/21/19 0000  HYDROcodone-acetaminophen (NORCO) 5-325 MG per tablet  Every 6 Hours PRN      01/21/19 0926    01/21/19 0000  Discharge Follow-up with PCP      01/21/19 1119    01/21/19 0000  Activity as Tolerated      01/21/19 1119    01/21/19 0000  Diet: Dysphagia; Thin Liquids, No Restrictions; Pureed      01/21/19 1119    01/20/19 2100  cefdinir (OMNICEF) capsule 300 mg  Every 12 Hours Scheduled      01/20/19 1606    01/20/19  2055  POC Glucose Once  Once      01/20/19 2047    01/20/19 1609  POC Glucose Once  Once      01/20/19 1552    01/20/19 1200  magnesium sulfate 2g/50 mL (PREMIX) infusion  Every 2 Hours      01/20/19 1057    01/20/19 1151  Blood Gas, Arterial  STAT      01/20/19 1150    01/20/19 1145  sodium chloride 0.9 % bolus 250 mL  Once      01/20/19 1053    01/20/19 1145  sodium chloride 0.9 % infusion  Continuous      01/20/19 1053    01/20/19 0800  Dietary Nutrition Supplements Boost Plus (Ensure Enlive, Ensure Plus)  Daily With Breakfast, Lunch & Dinner     Comments:  Shake (add ice cream).    01/19/19 1809    01/20/19 0600  CBC & Differential  Daily      01/19/19 1621    01/18/19 1200  Dietary Nutrition Supplements Magic Cup  Daily With Lunch & Dinner      01/18/19 1151    01/18/19 0600  C-reactive Protein  Daily      01/17/19 1512    01/18/19 0600  Comprehensive Metabolic Panel  Daily      01/17/19 1554    01/17/19 1315  sodium chloride 0.9 % flush 10 mL  Every 12 Hours Scheduled      01/17/19 1215    01/17/19 1213  sodium chloride 0.9 % flush 10 mL  As Needed      01/17/19 1215    01/17/19 1213  sodium chloride 0.9 % flush 20 mL  As Needed      01/17/19 1215    01/16/19 1730  levothyroxine (SYNTHROID, LEVOTHROID) tablet 100 mcg  Every Early Morning      01/16/19 1351    01/16/19 1700  gentamicin (GARAMYCIN) 170 mg in Sodium chloride 0.9 % 100 mL IVPB  Every 24 Hours,   Status:  Discontinued      01/16/19 1412    01/16/19 1600  cefTRIAXone (ROCEPHIN) 2 g/100 mL 0.9% NS VTB (MAX)  Every 24 Hours,   Status:  Discontinued      01/16/19 1412    01/16/19 1131  Magnesium Sulfate 2 gram Bolus, followed by 8 gram infusion (total Mg dose 10 grams)- Mg less than or equal to 1mg/dL  As Needed      01/16/19 1131    01/16/19 1131  Magnesium Sulfate 2 gram / 50mL Infusion (GIVE X 3 BAGS TO EQUAL 6GM TOTAL DOSE) - Mg 1.1 - 1.5 mg/dl  As Needed      01/16/19 1131    01/16/19 1131  Magnesium Sulfate 4 gram infusion- Mg 1.6-1.9 mg/dL  As  Needed      01/16/19 1131    01/16/19 1131  potassium chloride (MICRO-K) CR capsule 40 mEq  As Needed      01/16/19 1131    01/16/19 1131  potassium chloride (KLOR-CON) packet 40 mEq  As Needed      01/16/19 1131    01/16/19 1131  potassium chloride 10 mEq in 100 mL IVPB  Every 1 Hour PRN      01/16/19 1131    01/16/19 0915  ipratropium-albuterol (DUO-NEB) nebulizer solution 3 mL  Every 6 Hours PRN      01/16/19 0912    01/16/19 0900  fluticasone (FLONASE) 50 MCG/ACT nasal spray 2 spray  Daily      01/15/19 1818    01/15/19 2200  POC Glucose 4x Daily AC & at Bedtime  4 Times Daily Before Meals & at Bedtime      01/15/19 1713    01/15/19 2130  budesonide-formoterol (SYMBICORT) 160-4.5 MCG/ACT inhaler 2 puff  2 Times Daily - RT      01/15/19 1818    01/15/19 2113  Pharmacy Consult - Pharmacy to dose  Continuous PRN,   Status:  Discontinued      01/15/19 2114    01/15/19 2100  sodium chloride 0.9 % flush 3 mL  Every 12 Hours Scheduled      01/15/19 1713    01/15/19 2100  montelukast (SINGULAIR) tablet 10 mg  Nightly      01/15/19 1818    01/15/19 2100  apixaban (ELIQUIS) tablet 5 mg  Every 12 Hours Scheduled      01/15/19 1818    01/15/19 2100  famotidine (PEPCID) tablet 20 mg  2 Times Daily      01/15/19 1818    01/15/19 2100  QUEtiapine (SEROquel) tablet 25 mg  Nightly      01/15/19 1818    01/15/19 2100  docusate sodium (COLACE) capsule 100 mg  2 Times Daily      01/15/19 1818    01/15/19 2000  venlafaxine (EFFEXOR) tablet 37.5 mg  2 Times Daily With Meals      01/15/19 1818    01/15/19 2000  lactobacillus acidophilus (RISAQUAD) capsule 1 capsule  Daily,   Status:  Discontinued      01/15/19 1818    01/15/19 1900  QUEtiapine (SEROquel) tablet 25 mg  2 Times Daily PRN      01/15/19 1818    01/15/19 1800  dextrose (GLUTOSE) oral gel 15 g  Every 15 Minutes PRN      01/15/19 1713    01/15/19 1800  dextrose (D50W) 25 g/ 50mL Intravenous Solution 25 g  Every 15 Minutes PRN      01/15/19 1713    01/15/19 1800  glucagon  (human recombinant) (GLUCAGEN DIAGNOSTIC) injection 1 mg  As Needed      01/15/19 1713    01/15/19 1713  sodium chloride 0.9 % flush 3-10 mL  As Needed      01/15/19 1713    01/14/19 2126  sodium chloride 0.9 % flush 10 mL  As Needed      19    Unscheduled  Magnesium  As Needed      19 1131    Unscheduled  Potassium  As Needed      19 1131    Unscheduled  Spoon Feeding  As Needed      19 1254    Unscheduled  Assist With Feeding Patient  As Needed      19 1254    Unscheduled  Change Dressing to IV Site As Needed When Damp, Loose or Soiled  As Needed      19 1215    Unscheduled  Change Needleless Connectors  As Needed     Comments:  Change Needleless Connectors When:  - Removed For Any Reason  - Residual Blood or Debris Within Connector  - Prior to Drawing Blood Cultures  - Contamination of Connector  - After Administration of Blood or Blood Components    19 1215    Unscheduled  PICC LINE CARE - Change Needleless Connectors  As Needed     Comments:  Per [facility] Policy    19 1215    --  docusate sodium (COLACE) 100 MG capsule  2 Times Daily      01/15/19 1710          Operative/Procedure Notes (most recent note)     No notes of this type exist for this encounter.           Physician Progress Notes (most recent note)      Raina Rajan APRN at 2019  4:06 PM     Attestation signed by Juan Cardoza MD at 2019 11:32 AM    I have reviewed the documentation above and agree.                               PROGRESS NOTE         Patient Identification:  Name:  Jeanna Flower  Age:  78 y.o.  Sex:  female  :  1940  MRN:  [id number]  Visit Number:  [id number]  Primary Care Provider:  Raul Robert MD      ----------------------------------------------------------------------------------------------------------------------  Subjective       Chief Complaints:    Shortness of Breath and Vomiting        Interval History:        She was sleeping this  morning, sitter at bedside. CRP level continues to significantly improve down from 4.60 to 2.98 with a normal white count and afebrile.    Review of Systems:  Unable to obtain  ----------------------------------------------------------------------------------------------------------------------      Objective       Current [facility] Meds:    apixaban 5 mg Oral Q12H   budesonide-formoterol 2 puff Inhalation BID - RT   cefdinir 300 mg Oral Q12H   docusate sodium 100 mg Oral BID   famotidine 20 mg Oral BID   fluticasone 2 spray Nasal Daily   lactobacillus acidophilus 1 capsule Oral Daily   levothyroxine 100 mcg Oral Q AM   magnesium sulfate 2 g Intravenous Q2H   montelukast 10 mg Oral Nightly   QUEtiapine 25 mg Oral Nightly   sodium chloride 10 mL Intravenous Q12H   sodium chloride 3 mL Intravenous Q12H   venlafaxine 37.5 mg Oral BID With Meals       Pharmacy Consult - Pharmacy to dose     sodium chloride 75 mL/hr Last Rate: 75 mL/hr (01/20/19 1143)     ----------------------------------------------------------------------------------------------------------------------    Vital Signs:  Temp:  [97.7 °F (36.5 °C)-98.1 °F (36.7 °C)] 98 °F (36.7 °C)  Heart Rate:  [84-98] 85  Resp:  [14-20] 18  BP: ()/(45-78) 104/54  Mean Arterial Pressure (Non-Invasive) for the past 24 hrs (Last 3 readings):   Noninvasive MAP (mmHg)   01/20/19 1605 83     SpO2 Percentage    01/20/19 0650 01/20/19 0725 01/20/19 1033   SpO2: 99% 99% 99%     SpO2:  [96 %-99 %] 99 %  on  Flow (L/min):  [2] 2;   Device (Oxygen Therapy): nasal cannula    Body mass index is 28.79 kg/m².  Wt Readings from Last 3 Encounters:   01/15/19 78.5 kg (173 lb)   01/14/19 78.5 kg (173 lb)   01/07/19 76.8 kg (169 lb 6.4 oz)        Intake/Output Summary (Last 24 hours) at 1/20/2019 1606  Last data filed at 1/20/2019 1442  Gross per 24 hour   Intake 460 ml   Output 2550 ml   Net -2090 ml     Diet Pureed;  Thin  ----------------------------------------------------------------------------------------------------------------------    Physical exam:    Constitutional:  Well-developed and well-nourished.  No respiratory distress.      HENT:  Head:  Normocephalic and atraumatic.  Mouth:  Moist mucous membranes.    Eyes:  Conjunctivae and EOM are normal.  No scleral icterus.    Neck:  Neck supple.  No JVD present.    Cardiovascular:  Normal rate, regular rhythm and normal heart sounds with no murmur. No edema.  Pulmonary/Chest:  No respiratory distress, no wheezes, no crackles, with normal breath sounds and good air movement.  Abdominal:  Soft.  Bowel sounds are normal.  No distension and no tenderness.   Musculoskeletal:  No edema, no tenderness, and no deformity.  No red or swollen joints anywhere.    Neurological:  Alert and oriented to person, place, and time. No facial droop.  No slurred speech.   Skin:  Skin is warm and dry. No rash noted. No pallor.     ----------------------------------------------------------------------------------------------------------------------  I have personally reviewed the EKG/Telemetry strips   ----------------------------------------------------------------------------------------------------------------------  Results from last 7 days   Lab Units 01/15/19  0549 01/15/19  0018 01/14/19  2153   TROPONIN I ng/mL 0.048* 0.053* 0.011       Results from last 7 days   Lab Units 01/15/19  1227   TRIGLYCERIDES mg/dL 82     Results from last 7 days   Lab Units 01/20/19  1219   PH, ARTERIAL pH units 7.436   PO2 ART mm Hg 78.0*   PCO2, ARTERIAL mm Hg 35.1   HCO3 ART mmol/L 23.1     Results from last 7 days   Lab Units 01/20/19  0915 01/19/19  0422 01/18/19  0055  01/15/19  1227 01/15/19  0457 01/15/19  0018   CRP mg/dL 5.01* 2.98* 4.60*   < >  --   --   --    LACTATE mmol/L  --   --   --   --  1.2 3.1* 4.2*   WBC 10*3/mm3 7.97 11.87 9.22   < > 16.96*  --   --    HEMOGLOBIN g/dL 9.5* 9.5* 8.4*   <  > 9.3*  --   --    HEMATOCRIT % 30.9* 31.0* 28.5*   < > 31.4*  --   --    MCV fL 92.2 92.8 94.1*   < > 92.1  --   --    MCHC g/dL 30.7* 30.6* 29.5*   < > 29.6*  --   --    PLATELETS 10*3/mm3 388 380 348   < > 329  --   --     < > = values in this interval not displayed.     Results from last 7 days   Lab Units 01/20/19  0915 01/19/19  0422 01/18/19  0055   SODIUM mmol/L 139 134* 141   POTASSIUM mmol/L 3.8 3.9 4.1   MAGNESIUM mg/dL 1.5* 1.8 1.8   CHLORIDE mmol/L 107 106 116*   CO2 mmol/L 25.8 21.8* 20.1*   BUN mg/dL 7 7 8   CREATININE mg/dL 0.68 0.64 0.65   EGFR IF NONAFRICN AM mL/min/1.73 84 90 88   CALCIUM mg/dL 8.2 7.9 7.8   GLUCOSE mg/dL 101 109 100   ALBUMIN g/dL 2.60* 2.60* 2.30*   BILIRUBIN mg/dL 0.4 0.4 0.3   ALK PHOS U/L 256* 311* 344*   AST (SGOT) U/L 28 42* 52*   ALT (SGPT) U/L 15 26 23   Estimated Creatinine Clearance: 61 mL/min (by C-G formula based on SCr of 0.68 mg/dL).  No results found for: AMMONIA    Glucose   Date/Time Value Ref Range Status   01/20/2019 1038 128 70 - 130 mg/dL Final   01/20/2019 0656 101 70 - 130 mg/dL Final   01/19/2019 1914 187 (H) 70 - 130 mg/dL Final   01/19/2019 1634 111 70 - 130 mg/dL Final   01/19/2019 1053 122 70 - 130 mg/dL Final   01/19/2019 0719 107 70 - 130 mg/dL Final   01/18/2019 1936 165 (H) 70 - 130 mg/dL Final   01/18/2019 1626 119 70 - 130 mg/dL Final     Lab Results   Component Value Date    HGBA1C 5.90 (H) 01/15/2019     Lab Results   Component Value Date    TSH 6.470 (H) 01/15/2019    FREET4 0.96 01/17/2019       Blood Culture   Date Value Ref Range Status   01/15/2019 Escherichia coli (A)  Final   01/15/2019 Escherichia coli (A)  Final                 Pain Management Panel     Pain Management Panel Latest Ref Rng & Units 9/16/2018    AMPHETAMINES SCREEN, URINE Negative Negative    BARBITURATES SCREEN Negative Negative    BENZODIAZEPINE SCREEN, URINE Negative Negative    BUPRENORPHINE Negative Negative    COCAINE SCREEN, URINE Negative Negative    METHADONE  SCREEN, URINE Negative Negative          I have personally reviewed the above laboratory results.   ----------------------------------------------------------------------------------------------------------------------  Imaging Results (last 24 hours)     ** No results found for the last 24 hours. **        I have personally reviewed the above radiology results.   ----------------------------------------------------------------------------------------------------------------------    Assessment/Plan       Assessment/Plan     ASSESSMENT:    1. Septic shock with lactic acid >4 on admission  2. E. Coli Bacteremia  3. MSSA bacteremia   4. Acute Pyelonephritis       PLAN:    She was sleeping this morning, sitter at bedside. CRP level continues to significantly improve down from 4.60 to 2.98 with a normal white count and afebrile.    In the setting of significant clinical and laboratory improvement, Rocephin was de-escalated to Omnicef 300 mg by mouth twice daily through 1/25/19. Gentamycin was discontinued.    CRP in the am.    Antibiotic therapy:    Omnicef 300 mg by mouth twice daily through 1/25/19      Code Status:   Code Status and Medical Interventions:   Ordered at: 01/15/19 1557     Limited Support to NOT Include:    Intubation     Code Status:    No CPR     Medical Interventions (Level of Support Prior to Arrest):    AMELIE Barraza  01/20/19  4:06 PM    Electronically signed by Juan Cardoza MD at 1/21/2019 11:32 AM          Consult Notes (most recent note)      Osman Rajan PA-C at 1/16/2019 12:06 PM      Consult Orders    1. Inpatient Infectious Diseases Consult [[id number]] ordered by Reyna Doyle APRN at 01/16/19 1053           Attestation signed by Juan Cardoza MD at 1/18/2019  6:28 AM    I have reviewed the documentation above and agree.                            INFECTIOUS DISEASE CONSULTATION REPORT        Patient Identification:  Name:  Jeanna KIRAN  Mundo  Age:  78 y.o.  Sex:  female  :  1940  MRN:  [id number]   Visit Number:  [id number]  Primary Care Physician:  Raul Robert MD         Subjective       Subjective     History of present illness:      Thank you Dr. Livingston for allowing us to participate in the care of your patient.  As you well know, Ms. Jeanna Flower is a 78 y.o. female with past medical history significantof recent E.Coli UTI on 19, MSSA bacteremia, HTN, DM, hx of CVA, dysphagia, GERD, Dementia COPD and Hypothyroidism, paroxysmal a-fib, debility, who presented to [facility] Emergency Department on 2019 for low SpO2. Patient is a poor historian. She was seen in the ED on 19 and 1/15/19 for similar complaint.     Today she has improving lactic acid of 1.2 from 4.2 on admission and resolution of leukocytosis. Chest radiograph is unremarkable. CT of Abdomen reveals minimal bibasilar atelectasis. Blood cultures 2 of 2 from 1/15 reveal E.coli. Urine cultures reveal >100,000 of E.coli.     Infectious Disease consultation was requested for antimicrobial management.      ---------------------------------------------------------------------------------------------------------------------     Review Of Systems:    Unable to obtain due to confusion     ---------------------------------------------------------------------------------------------------------------------     Past Medical History    Past Medical History:   Diagnosis Date   • Anemia    • Closed wedge compression fracture of first lumbar vertebra (CMS/[facility])    • COPD (chronic obstructive pulmonary disease) (CMS/[facility])    • CVA (cerebral vascular accident) with left hemiparesis. 2017   • Dementia    • Diabetes mellitus (CMS/[facility])    • Dysarthria following cerebral infarction    • Dysphagia, oropharyngeal phase    • Dysphagia, pharyngoesophageal phase    • GERD (gastroesophageal reflux disease)    • Heart disease    • Hyperlipidemia    • Hypertension    •  Hypothyroidism    • Insomnia    • Obesity    • Other sleep apnea 11/13/2018   • Paroxysmal atrial fibrillation (CMS/HCC) 12/6/2017       Past Surgical History    Past Surgical History:   Procedure Laterality Date   • APPENDECTOMY     • BLADDER SURGERY     • CARDIAC CATHETERIZATION     • CHOLECYSTECTOMY         Family History    Family History   Problem Relation Age of Onset   • Heart disease Mother    • Heart attack Mother    • Heart disease Father    • Heart attack Father    • Heart attack Brother    • Heart attack Brother        Social History    Social History     Tobacco Use   • Smoking status: Never Smoker   • Smokeless tobacco: Never Used   Substance Use Topics   • Alcohol use: No   • Drug use: No       Allergies    Bactrim [sulfamethoxazole-trimethoprim]; Codeine; and Penicillins  ---------------------------------------------------------------------------------------------------------------------     Home Medications:    Prior to Admission Medications     Prescriptions Last Dose Informant Patient Reported? Taking?    apixaban (ELIQUIS) 5 MG tablet tablet 1/14/2019 Nursing Home No Yes    Take 1 tablet by mouth Every 12 (Twelve) Hours.    budesonide (PULMICORT) 0.5 MG/2ML nebulizer solution 1/14/2019 Nursing Home Yes Yes    Take 0.5 mg by nebulization Every 12 (Twelve) Hours.    CeFAZolin Sodium-Dextrose (ANCEF) 2-3 GM-%(50ML) IVPB 1/14/2019 Nursing Home No Yes    Infuse 2,000 mg into a venous catheter Every 8 (Eight) Hours for 10 days.    cycloSPORINE (RESTASIS) 0.05 % ophthalmic emulsion 1/14/2019 Nursing Home Yes Yes    Administer 1 drop to both eyes 2 (Two) Times a Day.    docusate sodium (COLACE) 100 MG capsule 1/14/2019 Nursing Home Yes Yes    Take 100 mg by mouth 2 (Two) Times a Day.    famotidine (PEPCID) 20 MG tablet 1/14/2019 Nursing Home Yes Yes    Take 20 mg by mouth 2 (Two) Times a Day.    fluticasone (FLONASE) 50 MCG/ACT nasal spray 1/14/2019 Nursing Home Yes Yes    2 sprays into the nostril(s) as  directed by provider Daily.    fluticasone-salmeterol (ADVAIR DISKUS) 250-50 MCG/DOSE DISKUS 1/14/2019 Nursing Home Yes Yes    Inhale 1 puff 2 (Two) Times a Day.    lactobacillus acidophilus (RISAQUAD) capsule capsule 1/14/2019 Nursing Home No Yes    Take 1 capsule by mouth Daily for 9 days.    levothyroxine (SYNTHROID, LEVOTHROID) 100 MCG tablet 1/14/2019 Nursing Home Yes Yes    Take 100 mcg by mouth Daily.    venlafaxine (EFFEXOR) 37.5 MG tablet 1/14/2019 Nursing Home Yes Yes    Take 37.5 mg by mouth 2 (Two) Times a Day.    albuterol sulfate  (90 Base) MCG/ACT inhaler Unknown Nursing Home Yes No    Inhale 2 puffs Every 4 (Four) Hours As Needed for Wheezing or Shortness of Air.    HYDROcodone-acetaminophen (NORCO) 5-325 MG per tablet Unknown Nursing Home No No    Take 1 tablet by mouth Every 6 (Six) Hours As Needed for Moderate Pain .    montelukast (SINGULAIR) 10 MG tablet 1/13/2019 Nursing Home Yes No    Take 10 mg by mouth Every Night.    QUEtiapine (SEROquel) 25 MG tablet 1/13/2019 Nursing Home No No    Take 1 tablet by mouth Every Night.    QUEtiapine (SEROquel) 25 MG tablet Unknown Nursing Home No No    Take 1 tablet by mouth 2 (Two) Times a Day As Needed (Agitation, halllucinations).        ---------------------------------------------------------------------------------------------------------------------    Objective       Objective     Hospital Scheduled Meds:    apixaban 5 mg Oral Q12H   aztreonam 1 g Intravenous Q8H   budesonide-formoterol 2 puff Inhalation BID - RT   ceFAZolin 2 g Intravenous Q8H   docusate sodium 100 mg Oral BID   famotidine 20 mg Oral BID   fluticasone 2 spray Nasal Daily   lactobacillus acidophilus 1 capsule Oral Daily   levothyroxine 100 mcg Oral Daily   montelukast 10 mg Oral Nightly   potassium chloride 40 mEq Oral Q4H   QUEtiapine 25 mg Oral Nightly   sodium chloride 3 mL Intravenous Q12H   venlafaxine 37.5 mg Oral BID With Meals       Pharmacy Consult - Pharmacy to dose      Sodium chloride 125 mL/hr Last Rate: 125 mL/hr (01/16/19 0732)     ---------------------------------------------------------------------------------------------------------------------   Vital Signs:  Temp:  [97.8 °F (36.6 °C)-98.8 °F (37.1 °C)] 98.1 °F (36.7 °C)  Heart Rate:  [] 62  Resp:  [18-22] 18  BP: (100-156)/(55-96) 146/82  Mean Arterial Pressure (Non-Invasive) for the past 24 hrs (Last 3 readings):   Noninvasive MAP (mmHg)   01/15/19 1548 78   01/15/19 1517 91   01/15/19 1502 87     SpO2 Percentage    01/16/19 0029 01/16/19 0700 01/16/19 0706   SpO2: 98% 92% 98%     SpO2:  [92 %-98 %] 98 %  on  Flow (L/min):  [2] 2;   Device (Oxygen Therapy): nasal cannula    Body mass index is 28.79 kg/m².  Wt Readings from Last 3 Encounters:   01/15/19 78.5 kg (173 lb)   01/14/19 78.5 kg (173 lb)   01/07/19 76.8 kg (169 lb 6.4 oz)     ---------------------------------------------------------------------------------------------------------------------     Physical Exam:    Constitutional:  Well-developed and well-nourished.  No respiratory distress.      HENT:  Head: Normocephalic and atraumatic.  Mouth:  Moist mucous membranes.    Eyes:  Conjunctivae and EOM are normal.  No scleral icterus.  Neck:  Neck supple.  No JVD present.    Cardiovascular:  Normal rate, regular rhythm and normal heart sounds with no murmur. No edema.  Pulmonary/Chest:  No respiratory distress, no wheezes, no crackles, with normal breath sounds and good air movement.  Abdominal:  Soft.  Bowel sounds are normal.  No distension and no tenderness.   Musculoskeletal:  No edema, no tenderness, and no deformity.  No swelling or redness of joints.  Neurological:  Confused  Skin:  Skin is warm and dry.  No rash noted.  No pallor.     ---------------------------------------------------------------------------------------------------------------------    Results from last 7 days   Lab Units 01/15/19  0549 01/15/19  0018 01/14/19  2153   TROPONIN I  ng/mL 0.048* 0.053* 0.011       Results from last 7 days   Lab Units 01/15/19  1227   TRIGLYCERIDES mg/dL 82     Results from last 7 days   Lab Units 01/15/19  1328   PH, ARTERIAL pH units 7.464*   PO2 ART mm Hg 89.3   PCO2, ARTERIAL mm Hg 27.4*   HCO3 ART mmol/L 19.2*     Results from last 7 days   Lab Units 01/16/19  0700 01/15/19  1227 01/15/19  0457 01/15/19  0018 01/14/19  2153  01/11/19  0450 01/10/19  0536   CRP mg/dL  --   --   --   --   --   --  5.10* 9.75*   LACTATE mmol/L  --  1.2 3.1* 4.2*  --   --   --   --    WBC 10*3/mm3 9.83 16.96*  --   --  11.56   < >  --  8.42   HEMOGLOBIN g/dL 8.6* 9.3*  --   --  10.7*   < >  --  10.8*   HEMATOCRIT % 28.9* 31.4*  --   --  36.0*   < >  --  36.1*   MCV fL 94.8* 92.1  --   --  94.0   < >  --  93.0   MCHC g/dL 29.8* 29.6*  --   --  29.7*   < >  --  29.9*   PLATELETS 10*3/mm3 326 329  --   --  363   < >  --  236    < > = values in this interval not displayed.     Results from last 7 days   Lab Units 01/16/19  0700 01/15/19  1227 01/14/19  2153  01/11/19  0450  01/10/19  0536   SODIUM mmol/L 144 141 141   < > 138  --  139   POTASSIUM mmol/L 3.4* 4.0 5.0   < > 4.4   < > 3.5   MAGNESIUM mg/dL  --   --   --   --  1.9  --  1.6*   CHLORIDE mmol/L 117* 113* 108   < > 109  --  110   CO2 mmol/L 20.0* 22.1* 19.5*   < > 21.7*  --  21.2*   BUN mg/dL 14 16 13   < > 12  --  12   CREATININE mg/dL 0.77 0.73 0.93   < > 0.68  --  0.73   EGFR IF NONAFRICN AM mL/min/1.73 73 77 58*   < > 84  --  77   CALCIUM mg/dL 7.7 8.0 8.8   < > 8.3  --  8.4   GLUCOSE mg/dL 101 97 164*   < > 107  --  83   ALBUMIN g/dL 2.60* 2.80* 3.00*   < > 2.90*  --  2.90*   BILIRUBIN mg/dL 0.3 0.5 1.1   < > 0.4  --  0.5   ALK PHOS U/L 298* 379* 503*   < > 246*  --  288*   AST (SGOT) U/L 108* 223* 514*   < > 49*  --  89*   ALT (SGPT) U/L 51* 84* 104*   < > 45*  --  102*    < > = values in this interval not displayed.   Estimated Creatinine Clearance: 61 mL/min (by C-G formula based on SCr of 0.77 mg/dL).  No results  found for: AMMONIA    Hemoglobin A1C   Date/Time Value Ref Range Status   01/15/2019 1227 5.90 (H) 4.50 - 5.70 % Final     Glucose   Date/Time Value Ref Range Status   01/16/2019 1047 104 70 - 130 mg/dL Final   01/16/2019 0731 108 70 - 130 mg/dL Final   01/15/2019 2122 111 70 - 130 mg/dL Final     Lab Results   Component Value Date    HGBA1C 5.90 (H) 01/15/2019     Lab Results   Component Value Date    TSH 6.470 (H) 01/15/2019       Blood Culture   Date Value Ref Range Status   01/15/2019 Abnormal Stain (A)  Preliminary   01/15/2019 (A)  Preliminary    Gram Negative Bacilli, Morphology consistent with Escherichia / Citrobacter   01/15/2019 Abnormal Stain (A)  Preliminary   01/15/2019 (A)  Preliminary    Gram Negative Bacilli, Morphology consistent with Escherichia / Citrobacter                 Pain Management Panel     Pain Management Panel Latest Ref Rng & Units 9/16/2018    AMPHETAMINES SCREEN, URINE Negative Negative    BARBITURATES SCREEN Negative Negative    BENZODIAZEPINE SCREEN, URINE Negative Negative    BUPRENORPHINE Negative Negative    COCAINE SCREEN, URINE Negative Negative    METHADONE SCREEN, URINE Negative Negative        I have personally reviewed the above laboratory results.   ---------------------------------------------------------------------------------------------------------------------  Imaging Results (last 7 days)     Procedure Component Value Units Date/Time    XR Chest 1 View [[id number]] Collected:  01/16/19 0901     Updated:  01/16/19 0903    Narrative:       XR CHEST 1 VW-     CLINICAL INDICATION: leukocytosis r/t pneumonia; K85.90-Acute  pancreatitis without necrosis or infection, unspecified; N39.0-Urinary  tract infection, site not specified          COMPARISON: 1/14/2019      TECHNIQUE: Single frontal view of the chest.     FINDINGS:     There is no focal alveolar infiltrate or effusion.  The cardiac silhouette is normal. The pulmonary vasculature is  unremarkable.  There is no  evidence of an acute osseous abnormality.   There are no suspicious-appearing parenchymal soft tissue nodules.            Impression:       No evidence of active or acute cardiopulmonary disease on today's chest  radiograph.         This report was finalized on 1/16/2019 9:01 AM by Dr. Ulysses Rojas MD.       CT Abdomen Pelvis With Contrast [[id number]] Collected:  01/15/19 0701     Updated:  01/15/19 1308    Narrative:       CT ABDOMEN PELVIS WITH CONTRAST-     CLINICAL INDICATION: Abdominal pain, unspecified          COMPARISON: None available     TECHNIQUE: Axial images were acquired from the lung bases through the  pubic symphysis after IV , but without oral contrast.  Reformatted images were created in both the coronal and sagittal planes.     Radiation dose reduction techniques were utilized per ALARA protocol.  Automated exposure control was initiated through either or [employer] or  [employer] software packages by  protocol.           FINDINGS:   There is minimal bibasilar atelectasis.     There is a hiatal hernia.     The liver is homogeneous. There is no evidence of focal hepatic mass     The spleen is homogeneous     There is no peripancreatic stranding or pancreatic head mass.     There is no adrenal enlargement.     The kidneys show no evidence of hydronephrosis or hydroureter. I do not  see any distal ureteral stones.      Otherwise I do not see any free fluid or walled off fluid collections.     There is no bowel wall thickening or mesenteric stranding.               Impression:       1. Moderate sized hiatal hernia.  2. Mild compression of superior endplate of L1.                   This report was finalized on 1/15/2019 1:05 PM by Dr. Ulysses Rojas MD.       XR Chest 1 View [[id number]] Collected:  01/15/19 0701     Updated:  01/15/19 1300    Narrative:       XR CHEST 1 VIEW-     CLINICAL INDICATION: SOA          COMPARISON: 01/14/2019      TECHNIQUE: Single frontal view of the chest.      FINDINGS:     Right-sided PICC line tip in the superior vena cava.  The cardiac silhouette is normal. The pulmonary vasculature is  unremarkable.  There is no evidence of an acute osseous abnormality.   There are no suspicious-appearing parenchymal soft tissue nodules.            Impression:       No evidence of active or acute cardiopulmonary disease on today's chest  radiograph.         This report was finalized on 1/15/2019 12:58 PM by Dr. Ulysses Rojas MD.           I have personally reviewed the above radiology results.   ---------------------------------------------------------------------------------------------------------------------      Assessment & Plan        Assessment/Plan       ASSESSMENT:    1. Septic shock with lactic acid >4 on admission  2. E. Coli Bacteremia  3. MSSA bacteremia   4. Acute Pyelonephritis       PLAN:    Thank you Dr. Livingston for allowing us to participate in the care of your patient.  As you well know, Ms. Jeanna Flower is a 78 y.o. female with past medical history significantof recent E.Coli UTI on 1/7/19, MSSA bacteremia, HTN, DM, hx of CVA, dysphagia, GERD, Dementia COPD and Hypothyroidism, paroxysmal a-fib, debility, who presented to [facility] Emergency Department on 1/14/2019 for low SpO2. Patient is a poor historian. She was seen in the ED on 1/14/19 and 1/15/19 for similar complaint.     Today she has improving lactic acid of 1.2 from 4.2 on admission and resolution of leukocytosis. Chest radiograph is unremarkable. CT of Abdomen reveals minimal bibasilar atelectasis. Blood cultures 2 of 2 from 1/15 reveal E.coli. Urine cultures reveal >100,000 of E.coli.     At this time patient shows no sign of relapsing MSSA bacteremia. It is unusual for the patient to present with E. Coli bacteremia and E. Coli uremia with susceptibilities to Cefazolin in the setting of current treatment with Cefazolin. For now we are awaiting culture susceptibilities. Coverage changes to  Rocephin 2g IV Q24hrs and Gentamycin 3mg/kg IV Q24hrs. Patient has allergy to Penicillin however patient tolerated Cefazolin without complaint.     Current antimicrobials   Rocephin 2g IV Q24hrs  Gentamycin 3mg/kg IV Q24hrs      Code Status:   Code Status and Medical Interventions:   Ordered at: 01/15/19 1552     Limited Support to NOT Include:    Intubation     Code Status:    No CPR     Medical Interventions (Level of Support Prior to Arrest):    Limited           Osman Rajan PA-C  01/16/19  12:07 PM    Electronically signed by Juan Cardoza MD at 1/18/2019  6:28 AM          Nutrition Notes (most recent note)      Radha Allen RD at 1/19/2019  6:11 PM        Nutrition Services    Patient Name:  Jeanna Flower  YOB: 1940  MRN: [id number]  Admit Date:  1/14/2019    Consult received due to low albumin.  Albumin no longer used as an indicator of nutrition status.  Intake remains poor.  Will increase supplements tid Boost plus shake tid.  Will continue to follow.     Electronically signed by:  Radha Allen RD  01/19/19 6:12 PM     Electronically signed by Radha Allen RD at 1/19/2019  6:13 PM       Physical Therapy Notes (most recent note)     No notes of this type exist for this encounter.        Occupational Therapy Notes (most recent note)     No notes of this type exist for this encounter.           Speech Language Pathology Notes (most recent note)      Ivanna Kuo MS CCC-SLP at 1/16/2019  4:37 PM          Problem: Patient Care Overview  Goal: Plan of Care Review  Outcome: Outcome(s) achieved Date Met: 01/16/19 01/16/19 1615   Coping/Psychosocial   Plan of Care Reviewed With patient   OTHER   Outcome Summary MBS this pm w/ aspiration of nectar thick and thin liquids. High risk of aspiration w/ any po consistency 2/2 advanced dementia, fluent wernicke's like verbalizations, interfering w/ swallow function. D/w Dr. Livingston who agrees pt least restrictive  puree diet, thin liquids to be continued 2/2 DNR/DNI, family w/o wishes for alternative nutrition. Family understands risks of aspiration. Plan: Puree diet, thin liquids.            Electronically signed by Ivanna Kuo MS CCC-SLP at 1/16/2019  4:37 PM       Respiratory Therapy Notes (most recent note)     No notes of this type exist for this encounter.        ADL Documentation (most recent)      Most Recent Value   Presence of Pain  denies pain/discomfort   Transferring  4 - completely dependent   Toileting  4 - completely dependent   Bathing  4 - completely dependent   Dressing  4 - completely dependent   Eating  4 - completely dependent   Communication  2 - difficulty understanding and speaking (not related to language barrier)   Swallowing  2 - difficulty swallowing liquids/foods   Equipment Currently Used at Home  other (see comments) [pt from NH]            Discharge Summary     No notes of this type exist for this encounter.        Discharge Order (From admission, onward)    Start     Ordered    01/21/19 1119  Discharge patient  Once     Expected Discharge Date:  01/21/19    Expected Discharge Time:  Morning    Discharge Disposition:  Skilled Nursing Facility (DC - External)    Physician of Record for Attribution - Please select from Treatment Team:  DENI QUINTEROS [[id number]]    Review needed by CMO to determine Physician of Record:  No       Question Answer Comment   Physician of Record for Attribution - Please select from Treatment Team DENI QUINTEROS    Review needed by CMO to determine Physician of Record No        01/21/19 1119

## 2019-01-21 NOTE — PLAN OF CARE
Problem: Patient Care Overview  Goal: Plan of Care Review  Outcome: Ongoing (interventions implemented as appropriate)   01/21/19 0902   Coping/Psychosocial   Plan of Care Reviewed With patient   Plan of Care Review   Progress no change       Problem: Fall Risk (Adult)  Goal: Identify Related Risk Factors and Signs and Symptoms  Outcome: Ongoing (interventions implemented as appropriate)    Goal: Absence of Fall  Outcome: Ongoing (interventions implemented as appropriate)      Problem: Pain, Acute (Adult)  Goal: Identify Related Risk Factors and Signs and Symptoms  Outcome: Ongoing (interventions implemented as appropriate)    Goal: Acceptable Pain Control/Comfort Level  Outcome: Ongoing (interventions implemented as appropriate)      Problem: Skin Injury Risk (Adult)  Goal: Identify Related Risk Factors and Signs and Symptoms  Outcome: Ongoing (interventions implemented as appropriate)    Goal: Skin Health and Integrity  Outcome: Ongoing (interventions implemented as appropriate)      Problem: Nausea/Vomiting (Adult)  Goal: Identify Related Risk Factors and Signs and Symptoms  Outcome: Ongoing (interventions implemented as appropriate)    Goal: Symptom Relief  Outcome: Ongoing (interventions implemented as appropriate)    Goal: Adequate Hydration  Outcome: Ongoing (interventions implemented as appropriate)      Problem: Wound (Includes Pressure Injury) (Adult)  Goal: Signs and Symptoms of Listed Potential Problems Will be Absent, Minimized or Managed (Wound)  Outcome: Ongoing (interventions implemented as appropriate)

## 2019-01-21 NOTE — PROGRESS NOTES
PROGRESS NOTE         Patient Identification:  Name:  Jeanna Flower  Age:  78 y.o.  Sex:  female  :  1940  MRN:  [id number]  Visit Number:  [id number]  Primary Care Provider:  Raul Robert MD      ----------------------------------------------------------------------------------------------------------------------  Subjective       Chief Complaints:    Shortness of Breath and Vomiting        Interval History:        She is more awake this morning. No issues overnight. She is being discharged back to [facility].    Review of Systems:  Unable to obtain  ----------------------------------------------------------------------------------------------------------------------      Objective       Current [facility] Meds:    apixaban 5 mg Oral Q12H   budesonide-formoterol 2 puff Inhalation BID - RT   cefdinir 300 mg Oral Q12H   docusate sodium 100 mg Oral BID   famotidine 20 mg Oral BID   fluticasone 2 spray Nasal Daily   levothyroxine 100 mcg Oral Q AM   montelukast 10 mg Oral Nightly   QUEtiapine 25 mg Oral Nightly   sodium chloride 10 mL Intravenous Q12H   sodium chloride 3 mL Intravenous Q12H   venlafaxine 37.5 mg Oral BID With Meals       sodium chloride 75 mL/hr Last Rate: 75 mL/hr (19 0230)     ----------------------------------------------------------------------------------------------------------------------    Vital Signs:  Temp:  [97.7 °F (36.5 °C)-98.1 °F (36.7 °C)] 97.8 °F (36.6 °C)  Heart Rate:  [81-88] 88  Resp:  [17-20] 20  BP: (102-148)/(45-62) 148/62  Mean Arterial Pressure (Non-Invasive) for the past 24 hrs (Last 3 readings):   Noninvasive MAP (mmHg)   19 1605 83     SpO2 Percentage    19 2241 19 0620 19 0709   SpO2: 99% 99% 97%     SpO2:  [96 %-99 %] 97 %  on   ;   Device (Oxygen Therapy): room air    Body mass index is 28.79 kg/m².  Wt Readings from Last 3 Encounters:   01/15/19 78.5 kg (173 lb)   19 78.5 kg (173 lb)   19 76.8 kg (169 lb 6.4 oz)         Intake/Output Summary (Last 24 hours) at 1/21/2019 1302  Last data filed at 1/21/2019 0300  Gross per 24 hour   Intake 420 ml   Output 1200 ml   Net -780 ml     Diet Pureed; Thin  ----------------------------------------------------------------------------------------------------------------------    Physical exam:    Constitutional:  Well-developed and well-nourished.  No respiratory distress.      HENT:  Head:  Normocephalic and atraumatic.  Mouth:  Moist mucous membranes.    Eyes:  Conjunctivae and EOM are normal.  No scleral icterus.    Neck:  Neck supple.  No JVD present.    Cardiovascular:  Normal rate, regular rhythm and normal heart sounds with no murmur. No edema.  Pulmonary/Chest:  No respiratory distress, no wheezes, no crackles, with normal breath sounds and good air movement.  Abdominal:  Soft.  Bowel sounds are normal.  No distension and no tenderness.   Musculoskeletal:  No edema, no tenderness, and no deformity.  No red or swollen joints anywhere.    Neurological:  Alert and oriented to person, place, and time. No facial droop.  No slurred speech.   Skin:  Skin is warm and dry. No rash noted. No pallor.     ----------------------------------------------------------------------------------------------------------------------  I have personally reviewed the EKG/Telemetry strips   ----------------------------------------------------------------------------------------------------------------------  Results from last 7 days   Lab Units 01/15/19  0549 01/15/19  0018 01/14/19  2153   TROPONIN I ng/mL 0.048* 0.053* 0.011       Results from last 7 days   Lab Units 01/15/19  1227   TRIGLYCERIDES mg/dL 82     Results from last 7 days   Lab Units 01/20/19  1219   PH, ARTERIAL pH units 7.436   PO2 ART mm Hg 78.0*   PCO2, ARTERIAL mm Hg 35.1   HCO3 ART mmol/L 23.1     Results from last 7 days   Lab Units 01/21/19  0442 01/20/19  0915 01/19/19  0422  01/15/19  1227 01/15/19  0457 01/15/19  0018   CRP mg/dL  3.15* 5.01* 2.98*   < >  --   --   --    LACTATE mmol/L  --   --   --   --  1.2 3.1* 4.2*   WBC 10*3/mm3 7.35 7.97 11.87   < > 16.96*  --   --    HEMOGLOBIN g/dL 9.1* 9.5* 9.5*   < > 9.3*  --   --    HEMATOCRIT % 29.1* 30.9* 31.0*   < > 31.4*  --   --    MCV fL 92.1 92.2 92.8   < > 92.1  --   --    MCHC g/dL 31.3* 30.7* 30.6*   < > 29.6*  --   --    PLATELETS 10*3/mm3 347 388 380   < > 329  --   --     < > = values in this interval not displayed.     Results from last 7 days   Lab Units 01/21/19  0442 01/20/19  0915 01/19/19  0422   SODIUM mmol/L 135 139 134*   POTASSIUM mmol/L 3.6 3.8 3.9   MAGNESIUM mg/dL 2.3 1.5* 1.8   CHLORIDE mmol/L 106 107 106   CO2 mmol/L 23.5* 25.8 21.8*   BUN mg/dL 5* 7 7   CREATININE mg/dL 0.60 0.68 0.64   EGFR IF NONAFRICN AM mL/min/1.73 97 84 90   CALCIUM mg/dL 7.5* 8.2 7.9   GLUCOSE mg/dL 116* 101 109   ALBUMIN g/dL 2.40* 2.60* 2.60*   BILIRUBIN mg/dL 0.3 0.4 0.4   ALK PHOS U/L 217* 256* 311*   AST (SGOT) U/L 31* 28 42*   ALT (SGPT) U/L 21 15 26   Estimated Creatinine Clearance: 61 mL/min (by C-G formula based on SCr of 0.6 mg/dL).  No results found for: AMMONIA    Glucose   Date/Time Value Ref Range Status   01/21/2019 0709 105 70 - 130 mg/dL Final   01/20/2019 2047 98 70 - 130 mg/dL Final   01/20/2019 1552 119 70 - 130 mg/dL Final   01/20/2019 1038 128 70 - 130 mg/dL Final   01/20/2019 0656 101 70 - 130 mg/dL Final   01/19/2019 1914 187 (H) 70 - 130 mg/dL Final   01/19/2019 1634 111 70 - 130 mg/dL Final   01/19/2019 1053 122 70 - 130 mg/dL Final     Lab Results   Component Value Date    HGBA1C 5.90 (H) 01/15/2019     Lab Results   Component Value Date    TSH 6.470 (H) 01/15/2019    FREET4 0.96 01/17/2019       Blood Culture   Date Value Ref Range Status   01/15/2019 Escherichia coli (A)  Final   01/15/2019 Escherichia coli (A)  Final                 Pain Management Panel     Pain Management Panel Latest Ref Rng & Units 9/16/2018    AMPHETAMINES SCREEN, URINE Negative Negative     BARBITURATES SCREEN Negative Negative    BENZODIAZEPINE SCREEN, URINE Negative Negative    BUPRENORPHINE Negative Negative    COCAINE SCREEN, URINE Negative Negative    METHADONE SCREEN, URINE Negative Negative          I have personally reviewed the above laboratory results.   ----------------------------------------------------------------------------------------------------------------------  Imaging Results (last 24 hours)     ** No results found for the last 24 hours. **        I have personally reviewed the above radiology results.   ----------------------------------------------------------------------------------------------------------------------    Assessment/Plan       Assessment/Plan     ASSESSMENT:    1. Septic shock with lactic acid >4 on admission  2. E. Coli Bacteremia  3. MSSA bacteremia   4. Acute Pyelonephritis       PLAN:    She is more awake this morning. No issues overnight. She is being discharged back to SNF.    In the setting of significant clinical and laboratory improvement, Rocephin was de-escalated to Omnicef 300 mg by mouth twice daily through 1/25/19. Gentamycin was discontinued.    CRP in the am.    Antibiotic therapy:    Omnicef 300 mg by mouth twice daily through 1/25/19      Code Status:   Code Status and Medical Interventions:   Ordered at: 01/15/19 1551     Limited Support to NOT Include:    Intubation     Code Status:    No CPR     Medical Interventions (Level of Support Prior to Arrest):    Marita Rajan, AMELIE  01/21/19  1:02 PM

## 2019-01-21 NOTE — PROGRESS NOTES
Discharge Planning Assessment  [facility]     Patient Name: Jeanna Flower  MRN: [id number]  Today's Date: 1/21/2019    Admit Date: 1/14/2019        Discharge Plan     Row Name 01/21/19 1145       Plan    Final Discharge Disposition Code  03 - skilled nursing facility (SNF)    Final Note Pt is being discharged back to [facility] today. SS contacted [facility] [phone] per Reyna who states pt has a bed avaiable. SS notified Radha STRONG. SS faxed information including prescription and AVS to [facility] [phone]. Nurse to call report to [facility]. SS contacted pt's Ernestina TAY at [phone] who is agreeable for pt to be discharged back to [facility] today. SS completed EMS PCS form and will contact EMS when pt is ready for EMS. SS to follow.     12:45 SS contacted [facility] per Karla. No other needs identified.         Destination - Selection Complete      Service Provider Request Status Selected Services Address Phone Number Fax Number    [facility] Selected Skilled Nursing [address], [facility] [address] [phone]     Lakia Alatorre

## 2019-01-24 ENCOUNTER — APPOINTMENT (OUTPATIENT)
Dept: GENERAL RADIOLOGY | Facility: HOSPITAL | Age: 79
End: 2019-01-24

## 2019-01-24 ENCOUNTER — HOSPITAL ENCOUNTER (EMERGENCY)
Facility: HOSPITAL | Age: 79
Discharge: HOME | End: 2019-01-24
Attending: EMERGENCY MEDICINE | Admitting: EMERGENCY MEDICINE

## 2019-01-24 ENCOUNTER — APPOINTMENT (OUTPATIENT)
Dept: CT IMAGING | Facility: HOSPITAL | Age: 79
End: 2019-01-24

## 2019-01-24 VITALS
HEIGHT: 65 IN | TEMPERATURE: 98.2 F | BODY MASS INDEX: 28.49 KG/M2 | SYSTOLIC BLOOD PRESSURE: 152 MMHG | OXYGEN SATURATION: 99 % | HEART RATE: 89 BPM | WEIGHT: 171 LBS | RESPIRATION RATE: 18 BRPM | DIASTOLIC BLOOD PRESSURE: 86 MMHG

## 2019-01-24 DIAGNOSIS — S70.01XA CONTUSION OF RIGHT HIP, INITIAL ENCOUNTER: ICD-10-CM

## 2019-01-24 DIAGNOSIS — S09.90XA INJURY OF HEAD, INITIAL ENCOUNTER: Primary | ICD-10-CM

## 2019-01-24 PROCEDURE — 70450 CT HEAD/BRAIN W/O DYE: CPT | Performed by: RADIOLOGY

## 2019-01-24 PROCEDURE — 73502 X-RAY EXAM HIP UNI 2-3 VIEWS: CPT

## 2019-01-24 PROCEDURE — 73552 X-RAY EXAM OF FEMUR 2/>: CPT

## 2019-01-24 PROCEDURE — 73552 X-RAY EXAM OF FEMUR 2/>: CPT | Performed by: RADIOLOGY

## 2019-01-24 PROCEDURE — 99284 EMERGENCY DEPT VISIT MOD MDM: CPT

## 2019-01-24 PROCEDURE — 70450 CT HEAD/BRAIN W/O DYE: CPT

## 2019-01-24 PROCEDURE — 73502 X-RAY EXAM HIP UNI 2-3 VIEWS: CPT | Performed by: RADIOLOGY

## 2019-01-24 PROCEDURE — 72125 CT NECK SPINE W/O DYE: CPT | Performed by: RADIOLOGY

## 2019-01-24 PROCEDURE — 72125 CT NECK SPINE W/O DYE: CPT

## 2019-01-24 NOTE — ED NOTES
Nursing home reports they found patient on the floor and states she had fallen out of her wheelchair and is not complaining of right hip pain. Nursing home also reports patient hit her head on her wheelchair as well. Nursing home reports patient is confused but this is her norm. Upon observation no redness or bruseing noted on patient.   Patient resting quietly on stretcher with no complaints. Pt AA. No respiratory distress noted. Respirations even and unlabored. Pt denies any needs at this time. Skin PWD.  Will continue to monitor and follow plan of care. Bed rails up x 2, bed in lowest position, call light within reach.      Radha Mcdermott, RN  01/24/19 0973

## 2019-01-24 NOTE — ED PROVIDER NOTES
Subjective   78-year-old white female presents secondary to falling out of her wheelchair at the nursing home.  She states that she struck the back of her head and hurt her right hip.  He denies loss of consciousness.  No chest pain or abdominal pain.  No neck or back pain.  She voices no other complaints at this time.  Her only request is a [employer].            Review of Systems   Constitutional: Negative.  Negative for fever.   HENT: Negative.    Respiratory: Negative.    Cardiovascular: Negative.  Negative for chest pain.   Gastrointestinal: Negative.  Negative for abdominal pain.   Endocrine: Negative.    Genitourinary: Negative.  Negative for dysuria.   Skin: Negative.    Neurological: Negative.    Psychiatric/Behavioral: Negative.    All other systems reviewed and are negative.      Past Medical History:   Diagnosis Date   • Anemia    • Closed wedge compression fracture of first lumbar vertebra (CMS/HCC)    • COPD (chronic obstructive pulmonary disease) (CMS/[facility])    • CVA (cerebral vascular accident) with left hemiparesis. 12/6/2017   • Dementia    • Diabetes mellitus (CMS/[facility])    • Dysarthria following cerebral infarction    • Dysphagia, oropharyngeal phase    • Dysphagia, pharyngoesophageal phase    • GERD (gastroesophageal reflux disease)    • Heart disease    • Hyperlipidemia    • Hypertension    • Hypothyroidism    • Insomnia    • Obesity    • Other sleep apnea 11/13/2018   • Paroxysmal atrial fibrillation (CMS/[facility]) 12/6/2017       Allergies   Allergen Reactions   • Bactrim [Sulfamethoxazole-Trimethoprim] Unknown (See Comments)     Pt does not know.        • Codeine Unknown (See Comments)     Pt reports she doesn't know    • Penicillins Unknown (See Comments)     Pt reports she does not know        Past Surgical History:   Procedure Laterality Date   • APPENDECTOMY     • BLADDER SURGERY     • CARDIAC CATHETERIZATION     • CHOLECYSTECTOMY         Family History   Problem Relation Age of Onset   • Heart  disease Mother    • Heart attack Mother    • Heart disease Father    • Heart attack Father    • Heart attack Brother    • Heart attack Brother        Social History     Socioeconomic History   • Marital status: Single     Spouse name: Not on file   • Number of children: Not on file   • Years of education: Not on file   • Highest education level: Not on file   Tobacco Use   • Smoking status: Never Smoker   • Smokeless tobacco: Never Used   Substance and Sexual Activity   • Alcohol use: No   • Drug use: No   • Sexual activity: Defer           Objective   Physical Exam   Constitutional: She is oriented to person, place, and time. She appears well-developed and well-nourished. No distress.   HENT:   Head: Normocephalic and atraumatic.   Right Ear: External ear normal.   Left Ear: External ear normal.   Nose: Nose normal.   Tender over posterior scalp.  No deformity.   Eyes: Conjunctivae and EOM are normal. Pupils are equal, round, and reactive to light.   Neck: Normal range of motion. Neck supple. No JVD present. No tracheal deviation present.   Cardiovascular: Normal rate, regular rhythm and normal heart sounds.   No murmur heard.  Pulmonary/Chest: Effort normal and breath sounds normal. No respiratory distress. She has no wheezes.   Abdominal: Soft. Bowel sounds are normal. There is no tenderness.   Musculoskeletal: Normal range of motion. She exhibits no edema or deformity.   Tender over her right hip though patient is able to move this without difficulty.   Neurological: She is alert and oriented to person, place, and time. No cranial nerve deficit.   Skin: Skin is warm and dry. No rash noted. She is not diaphoretic. No erythema. No pallor.   Psychiatric: She has a normal mood and affect. Her behavior is normal. Thought content normal.   Nursing note and vitals reviewed.      Procedures           ED Course  ED Course as of Jan 24 1059   Thu Jan 24, 2019   0941 Negative per Dr. Lopez XR Femur 2 View Right [JI]   0941  Negative per Dr. Lopez XR Hip With or Without Pelvis 2 - 3 View Right [JI]      ED Course User Index  [JI] Rubén Hwang PA                  MDM  Number of Diagnoses or Management Options  Contusion of right hip, initial encounter: new and requires workup  Injury of head, initial encounter: new and requires workup     Amount and/or Complexity of Data Reviewed  Tests in the radiology section of CPT®: ordered and reviewed  Discuss the patient with other providers: yes  Independent visualization of images, tracings, or specimens: yes    Risk of Complications, Morbidity, and/or Mortality  Presenting problems: moderate          Final diagnoses:   Injury of head, initial encounter   Contusion of right hip, initial encounter            Rubén Hwang PA  01/24/19 1100

## 2019-01-24 NOTE — ED NOTES
Called [facility]. EMS for transfer back to nursing home. They accepted. Said it would be about 20 minutes until they could get here.      Nirali Morales  01/24/19 102

## 2019-02-02 ENCOUNTER — APPOINTMENT (OUTPATIENT)
Dept: GENERAL RADIOLOGY | Facility: HOSPITAL | Age: 79
End: 2019-02-02

## 2019-02-02 ENCOUNTER — HOSPITAL ENCOUNTER (INPATIENT)
Facility: HOSPITAL | Age: 79
LOS: 4 days | Discharge: NURSING, REHAB OR LONG-TERM CARE | End: 2019-02-06
Attending: EMERGENCY MEDICINE | Admitting: INTERNAL MEDICINE

## 2019-02-02 DIAGNOSIS — J18.9 PNEUMONIA OF RIGHT MIDDLE LOBE DUE TO INFECTIOUS ORGANISM: Primary | ICD-10-CM

## 2019-02-02 DIAGNOSIS — G47.39 OTHER SLEEP APNEA: ICD-10-CM

## 2019-02-02 DIAGNOSIS — A41.9 SEPSIS, DUE TO UNSPECIFIED ORGANISM: ICD-10-CM

## 2019-02-02 DIAGNOSIS — R53.81 DEBILITY: ICD-10-CM

## 2019-02-02 DIAGNOSIS — A41.9 SEVERE SEPSIS (HCC): ICD-10-CM

## 2019-02-02 DIAGNOSIS — R65.20 SEVERE SEPSIS (HCC): ICD-10-CM

## 2019-02-02 DIAGNOSIS — I48.0 PAROXYSMAL ATRIAL FIBRILLATION (HCC): ICD-10-CM

## 2019-02-02 LAB
A-A DO2: 112.4 MMHG (ref 0–300)
ACANTHOCYTES BLD QL SMEAR: ABNORMAL
ALBUMIN SERPL-MCNC: 2.6 G/DL (ref 3.4–4.8)
ALBUMIN SERPL-MCNC: 3.4 G/DL (ref 3.4–4.8)
ALBUMIN/GLOB SERPL: 0.8 G/DL (ref 1.5–2.5)
ALBUMIN/GLOB SERPL: 0.9 G/DL (ref 1.5–2.5)
ALP SERPL-CCNC: 154 U/L (ref 35–104)
ALP SERPL-CCNC: 208 U/L (ref 35–104)
ALT SERPL W P-5'-P-CCNC: 33 U/L (ref 10–36)
ALT SERPL W P-5'-P-CCNC: 45 U/L (ref 10–36)
AMYLASE SERPL-CCNC: 26 U/L (ref 28–100)
ANION GAP SERPL CALCULATED.3IONS-SCNC: 10.8 MMOL/L (ref 3.6–11.2)
ANION GAP SERPL CALCULATED.3IONS-SCNC: 7.8 MMOL/L (ref 3.6–11.2)
ANISOCYTOSIS BLD QL: ABNORMAL
ARTERIAL PATENCY WRIST A: ABNORMAL
AST SERPL-CCNC: 41 U/L (ref 10–30)
AST SERPL-CCNC: 53 U/L (ref 10–30)
ATMOSPHERIC PRESS: 733 MMHG
BASE EXCESS BLDA CALC-SCNC: -4.1 MMOL/L
BASOPHILS # BLD AUTO: 0.03 10*3/MM3 (ref 0–0.3)
BASOPHILS # BLD MANUAL: 0.16 10*3/MM3 (ref 0–0.3)
BASOPHILS NFR BLD AUTO: 0.3 % (ref 0–2)
BASOPHILS NFR BLD AUTO: 1 % (ref 0–2)
BDY SITE: ABNORMAL
BILIRUB SERPL-MCNC: 0.3 MG/DL (ref 0.2–1.8)
BILIRUB SERPL-MCNC: 0.5 MG/DL (ref 0.2–1.8)
BILIRUB UR QL STRIP: NEGATIVE
BODY TEMPERATURE: 98.6 C
BUN BLD-MCNC: 16 MG/DL (ref 7–21)
BUN BLD-MCNC: 18 MG/DL (ref 7–21)
BUN/CREAT SERPL: 15.5 (ref 7–25)
BUN/CREAT SERPL: 17.3 (ref 7–25)
CALCIUM SPEC-SCNC: 7.8 MG/DL (ref 7.7–10)
CALCIUM SPEC-SCNC: 8.6 MG/DL (ref 7.7–10)
CHLORIDE SERPL-SCNC: 104 MMOL/L (ref 99–112)
CHLORIDE SERPL-SCNC: 112 MMOL/L (ref 99–112)
CLARITY UR: CLEAR
CO2 SERPL-SCNC: 16.2 MMOL/L (ref 24.3–31.9)
CO2 SERPL-SCNC: 20.2 MMOL/L (ref 24.3–31.9)
COHGB MFR BLD: 1 % (ref 0–5)
COLOR UR: YELLOW
CREAT BLD-MCNC: 1.03 MG/DL (ref 0.43–1.29)
CREAT BLD-MCNC: 1.04 MG/DL (ref 0.43–1.29)
D-LACTATE SERPL-SCNC: 2.1 MMOL/L (ref 0.5–2)
D-LACTATE SERPL-SCNC: 2.6 MMOL/L (ref 0.5–2)
D-LACTATE SERPL-SCNC: 3.7 MMOL/L (ref 0.5–2)
DEPRECATED RDW RBC AUTO: 64 FL (ref 37–54)
DEPRECATED RDW RBC AUTO: 65.2 FL (ref 37–54)
EOSINOPHIL # BLD AUTO: 0.02 10*3/MM3 (ref 0–0.7)
EOSINOPHIL NFR BLD AUTO: 0.2 % (ref 0–7)
ERYTHROCYTE [DISTWIDTH] IN BLOOD BY AUTOMATED COUNT: 19.1 % (ref 11.5–14.5)
ERYTHROCYTE [DISTWIDTH] IN BLOOD BY AUTOMATED COUNT: 19.3 % (ref 11.5–14.5)
FLUAV AG NPH QL: NEGATIVE
FLUBV AG NPH QL IA: NEGATIVE
GFR SERPL CREATININE-BSD FRML MDRD: 51 ML/MIN/1.73
GFR SERPL CREATININE-BSD FRML MDRD: 52 ML/MIN/1.73
GLOBULIN UR ELPH-MCNC: 3.4 GM/DL
GLOBULIN UR ELPH-MCNC: 3.7 GM/DL
GLUCOSE BLD-MCNC: 130 MG/DL (ref 70–110)
GLUCOSE BLD-MCNC: 166 MG/DL (ref 70–110)
GLUCOSE UR STRIP-MCNC: NEGATIVE MG/DL
HCO3 BLDA-SCNC: 18.4 MMOL/L (ref 22–26)
HCT VFR BLD AUTO: 35.1 % (ref 37–47)
HCT VFR BLD AUTO: 37.5 % (ref 37–47)
HCT VFR BLD CALC: 30 % (ref 37–47)
HGB BLD-MCNC: 10.5 G/DL (ref 12–16)
HGB BLD-MCNC: 11.7 G/DL (ref 12–16)
HGB BLDA-MCNC: 10.1 G/DL (ref 12–16)
HGB UR QL STRIP.AUTO: NEGATIVE
HOLD SPECIMEN: NORMAL
HOROWITZ INDEX BLD+IHG-RTO: 32 %
IMM GRANULOCYTES # BLD AUTO: 0.03 10*3/MM3 (ref 0–0.03)
IMM GRANULOCYTES NFR BLD AUTO: 0.3 % (ref 0–0.5)
KETONES UR QL STRIP: NEGATIVE
L PNEUMO1 AG UR QL IA: NEGATIVE
LEUKOCYTE ESTERASE UR QL STRIP.AUTO: NEGATIVE
LIPASE SERPL-CCNC: 42 U/L (ref 13–60)
LYMPHOCYTES # BLD AUTO: 1.83 10*3/MM3 (ref 1–3)
LYMPHOCYTES # BLD MANUAL: 3.97 10*3/MM3 (ref 1–3)
LYMPHOCYTES NFR BLD AUTO: 18.9 % (ref 16–46)
LYMPHOCYTES NFR BLD MANUAL: 1 % (ref 0–12)
LYMPHOCYTES NFR BLD MANUAL: 25 % (ref 16–46)
M PNEUMO IGM SER QL: NEGATIVE
MCH RBC QN AUTO: 28.8 PG (ref 27–33)
MCH RBC QN AUTO: 29.5 PG (ref 27–33)
MCHC RBC AUTO-ENTMCNC: 29.9 G/DL (ref 33–37)
MCHC RBC AUTO-ENTMCNC: 31.2 G/DL (ref 33–37)
MCV RBC AUTO: 94.5 FL (ref 80–94)
MCV RBC AUTO: 96.2 FL (ref 80–94)
METAMYELOCYTES NFR BLD MANUAL: 3 % (ref 0–0)
METHGB BLD QL: 0.2 % (ref 0–3)
MODALITY: ABNORMAL
MONOCYTES # BLD AUTO: 0.16 10*3/MM3 (ref 0.1–0.9)
MONOCYTES # BLD AUTO: 0.39 10*3/MM3 (ref 0.1–0.9)
MONOCYTES NFR BLD AUTO: 4 % (ref 0–12)
NEUTROPHILS # BLD AUTO: 11.1 10*3/MM3 (ref 1.4–6.5)
NEUTROPHILS # BLD AUTO: 7.4 10*3/MM3 (ref 1.4–6.5)
NEUTROPHILS NFR BLD AUTO: 76.3 % (ref 40–75)
NEUTROPHILS NFR BLD MANUAL: 48 % (ref 40–75)
NEUTS BAND NFR BLD MANUAL: 22 % (ref 0–8)
NITRITE UR QL STRIP: NEGATIVE
OSMOLALITY SERPL CALC.SUM OF ELEC: 274.9 MOSM/KG (ref 273–305)
OSMOLALITY SERPL CALC.SUM OF ELEC: 275.8 MOSM/KG (ref 273–305)
OVALOCYTES BLD QL SMEAR: ABNORMAL
OXYHGB MFR BLDV: 94.6 % (ref 85–100)
PCO2 BLDA: 25.8 MM HG (ref 35–45)
PH BLDA: 7.47 PH UNITS (ref 7.35–7.45)
PH UR STRIP.AUTO: 5.5 [PH] (ref 5–8)
PLAT MORPH BLD: NORMAL
PLATELET # BLD AUTO: 363 10*3/MM3 (ref 130–400)
PLATELET # BLD AUTO: 397 10*3/MM3 (ref 130–400)
PMV BLD AUTO: 9 FL (ref 6–10)
PMV BLD AUTO: 9.3 FL (ref 6–10)
PO2 BLDA: 76.9 MM HG (ref 80–100)
POTASSIUM BLD-SCNC: 3.5 MMOL/L (ref 3.5–5.3)
POTASSIUM BLD-SCNC: 4.2 MMOL/L (ref 3.5–5.3)
PROT SERPL-MCNC: 6 G/DL (ref 6–8)
PROT SERPL-MCNC: 7.1 G/DL (ref 6–8)
PROT UR QL STRIP: NEGATIVE
RBC # BLD AUTO: 3.65 10*6/MM3 (ref 4.2–5.4)
RBC # BLD AUTO: 3.97 10*6/MM3 (ref 4.2–5.4)
SAO2 % BLDCOA: 95.7 % (ref 90–100)
SCAN SLIDE: NORMAL
SODIUM BLD-SCNC: 135 MMOL/L (ref 135–153)
SODIUM BLD-SCNC: 136 MMOL/L (ref 135–153)
SP GR UR STRIP: 1.02 (ref 1–1.03)
UROBILINOGEN UR QL STRIP: NORMAL
WBC NRBC COR # BLD: 15.86 10*3/MM3 (ref 4.5–12.5)
WBC NRBC COR # BLD: 9.7 10*3/MM3 (ref 4.5–12.5)

## 2019-02-02 PROCEDURE — 82805 BLOOD GASES W/O2 SATURATION: CPT | Performed by: PHYSICIAN ASSISTANT

## 2019-02-02 PROCEDURE — 82375 ASSAY CARBOXYHB QUANT: CPT | Performed by: PHYSICIAN ASSISTANT

## 2019-02-02 PROCEDURE — 83605 ASSAY OF LACTIC ACID: CPT | Performed by: PHYSICIAN ASSISTANT

## 2019-02-02 PROCEDURE — 80053 COMPREHEN METABOLIC PANEL: CPT | Performed by: PHYSICIAN ASSISTANT

## 2019-02-02 PROCEDURE — 87040 BLOOD CULTURE FOR BACTERIA: CPT | Performed by: PHYSICIAN ASSISTANT

## 2019-02-02 PROCEDURE — 99223 1ST HOSP IP/OBS HIGH 75: CPT | Performed by: INTERNAL MEDICINE

## 2019-02-02 PROCEDURE — 83690 ASSAY OF LIPASE: CPT | Performed by: PHYSICIAN ASSISTANT

## 2019-02-02 PROCEDURE — 25010000002 HEPARIN (PORCINE) PER 1000 UNITS: Performed by: INTERNAL MEDICINE

## 2019-02-02 PROCEDURE — 85025 COMPLETE CBC W/AUTO DIFF WBC: CPT | Performed by: PHYSICIAN ASSISTANT

## 2019-02-02 PROCEDURE — 80053 COMPREHEN METABOLIC PANEL: CPT | Performed by: INTERNAL MEDICINE

## 2019-02-02 PROCEDURE — 99285 EMERGENCY DEPT VISIT HI MDM: CPT

## 2019-02-02 PROCEDURE — 82150 ASSAY OF AMYLASE: CPT | Performed by: PHYSICIAN ASSISTANT

## 2019-02-02 PROCEDURE — 87804 INFLUENZA ASSAY W/OPTIC: CPT | Performed by: PHYSICIAN ASSISTANT

## 2019-02-02 PROCEDURE — 85025 COMPLETE CBC W/AUTO DIFF WBC: CPT | Performed by: INTERNAL MEDICINE

## 2019-02-02 PROCEDURE — 71045 X-RAY EXAM CHEST 1 VIEW: CPT

## 2019-02-02 PROCEDURE — 71045 X-RAY EXAM CHEST 1 VIEW: CPT | Performed by: RADIOLOGY

## 2019-02-02 PROCEDURE — 83050 HGB METHEMOGLOBIN QUAN: CPT | Performed by: PHYSICIAN ASSISTANT

## 2019-02-02 PROCEDURE — 93010 ELECTROCARDIOGRAM REPORT: CPT | Performed by: INTERNAL MEDICINE

## 2019-02-02 PROCEDURE — 85007 BL SMEAR W/DIFF WBC COUNT: CPT | Performed by: INTERNAL MEDICINE

## 2019-02-02 PROCEDURE — 86738 MYCOPLASMA ANTIBODY: CPT | Performed by: INTERNAL MEDICINE

## 2019-02-02 PROCEDURE — 93005 ELECTROCARDIOGRAM TRACING: CPT | Performed by: PHYSICIAN ASSISTANT

## 2019-02-02 PROCEDURE — 99233 SBSQ HOSP IP/OBS HIGH 50: CPT | Performed by: INTERNAL MEDICINE

## 2019-02-02 PROCEDURE — 87899 AGENT NOS ASSAY W/OPTIC: CPT | Performed by: INTERNAL MEDICINE

## 2019-02-02 PROCEDURE — P9612 CATHETERIZE FOR URINE SPEC: HCPCS

## 2019-02-02 PROCEDURE — 36600 WITHDRAWAL OF ARTERIAL BLOOD: CPT | Performed by: PHYSICIAN ASSISTANT

## 2019-02-02 PROCEDURE — 25010000002 ZIPRASIDONE MESYLATE PER 10 MG: Performed by: PHYSICIAN ASSISTANT

## 2019-02-02 PROCEDURE — 94799 UNLISTED PULMONARY SVC/PX: CPT

## 2019-02-02 PROCEDURE — 81003 URINALYSIS AUTO W/O SCOPE: CPT | Performed by: PHYSICIAN ASSISTANT

## 2019-02-02 PROCEDURE — 94640 AIRWAY INHALATION TREATMENT: CPT

## 2019-02-02 PROCEDURE — 25010000002 VANCOMYCIN 5 G RECONSTITUTED SOLUTION 5,000 MG VIAL: Performed by: PHYSICIAN ASSISTANT

## 2019-02-02 RX ORDER — MONTELUKAST SODIUM 10 MG/1
10 TABLET ORAL NIGHTLY
Status: CANCELLED | OUTPATIENT
Start: 2019-02-02

## 2019-02-02 RX ORDER — SODIUM CHLORIDE 0.9 % (FLUSH) 0.9 %
3-10 SYRINGE (ML) INJECTION AS NEEDED
Status: DISCONTINUED | OUTPATIENT
Start: 2019-02-02 | End: 2019-02-06 | Stop reason: HOSPADM

## 2019-02-02 RX ORDER — LEVOTHYROXINE SODIUM 0.1 MG/1
100 TABLET ORAL DAILY
Status: CANCELLED | OUTPATIENT
Start: 2019-02-02

## 2019-02-02 RX ORDER — DIPHENHYDRAMINE HYDROCHLORIDE 50 MG/ML
25 INJECTION INTRAMUSCULAR; INTRAVENOUS ONCE
Status: COMPLETED | OUTPATIENT
Start: 2019-02-03 | End: 2019-02-02

## 2019-02-02 RX ORDER — HYDROCODONE BITARTRATE AND ACETAMINOPHEN 5; 325 MG/1; MG/1
1 TABLET ORAL EVERY 6 HOURS PRN
Status: CANCELLED | OUTPATIENT
Start: 2019-02-02

## 2019-02-02 RX ORDER — VENLAFAXINE 37.5 MG/1
37.5 TABLET ORAL 2 TIMES DAILY WITH MEALS
Status: CANCELLED | OUTPATIENT
Start: 2019-02-02

## 2019-02-02 RX ORDER — CYCLOSPORINE 0.5 MG/ML
1 EMULSION OPHTHALMIC 2 TIMES DAILY
Status: CANCELLED | OUTPATIENT
Start: 2019-02-02

## 2019-02-02 RX ORDER — QUETIAPINE FUMARATE 25 MG/1
25 TABLET, FILM COATED ORAL NIGHTLY
Status: CANCELLED | OUTPATIENT
Start: 2019-02-02

## 2019-02-02 RX ORDER — FLUTICASONE PROPIONATE 50 MCG
2 SPRAY, SUSPENSION (ML) NASAL DAILY
Status: DISCONTINUED | OUTPATIENT
Start: 2019-02-02 | End: 2019-02-06 | Stop reason: HOSPADM

## 2019-02-02 RX ORDER — BUDESONIDE AND FORMOTEROL FUMARATE DIHYDRATE 160; 4.5 UG/1; UG/1
2 AEROSOL RESPIRATORY (INHALATION)
Status: DISCONTINUED | OUTPATIENT
Start: 2019-02-02 | End: 2019-02-06 | Stop reason: HOSPADM

## 2019-02-02 RX ORDER — SENNA PLUS 8.6 MG/1
1 TABLET ORAL DAILY
Status: CANCELLED | OUTPATIENT
Start: 2019-02-02

## 2019-02-02 RX ORDER — HEPARIN SODIUM 5000 [USP'U]/ML
5000 INJECTION, SOLUTION INTRAVENOUS; SUBCUTANEOUS EVERY 12 HOURS SCHEDULED
Status: DISCONTINUED | OUTPATIENT
Start: 2019-02-02 | End: 2019-02-03

## 2019-02-02 RX ORDER — SODIUM CHLORIDE 9 MG/ML
170 INJECTION, SOLUTION INTRAVENOUS CONTINUOUS
Status: DISCONTINUED | OUTPATIENT
Start: 2019-02-02 | End: 2019-02-02

## 2019-02-02 RX ORDER — DOCUSATE SODIUM 100 MG/1
100 CAPSULE, LIQUID FILLED ORAL 2 TIMES DAILY
Status: CANCELLED | OUTPATIENT
Start: 2019-02-02

## 2019-02-02 RX ORDER — L. ACIDOPHILUS/PECTIN, CITRUS 25MM-100MG
1 TABLET ORAL 3 TIMES DAILY
COMMUNITY

## 2019-02-02 RX ORDER — SODIUM CHLORIDE 0.9 % (FLUSH) 0.9 %
3 SYRINGE (ML) INJECTION EVERY 12 HOURS SCHEDULED
Status: DISCONTINUED | OUTPATIENT
Start: 2019-02-02 | End: 2019-02-06 | Stop reason: HOSPADM

## 2019-02-02 RX ORDER — BUDESONIDE 0.5 MG/2ML
0.5 INHALANT ORAL 2 TIMES DAILY PRN
Status: DISCONTINUED | OUTPATIENT
Start: 2019-02-02 | End: 2019-02-06 | Stop reason: HOSPADM

## 2019-02-02 RX ORDER — BUDESONIDE 0.5 MG/2ML
0.5 INHALANT ORAL EVERY 12 HOURS
Status: CANCELLED | OUTPATIENT
Start: 2019-02-02

## 2019-02-02 RX ORDER — SODIUM CHLORIDE 0.9 % (FLUSH) 0.9 %
10 SYRINGE (ML) INJECTION AS NEEDED
Status: DISCONTINUED | OUTPATIENT
Start: 2019-02-02 | End: 2019-02-06 | Stop reason: HOSPADM

## 2019-02-02 RX ORDER — CLINDAMYCIN PHOSPHATE 300 MG/50ML
300 INJECTION INTRAVENOUS EVERY 6 HOURS
Status: DISCONTINUED | OUTPATIENT
Start: 2019-02-02 | End: 2019-02-03

## 2019-02-02 RX ORDER — FAMOTIDINE 20 MG/1
20 TABLET, FILM COATED ORAL 2 TIMES DAILY
Status: CANCELLED | OUTPATIENT
Start: 2019-02-02

## 2019-02-02 RX ORDER — L.ACID,PARA/B.BIFIDUM/S.THERM 8B CELL
1 CAPSULE ORAL DAILY
Status: CANCELLED | OUTPATIENT
Start: 2019-02-02

## 2019-02-02 RX ORDER — ALBUTEROL SULFATE 90 UG/1
2 AEROSOL, METERED RESPIRATORY (INHALATION) EVERY 4 HOURS PRN
Status: CANCELLED | OUTPATIENT
Start: 2019-02-02

## 2019-02-02 RX ORDER — BUDESONIDE 0.5 MG/2ML
0.5 INHALANT ORAL
Status: DISCONTINUED | OUTPATIENT
Start: 2019-02-02 | End: 2019-02-02

## 2019-02-02 RX ORDER — SENNA PLUS 8.6 MG/1
1 TABLET ORAL DAILY
COMMUNITY

## 2019-02-02 RX ORDER — SODIUM CHLORIDE 9 MG/ML
75 INJECTION, SOLUTION INTRAVENOUS CONTINUOUS
Status: DISCONTINUED | OUTPATIENT
Start: 2019-02-02 | End: 2019-02-05

## 2019-02-02 RX ORDER — NITROGLYCERIN 0.4 MG/1
0.4 TABLET SUBLINGUAL
Status: DISCONTINUED | OUTPATIENT
Start: 2019-02-02 | End: 2019-02-06 | Stop reason: HOSPADM

## 2019-02-02 RX ORDER — ALBUTEROL SULFATE 2.5 MG/3ML
2.5 SOLUTION RESPIRATORY (INHALATION) EVERY 4 HOURS PRN
Status: CANCELLED | OUTPATIENT
Start: 2019-02-02

## 2019-02-02 RX ORDER — QUETIAPINE FUMARATE 25 MG/1
25 TABLET, FILM COATED ORAL 2 TIMES DAILY PRN
Status: CANCELLED | OUTPATIENT
Start: 2019-02-02

## 2019-02-02 RX ORDER — ACETAMINOPHEN 650 MG/1
650 SUPPOSITORY RECTAL ONCE
Status: COMPLETED | OUTPATIENT
Start: 2019-02-02 | End: 2019-02-02

## 2019-02-02 RX ORDER — ACETAMINOPHEN 500 MG
1000 TABLET ORAL ONCE
Status: COMPLETED | OUTPATIENT
Start: 2019-02-02 | End: 2019-02-02

## 2019-02-02 RX ORDER — FLUTICASONE PROPIONATE 50 MCG
2 SPRAY, SUSPENSION (ML) NASAL DAILY
Status: CANCELLED | OUTPATIENT
Start: 2019-02-02

## 2019-02-02 RX ORDER — ACETAMINOPHEN 650 MG/1
650 SUPPOSITORY RECTAL EVERY 6 HOURS PRN
Status: DISCONTINUED | OUTPATIENT
Start: 2019-02-02 | End: 2019-02-06 | Stop reason: HOSPADM

## 2019-02-02 RX ORDER — FAMOTIDINE 10 MG/ML
20 INJECTION, SOLUTION INTRAVENOUS DAILY
Status: DISCONTINUED | OUTPATIENT
Start: 2019-02-02 | End: 2019-02-03

## 2019-02-02 RX ORDER — BUDESONIDE AND FORMOTEROL FUMARATE DIHYDRATE 160; 4.5 UG/1; UG/1
2 AEROSOL RESPIRATORY (INHALATION)
Status: CANCELLED | OUTPATIENT
Start: 2019-02-02

## 2019-02-02 RX ADMIN — SODIUM CHLORIDE, PRESERVATIVE FREE 3 ML: 5 INJECTION INTRAVENOUS at 09:00

## 2019-02-02 RX ADMIN — METRONIDAZOLE 500 MG: 500 INJECTION, SOLUTION INTRAVENOUS at 14:38

## 2019-02-02 RX ADMIN — HEPARIN SODIUM 5000 UNITS: 5000 INJECTION INTRAVENOUS; SUBCUTANEOUS at 20:18

## 2019-02-02 RX ADMIN — SODIUM CHLORIDE 50 ML/HR: 9 INJECTION, SOLUTION INTRAVENOUS at 11:43

## 2019-02-02 RX ADMIN — SODIUM CHLORIDE 500 ML: 9 INJECTION, SOLUTION INTRAVENOUS at 02:46

## 2019-02-02 RX ADMIN — DIPHENHYDRAMINE HYDROCHLORIDE 25 MG: 50 INJECTION INTRAMUSCULAR; INTRAVENOUS at 23:53

## 2019-02-02 RX ADMIN — FAMOTIDINE 20 MG: 10 INJECTION, SOLUTION INTRAVENOUS at 09:51

## 2019-02-02 RX ADMIN — VANCOMYCIN HYDROCHLORIDE 1500 MG: 5 INJECTION, POWDER, LYOPHILIZED, FOR SOLUTION INTRAVENOUS at 04:45

## 2019-02-02 RX ADMIN — ACETAMINOPHEN 650 MG: 650 SUPPOSITORY RECTAL at 14:24

## 2019-02-02 RX ADMIN — CLINDAMYCIN IN 5 PERCENT DEXTROSE 300 MG: 6 INJECTION, SOLUTION INTRAVENOUS at 18:15

## 2019-02-02 RX ADMIN — SODIUM CHLORIDE 1729 ML: 9 INJECTION, SOLUTION INTRAVENOUS at 04:19

## 2019-02-02 RX ADMIN — SODIUM CHLORIDE 75 ML/HR: 9 INJECTION, SOLUTION INTRAVENOUS at 18:22

## 2019-02-02 RX ADMIN — SODIUM CHLORIDE, PRESERVATIVE FREE 3 ML: 5 INJECTION INTRAVENOUS at 20:18

## 2019-02-02 RX ADMIN — BUDESONIDE AND FORMOTEROL FUMARATE DIHYDRATE 2 PUFF: 160; 4.5 AEROSOL RESPIRATORY (INHALATION) at 10:02

## 2019-02-02 RX ADMIN — AZTREONAM 2 G: 2 INJECTION, POWDER, FOR SOLUTION INTRAMUSCULAR; INTRAVENOUS at 03:55

## 2019-02-02 RX ADMIN — SODIUM CHLORIDE 170 ML/HR: 9 INJECTION, SOLUTION INTRAVENOUS at 03:55

## 2019-02-02 RX ADMIN — ACETAMINOPHEN 1000 MG: 500 TABLET ORAL at 02:29

## 2019-02-02 RX ADMIN — WATER 10 MG: 1 INJECTION INTRAMUSCULAR; INTRAVENOUS; SUBCUTANEOUS at 03:56

## 2019-02-02 RX ADMIN — ACETAMINOPHEN 650 MG: 650 SUPPOSITORY RECTAL at 02:37

## 2019-02-02 RX ADMIN — HEPARIN SODIUM 5000 UNITS: 5000 INJECTION INTRAVENOUS; SUBCUTANEOUS at 09:58

## 2019-02-02 RX ADMIN — BUDESONIDE AND FORMOTEROL FUMARATE DIHYDRATE 2 PUFF: 160; 4.5 AEROSOL RESPIRATORY (INHALATION) at 19:23

## 2019-02-02 RX ADMIN — IPRATROPIUM BROMIDE 0.5 MG: 0.5 SOLUTION RESPIRATORY (INHALATION) at 13:39

## 2019-02-02 RX ADMIN — METRONIDAZOLE 500 MG: 500 INJECTION, SOLUTION INTRAVENOUS at 04:40

## 2019-02-02 RX ADMIN — IPRATROPIUM BROMIDE 0.5 MG: 0.5 SOLUTION RESPIRATORY (INHALATION) at 19:23

## 2019-02-02 RX ADMIN — AZTREONAM 1 G: 1 INJECTION, POWDER, FOR SOLUTION INTRAMUSCULAR; INTRAVENOUS at 11:42

## 2019-02-02 RX ADMIN — FLUTICASONE PROPIONATE 2 SPRAY: 50 SPRAY, METERED NASAL at 11:44

## 2019-02-02 RX ADMIN — CLINDAMYCIN IN 5 PERCENT DEXTROSE 300 MG: 6 INJECTION, SOLUTION INTRAVENOUS at 23:18

## 2019-02-02 NOTE — H&P
Hospitalist History and Physical    Patient Identification:  Name: Jeanna Flower  Age/Sex: 78 y.o. female  :  1940  MRN: [id number]         Primary Care Physician: Raul Robert MD    Chief Complaint   Patient presents with   • Fever       History of Present Illness  Patient is a 78 y.o. female presents with the following: fever    The patient is a 78-year-old nursing home resident with past history significant for dementia, CVA, COPD, hypertension and dysphasia presents from the nursing home with fever.    The patient's fever was approximately 102.5° axillary at the nursing home.  She was tachycardic to the 130s with respiratory rate in the 40s.  Nursing home documentation also reported that the patient was vomiting a yellow emesis.  Her oxygen saturation was documented to be 90% the 3 L per nasal cannula.  The patient is on a puréed diet with thin liquids at the nursing home.    The patient was recently on our service from  through  were she was treated for septic shock, Escherichia coli bacteremia with UTI.  She was found to have moderate to severe oral dysphagia with aspiration.  He also had acute hypomagnesemia, hypokalemia and paroxysmal atrial fibrillation.    In the emergency department, the patient's temperature was 102.5°F, her maximum heart rate was 130 and her respiratory rate was 20.     CBC was remarkable for a lactate of 2.6, Phosphatase 208, ALT 45 and AST 53.  Portable chest x-ray was performed and appears to be suggestive of a right middle lobe pneumonia.  The patient has been admitted to the medical surgical floor with telemetry.    Past History:  Past Medical History:   Diagnosis Date   • Anemia    • Closed wedge compression fracture of first lumbar vertebra (CMS/HCC)    • COPD (chronic obstructive pulmonary disease) (CMS/HCC)    • CVA (cerebral vascular accident) with left hemiparesis. 2017   • Dementia    • Diabetes mellitus (CMS/HCC)    • Dysarthria  following cerebral infarction    • Dysphagia, oropharyngeal phase    • Dysphagia, pharyngoesophageal phase    • GERD (gastroesophageal reflux disease)    • Heart disease    • Hyperlipidemia    • Hypertension    • Hypothyroidism    • Insomnia    • Obesity    • Other sleep apnea 11/13/2018   • Paroxysmal atrial fibrillation (CMS/HCC) 12/6/2017     Past Surgical History:   Procedure Laterality Date   • APPENDECTOMY     • BLADDER SURGERY     • CARDIAC CATHETERIZATION     • CHOLECYSTECTOMY       Family History   Problem Relation Age of Onset   • Heart disease Mother    • Heart attack Mother    • Heart disease Father    • Heart attack Father    • Heart attack Brother    • Heart attack Brother      Social History     Tobacco Use   • Smoking status: Never Smoker   • Smokeless tobacco: Never Used   Substance Use Topics   • Alcohol use: No   • Drug use: No     Medications Prior to Admission   Medication Sig Dispense Refill Last Dose   • apixaban (ELIQUIS) 5 MG tablet tablet Take 1 tablet by mouth Every 12 (Twelve) Hours. 60 tablet 5 2/1/2019 at 0800   • budesonide (PULMICORT) 0.5 MG/2ML nebulizer solution Take 0.5 mg by nebulization Every 12 (Twelve) Hours.   2/1/2019 at 0800   • cycloSPORINE (RESTASIS) 0.05 % ophthalmic emulsion Administer 1 drop to both eyes 2 (Two) Times a Day.   2/1/2019 at 0800   • docusate sodium (COLACE) 100 MG capsule Take 100 mg by mouth 2 (Two) Times a Day.   2/1/2019 at 0800   • famotidine (PEPCID) 20 MG tablet Take 20 mg by mouth 2 (Two) Times a Day.   2/1/2019 at 0800   • fluticasone (FLONASE) 50 MCG/ACT nasal spray 2 sprays into the nostril(s) as directed by provider Daily.   2/1/2019 at 0800   • fluticasone-salmeterol (ADVAIR DISKUS) 250-50 MCG/DOSE DISKUS Inhale 1 puff 2 (Two) Times a Day.   2/1/2019 at 0800   • HYDROcodone-acetaminophen (NORCO) 5-325 MG per tablet Take 1 tablet by mouth Every 6 (Six) Hours As Needed for Moderate Pain . 12 tablet 0 2/1/2019 at Unknown time   • Lactobacillus  Acid-Pectin (ACIDOPHILUS/CITRUS PECTIN) tablet tablet Take 1 tablet by mouth 3 (Three) Times a Day.   2/1/2019 at 0800   • levothyroxine (SYNTHROID, LEVOTHROID) 100 MCG tablet Take 100 mcg by mouth Daily.   2/1/2019 at 0800   • montelukast (SINGULAIR) 10 MG tablet Take 10 mg by mouth Every Night.   1/31/2019 at 2000   • QUEtiapine (SEROquel) 25 MG tablet Take 1 tablet by mouth Every Night.   1/31/2019 at 2000   • QUEtiapine (SEROquel) 25 MG tablet Take 1 tablet by mouth 2 (Two) Times a Day As Needed (Agitation, halllucinations).   2/1/2019 at Unknown time   • venlafaxine (EFFEXOR) 37.5 MG tablet Take 37.5 mg by mouth 2 (Two) Times a Day.   2/1/2019 at Unknown time   • albuterol sulfate  (90 Base) MCG/ACT inhaler Inhale 2 puffs Every 4 (Four) Hours As Needed for Wheezing or Shortness of Air.   Unknown at Unknown time   • senna (SENNA-LAX) 8.6 MG tablet Take 1 tablet by mouth Daily.   Unknown at Unknown time     Allergies: Bactrim [sulfamethoxazole-trimethoprim]; Codeine; and Penicillins    Review of Systems:  Review of Systems   Unable to perform ROS: Dementia      Vital Signs  Temp:  [99.3 °F (37.4 °C)-102.5 °F (39.2 °C)] 99.3 °F (37.4 °C)  Heart Rate:  [102-130] 107  Resp:  [20] 20  BP: ()/(65-70) 90/66  Body mass index is 30.25 kg/m².    Physical Exam:  Physical Exam   Constitutional: She appears well-developed and well-nourished. She appears lethargic. She is sleeping. No distress. Nasal cannula in place.   HENT:   Head: Normocephalic and atraumatic.   Mouth/Throat: Oropharynx is clear and moist. Mucous membranes are dry.   Eyes: Conjunctivae and EOM are normal. Pupils are equal, round, and reactive to light.   Neck: Neck supple. No tracheal deviation present. No thyromegaly present.   Cardiovascular: Normal rate and regular rhythm. Exam reveals no gallop and no friction rub.   No murmur heard.  Pulmonary/Chest: Breath sounds normal. No respiratory distress. She has no wheezes. She has no rales.    Coarse bilateral breath sounds.    Abdominal: Soft. Bowel sounds are normal. She exhibits no distension. There is no tenderness. There is no guarding.   Genitourinary:   Genitourinary Comments: Hendrix catheter in place.   Musculoskeletal: She exhibits no tenderness.   Neurological: She appears lethargic.   Skin: Skin is warm and dry. No rash noted. No erythema.      Results Review:    Results from last 7 days   Lab Units 02/02/19  0216   WBC 10*3/mm3 9.70   HEMOGLOBIN g/dL 11.7*   HEMATOCRIT % 37.5   PLATELETS 10*3/mm3 397     Results from last 7 days   Lab Units 02/02/19  0216   SODIUM mmol/L 135   POTASSIUM mmol/L 3.5   CHLORIDE mmol/L 104   CO2 mmol/L 20.2*   BUN mg/dL 18   CREATININE mg/dL 1.04   CALCIUM mg/dL 8.6   GLUCOSE mg/dL 166*     Results from last 7 days   Lab Units 02/02/19  0216   BILIRUBIN mg/dL 0.5   ALK PHOS U/L 208*   AST (SGOT) U/L 53*   ALT (SGPT) U/L 45*     Imaging:    Imaging Results (most recent)     Procedure Component Value Units Date/Time    XR Chest 1 View [[id number]] Updated:  02/02/19 0223        *Pending official radiology interpretation, however, per my view, patient has a RML pneumonia.     Assessment/Plan      -Severe sepsis, present upon admission and secondary to RML aspiration versus healthcare associated pneumonia: Admit to the medical surgical floor with telemetry.  Keep nothing by mouth.  Place on broad-spectrum IV antibiotics to include pharmacy to dose vancomycin, Azactam and Flagyl. Continue with oxygen per nasal cannula and titrate for saturations greater than 90%. Trend lactic acid levels. Patient received her sepsis bolus. Continue IV fluid hydration. Follow blood culture results obtained in ED. Follow up official CXR results.    -Minimal AST elevation/Alkaline phosphatase elevation: Continue to monitor.     -Decreased CO2 level. Repeat later today.     -Severe hypoalbuminemia.     -Dementia.     -Macrocytic anemia, chronic: Continue to monitor H/H.     DVT/GI  prophylaxis: Subcutaneous heparin/PPI    Estimated Length of Stay: > 2 MNs    CODE: DNR/No CPR    The patient is a high risk patient due to: severe sepsis, dementia, aspiration.       I discussed the patients findings and my recommendations with nursing staff (RUFUS Chang)      Anna Marie Livingston DO  02/02/19  6:09 AM

## 2019-02-02 NOTE — PROGRESS NOTES
Pharmacokinetics Service Note:     Ms. Flower is initiated on vancomycin for PNA. The patient previously received vancomycin 1500mg x1 in the ER as a loading dose. Given the patient’s demographics and CrCl of ~43 mL/min plan to start the patient on vancomycin 1g Q18 to target trough of 15-20mg/L. We will obtain a trough level at steady state and further assess the patient for any modifications in the dosage regimen as appropriate.          Thank you,    Sheron Joel, Pharmacy Resident  9:28 AM  02/02/19

## 2019-02-02 NOTE — ED PROVIDER NOTES
Subjective     Fever   Max temp prior to arrival:  102  Temp source:  Axillary  Severity:  Moderate  Onset quality:  Gradual  Duration:  1 day  Timing:  Constant  Progression:  Worsening  Chronicity:  New  Relieved by:  None tried  Worsened by:  Nothing  Ineffective treatments:  None tried  Associated symptoms: chills    Associated symptoms: no chest pain, no confusion, no congestion, no cough, no diarrhea, no dysuria, no ear pain, no headaches, no myalgias, no nausea, no rash, no rhinorrhea, no somnolence, no sore throat and no vomiting    Risk factors: no contaminated food, no contaminated water, no hx of cancer, no immunosuppression, no occupational exposure, no recent sickness, no recent travel and no sick contacts        Review of Systems   Constitutional: Positive for chills and fever. Negative for activity change, appetite change, diaphoresis, fatigue and unexpected weight change.   HENT: Negative.  Negative for congestion, ear pain, rhinorrhea and sore throat.    Respiratory: Negative.  Negative for cough.    Cardiovascular: Negative.  Negative for chest pain.   Gastrointestinal: Negative.  Negative for abdominal pain, diarrhea, nausea and vomiting.   Endocrine: Negative.    Genitourinary: Negative.  Negative for dysuria.   Musculoskeletal: Negative for myalgias.   Skin: Negative.  Negative for rash.   Neurological: Negative.  Negative for headaches.   Psychiatric/Behavioral: Negative.  Negative for confusion.   All other systems reviewed and are negative.      Past Medical History:   Diagnosis Date   • Anemia    • Closed wedge compression fracture of first lumbar vertebra (CMS/HCC)    • COPD (chronic obstructive pulmonary disease) (CMS/[facility])    • CVA (cerebral vascular accident) with left hemiparesis. 12/6/2017   • Dementia    • Diabetes mellitus (CMS/HCC)    • Dysarthria following cerebral infarction    • Dysphagia, oropharyngeal phase    • Dysphagia, pharyngoesophageal phase    • GERD (gastroesophageal  reflux disease)    • Heart disease    • Hyperlipidemia    • Hypertension    • Hypothyroidism    • Insomnia    • Obesity    • Other sleep apnea 11/13/2018   • Paroxysmal atrial fibrillation (CMS/HCC) 12/6/2017       Allergies   Allergen Reactions   • Bactrim [Sulfamethoxazole-Trimethoprim] Unknown (See Comments)     Pt does not know.        • Codeine Unknown (See Comments)     Pt reports she doesn't know    • Penicillins Unknown (See Comments)     Pt reports she does not know        Past Surgical History:   Procedure Laterality Date   • APPENDECTOMY     • BLADDER SURGERY     • CARDIAC CATHETERIZATION     • CHOLECYSTECTOMY         Family History   Problem Relation Age of Onset   • Heart disease Mother    • Heart attack Mother    • Heart disease Father    • Heart attack Father    • Heart attack Brother    • Heart attack Brother        Social History     Socioeconomic History   • Marital status: Single     Spouse name: Not on file   • Number of children: Not on file   • Years of education: Not on file   • Highest education level: Not on file   Tobacco Use   • Smoking status: Never Smoker   • Smokeless tobacco: Never Used   Substance and Sexual Activity   • Alcohol use: No   • Drug use: No   • Sexual activity: Defer           Objective   Physical Exam   Constitutional: She is oriented to person, place, and time. She appears well-developed and well-nourished. No distress.   HENT:   Head: Normocephalic and atraumatic.   Right Ear: External ear normal.   Left Ear: External ear normal.   Nose: Nose normal.   Eyes: Conjunctivae and EOM are normal. Pupils are equal, round, and reactive to light.   Neck: Normal range of motion. Neck supple. No JVD present. No tracheal deviation present.   Cardiovascular: Normal rate, regular rhythm and normal heart sounds. Exam reveals no gallop and no friction rub.   No murmur heard.  Pulmonary/Chest: Effort normal and breath sounds normal. No stridor. No respiratory distress. She has no  wheezes. She has no rales. She exhibits no tenderness.   Abdominal: Soft. Bowel sounds are normal. She exhibits no distension and no mass. There is no tenderness. There is no rebound and no guarding. No hernia.   Musculoskeletal: Normal range of motion. She exhibits no edema or deformity.   Neurological: She is alert and oriented to person, place, and time. No cranial nerve deficit.   Skin: Skin is warm and dry. No rash noted. She is not diaphoretic. No erythema. No pallor.   Psychiatric: She has a normal mood and affect. Her behavior is normal. Thought content normal.   Nursing note and vitals reviewed.      Procedures           ED Course  ED Course as of Feb 02 0410   Sat Feb 02, 2019   0235 Patient can't appropriately swallow oral tylenol so suppository has been ordered instead.   [MM]   0333 Right middle lobe infiltrate per Dr. Allan.  XR Chest 1 View [MM]   0407 Spoke with Dr. Livingston and she has agreed to admit her to [facility] with tele. She also asked me to add flagyl and do the full sepsis bolus with her as she has no history of CHF and good kidney function.   [MM]      ED Course User Index  [MM] Tiffanie George PA                  MDM  Number of Diagnoses or Management Options  Pneumonia of right middle lobe due to infectious organism (CMS/HCC):   Sepsis, due to unspecified organism (CMS/HCC):      Amount and/or Complexity of Data Reviewed  Clinical lab tests: ordered and reviewed  Tests in the radiology section of CPT®: ordered and reviewed  Decide to obtain previous medical records or to obtain history from someone other than the patient: yes  Discuss the patient with other providers: yes          Final diagnoses:   Pneumonia of right middle lobe due to infectious organism (CMS/HCC)   Sepsis, due to unspecified organism (CMS/HCC)            Tiffanie George PA  02/02/19 4129

## 2019-02-02 NOTE — PROGRESS NOTES
Discharge Planning Assessment  [facility]     Patient Name: Jeanna Flower  MRN: [id number]  Today's Date: 2/2/2019    Admit Date: 2/2/2019      Discharge Plan     Row Name 02/02/19 0828       Plan    Plan SS received consult Discharge consult, nursing home patient. SS to f/u on Monday, 2/4 with [facility] for bed hold information. SS to follow and assist as needed with discharge planning.         Destination      Service Provider Request Status Selected Services Address Phone Number Fax Number    [facility] Pending - No Request Sent N/A [address], [facility] [address] [phone]     Lakia Alatorre

## 2019-02-02 NOTE — PLAN OF CARE
Problem: Skin Injury Risk (Adult)  Goal: Identify Related Risk Factors and Signs and Symptoms  Outcome: Ongoing (interventions implemented as appropriate)   02/02/19 0644   Skin Injury Risk (Adult)   Related Risk Factors (Skin Injury Risk) advanced age;cognitive impairment;infection;mobility impaired     Goal: Skin Health and Integrity  Outcome: Ongoing (interventions implemented as appropriate)   02/02/19 0644   Skin Injury Risk (Adult)   Skin Health and Integrity making progress toward outcome       Problem: Fall Risk (Adult)  Goal: Identify Related Risk Factors and Signs and Symptoms  Outcome: Ongoing (interventions implemented as appropriate)   02/02/19 0644   Fall Risk (Adult)   Related Risk Factors (Fall Risk) age-related changes;confusion/agitation;gait/mobility problems;fatigue/slow reaction;environment unfamiliar   Signs and Symptoms (Fall Risk) presence of risk factors     Goal: Absence of Fall  Outcome: Ongoing (interventions implemented as appropriate)   02/02/19 0644   Fall Risk (Adult)   Absence of Fall making progress toward outcome       Problem: Pneumonia (Adult)  Goal: Signs and Symptoms of Listed Potential Problems Will be Absent, Minimized or Managed (Pneumonia)  Outcome: Ongoing (interventions implemented as appropriate)   02/02/19 0644   Goal/Outcome Evaluation   Problems Assessed (Pneumonia) all   Problems Present (Pneumonia) infection progression       Problem: Patient Care Overview  Goal: Plan of Care Review  Outcome: Ongoing (interventions implemented as appropriate)   02/02/19 0644   Coping/Psychosocial   Plan of Care Reviewed With patient   Plan of Care Review   Progress no change       Problem: Pain, Chronic (Adult)  Goal: Identify Related Risk Factors and Signs and Symptoms  Outcome: Ongoing (interventions implemented as appropriate)   02/02/19 0644   Pain, Chronic (Adult)   Related Risk Factors (Chronic Pain) disease process   Signs and Symptoms (Chronic Pain) BADLs/IADLs,  reluctance/inability to perform;fatigue/weakness;verbalization of pain/discomfort for a prolonged time period     Goal: Acceptable Pain/Comfort Level and Functional Ability  Outcome: Ongoing (interventions implemented as appropriate)   02/02/19 0644   Pain, Chronic (Adult)   Acceptable Pain/Comfort Level and Functional Ability making progress toward outcome

## 2019-02-02 NOTE — SIGNIFICANT NOTE
"Consultation received, chart reviewed. Ms. Flower is well known to SLP from previous admission to [facility] during the month of January 2019. She is s/p multiple clinical dysphagia assessments, as well as instrumental MBS 1/16/19 to objectively evaluate swallowing fnx.     Ms. Flower,unfortunately, suffers from advanced dementia w/ established fluctuating swallowing safety, resulting in moderate oropharyngeal dysphagia w/ observed silent aspiration of nectar thick and thin liquids during the MBS. Secondary to overall poor oropharyngeal bolus control, inability to clear significant pharyngeal residue, she was felt to be at HIGH RISK for intermittent aspiration w/ all po intake. Specifically in consideration of her advanced dementia, fluctuating a/a status.     MBS results were d/w pt's family and Dr. Livingston at that time. Pt's grandchildren very adamantly declined consideration of alternative method of nutrition/hydration. They were educated on observed aspiration, silent aspiration, risks of aspiration. Family continued to decline alternative nutrition. Per their request and in their understanding of risks of aspiration, Ms. Flower was recommended least restrictive po diet of puree and thin liquids, medications crushed in puree. This was her discharge diet.    Per chart review this am, Ms. Flower continues w/ \"No CPR\" code status w/ \"Limited Interventions.\" She is seen at bedside w/ RN present. She continues nonverbal and fluctuating in a/a status. She continues at risk for intermittent aspiration w/ any po consistency.     D/w RNYessenia, pt status, h/o oropharyngeal dysphagia w/ family declinig alternative nutrition, requesting least restrictive po diet w/ understanding of risks. D/w her MD agreement at that time w/ least restrictive po diet puree/thins initiated. D/w her contraindication of repeat exposure to radiation for MBS if we are aware of pt's family requests to defer alternative nutrition and given that her AMS is " persistent/progressive dementia w/ potential for aspiration w/ any po intake w/o ability to functionally participate in tx tasks. This is her baseline. RN agrees.     D/w MD deferral of re-evaluation at this time, given known history.     Please continue NPO w/ MD to initiate least restrictive po diet puree/thins at his discretion.     Thank you for allowing me to participating in the care of your patient-  Ivanna Kuo M.S., CCC/SLP

## 2019-02-02 NOTE — PLAN OF CARE
Problem: Skin Injury Risk (Adult)  Goal: Identify Related Risk Factors and Signs and Symptoms  Outcome: Ongoing (interventions implemented as appropriate)   02/02/19 0644   Skin Injury Risk (Adult)   Related Risk Factors (Skin Injury Risk) advanced age;cognitive impairment;infection;mobility impaired     Goal: Skin Health and Integrity  Outcome: Ongoing (interventions implemented as appropriate)   02/02/19 0644   Skin Injury Risk (Adult)   Skin Health and Integrity making progress toward outcome       Problem: Fall Risk (Adult)  Goal: Identify Related Risk Factors and Signs and Symptoms  Outcome: Ongoing (interventions implemented as appropriate)   02/02/19 0644   Fall Risk (Adult)   Related Risk Factors (Fall Risk) age-related changes;confusion/agitation;gait/mobility problems;fatigue/slow reaction;environment unfamiliar   Signs and Symptoms (Fall Risk) presence of risk factors     Goal: Absence of Fall  Outcome: Ongoing (interventions implemented as appropriate)   02/02/19 0644   Fall Risk (Adult)   Absence of Fall making progress toward outcome       Problem: Pneumonia (Adult)  Goal: Signs and Symptoms of Listed Potential Problems Will be Absent, Minimized or Managed (Pneumonia)  Outcome: Ongoing (interventions implemented as appropriate)   02/02/19 0644   Goal/Outcome Evaluation   Problems Assessed (Pneumonia) all   Problems Present (Pneumonia) infection progression       Problem: Patient Care Overview  Goal: Plan of Care Review  Outcome: Ongoing (interventions implemented as appropriate)   02/02/19 0644   Coping/Psychosocial   Plan of Care Reviewed With patient   Plan of Care Review   Progress no change     Goal: Individualization and Mutuality  Outcome: Ongoing (interventions implemented as appropriate)    Goal: Discharge Needs Assessment  Outcome: Ongoing (interventions implemented as appropriate)   02/02/19 0635 02/02/19 0641   Disability   Equipment Currently Used at Home (Pt lives in a nursing home,  equipment unknown at present) --    Discharge Needs Assessment   Patient/Family Anticipates Transition to --  long term care facility   Patient/Family Anticipated Services at Transition --  skilled nursing   Transportation Anticipated --  health plan transportation     Goal: Interprofessional Rounds/Family Conf  Outcome: Ongoing (interventions implemented as appropriate)      Problem: Pain, Chronic (Adult)  Goal: Identify Related Risk Factors and Signs and Symptoms  Outcome: Ongoing (interventions implemented as appropriate)   02/02/19 0644   Pain, Chronic (Adult)   Related Risk Factors (Chronic Pain) disease process   Signs and Symptoms (Chronic Pain) BADLs/IADLs, reluctance/inability to perform;fatigue/weakness;verbalization of pain/discomfort for a prolonged time period     Goal: Acceptable Pain/Comfort Level and Functional Ability  Outcome: Ongoing (interventions implemented as appropriate)   02/02/19 0644   Pain, Chronic (Adult)   Acceptable Pain/Comfort Level and Functional Ability making progress toward outcome

## 2019-02-02 NOTE — PROGRESS NOTES
[facility] HOSPITALIST PROGRESS NOTE     After midnight admission note:    Patient Identification:  Name:  Jeanna Flower  Age:  78 y.o.  Sex:  female  :  1940  MRN:  [id number]  Visit Number:  [id number]  Primary Care Provider:  Raul Robert MD    Date of admission: 2019  Length of stay:  0    ----------------------------------------------------------------------------------------------------------------------  Subjective     Chief Complaint:   Chief Complaint   Patient presents with   • Fever     Subjective/Interval History:    Patient is a 78 y.o. female who was admitted on 2019 after presenting to the emergency department of [facility] with complaints of fever up to 102.5F. She was diagnosed with severe sepsis secondary to RML aspiration versus HCAP. She was recently admitted to our facility with E. Coli bacteremia and was found to be at high risk of aspiration. POA did not want any alternative nutrition at that time. For complete admission information, please see history and physical.     Today, the patient is drowsy, but arousable. No family members are available at this time. She does not provide any meaningful history and just mumbles to questioning. Discussed with RN, Yessenia. She was evaluated by SLP this morning, who has evaluated her during a recent stay. Patient is known to be high risk for aspiration and repeat evaluation was deferred at this time.     ----------------------------------------------------------------------------------------------------------------------  Objective   Current [facility] Meds:    aztreonam 1 g Intravenous Q8H   budesonide-formoterol 2 puff Inhalation BID - RT   famotidine 20 mg Intravenous Daily   fluticasone 2 spray Nasal Daily   heparin (porcine) 5,000 Units Subcutaneous Q12H   metroNIDAZOLE 500 mg Intravenous Q8H   sodium chloride 3 mL Intravenous Q12H   vancomycin 1,000 mg Intravenous Q18H       Pharmacy to dose vancomycin      sodium chloride 50 mL/hr Last Rate: 50 mL/hr (02/02/19 1143)     ----------------------------------------------------------------------------------------------------------------------  Vital Signs:  Temp:  [98.8 °F (37.1 °C)-102.5 °F (39.2 °C)] 99.9 °F (37.7 °C)  Heart Rate:  [102-130] 102  Resp:  [16-20] 20  BP: ()/(43-70) 131/43  Mean Arterial Pressure (Non-Invasive) for the past 24 hrs (Last 3 readings):   Noninvasive MAP (mmHg)   02/02/19 0517 103     SpO2 Percentage    02/02/19 0635 02/02/19 1002 02/02/19 1110   SpO2: 93% 99% 100%     SpO2:  [93 %-100 %] 100 %  on  Flow (L/min):  [2-3] 3;   Device (Oxygen Therapy): nasal cannula    Body mass index is 30.25 kg/m².  Wt Readings from Last 3 Encounters:   02/02/19 75 kg (165 lb 6.4 oz)   01/24/19 77.6 kg (171 lb)   01/15/19 78.5 kg (173 lb)        Intake/Output Summary (Last 24 hours) at 2/2/2019 1218  Last data filed at 2/2/2019 0425  Gross per 24 hour   Intake 114.17 ml   Output --   Net 114.17 ml     NPO Diet  ----------------------------------------------------------------------------------------------------------------------  Physical exam:  Constitutional:  Well-developed and well-nourished.  No respiratory distress. Drowsy, but arousable to voice, quickly falls back to sleep.      HENT:  Head:  Normocephalic and atraumatic.  Mouth: somewhat dry.   Eyes:  Conjunctivae and EOM are normal. No scleral icterus. No erythema or drainage.  Neck:  Neck supple.  No JVD present.    Cardiovascular: Mildly elevated rate, regular rhythm and normal heart sounds with no murmur.  Pulmonary/Chest:  No respiratory distress. Coarse breath sounds noted in the right middle and lower lung fields, no wheezes or rhonchi noted.   Abdominal:  Soft.  Bowel sounds are normal x4  No distension and no apparent tenderness.   Musculoskeletal:  No edema, no tenderness. She has contractures on the left side.  No red or swollen joints anywhere.    Neurological: Drowsy but arousable.  Mumbles in response to questioning. No cranial nerve deficit.  No tongue deviation.  No facial droop.  No slurred speech.   Skin:  Skin is warm and dry. No rash noted. No pallor.   Peripheral vascular: No clubbing, no cyanosis, no edema. 2+ pedal pulses bilaterally.   Genitourinary: No olivas catheter in place.   ----------------------------------------------------------------------------------------------------------------------  Tele: Sinus tachycardia 100-110s.    I have personally reviewed the EKG/Telemetry strips.  ----------------------------------------------------------------------------------------------------------------------            Results from last 7 days   Lab Units 02/02/19  0652 02/02/19 0216   LACTATE mmol/L 3.7* 2.6*   WBC 10*3/mm3 15.86* 9.70   HEMOGLOBIN g/dL 10.5* 11.7*   HEMATOCRIT % 35.1* 37.5   MCV fL 96.2* 94.5*   MCHC g/dL 29.9* 31.2*   PLATELETS 10*3/mm3 363 397     Results from last 7 days   Lab Units 02/02/19  0652 02/02/19 0216   SODIUM mmol/L 136 135   POTASSIUM mmol/L 4.2 3.5   CHLORIDE mmol/L 112 104   CO2 mmol/L 16.2* 20.2*   BUN mg/dL 16 18   CREATININE mg/dL 1.03 1.04   EGFR IF NONAFRICN AM mL/min/1.73 52* 51*   CALCIUM mg/dL 7.8 8.6   GLUCOSE mg/dL 130* 166*   ALBUMIN g/dL 2.60* 3.40   BILIRUBIN mg/dL 0.3 0.5   ALK PHOS U/L 154* 208*   AST (SGOT) U/L 41* 53*   ALT (SGPT) U/L 33 45*   Estimated Creatinine Clearance: 43.4 mL/min (by C-G formula based on SCr of 1.03 mg/dL).    Lab Results   Component Value Date    HGBA1C 5.90 (H) 01/15/2019     Lab Results   Component Value Date    TSH 6.470 (H) 01/15/2019    FREET4 0.96 01/17/2019     Pain Management Panel     Pain Management Panel Latest Ref Rng & Units 9/16/2018    AMPHETAMINES SCREEN, URINE Negative Negative    BARBITURATES SCREEN Negative Negative    BENZODIAZEPINE SCREEN, URINE Negative Negative    BUPRENORPHINE Negative Negative    COCAINE SCREEN, URINE Negative Negative    METHADONE SCREEN, URINE Negative Negative         I have personally reviewed the above laboratory results.   ----------------------------------------------------------------------------------------------------------------------  Imaging Results (last 24 hours)     Procedure Component Value Units Date/Time    XR Chest 1 View [[id number]] Collected:  02/02/19 0927     Updated:  02/02/19 0930    Narrative:       EXAMINATION:  XR CHEST 1 VW-      CLINICAL INDICATION:     Fever     TECHNIQUE:  XR CHEST 1 VW-       COMPARISON: NONE      FINDINGS:   Coarse interstitial markings suggestive of chronic interstitial lung  disease.   Slightly increased nodularity right lung base may represent some chronic  aspiration.   No pneumothorax.   No pleural effusion.   Bony and soft tissue structures are unremarkable.            Impression:       Slightly increased nodularity right lung base may represent  some chronic aspiration.      This report was finalized on 2/2/2019 9:28 AM by Dr. Giovanni Martin MD.           I have personally reviewed the above radiology results.   ----------------------------------------------------------------------------------------------------------------------  Assessment/Plan     -Severe sepsis, present upon admission and secondary to RML aspiration versus healthcare associated pneumonia: Will continue broad-spectrum IV antibiotics with vancomycin, Azactam and Flagyl to cover for aspiration pneumonia. Continue with oxygen per nasal cannula and titrate for saturations greater than 90%. Continue tylenol PRN for fever. Patient received her sepsis bolus. Continue IV fluid hydration. Follow blood culture results obtained in ED. Lactic acid noted to be increasing and rate of fluids advanced. Follow up on repeat lactic acid. Add Atrovent nebulizers. Avoiding albuterol given lactic acidosis. CBC and CRP in AM.     -Moderate to severe oral dysphagia with aspiration: Will keep her NPO for now given drowsiness, if she becomes more awake, will consider least  restrictive oral diet as this was the request of her family on her recent admission. I have called and discussed with Ernestina, her POA, and they continue to want the patient to eat the least restrictive diet and do not wish for any alternative forms of feeding.      -Minimal AST elevation/Alkaline phosphatase elevation: Improving. Continue to monitor.      -Decreased CO2 level: Has reduced further. Will check ABG.      -Severe hypoalbuminemia.      -Dementia.      -Macrocytic anemia, chronic: Continue to monitor H/H. Recent folate and vitamin B12 levels within normal limits.      DVT/GI prophylaxis: Subcutaneous heparin/PPI.     Estimated Length of Stay: > 2 MNs     CODE: DNR/No CPR    The patient is high risk due to the following diagnoses/reasons: severe sepsis, dementia, aspiration.     I have discussed the patient's assessment and plan with RNYessenia and attending physician, Dr. Young. I have also discussed with the POAErnestina over the phone.     Disposition: Discharge back to the nursing home when pneumonia has improved.     TAE Parker  02/02/19  12:18 PM    Addendum: Discussed with Dr. Young. He would like antibiotic therapy changed to Clindamycin 450mg IV TID for aspiration pneumonia. Orders placed.     TAE Parker  02/02/19  5:15 PM      Addendum; Felice Young MD  *Patient seen and examined discussed with alyson Garcia with above note  *Will be placed on clindamycin, DC aztreonam/Flagyl  *Work with PT/OT hopefully we will can get the patient place if needed

## 2019-02-02 NOTE — ED NOTES
Patient being transported to floor at this time. All infusions infusing with no issues. Both 22 G IV's patent and intact, shows no signs of infiltration. Breathing even and unlabored. Pt remains confused. No acute distress noted at this time.      Robby Mack, RN  02/02/19 0565

## 2019-02-03 ENCOUNTER — APPOINTMENT (OUTPATIENT)
Dept: GENERAL RADIOLOGY | Facility: HOSPITAL | Age: 79
End: 2019-02-03

## 2019-02-03 PROBLEM — J69.0 ASPIRATION PNEUMONIA (HCC): Status: ACTIVE | Noted: 2019-02-03

## 2019-02-03 LAB
ALBUMIN SERPL-MCNC: 3.1 G/DL (ref 3.4–4.8)
ALBUMIN/GLOB SERPL: 1 G/DL (ref 1.5–2.5)
ALP SERPL-CCNC: 160 U/L (ref 35–104)
ALT SERPL W P-5'-P-CCNC: 36 U/L (ref 10–36)
ANION GAP SERPL CALCULATED.3IONS-SCNC: 8.3 MMOL/L (ref 3.6–11.2)
AST SERPL-CCNC: 34 U/L (ref 10–30)
BASOPHILS # BLD AUTO: 0.06 10*3/MM3 (ref 0–0.3)
BASOPHILS NFR BLD AUTO: 0.3 % (ref 0–2)
BILIRUB SERPL-MCNC: 0.6 MG/DL (ref 0.2–1.8)
BUN BLD-MCNC: 20 MG/DL (ref 7–21)
BUN/CREAT SERPL: 23.3 (ref 7–25)
CALCIUM SPEC-SCNC: 7.9 MG/DL (ref 7.7–10)
CHLORIDE SERPL-SCNC: 114 MMOL/L (ref 99–112)
CO2 SERPL-SCNC: 17.7 MMOL/L (ref 24.3–31.9)
CREAT BLD-MCNC: 0.86 MG/DL (ref 0.43–1.29)
CRP SERPL-MCNC: 27.36 MG/DL (ref 0–0.99)
D-LACTATE SERPL-SCNC: 1 MMOL/L (ref 0.5–2)
DEPRECATED RDW RBC AUTO: 65.3 FL (ref 37–54)
EOSINOPHIL # BLD AUTO: 0.13 10*3/MM3 (ref 0–0.7)
EOSINOPHIL NFR BLD AUTO: 0.7 % (ref 0–7)
ERYTHROCYTE [DISTWIDTH] IN BLOOD BY AUTOMATED COUNT: 19.1 % (ref 11.5–14.5)
GFR SERPL CREATININE-BSD FRML MDRD: 64 ML/MIN/1.73
GLOBULIN UR ELPH-MCNC: 3.2 GM/DL
GLUCOSE BLD-MCNC: 97 MG/DL (ref 70–110)
HCT VFR BLD AUTO: 34.4 % (ref 37–47)
HGB BLD-MCNC: 10.3 G/DL (ref 12–16)
IMM GRANULOCYTES # BLD AUTO: 0.06 10*3/MM3 (ref 0–0.03)
IMM GRANULOCYTES NFR BLD AUTO: 0.3 % (ref 0–0.5)
LYMPHOCYTES # BLD AUTO: 2.92 10*3/MM3 (ref 1–3)
LYMPHOCYTES NFR BLD AUTO: 16.8 % (ref 16–46)
MAGNESIUM SERPL-MCNC: 1.4 MG/DL (ref 1.7–2.6)
MCH RBC QN AUTO: 28.8 PG (ref 27–33)
MCHC RBC AUTO-ENTMCNC: 29.9 G/DL (ref 33–37)
MCV RBC AUTO: 96.1 FL (ref 80–94)
MONOCYTES # BLD AUTO: 0.61 10*3/MM3 (ref 0.1–0.9)
MONOCYTES NFR BLD AUTO: 3.5 % (ref 0–12)
NEUTROPHILS # BLD AUTO: 13.64 10*3/MM3 (ref 1.4–6.5)
NEUTROPHILS NFR BLD AUTO: 78.4 % (ref 40–75)
OSMOLALITY SERPL CALC.SUM OF ELEC: 281.9 MOSM/KG (ref 273–305)
PLATELET # BLD AUTO: 326 10*3/MM3 (ref 130–400)
PMV BLD AUTO: 9.5 FL (ref 6–10)
POTASSIUM BLD-SCNC: 3.4 MMOL/L (ref 3.5–5.3)
POTASSIUM BLD-SCNC: 3.4 MMOL/L (ref 3.5–5.3)
PROT SERPL-MCNC: 6.3 G/DL (ref 6–8)
RBC # BLD AUTO: 3.58 10*6/MM3 (ref 4.2–5.4)
SODIUM BLD-SCNC: 140 MMOL/L (ref 135–153)
TROPONIN I SERPL-MCNC: 0.04 NG/ML
TROPONIN I SERPL-MCNC: 0.05 NG/ML
VANCOMYCIN SERPL-MCNC: 15.4 MCG/ML
WBC NRBC COR # BLD: 17.42 10*3/MM3 (ref 4.5–12.5)

## 2019-02-03 PROCEDURE — 85025 COMPLETE CBC W/AUTO DIFF WBC: CPT | Performed by: INTERNAL MEDICINE

## 2019-02-03 PROCEDURE — 73030 X-RAY EXAM OF SHOULDER: CPT

## 2019-02-03 PROCEDURE — 84132 ASSAY OF SERUM POTASSIUM: CPT | Performed by: INTERNAL MEDICINE

## 2019-02-03 PROCEDURE — 25010000002 DIPHENHYDRAMINE PER 50 MG: Performed by: INTERNAL MEDICINE

## 2019-02-03 PROCEDURE — 80202 ASSAY OF VANCOMYCIN: CPT

## 2019-02-03 PROCEDURE — 80053 COMPREHEN METABOLIC PANEL: CPT | Performed by: INTERNAL MEDICINE

## 2019-02-03 PROCEDURE — 25010000002 VANCOMYCIN 5 G RECONSTITUTED SOLUTION 5,000 MG VIAL

## 2019-02-03 PROCEDURE — 94799 UNLISTED PULMONARY SVC/PX: CPT

## 2019-02-03 PROCEDURE — 93010 ELECTROCARDIOGRAM REPORT: CPT | Performed by: INTERNAL MEDICINE

## 2019-02-03 PROCEDURE — 84484 ASSAY OF TROPONIN QUANT: CPT | Performed by: PHYSICIAN ASSISTANT

## 2019-02-03 PROCEDURE — 25010000002 HEPARIN (PORCINE) PER 1000 UNITS: Performed by: INTERNAL MEDICINE

## 2019-02-03 PROCEDURE — 25010000003 POTASSIUM CHLORIDE 10 MEQ/100ML SOLUTION: Performed by: PHYSICIAN ASSISTANT

## 2019-02-03 PROCEDURE — 25010000002 ZIPRASIDONE MESYLATE PER 10 MG: Performed by: INTERNAL MEDICINE

## 2019-02-03 PROCEDURE — 73080 X-RAY EXAM OF ELBOW: CPT | Performed by: RADIOLOGY

## 2019-02-03 PROCEDURE — 83735 ASSAY OF MAGNESIUM: CPT | Performed by: PHYSICIAN ASSISTANT

## 2019-02-03 PROCEDURE — 86140 C-REACTIVE PROTEIN: CPT | Performed by: INTERNAL MEDICINE

## 2019-02-03 PROCEDURE — 93005 ELECTROCARDIOGRAM TRACING: CPT | Performed by: INTERNAL MEDICINE

## 2019-02-03 PROCEDURE — 25010000002 MAGNESIUM SULFATE 2 GM/50ML SOLUTION: Performed by: INTERNAL MEDICINE

## 2019-02-03 PROCEDURE — 99233 SBSQ HOSP IP/OBS HIGH 50: CPT | Performed by: PHYSICIAN ASSISTANT

## 2019-02-03 PROCEDURE — 83605 ASSAY OF LACTIC ACID: CPT | Performed by: INTERNAL MEDICINE

## 2019-02-03 PROCEDURE — 73030 X-RAY EXAM OF SHOULDER: CPT | Performed by: RADIOLOGY

## 2019-02-03 PROCEDURE — 73080 X-RAY EXAM OF ELBOW: CPT

## 2019-02-03 RX ORDER — POTASSIUM CHLORIDE 7.45 MG/ML
10 INJECTION INTRAVENOUS
Status: DISCONTINUED | OUTPATIENT
Start: 2019-02-03 | End: 2019-02-03 | Stop reason: SDUPTHER

## 2019-02-03 RX ORDER — LEVOTHYROXINE SODIUM 0.1 MG/1
100 TABLET ORAL DAILY
Status: DISCONTINUED | OUTPATIENT
Start: 2019-02-03 | End: 2019-02-06 | Stop reason: HOSPADM

## 2019-02-03 RX ORDER — POTASSIUM CHLORIDE 750 MG/1
40 CAPSULE, EXTENDED RELEASE ORAL AS NEEDED
Status: DISCONTINUED | OUTPATIENT
Start: 2019-02-03 | End: 2019-02-06 | Stop reason: HOSPADM

## 2019-02-03 RX ORDER — POTASSIUM CHLORIDE 1.5 G/1.77G
40 POWDER, FOR SOLUTION ORAL AS NEEDED
Status: DISCONTINUED | OUTPATIENT
Start: 2019-02-03 | End: 2019-02-06 | Stop reason: HOSPADM

## 2019-02-03 RX ORDER — POTASSIUM CHLORIDE 7.45 MG/ML
10 INJECTION INTRAVENOUS
Status: DISPENSED | OUTPATIENT
Start: 2019-02-04 | End: 2019-02-04

## 2019-02-03 RX ORDER — QUETIAPINE FUMARATE 25 MG/1
25 TABLET, FILM COATED ORAL NIGHTLY
Status: DISCONTINUED | OUTPATIENT
Start: 2019-02-03 | End: 2019-02-06 | Stop reason: HOSPADM

## 2019-02-03 RX ORDER — VENLAFAXINE 37.5 MG/1
37.5 TABLET ORAL 2 TIMES DAILY WITH MEALS
Status: DISCONTINUED | OUTPATIENT
Start: 2019-02-03 | End: 2019-02-06 | Stop reason: HOSPADM

## 2019-02-03 RX ORDER — POTASSIUM CHLORIDE 750 MG/1
40 CAPSULE, EXTENDED RELEASE ORAL EVERY 4 HOURS
Status: DISCONTINUED | OUTPATIENT
Start: 2019-02-03 | End: 2019-02-03 | Stop reason: ALTCHOICE

## 2019-02-03 RX ORDER — HYDROCODONE BITARTRATE AND ACETAMINOPHEN 5; 325 MG/1; MG/1
1 TABLET ORAL EVERY 6 HOURS PRN
Status: DISCONTINUED | OUTPATIENT
Start: 2019-02-03 | End: 2019-02-06 | Stop reason: HOSPADM

## 2019-02-03 RX ORDER — MAGNESIUM SULFATE HEPTAHYDRATE 40 MG/ML
4 INJECTION, SOLUTION INTRAVENOUS AS NEEDED
Status: DISCONTINUED | OUTPATIENT
Start: 2019-02-03 | End: 2019-02-06 | Stop reason: HOSPADM

## 2019-02-03 RX ORDER — QUETIAPINE FUMARATE 25 MG/1
25 TABLET, FILM COATED ORAL 2 TIMES DAILY PRN
Status: DISCONTINUED | OUTPATIENT
Start: 2019-02-03 | End: 2019-02-06 | Stop reason: HOSPADM

## 2019-02-03 RX ORDER — MAGNESIUM SULFATE HEPTAHYDRATE 40 MG/ML
2 INJECTION, SOLUTION INTRAVENOUS AS NEEDED
Status: DISCONTINUED | OUTPATIENT
Start: 2019-02-03 | End: 2019-02-06 | Stop reason: HOSPADM

## 2019-02-03 RX ORDER — POTASSIUM CHLORIDE 7.45 MG/ML
10 INJECTION INTRAVENOUS
Status: COMPLETED | OUTPATIENT
Start: 2019-02-03 | End: 2019-02-03

## 2019-02-03 RX ORDER — FAMOTIDINE 20 MG/1
20 TABLET, FILM COATED ORAL 2 TIMES DAILY
Status: DISCONTINUED | OUTPATIENT
Start: 2019-02-03 | End: 2019-02-06 | Stop reason: HOSPADM

## 2019-02-03 RX ORDER — L.ACID,PARA/B.BIFIDUM/S.THERM 8B CELL
1 CAPSULE ORAL DAILY
Status: DISCONTINUED | OUTPATIENT
Start: 2019-02-03 | End: 2019-02-06 | Stop reason: HOSPADM

## 2019-02-03 RX ORDER — MAGNESIUM SULFATE HEPTAHYDRATE 40 MG/ML
2 INJECTION, SOLUTION INTRAVENOUS
Status: COMPLETED | OUTPATIENT
Start: 2019-02-03 | End: 2019-02-03

## 2019-02-03 RX ORDER — DOCUSATE SODIUM 100 MG/1
100 CAPSULE, LIQUID FILLED ORAL 2 TIMES DAILY
Status: DISCONTINUED | OUTPATIENT
Start: 2019-02-03 | End: 2019-02-06 | Stop reason: HOSPADM

## 2019-02-03 RX ORDER — POTASSIUM CHLORIDE 20 MEQ/1
40 TABLET, EXTENDED RELEASE ORAL EVERY 4 HOURS
Status: DISCONTINUED | OUTPATIENT
Start: 2019-02-03 | End: 2019-02-03 | Stop reason: SINTOL

## 2019-02-03 RX ORDER — MONTELUKAST SODIUM 10 MG/1
10 TABLET ORAL NIGHTLY
Status: DISCONTINUED | OUTPATIENT
Start: 2019-02-03 | End: 2019-02-06 | Stop reason: HOSPADM

## 2019-02-03 RX ORDER — POTASSIUM CHLORIDE 7.45 MG/ML
10 INJECTION INTRAVENOUS
Status: DISCONTINUED | OUTPATIENT
Start: 2019-02-03 | End: 2019-02-06 | Stop reason: HOSPADM

## 2019-02-03 RX ORDER — SENNA PLUS 8.6 MG/1
1 TABLET ORAL DAILY
Status: DISCONTINUED | OUTPATIENT
Start: 2019-02-03 | End: 2019-02-06 | Stop reason: HOSPADM

## 2019-02-03 RX ADMIN — SODIUM CHLORIDE, PRESERVATIVE FREE 3 ML: 5 INJECTION INTRAVENOUS at 08:56

## 2019-02-03 RX ADMIN — DOXYCYCLINE 100 MG: 100 INJECTION, POWDER, LYOPHILIZED, FOR SOLUTION INTRAVENOUS at 15:57

## 2019-02-03 RX ADMIN — FAMOTIDINE 20 MG: 10 INJECTION, SOLUTION INTRAVENOUS at 08:56

## 2019-02-03 RX ADMIN — DOCUSATE SODIUM 100 MG: 100 CAPSULE ORAL at 12:21

## 2019-02-03 RX ADMIN — VENLAFAXINE HYDROCHLORIDE 37.5 MG: 37.5 TABLET ORAL at 16:01

## 2019-02-03 RX ADMIN — LEVOTHYROXINE SODIUM 100 MCG: 100 TABLET ORAL at 12:21

## 2019-02-03 RX ADMIN — POTASSIUM CHLORIDE 10 MEQ: 10 INJECTION, SOLUTION INTRAVENOUS at 10:30

## 2019-02-03 RX ADMIN — IPRATROPIUM BROMIDE 0.5 MG: 0.5 SOLUTION RESPIRATORY (INHALATION) at 19:18

## 2019-02-03 RX ADMIN — MAGNESIUM SULFATE IN WATER 2 G: 40 INJECTION, SOLUTION INTRAVENOUS at 14:24

## 2019-02-03 RX ADMIN — SODIUM CHLORIDE 75 ML/HR: 9 INJECTION, SOLUTION INTRAVENOUS at 08:56

## 2019-02-03 RX ADMIN — METRONIDAZOLE 500 MG: 500 INJECTION, SOLUTION INTRAVENOUS at 20:52

## 2019-02-03 RX ADMIN — WATER 10 MG: 1 INJECTION INTRAMUSCULAR; INTRAVENOUS; SUBCUTANEOUS at 03:43

## 2019-02-03 RX ADMIN — Medication 1 CAPSULE: at 12:22

## 2019-02-03 RX ADMIN — METRONIDAZOLE 500 MG: 500 INJECTION, SOLUTION INTRAVENOUS at 14:05

## 2019-02-03 RX ADMIN — DOCUSATE SODIUM 100 MG: 100 CAPSULE ORAL at 20:52

## 2019-02-03 RX ADMIN — IPRATROPIUM BROMIDE 0.5 MG: 0.5 SOLUTION RESPIRATORY (INHALATION) at 07:26

## 2019-02-03 RX ADMIN — QUETIAPINE FUMARATE 25 MG: 25 TABLET, FILM COATED ORAL at 20:53

## 2019-02-03 RX ADMIN — HEPARIN SODIUM 5000 UNITS: 5000 INJECTION INTRAVENOUS; SUBCUTANEOUS at 08:56

## 2019-02-03 RX ADMIN — SENNOSIDES 1 TABLET: 8.6 TABLET, FILM COATED ORAL at 12:21

## 2019-02-03 RX ADMIN — IPRATROPIUM BROMIDE 0.5 MG: 0.5 SOLUTION RESPIRATORY (INHALATION) at 12:59

## 2019-02-03 RX ADMIN — APIXABAN 5 MG: 5 TABLET, FILM COATED ORAL at 20:52

## 2019-02-03 RX ADMIN — IPRATROPIUM BROMIDE 0.5 MG: 0.5 SOLUTION RESPIRATORY (INHALATION) at 00:24

## 2019-02-03 RX ADMIN — HYDROCODONE BITARTRATE AND ACETAMINOPHEN 1 TABLET: 5; 325 TABLET ORAL at 11:32

## 2019-02-03 RX ADMIN — CEFEPIME HYDROCHLORIDE 1 G: 1 INJECTION, POWDER, FOR SOLUTION INTRAMUSCULAR; INTRAVENOUS at 12:21

## 2019-02-03 RX ADMIN — POTASSIUM CHLORIDE 10 MEQ: 10 INJECTION, SOLUTION INTRAVENOUS at 14:04

## 2019-02-03 RX ADMIN — BUDESONIDE AND FORMOTEROL FUMARATE DIHYDRATE 2 PUFF: 160; 4.5 AEROSOL RESPIRATORY (INHALATION) at 19:18

## 2019-02-03 RX ADMIN — MAGNESIUM SULFATE IN WATER 2 G: 40 INJECTION, SOLUTION INTRAVENOUS at 12:21

## 2019-02-03 RX ADMIN — POTASSIUM CHLORIDE 40 MEQ: 1500 TABLET, EXTENDED RELEASE ORAL at 20:52

## 2019-02-03 RX ADMIN — FLUTICASONE PROPIONATE 2 SPRAY: 50 SPRAY, METERED NASAL at 08:57

## 2019-02-03 RX ADMIN — POTASSIUM CHLORIDE 10 MEQ: 10 INJECTION, SOLUTION INTRAVENOUS at 23:47

## 2019-02-03 RX ADMIN — FAMOTIDINE 20 MG: 20 TABLET, FILM COATED ORAL at 20:52

## 2019-02-03 RX ADMIN — POTASSIUM CHLORIDE 10 MEQ: 10 INJECTION, SOLUTION INTRAVENOUS at 11:33

## 2019-02-03 RX ADMIN — CLINDAMYCIN IN 5 PERCENT DEXTROSE 300 MG: 6 INJECTION, SOLUTION INTRAVENOUS at 05:00

## 2019-02-03 RX ADMIN — BUDESONIDE AND FORMOTEROL FUMARATE DIHYDRATE 2 PUFF: 160; 4.5 AEROSOL RESPIRATORY (INHALATION) at 07:26

## 2019-02-03 RX ADMIN — VANCOMYCIN HYDROCHLORIDE 1000 MG: 5 INJECTION, POWDER, LYOPHILIZED, FOR SOLUTION INTRAVENOUS at 11:32

## 2019-02-03 RX ADMIN — CEFEPIME 2 G: 2 INJECTION, POWDER, FOR SOLUTION INTRAVENOUS at 23:47

## 2019-02-03 RX ADMIN — QUETIAPINE FUMARATE 25 MG: 25 TABLET, FILM COATED ORAL at 12:21

## 2019-02-03 RX ADMIN — POTASSIUM CHLORIDE 10 MEQ: 10 INJECTION, SOLUTION INTRAVENOUS at 13:00

## 2019-02-03 RX ADMIN — MAGNESIUM SULFATE IN WATER 2 G: 40 INJECTION, SOLUTION INTRAVENOUS at 15:57

## 2019-02-03 RX ADMIN — MONTELUKAST SODIUM 10 MG: 10 TABLET, COATED ORAL at 20:52

## 2019-02-03 NOTE — NURSING NOTE
Patient bed alarm was going off, went to the room immediately to find sky PICKETT, sitting with the patient on the floor. Bed alarm was on at the time, no injury occurred, vitals were taken, informed MD of the incident, sitter to be ordered.

## 2019-02-03 NOTE — CONSULTS
INFECTIOUS DISEASE CONSULTATION REPORT        Patient Identification:  Name:  Jeanna Flower  Age:  78 y.o.  Sex:  female  :  1940  MRN:  [id number]   Visit Number:  [id number]  Primary Care Physician:  Raul Robert MD         Subjective       Subjective     History of present illness:      Thank you Dr. Quach for allowing us to participate in the care of your patient.  As you well know, Ms. Jeanna Flower is a 78 y.o. female nursing home resident with past medical history significant for CVA, COPD, Hypertension, Diabetes, A Fib, who presented to [facility] Emergency Department on 2019 for fever. Urinalysis unremarkable. Influenza negative. WBC 17.42. CRP 27.36. Mycoplasma negative. Legionella negative. Lactic acid 2.1 on admission, now normalized. Recent E.Coli bacteremia in 2018. Chest x-ray reveals findings consistent with right sided pneumonia likely from chronic aspiration.     Infectious Disease consultation was requested for antimicrobial management.      ---------------------------------------------------------------------------------------------------------------------     Review Of Systems:    Unable to obtain. Confused.    ---------------------------------------------------------------------------------------------------------------------     Past Medical History    Past Medical History:   Diagnosis Date   • Anemia    • Closed wedge compression fracture of first lumbar vertebra (CMS/HCC)    • COPD (chronic obstructive pulmonary disease) (CMS/HCC)    • CVA (cerebral vascular accident) with left hemiparesis. 2017   • Dementia    • Diabetes mellitus (CMS/HCC)    • Dysarthria following cerebral infarction    • Dysphagia, oropharyngeal phase    • Dysphagia, pharyngoesophageal phase    • GERD (gastroesophageal reflux disease)    • Heart disease    • Hyperlipidemia    • Hypertension    • Hypothyroidism    • Insomnia    • Obesity    • Other sleep apnea 2018   •  Paroxysmal atrial fibrillation (CMS/[facility]) 12/6/2017       Past Surgical History    Past Surgical History:   Procedure Laterality Date   • APPENDECTOMY     • BLADDER SURGERY     • CARDIAC CATHETERIZATION     • CHOLECYSTECTOMY         Family History    Family History   Problem Relation Age of Onset   • Heart disease Mother    • Heart attack Mother    • Heart disease Father    • Heart attack Father    • Heart attack Brother    • Heart attack Brother          Social History    Social History     Tobacco Use   • Smoking status: Never Smoker   • Smokeless tobacco: Never Used   Substance Use Topics   • Alcohol use: No   • Drug use: No       Allergies    Bactrim [sulfamethoxazole-trimethoprim]; Codeine; and Penicillins  ---------------------------------------------------------------------------------------------------------------------     Home Medications:    Prior to Admission Medications     Prescriptions Last Dose Informant Patient Reported? Taking?    apixaban (ELIQUIS) 5 MG tablet tablet 2/1/2019 Nursing Home No Yes    Take 1 tablet by mouth Every 12 (Twelve) Hours.    budesonide (PULMICORT) 0.5 MG/2ML nebulizer solution 2/1/2019 Nursing Home Yes Yes    Take 0.5 mg by nebulization Every 12 (Twelve) Hours.    cycloSPORINE (RESTASIS) 0.05 % ophthalmic emulsion 2/1/2019 Nursing Home Yes Yes    Administer 1 drop to both eyes 2 (Two) Times a Day.    docusate sodium (COLACE) 100 MG capsule 2/1/2019 Nursing Home Yes Yes    Take 100 mg by mouth 2 (Two) Times a Day.    famotidine (PEPCID) 20 MG tablet 2/1/2019 Nursing Home Yes Yes    Take 20 mg by mouth 2 (Two) Times a Day.    fluticasone (FLONASE) 50 MCG/ACT nasal spray 2/1/2019 Nursing Home Yes Yes    2 sprays into the nostril(s) as directed by provider Daily.    fluticasone-salmeterol (ADVAIR DISKUS) 250-50 MCG/DOSE DISKUS 2/1/2019 Nursing Home Yes Yes    Inhale 1 puff 2 (Two) Times a Day.    HYDROcodone-acetaminophen (NORCO) 5-325 MG per tablet 2/1/2019 Nursing Home No Yes     Take 1 tablet by mouth Every 6 (Six) Hours As Needed for Moderate Pain .    Lactobacillus Acid-Pectin (ACIDOPHILUS/CITRUS PECTIN) tablet tablet 2/1/2019 Nursing Home Yes Yes    Take 1 tablet by mouth 3 (Three) Times a Day.    levothyroxine (SYNTHROID, LEVOTHROID) 100 MCG tablet 2/1/2019 Nursing Home Yes Yes    Take 100 mcg by mouth Daily.    montelukast (SINGULAIR) 10 MG tablet 1/31/2019 Nursing Home Yes Yes    Take 10 mg by mouth Every Night.    QUEtiapine (SEROquel) 25 MG tablet 1/31/2019 Nursing Home No Yes    Take 1 tablet by mouth Every Night.    QUEtiapine (SEROquel) 25 MG tablet 2/1/2019 Nursing Home No Yes    Take 1 tablet by mouth 2 (Two) Times a Day As Needed (Agitation, halllucinations).    venlafaxine (EFFEXOR) 37.5 MG tablet 2/1/2019 Nursing Home Yes Yes    Take 37.5 mg by mouth 2 (Two) Times a Day.    albuterol sulfate  (90 Base) MCG/ACT inhaler Unknown Nursing Home Yes No    Inhale 2 puffs Every 4 (Four) Hours As Needed for Wheezing or Shortness of Air.    senna (SENNA-LAX) 8.6 MG tablet Unknown Nursing Home Yes No    Take 1 tablet by mouth Daily.        ---------------------------------------------------------------------------------------------------------------------    Objective       Objective     Hospital Scheduled Meds:    apixaban 5 mg Oral Q12H   budesonide-formoterol 2 puff Inhalation BID - RT   [START ON 2/4/2019] cefepime 2 g Intravenous Q12H   docusate sodium 100 mg Oral BID   doxycycline 100 mg Intravenous Q12H   famotidine 20 mg Oral BID   fluticasone 2 spray Nasal Daily   ipratropium 0.5 mg Nebulization 4x Daily - RT   lactobacillus acidophilus 1 capsule Oral Daily   levothyroxine 100 mcg Oral Daily   magnesium sulfate 2 g Intravenous Q2H   metroNIDAZOLE 500 mg Intravenous Q8H   montelukast 10 mg Oral Nightly   QUEtiapine 25 mg Oral Nightly   senna 1 tablet Oral Daily   sodium chloride 3 mL Intravenous Q12H   venlafaxine 37.5 mg Oral BID With Meals       Pharmacy Consult -  Pharmacy to dose     Pharmacy Consult - Pharmacy to dose     Pharmacy to dose vancomycin     sodium chloride 75 mL/hr Last Rate: 75 mL/hr (02/03/19 0856)     ---------------------------------------------------------------------------------------------------------------------   Vital Signs:  Temp:  [98 °F (36.7 °C)-100.9 °F (38.3 °C)] 99.3 °F (37.4 °C)  Heart Rate:  [] 103  Resp:  [18-20] 20  BP: (115-150)/(40-73) 142/73  No data found.  SpO2 Percentage    02/03/19 0740 02/03/19 1259 02/03/19 1308   SpO2: 97% 97% 98%     SpO2:  [95 %-98 %] 98 %  on  Flow (L/min):  [3] 3;   Device (Oxygen Therapy): nasal cannula    Body mass index is 30.25 kg/m².  Wt Readings from Last 3 Encounters:   02/02/19 75 kg (165 lb 6.4 oz)   01/24/19 77.6 kg (171 lb)   01/15/19 78.5 kg (173 lb)     ---------------------------------------------------------------------------------------------------------------------     Physical Exam:    Constitutional:  Well-developed and well-nourished.  No respiratory distress. Confused.      HENT:  Head: Normocephalic and atraumatic.  Mouth:  Moist mucous membranes.    Eyes:  Conjunctivae and EOM are normal.  No scleral icterus.  Neck:  Neck supple.  No JVD present.    Cardiovascular:  Normal rate, regular rhythm and normal heart sounds with no murmur. No edema.  Pulmonary/Chest: Scattered rhonchi throughout.  Abdominal:  Soft.  Bowel sounds are normal.  No distension and no tenderness.   Musculoskeletal:  No edema, no tenderness, and no deformity.  No swelling or redness of joints.  Neurological:   Confused.     Skin:  Skin is warm and dry.  No rash noted.  No pallor.   Psychiatric:  Normal mood and affect.  Behavior is normal.    ---------------------------------------------------------------------------------------------------------------------    Results from last 7 days   Lab Units 02/03/19  0453   TROPONIN I ng/mL 0.047*         Results from last 7 days   Lab Units 02/02/19  1401   PH, ARTERIAL  pH units 7.472*   PO2 ART mm Hg 76.9*   PCO2, ARTERIAL mm Hg 25.8*   HCO3 ART mmol/L 18.4*     Results from last 7 days   Lab Units 02/03/19  1118 02/03/19  0454 02/02/19  1421 02/02/19  0652 02/02/19  0216   CRP mg/dL  --  27.36*  --   --   --    LACTATE mmol/L 1.0  --  2.1* 3.7* 2.6*   WBC 10*3/mm3  --  17.42*  --  15.86* 9.70   HEMOGLOBIN g/dL  --  10.3*  --  10.5* 11.7*   HEMATOCRIT %  --  34.4*  --  35.1* 37.5   MCV fL  --  96.1*  --  96.2* 94.5*   MCHC g/dL  --  29.9*  --  29.9* 31.2*   PLATELETS 10*3/mm3  --  326  --  363 397     Results from last 7 days   Lab Units 02/03/19  0453 02/02/19  0652 02/02/19  0216   SODIUM mmol/L 140 136 135   POTASSIUM mmol/L 3.4* 4.2 3.5   MAGNESIUM mg/dL 1.4*  --   --    CHLORIDE mmol/L 114* 112 104   CO2 mmol/L 17.7* 16.2* 20.2*   BUN mg/dL 20 16 18   CREATININE mg/dL 0.86 1.03 1.04   EGFR IF NONAFRICN AM mL/min/1.73 64 52* 51*   CALCIUM mg/dL 7.9 7.8 8.6   GLUCOSE mg/dL 97 130* 166*   ALBUMIN g/dL 3.10* 2.60* 3.40   BILIRUBIN mg/dL 0.6 0.3 0.5   ALK PHOS U/L 160* 154* 208*   AST (SGOT) U/L 34* 41* 53*   ALT (SGPT) U/L 36 33 45*   Estimated Creatinine Clearance: 52 mL/min (by C-G formula based on SCr of 0.86 mg/dL).  No results found for: AMMONIA    No results found for: HGBA1C, POCGLU  Lab Results   Component Value Date    HGBA1C 5.90 (H) 01/15/2019     Lab Results   Component Value Date    TSH 6.470 (H) 01/15/2019    FREET4 0.96 01/17/2019       Blood Culture   Date Value Ref Range Status   02/02/2019 No growth at 24 hours  Preliminary   02/02/2019 No growth at 24 hours  Preliminary                 Pain Management Panel     Pain Management Panel Latest Ref Rng & Units 9/16/2018    AMPHETAMINES SCREEN, URINE Negative Negative    BARBITURATES SCREEN Negative Negative    BENZODIAZEPINE SCREEN, URINE Negative Negative    BUPRENORPHINE Negative Negative    COCAINE SCREEN, URINE Negative Negative    METHADONE SCREEN, URINE Negative Negative        I have personally reviewed the  above laboratory results.   ---------------------------------------------------------------------------------------------------------------------  Imaging Results (last 7 days)     Procedure Component Value Units Date/Time    XR Chest 1 View [[id number]] Collected:  02/02/19 0927     Updated:  02/02/19 0930    Narrative:       EXAMINATION:  XR CHEST 1 VW-      CLINICAL INDICATION:     Fever     TECHNIQUE:  XR CHEST 1 VW-       COMPARISON: NONE      FINDINGS:   Coarse interstitial markings suggestive of chronic interstitial lung  disease.   Slightly increased nodularity right lung base may represent some chronic  aspiration.   No pneumothorax.   No pleural effusion.   Bony and soft tissue structures are unremarkable.            Impression:       Slightly increased nodularity right lung base may represent  some chronic aspiration.      This report was finalized on 2/2/2019 9:28 AM by Dr. Giovanni Martin MD.           I have personally reviewed the above radiology results.   ---------------------------------------------------------------------------------------------------------------------      Assessment & Plan        Assessment/Plan       ASSESSMENT:    1. Severe sepsis with lactic acid greater than 2 on admission  2. Aspiration pneumonia    PLAN:    Patient presents with fever. Urinalysis unremarkable. Influenza negative. WBC 17.42. CRP 27.36. Mycoplasma negative. Legionella negative. Lactic acid 2.1 on admission, now normalized. Recent E.Coli bacteremia in 1/2018. Chest x-ray reveals findings consistent with right sided pneumonia likely from chronic aspiration.     In the setting of chronic aspiration, COPD, and nursing home facility resident, Cefepime was increased to 2gm IV Q12H to continue with Flagyl 500mg IV Q8H. Doxycycline 100mg IV Q12H to allow for COPD and MRSA coverage. Vancomycin was discontinued for now. Will continue to follow closely and adjust antibiotic therapy as needed. CRP in AM.    Current  Antimicrobials:  Cefepime 2gm IV Q12H  Doxycycline 100mg IV Q12H  Flagyl 500mg IV Q8H    Code Status:   Code Status and Medical Interventions:   Ordered at: 02/02/19 0413     Limited Support to NOT Include:    Intubation     Code Status:    No CPR     Medical Interventions (Level of Support Prior to Arrest):    Limited           AMELIE Reis  02/03/19  3:34 PM

## 2019-02-03 NOTE — PLAN OF CARE
Problem: Skin Injury Risk (Adult)  Goal: Identify Related Risk Factors and Signs and Symptoms  Outcome: Ongoing (interventions implemented as appropriate)    Goal: Skin Health and Integrity  Outcome: Ongoing (interventions implemented as appropriate)      Problem: Fall Risk (Adult)  Goal: Identify Related Risk Factors and Signs and Symptoms  Outcome: Ongoing (interventions implemented as appropriate)    Goal: Absence of Fall  Outcome: Ongoing (interventions implemented as appropriate)      Problem: Pneumonia (Adult)  Goal: Signs and Symptoms of Listed Potential Problems Will be Absent, Minimized or Managed (Pneumonia)  Outcome: Ongoing (interventions implemented as appropriate)      Problem: Patient Care Overview  Goal: Plan of Care Review  Outcome: Ongoing (interventions implemented as appropriate)    Goal: Discharge Needs Assessment  Outcome: Ongoing (interventions implemented as appropriate)      Problem: Pain, Chronic (Adult)  Goal: Identify Related Risk Factors and Signs and Symptoms  Outcome: Ongoing (interventions implemented as appropriate)

## 2019-02-03 NOTE — PROGRESS NOTES
[facility] HOSPITALIST PROGRESS NOTE     After midnight admission note:    Patient Identification:  Name:  Jeanna Flower  Age:  78 y.o.  Sex:  female  :  1940  MRN:  [id number]  Visit Number:  [id number]  Primary Care Provider:  Raul Robert MD    Date of admission: 2019  Length of stay:  1    ----------------------------------------------------------------------------------------------------------------------  Subjective     Chief Complaint:   Chief Complaint   Patient presents with   • Fever     Subjective/Interval History:    Patient is a 78 y.o. female who was admitted on 2019 after presenting to the emergency department of [facility] with complaints of fever up to 102.5F. She was diagnosed with severe sepsis secondary to RML aspiration versus HCAP. She was recently admitted to our facility with E. Coli bacteremia and was found to be at high risk of aspiration. POA did not want any alternative nutrition at that time. For complete admission information, please see history and physical.     Today, the patient is more awake and alert. She is asking for prayers this morning. She is alert to person and that she is in a hospital, confused to time. She reportedly suffered a fall from the bed last night with no initial reported injuries. She is now complaining of some left shoulder and elbow pain. She now has a bedside sitter. Discussed with RNSherita who denies any other events or concerns at this time.    ----------------------------------------------------------------------------------------------------------------------  Objective   Current [facility] Meds:    budesonide-formoterol 2 puff Inhalation BID - RT   famotidine 20 mg Intravenous Daily   fluticasone 2 spray Nasal Daily   heparin (porcine) 5,000 Units Subcutaneous Q12H   ipratropium 0.5 mg Nebulization 4x Daily - RT   lactobacillus acidophilus 1 capsule Oral Daily   metroNIDAZOLE 500 mg Intravenous Q8H    potassium chloride 10 mEq Intravenous Q1H   sodium chloride 3 mL Intravenous Q12H       Pharmacy Consult - Pharmacy to dose     Pharmacy Consult - Pharmacy to dose     Pharmacy to dose vancomycin     sodium chloride 75 mL/hr Last Rate: 75 mL/hr (02/03/19 0856)     ----------------------------------------------------------------------------------------------------------------------  Vital Signs:  Temp:  [98 °F (36.7 °C)-100.9 °F (38.3 °C)] 98.1 °F (36.7 °C)  Heart Rate:  [] 110  Resp:  [18-20] 20  BP: (115-150)/(40-65) 135/65  No data found.  SpO2 Percentage    02/03/19 0034 02/03/19 0726 02/03/19 0740   SpO2: 95% 97% 97%     SpO2:  [95 %-100 %] 97 %  on  Flow (L/min):  [3] 3;   Device (Oxygen Therapy): nasal cannula    Body mass index is 30.25 kg/m².  Wt Readings from Last 3 Encounters:   02/02/19 75 kg (165 lb 6.4 oz)   01/24/19 77.6 kg (171 lb)   01/15/19 78.5 kg (173 lb)        Intake/Output Summary (Last 24 hours) at 2/3/2019 1026  Last data filed at 2/3/2019 0334  Gross per 24 hour   Intake 0 ml   Output 200 ml   Net -200 ml     Diet Pureed; Thin  ----------------------------------------------------------------------------------------------------------------------  Physical exam:  Constitutional:  Well-developed and well-nourished.  No respiratory distress. Awake, alert, talking with myself and sitter.   HENT:  Head:  Normocephalic and atraumatic.  Mouth: moist.   Eyes:  Conjunctivae and EOM are normal. No scleral icterus. No erythema or drainage.  Neck:  Neck supple.  No JVD present.    Cardiovascular: Mildly elevated rate, regular rhythm and normal heart sounds with no murmur.  Pulmonary/Chest:  No respiratory distress. Coarse breath sounds noted in the right middle and lower lung fields, no wheezes or rhonchi noted.   Abdominal:  Soft.  Bowel sounds are normal x4  No distension and no apparent tenderness.   Musculoskeletal:  No edema, no tenderness. She has contractures on the left side.  No red or  swollen joints anywhere.    Neurological: Drowsy but arousable. Mumbles in response to questioning. No cranial nerve deficit.  No tongue deviation.  No facial droop.  No slurred speech.   Skin:  Skin is warm and dry. No rash noted. No pallor.   Peripheral vascular: No clubbing, no cyanosis, no edema. 2+ pedal pulses bilaterally.   Genitourinary: No olivas catheter in place.   ----------------------------------------------------------------------------------------------------------------------  Tele: Appears to be sinus tachycardia 100-110s, she has a lot of baseline artifact.     I have personally reviewed the EKG/Telemetry strips.  ----------------------------------------------------------------------------------------------------------------------          Results from last 7 days   Lab Units 02/02/19  1401   PH, ARTERIAL pH units 7.472*   PO2 ART mm Hg 76.9*   PCO2, ARTERIAL mm Hg 25.8*   HCO3 ART mmol/L 18.4*     Results from last 7 days   Lab Units 02/03/19  0454 02/02/19  1421 02/02/19  0652 02/02/19  0216   CRP mg/dL 27.36*  --   --   --    LACTATE mmol/L  --  2.1* 3.7* 2.6*   WBC 10*3/mm3 17.42*  --  15.86* 9.70   HEMOGLOBIN g/dL 10.3*  --  10.5* 11.7*   HEMATOCRIT % 34.4*  --  35.1* 37.5   MCV fL 96.1*  --  96.2* 94.5*   MCHC g/dL 29.9*  --  29.9* 31.2*   PLATELETS 10*3/mm3 326  --  363 397     Results from last 7 days   Lab Units 02/03/19  0453 02/02/19  0652 02/02/19  0216   SODIUM mmol/L 140 136 135   POTASSIUM mmol/L 3.4* 4.2 3.5   CHLORIDE mmol/L 114* 112 104   CO2 mmol/L 17.7* 16.2* 20.2*   BUN mg/dL 20 16 18   CREATININE mg/dL 0.86 1.03 1.04   EGFR IF NONAFRICN AM mL/min/1.73 64 52* 51*   CALCIUM mg/dL 7.9 7.8 8.6   GLUCOSE mg/dL 97 130* 166*   ALBUMIN g/dL 3.10* 2.60* 3.40   BILIRUBIN mg/dL 0.6 0.3 0.5   ALK PHOS U/L 160* 154* 208*   AST (SGOT) U/L 34* 41* 53*   ALT (SGPT) U/L 36 33 45*   Estimated Creatinine Clearance: 52 mL/min (by C-G formula based on SCr of 0.86 mg/dL).    Lab Results   Component  Value Date    HGBA1C 5.90 (H) 01/15/2019     Lab Results   Component Value Date    TSH 6.470 (H) 01/15/2019    FREET4 0.96 01/17/2019     Pain Management Panel     Pain Management Panel Latest Ref Rng & Units 9/16/2018    AMPHETAMINES SCREEN, URINE Negative Negative    BARBITURATES SCREEN Negative Negative    BENZODIAZEPINE SCREEN, URINE Negative Negative    BUPRENORPHINE Negative Negative    COCAINE SCREEN, URINE Negative Negative    METHADONE SCREEN, URINE Negative Negative        I have personally reviewed the above laboratory results.   ----------------------------------------------------------------------------------------------------------------------  Imaging Results (last 24 hours)     ** No results found for the last 24 hours. **        I have personally reviewed the above radiology results.   ----------------------------------------------------------------------------------------------------------------------  Assessment/Plan     -Severe sepsis, present upon admission and secondary to RML aspiration versus healthcare associated pneumonia: Discussed with Dr. Quach. Her WBC count is increasing, CRP is elevated at 27.36, and she remains tachycardic. He has switched her antibiotic therapy to cefepime, vancomycin, and flagyl. ID consult placed, appreciate their input. Continue with oxygen per nasal cannula and titrate for saturations greater than 90%. Atrovent nebulizers every 6 hours. Repeat lactic acid. Continue IV fluids. CBC and CRP in AM.     -Moderate to severe oral dysphagia with aspiration: Since she is now more awake/alert, will start her on least restrictive diet per POA request. I called and discussed with Ernestina, her POA, yesterday and they still do not wish for any alternative forms of feeding.      -Minimal AST elevation/Alkaline phosphatase elevation: AST improving, Alkaline phosphatase stable. Continue to monitor.      -Acute metabolic alkalosis with partial respiratory compensation: CO2  improving on CMP today. Continue to monitor.     -Acute hypokalemia: replace per protocol. Check magnesium.      -Severe hypoalbuminemia: improving.      -Dementia: bedside sitter added last night due to fall. Continue fall and aspiration precautions.      -Macrocytic anemia, chronic: Stable. Continue to monitor H/H. Recent folate and vitamin B12 levels within normal limits.      -Fall: Bedside sitter added. She is complaining of some pain in the left shoulder and elbow, will obtain XRs.     -History of recent MSSA and E.Coli bacteremia: Blood cultures repeated at admission with no growth x 24 hours. Will follow up on final results. ID consult placed.     -Paroxsymal atrial fibrillation: Appears to be in sinus rhythm/sinus tachycardia on telemetry with a lot of baseline artifact. Continue to monitor. Hold Eliquis resumed starting this evening since she received subcutaneous heparin this morning.     -Hypothyroidism: Resume home levothyroxine. TSH recently mildly elevated during recent admission with normal Free T4, recommend outpatient follow up.     DVT/GI prophylaxis: Restarted on Eliquis BID.      Estimated Length of Stay: > 2 MNs     CODE: DNR/No CPR    The patient is high risk due to the following diagnoses/reasons: severe sepsis, dementia, aspiration.     I have discussed the patient's assessment and plan with the patient, RNSherita, and attending physician, Dr. Quach.     Disposition: Discharge back to the nursing home when pneumonia has improved. SS to follow up tomorrow regarding bed hold.     TAE Parker  02/03/19  10:27 AM

## 2019-02-03 NOTE — PLAN OF CARE
Problem: Skin Injury Risk (Adult)  Goal: Identify Related Risk Factors and Signs and Symptoms  Outcome: Ongoing (interventions implemented as appropriate)   02/02/19 0644   Skin Injury Risk (Adult)   Related Risk Factors (Skin Injury Risk) advanced age;cognitive impairment;infection;mobility impaired     Goal: Skin Health and Integrity  Outcome: Ongoing (interventions implemented as appropriate)   02/03/19 0138   Skin Injury Risk (Adult)   Skin Health and Integrity making progress toward outcome       Problem: Fall Risk (Adult)  Goal: Absence of Fall  Outcome: Ongoing (interventions implemented as appropriate)   02/03/19 0138   Fall Risk (Adult)   Absence of Fall making progress toward outcome       Problem: Pneumonia (Adult)  Goal: Signs and Symptoms of Listed Potential Problems Will be Absent, Minimized or Managed (Pneumonia)  Outcome: Ongoing (interventions implemented as appropriate)   02/02/19 0644   Goal/Outcome Evaluation   Problems Assessed (Pneumonia) all   Problems Present (Pneumonia) infection progression       Problem: Patient Care Overview  Goal: Plan of Care Review  Outcome: Ongoing (interventions implemented as appropriate)   02/02/19 0644 02/02/19 2130   Coping/Psychosocial   Plan of Care Reviewed With --  patient   Plan of Care Review   Progress no change --        Problem: Pain, Chronic (Adult)  Goal: Identify Related Risk Factors and Signs and Symptoms  Outcome: Ongoing (interventions implemented as appropriate)   02/02/19 0644   Pain, Chronic (Adult)   Related Risk Factors (Chronic Pain) disease process   Signs and Symptoms (Chronic Pain) BADLs/IADLs, reluctance/inability to perform;fatigue/weakness;verbalization of pain/discomfort for a prolonged time period     Goal: Acceptable Pain/Comfort Level and Functional Ability  Outcome: Ongoing (interventions implemented as appropriate)   02/03/19 0138   Pain, Chronic (Adult)   Acceptable Pain/Comfort Level and Functional Ability making progress toward  outcome

## 2019-02-04 LAB
ALBUMIN SERPL-MCNC: 2.5 G/DL (ref 3.4–4.8)
ALBUMIN/GLOB SERPL: 0.8 G/DL (ref 1.5–2.5)
ALP SERPL-CCNC: 122 U/L (ref 35–104)
ALT SERPL W P-5'-P-CCNC: 15 U/L (ref 10–36)
ANION GAP SERPL CALCULATED.3IONS-SCNC: 5.8 MMOL/L (ref 3.6–11.2)
AST SERPL-CCNC: 17 U/L (ref 10–30)
BASOPHILS # BLD AUTO: 0.04 10*3/MM3 (ref 0–0.3)
BASOPHILS NFR BLD AUTO: 0.3 % (ref 0–2)
BILIRUB SERPL-MCNC: 0.4 MG/DL (ref 0.2–1.8)
BUN BLD-MCNC: 12 MG/DL (ref 7–21)
BUN/CREAT SERPL: 17.4 (ref 7–25)
CALCIUM SPEC-SCNC: 8 MG/DL (ref 7.7–10)
CHLORIDE SERPL-SCNC: 114 MMOL/L (ref 99–112)
CO2 SERPL-SCNC: 16.2 MMOL/L (ref 24.3–31.9)
CREAT BLD-MCNC: 0.69 MG/DL (ref 0.43–1.29)
CRP SERPL-MCNC: 23.9 MG/DL (ref 0–0.99)
DEPRECATED RDW RBC AUTO: 63.4 FL (ref 37–54)
EOSINOPHIL # BLD AUTO: 0.19 10*3/MM3 (ref 0–0.7)
EOSINOPHIL NFR BLD AUTO: 1.3 % (ref 0–7)
ERYTHROCYTE [DISTWIDTH] IN BLOOD BY AUTOMATED COUNT: 18.9 % (ref 11.5–14.5)
GFR SERPL CREATININE-BSD FRML MDRD: 82 ML/MIN/1.73
GLOBULIN UR ELPH-MCNC: 3.3 GM/DL
GLUCOSE BLD-MCNC: 105 MG/DL (ref 70–110)
HCT VFR BLD AUTO: 30.6 % (ref 37–47)
HGB BLD-MCNC: 9.1 G/DL (ref 12–16)
IMM GRANULOCYTES # BLD AUTO: 0.05 10*3/MM3 (ref 0–0.03)
IMM GRANULOCYTES NFR BLD AUTO: 0.3 % (ref 0–0.5)
LYMPHOCYTES # BLD AUTO: 2.94 10*3/MM3 (ref 1–3)
LYMPHOCYTES NFR BLD AUTO: 20 % (ref 16–46)
MAGNESIUM SERPL-MCNC: 2.2 MG/DL (ref 1.7–2.6)
MCH RBC QN AUTO: 28.7 PG (ref 27–33)
MCHC RBC AUTO-ENTMCNC: 29.7 G/DL (ref 33–37)
MCV RBC AUTO: 96.5 FL (ref 80–94)
MONOCYTES # BLD AUTO: 0.64 10*3/MM3 (ref 0.1–0.9)
MONOCYTES NFR BLD AUTO: 4.4 % (ref 0–12)
NEUTROPHILS # BLD AUTO: 10.81 10*3/MM3 (ref 1.4–6.5)
NEUTROPHILS NFR BLD AUTO: 73.7 % (ref 40–75)
OSMOLALITY SERPL CALC.SUM OF ELEC: 272.1 MOSM/KG (ref 273–305)
PHOSPHATE SERPL-MCNC: 2.2 MG/DL (ref 2.7–4.5)
PHOSPHATE SERPL-MCNC: 3.2 MG/DL (ref 2.7–4.5)
PLATELET # BLD AUTO: 339 10*3/MM3 (ref 130–400)
PMV BLD AUTO: 9.1 FL (ref 6–10)
POTASSIUM BLD-SCNC: 4.1 MMOL/L (ref 3.5–5.3)
PROT SERPL-MCNC: 5.8 G/DL (ref 6–8)
RBC # BLD AUTO: 3.17 10*6/MM3 (ref 4.2–5.4)
SODIUM BLD-SCNC: 136 MMOL/L (ref 135–153)
TROPONIN I SERPL-MCNC: 0.02 NG/ML
WBC NRBC COR # BLD: 14.67 10*3/MM3 (ref 4.5–12.5)

## 2019-02-04 PROCEDURE — 80053 COMPREHEN METABOLIC PANEL: CPT | Performed by: INTERNAL MEDICINE

## 2019-02-04 PROCEDURE — 93005 ELECTROCARDIOGRAM TRACING: CPT | Performed by: HOSPITALIST

## 2019-02-04 PROCEDURE — 94799 UNLISTED PULMONARY SVC/PX: CPT

## 2019-02-04 PROCEDURE — 84100 ASSAY OF PHOSPHORUS: CPT | Performed by: INTERNAL MEDICINE

## 2019-02-04 PROCEDURE — 93010 ELECTROCARDIOGRAM REPORT: CPT | Performed by: INTERNAL MEDICINE

## 2019-02-04 PROCEDURE — 84484 ASSAY OF TROPONIN QUANT: CPT | Performed by: HOSPITALIST

## 2019-02-04 PROCEDURE — 86140 C-REACTIVE PROTEIN: CPT | Performed by: INTERNAL MEDICINE

## 2019-02-04 PROCEDURE — 85025 COMPLETE CBC W/AUTO DIFF WBC: CPT | Performed by: INTERNAL MEDICINE

## 2019-02-04 PROCEDURE — 25010000003 POTASSIUM CHLORIDE 10 MEQ/100ML SOLUTION: Performed by: INTERNAL MEDICINE

## 2019-02-04 PROCEDURE — 83735 ASSAY OF MAGNESIUM: CPT | Performed by: INTERNAL MEDICINE

## 2019-02-04 PROCEDURE — 99232 SBSQ HOSP IP/OBS MODERATE 35: CPT | Performed by: PHYSICIAN ASSISTANT

## 2019-02-04 PROCEDURE — 25010000002 CEFEPIME 2 G/NS 100 ML SOLUTION: Performed by: NURSE PRACTITIONER

## 2019-02-04 RX ADMIN — FAMOTIDINE 20 MG: 20 TABLET, FILM COATED ORAL at 08:31

## 2019-02-04 RX ADMIN — APIXABAN 5 MG: 5 TABLET, FILM COATED ORAL at 20:50

## 2019-02-04 RX ADMIN — BUDESONIDE AND FORMOTEROL FUMARATE DIHYDRATE 2 PUFF: 160; 4.5 AEROSOL RESPIRATORY (INHALATION) at 18:42

## 2019-02-04 RX ADMIN — POTASSIUM CHLORIDE 10 MEQ: 10 INJECTION, SOLUTION INTRAVENOUS at 05:03

## 2019-02-04 RX ADMIN — APIXABAN 5 MG: 5 TABLET, FILM COATED ORAL at 08:31

## 2019-02-04 RX ADMIN — DOCUSATE SODIUM 100 MG: 100 CAPSULE ORAL at 08:31

## 2019-02-04 RX ADMIN — METRONIDAZOLE 500 MG: 500 INJECTION, SOLUTION INTRAVENOUS at 20:50

## 2019-02-04 RX ADMIN — DOCUSATE SODIUM 100 MG: 100 CAPSULE ORAL at 20:51

## 2019-02-04 RX ADMIN — METRONIDAZOLE 500 MG: 500 INJECTION, SOLUTION INTRAVENOUS at 06:35

## 2019-02-04 RX ADMIN — FLUTICASONE PROPIONATE 2 SPRAY: 50 SPRAY, METERED NASAL at 08:31

## 2019-02-04 RX ADMIN — QUETIAPINE FUMARATE 25 MG: 25 TABLET, FILM COATED ORAL at 20:50

## 2019-02-04 RX ADMIN — VENLAFAXINE HYDROCHLORIDE 37.5 MG: 37.5 TABLET ORAL at 08:31

## 2019-02-04 RX ADMIN — METRONIDAZOLE 500 MG: 500 INJECTION, SOLUTION INTRAVENOUS at 13:56

## 2019-02-04 RX ADMIN — HYDROCODONE BITARTRATE AND ACETAMINOPHEN 1 TABLET: 5; 325 TABLET ORAL at 15:59

## 2019-02-04 RX ADMIN — IPRATROPIUM BROMIDE 0.5 MG: 0.5 SOLUTION RESPIRATORY (INHALATION) at 18:43

## 2019-02-04 RX ADMIN — BUDESONIDE AND FORMOTEROL FUMARATE DIHYDRATE 2 PUFF: 160; 4.5 AEROSOL RESPIRATORY (INHALATION) at 06:26

## 2019-02-04 RX ADMIN — LEVOTHYROXINE SODIUM 100 MCG: 100 TABLET ORAL at 08:31

## 2019-02-04 RX ADMIN — IPRATROPIUM BROMIDE 0.5 MG: 0.5 SOLUTION RESPIRATORY (INHALATION) at 13:46

## 2019-02-04 RX ADMIN — SODIUM CHLORIDE, PRESERVATIVE FREE 3 ML: 5 INJECTION INTRAVENOUS at 20:51

## 2019-02-04 RX ADMIN — MONTELUKAST SODIUM 10 MG: 10 TABLET, COATED ORAL at 20:50

## 2019-02-04 RX ADMIN — IPRATROPIUM BROMIDE 0.5 MG: 0.5 SOLUTION RESPIRATORY (INHALATION) at 06:26

## 2019-02-04 RX ADMIN — IPRATROPIUM BROMIDE 0.5 MG: 0.5 SOLUTION RESPIRATORY (INHALATION) at 00:33

## 2019-02-04 RX ADMIN — SODIUM CHLORIDE, PRESERVATIVE FREE 3 ML: 5 INJECTION INTRAVENOUS at 08:32

## 2019-02-04 RX ADMIN — POTASSIUM CHLORIDE 10 MEQ: 10 INJECTION, SOLUTION INTRAVENOUS at 06:34

## 2019-02-04 RX ADMIN — SODIUM PHOSPHATE, MONOBASIC, MONOHYDRATE 15 MMOL: 276; 142 INJECTION, SOLUTION INTRAVENOUS at 13:56

## 2019-02-04 RX ADMIN — FAMOTIDINE 20 MG: 20 TABLET, FILM COATED ORAL at 20:50

## 2019-02-04 RX ADMIN — DOXYCYCLINE 100 MG: 100 INJECTION, POWDER, LYOPHILIZED, FOR SOLUTION INTRAVENOUS at 16:00

## 2019-02-04 RX ADMIN — VENLAFAXINE HYDROCHLORIDE 37.5 MG: 37.5 TABLET ORAL at 16:02

## 2019-02-04 RX ADMIN — HYDROCODONE BITARTRATE AND ACETAMINOPHEN 1 TABLET: 5; 325 TABLET ORAL at 08:31

## 2019-02-04 RX ADMIN — DOXYCYCLINE 100 MG: 100 INJECTION, POWDER, LYOPHILIZED, FOR SOLUTION INTRAVENOUS at 04:15

## 2019-02-04 RX ADMIN — Medication 1 CAPSULE: at 08:31

## 2019-02-04 RX ADMIN — SENNOSIDES 1 TABLET: 8.6 TABLET, FILM COATED ORAL at 08:31

## 2019-02-04 RX ADMIN — CEFEPIME 2 G: 2 INJECTION, POWDER, FOR SOLUTION INTRAVENOUS at 13:56

## 2019-02-04 RX ADMIN — POTASSIUM CHLORIDE 10 MEQ: 10 INJECTION, SOLUTION INTRAVENOUS at 02:52

## 2019-02-04 NOTE — PROGRESS NOTES
[facility] HOSPITALIST PROGRESS NOTE     After midnight admission note:    Patient Identification:  Name:  Jeanna Flower  Age:  78 y.o.  Sex:  female  :  1940  MRN:  [id number]  Visit Number:  [id number]  Primary Care Provider:  Raul Robert MD    Date of admission: 2019  Length of stay:  2    ----------------------------------------------------------------------------------------------------------------------  Subjective     Chief Complaint:   Chief Complaint   Patient presents with   • Fever     Subjective/Interval History:    Patient is a 78 y.o. female who was admitted on 2019 after presenting to the emergency department of [facility] with complaints of fever up to 102.5F. She was diagnosed with severe sepsis secondary to RML aspiration versus HCAP. She was recently admitted to our facility with E. Coli bacteremia and was found to be at high risk of aspiration. POA did not want any alternative nutrition at that time. For complete admission information, please see history and physical.     Today, the patient has been sleeping most of the day. Bedside sitter reports that she was up all day yesterday and most of the night last night talking. She is easily arousable. Denies any complaints. She continues to have some cough on my evaluation. Discussed with RNSherita who reports that she has been sleeping today, otherwise no issues or concerns.     ----------------------------------------------------------------------------------------------------------------------  Objective   Current Hospital Meds:    apixaban 5 mg Oral Q12H   budesonide-formoterol 2 puff Inhalation BID - RT   cefepime 2 g Intravenous Q12H   docusate sodium 100 mg Oral BID   doxycycline 100 mg Intravenous Q12H   famotidine 20 mg Oral BID   fluticasone 2 spray Nasal Daily   ipratropium 0.5 mg Nebulization 4x Daily - RT   lactobacillus acidophilus 1 capsule Oral Daily   levothyroxine 100 mcg Oral  Daily   metroNIDAZOLE 500 mg Intravenous Q8H   montelukast 10 mg Oral Nightly   QUEtiapine 25 mg Oral Nightly   senna 1 tablet Oral Daily   sodium chloride 3 mL Intravenous Q12H   sodium phosphate IVPB 15 mmol Intravenous Once   venlafaxine 37.5 mg Oral BID With Meals       sodium chloride 75 mL/hr Last Rate: 75 mL/hr (02/04/19 0859)     ----------------------------------------------------------------------------------------------------------------------  Vital Signs:  Temp:  [97.9 °F (36.6 °C)-98.7 °F (37.1 °C)] 98.7 °F (37.1 °C)  Heart Rate:  [] 92  Resp:  [18-22] 18  BP: (117-142)/(49-71) 134/55  No data found.  SpO2 Percentage    02/04/19 0033 02/04/19 0044 02/04/19 0626   SpO2: 98% 98% 99%     SpO2:  [97 %-99 %] 99 %  on  Flow (L/min):  [3] 3;   Device (Oxygen Therapy): nasal cannula    Body mass index is 30.25 kg/m².  Wt Readings from Last 3 Encounters:   02/02/19 75 kg (165 lb 6.4 oz)   01/24/19 77.6 kg (171 lb)   01/15/19 78.5 kg (173 lb)        Intake/Output Summary (Last 24 hours) at 2/4/2019 1249  Last data filed at 2/4/2019 0343  Gross per 24 hour   Intake 360 ml   Output --   Net 360 ml     Diet Pureed; Thin  ----------------------------------------------------------------------------------------------------------------------  Physical exam:  Constitutional:  Well-developed and well-nourished.  No respiratory distress. Awake, alert, talking with myself and sitter.   HENT:  Head:  Normocephalic and atraumatic.  Mouth: moist.   Eyes:  Conjunctivae and EOM are normal. No scleral icterus. No erythema or drainage.  Neck:  Neck supple.  No JVD present.    Cardiovascular: Normal rate, regular rhythm and normal heart sounds with no murmur.  Pulmonary/Chest:  No respiratory distress. Few crackles noted right base. No wheezes or rhonchi noted.   Abdominal:  Soft.  Bowel sounds are normal x4  No distension and no apparent tenderness.   Musculoskeletal:  No edema, no tenderness. She has contractures on the  left side.  No red or swollen joints anywhere.    Neurological: Drowsy but arousable. Answers questions appropriately. No cranial nerve deficit.  No tongue deviation.  No facial droop.  No slurred speech.   Skin:  Skin is warm and dry. No rash noted. No pallor.   Peripheral vascular: No clubbing, no cyanosis, no edema. 2+ pedal pulses bilaterally.   Genitourinary: No olivas catheter in place.   ----------------------------------------------------------------------------------------------------------------------  Tele: Appears to be sinus rhythm in the 60s-80s, she has a lot of baseline artifact.     I have personally reviewed the EKG/Telemetry strips.  ----------------------------------------------------------------------------------------------------------------------  Results from last 7 days   Lab Units 02/03/19  1653 02/03/19  0453   TROPONIN I ng/mL 0.045* 0.047*         Results from last 7 days   Lab Units 02/02/19  1401   PH, ARTERIAL pH units 7.472*   PO2 ART mm Hg 76.9*   PCO2, ARTERIAL mm Hg 25.8*   HCO3 ART mmol/L 18.4*     Results from last 7 days   Lab Units 02/04/19  0451 02/03/19  1118 02/03/19  0454 02/02/19  1421 02/02/19  0652   CRP mg/dL 23.90*  --  27.36*  --   --    LACTATE mmol/L  --  1.0  --  2.1* 3.7*   WBC 10*3/mm3 14.67*  --  17.42*  --  15.86*   HEMOGLOBIN g/dL 9.1*  --  10.3*  --  10.5*   HEMATOCRIT % 30.6*  --  34.4*  --  35.1*   MCV fL 96.5*  --  96.1*  --  96.2*   MCHC g/dL 29.7*  --  29.9*  --  29.9*   PLATELETS 10*3/mm3 339  --  326  --  363     Results from last 7 days   Lab Units 02/04/19  0451 02/03/19  1653 02/03/19  0453 02/02/19  0652   SODIUM mmol/L 136  --  140 136   POTASSIUM mmol/L 4.1 3.4* 3.4* 4.2   MAGNESIUM mg/dL 2.2  --  1.4*  --    CHLORIDE mmol/L 114*  --  114* 112   CO2 mmol/L 16.2*  --  17.7* 16.2*   BUN mg/dL 12  --  20 16   CREATININE mg/dL 0.69  --  0.86 1.03   EGFR IF NONAFRICN AM mL/min/1.73 82  --  64 52*   CALCIUM mg/dL 8.0  --  7.9 7.8   GLUCOSE mg/dL 105   --  97 130*   ALBUMIN g/dL 2.50*  --  3.10* 2.60*   BILIRUBIN mg/dL 0.4  --  0.6 0.3   ALK PHOS U/L 122*  --  160* 154*   AST (SGOT) U/L 17  --  34* 41*   ALT (SGPT) U/L 15  --  36 33   Estimated Creatinine Clearance: 55.9 mL/min (by C-G formula based on SCr of 0.69 mg/dL).    Lab Results   Component Value Date    HGBA1C 5.90 (H) 01/15/2019     Lab Results   Component Value Date    TSH 6.470 (H) 01/15/2019    FREET4 0.96 01/17/2019     Pain Management Panel     Pain Management Panel Latest Ref Rng & Units 9/16/2018    AMPHETAMINES SCREEN, URINE Negative Negative    BARBITURATES SCREEN Negative Negative    BENZODIAZEPINE SCREEN, URINE Negative Negative    BUPRENORPHINE Negative Negative    COCAINE SCREEN, URINE Negative Negative    METHADONE SCREEN, URINE Negative Negative        I have personally reviewed the above laboratory results.   ----------------------------------------------------------------------------------------------------------------------  Imaging Results (last 24 hours)     Procedure Component Value Units Date/Time    XR Elbow 3+ View Left [[id number]] Collected:  02/03/19 1853     Updated:  02/03/19 1855    Narrative:       EXAMINATION: XR ELBOW 3+ VW LEFT-      CLINICAL INDICATION: Fall.; J18.1-Lobar pneumonia, unspecified organism;  A41.9-Sepsis, unspecified organism          COMPARISON: None immediately available     FINDINGS: 3 views of the left elbow show no acute fracture or  dislocation. There are no blastic or lytic lesions       Impression:       No acute bony abnormality      This report was finalized on 2/3/2019 6:53 PM by Dr. Ulysses Rojas MD.       XR Shoulder 2+ View Left [[id number]] Collected:  02/03/19 1852     Updated:  02/03/19 1855    Narrative:       EXAMINATION: XR SHOULDER 2+ VW LEFT-      CLINICAL INDICATION: Fall.; J18.1-Lobar pneumonia, unspecified organism;  A41.9-Sepsis, unspecified organism          COMPARISON: None available     FINDINGS: 2 views of the left shoulder  reveal arthritic change, but no  acute fracture or dislocation.       Impression:       No acute fracture      This report was finalized on 2/3/2019 6:53 PM by Dr. Ulysses Rojas MD.           I have personally reviewed the above radiology results.   ----------------------------------------------------------------------------------------------------------------------  Assessment/Plan     -Severe sepsis, present upon admission and secondary to RML aspiration versus healthcare associated pneumonia: WBC count and CRP improving. ID following and has changed antibiotic therapy to include Cefepime, Flagyl, and Doxycycline. Appreciate their input. Continue with oxygen per nasal cannula and titrate for saturations greater than 90%. Atrovent nebulizers every 6 hours. Continue IV fluids for now and will consider discontinuing tomorrow. CBC and CRP in AM. Consult PT/OT.     -Moderate to severe oral dysphagia with aspiration: Continue least restrictive diet per POA request. No alternative forms of feeding desired.      -Minimal AST elevation/Alkaline phosphatase elevation: AST normal, Alk phos improving. Continue to monitor.      -Acute metabolic alkalosis with partial respiratory compensation: CO2 fluctuating. Continue to monitor.     -Acute hypokalemia/hypomagnesemia: Improved. Continue to monitor and replace per protocol as needed.     -Acute hypophosphatemia: Replace per protocol. Repeat in AM.       -Severe hypoalbuminemia: Monitor.      -Dementia: Continue fall and aspiration precautions.      -Macrocytic anemia, chronic: H/H drift noted. Possibly dilutional. No bleeding appreciated. Continue to monitor H/H. Recent folate and vitamin B12 levels within normal limits. CBC in AM.      -Fall: Continue bedside sitter with fall precautions. XRs of left elbow and shoulder reviewed with no acute abnormalities noted.     -History of recent MSSA and E.Coli bacteremia: Blood cultures repeated at admission with no growth x 24 hours.  Will follow up on final results. ID consult placed.     -Paroxsymal atrial fibrillation: Appears to be in sinus rhythm on telemetry with baseline artifact. Continue to monitor. On Eliquis for stroke prevention.     -Hypothyroidism: Continue home levothyroxine. TSH recently mildly elevated during recent admission with normal Free T4, recommend outpatient follow up.     DVT/GI prophylaxis: Eliquis BID.      Estimated Length of Stay: > 2 MNs     CODE: DNR/No CPR    The patient is high risk due to the following diagnoses/reasons: severe sepsis, dementia, aspiration.     I have discussed the patient's assessment and plan with the patient, RNSherita, and attending physician, Dr. Quach.     Disposition: Discharge back to the nursing home when pneumonia has improved. SS to following to assist with discharge planning.     TAE Parker  02/04/19  12:49 PM

## 2019-02-04 NOTE — PLAN OF CARE
Problem: Skin Injury Risk (Adult)  Goal: Identify Related Risk Factors and Signs and Symptoms  Outcome: Ongoing (interventions implemented as appropriate)   02/02/19 0644   Skin Injury Risk (Adult)   Related Risk Factors (Skin Injury Risk) advanced age;cognitive impairment;infection;mobility impaired     Goal: Skin Health and Integrity  Outcome: Ongoing (interventions implemented as appropriate)      Problem: Fall Risk (Adult)  Goal: Identify Related Risk Factors and Signs and Symptoms  Outcome: Ongoing (interventions implemented as appropriate)    Goal: Absence of Fall  Outcome: Ongoing (interventions implemented as appropriate)      Problem: Pneumonia (Adult)  Goal: Signs and Symptoms of Listed Potential Problems Will be Absent, Minimized or Managed (Pneumonia)  Outcome: Ongoing (interventions implemented as appropriate)      Problem: Patient Care Overview  Goal: Plan of Care Review  Outcome: Ongoing (interventions implemented as appropriate)      Problem: Pain, Chronic (Adult)  Goal: Identify Related Risk Factors and Signs and Symptoms  Outcome: Ongoing (interventions implemented as appropriate)

## 2019-02-04 NOTE — PLAN OF CARE
Problem: Skin Injury Risk (Adult)  Goal: Identify Related Risk Factors and Signs and Symptoms  Outcome: Ongoing (interventions implemented as appropriate)   02/02/19 0644   Skin Injury Risk (Adult)   Related Risk Factors (Skin Injury Risk) advanced age;cognitive impairment;infection;mobility impaired     Goal: Skin Health and Integrity  Outcome: Ongoing (interventions implemented as appropriate)      Problem: Fall Risk (Adult)  Goal: Identify Related Risk Factors and Signs and Symptoms  Outcome: Ongoing (interventions implemented as appropriate)    Goal: Absence of Fall  Outcome: Ongoing (interventions implemented as appropriate)      Problem: Pneumonia (Adult)  Goal: Signs and Symptoms of Listed Potential Problems Will be Absent, Minimized or Managed (Pneumonia)  Outcome: Ongoing (interventions implemented as appropriate)      Problem: Patient Care Overview  Goal: Plan of Care Review  Outcome: Ongoing (interventions implemented as appropriate)      Problem: Pain, Chronic (Adult)  Goal: Identify Related Risk Factors and Signs and Symptoms  Outcome: Ongoing (interventions implemented as appropriate)    Goal: Acceptable Pain/Comfort Level and Functional Ability  Outcome: Ongoing (interventions implemented as appropriate)

## 2019-02-04 NOTE — PROGRESS NOTES
Discharge Planning Assessment  [facility]     Patient Name: Jeanna Flower  MRN: [id number]  Today's Date: 2/4/2019    Admit Date: 2/2/2019    Discharge Needs Assessment     Row Name 02/04/19 1409       Discharge Needs Assessment    Discharge Facility/Level of Care Needs  -- SS contacted [facility] [phone] per Yasmeen who states pt does not have a bed hold.         Discharge Plan     Row Name 02/04/19 1410       Plan    Plan Pt was admitted from [facility] and does not have a bed hold. SS contacted [facility] per Maria L regarding pt returning with hospice services. Maria L spoke to D.O.N. and she states pt can return with hospice services. [facility] does not have a bed available at this time and they are agreeable for pt to return if bed is available at discharge. SS to follow and assist as needed with discharge planning.         Destination      Service Provider Request Status Selected Services Address Phone Number Fax Number    [facility] Pending - No Request Sent N/A [address] [facility] [address] [phone]       Demographic Summary     Row Name 02/04/19 1409       General Information    Referral Source  nursing    Reason for Consult  -- SS received consult Discharge consult, nursing home patient. Pt was admitted from [facility].      Lakia Alatorre

## 2019-02-04 NOTE — PROGRESS NOTES
PROGRESS NOTE         Patient Identification:  Name:  Jeanna Flower  Age:  78 y.o.  Sex:  female  :  1940  MRN:  [id number]  Visit Number:  [id number]  Primary Care Provider:  Raul Robert MD      ----------------------------------------------------------------------------------------------------------------------  Subjective       Chief Complaints:    Fever        Interval History:      Patient comfortable. Sleeping this morning. Sitter at bedside. WBC improving at 14.67. CRP improving at 23.90.     Review of Systems:    Unable to obtain.  ----------------------------------------------------------------------------------------------------------------------      Objective       Current Hospital Meds:    apixaban 5 mg Oral Q12H   budesonide-formoterol 2 puff Inhalation BID - RT   cefepime 2 g Intravenous Q12H   docusate sodium 100 mg Oral BID   doxycycline 100 mg Intravenous Q12H   famotidine 20 mg Oral BID   fluticasone 2 spray Nasal Daily   ipratropium 0.5 mg Nebulization 4x Daily - RT   lactobacillus acidophilus 1 capsule Oral Daily   levothyroxine 100 mcg Oral Daily   metroNIDAZOLE 500 mg Intravenous Q8H   montelukast 10 mg Oral Nightly   QUEtiapine 25 mg Oral Nightly   senna 1 tablet Oral Daily   sodium chloride 3 mL Intravenous Q12H   venlafaxine 37.5 mg Oral BID With Meals       Pharmacy to dose vancomycin     sodium chloride 75 mL/hr Last Rate: 75 mL/hr (19 0859)     ----------------------------------------------------------------------------------------------------------------------    Vital Signs:  Temp:  [97.9 °F (36.6 °C)-99.3 °F (37.4 °C)] 98.7 °F (37.1 °C)  Heart Rate:  [] 84  Resp:  [18-22] 22  BP: (117-142)/(49-73) 142/71  No data found.  SpO2 Percentage    19 0033 19 0044 19 0626   SpO2: 98% 98% 99%     SpO2:  [97 %-99 %] 99 %  on  Flow (L/min):  [3] 3;   Device (Oxygen Therapy): nasal cannula    Body mass index is 30.25 kg/m².  Wt Readings from  Last 3 Encounters:   02/02/19 75 kg (165 lb 6.4 oz)   01/24/19 77.6 kg (171 lb)   01/15/19 78.5 kg (173 lb)        Intake/Output Summary (Last 24 hours) at 2/4/2019 1024  Last data filed at 2/4/2019 0343  Gross per 24 hour   Intake 360 ml   Output --   Net 360 ml     Diet Pureed; Thin  ----------------------------------------------------------------------------------------------------------------------    Physical exam:    Constitutional:  Well-developed and well-nourished.  No respiratory distress.      HENT:  Head:  Normocephalic and atraumatic.  Mouth:  Moist mucous membranes.    Eyes:  Conjunctivae and EOM are normal.  No scleral icterus.    Neck:  Neck supple.  No JVD present.    Cardiovascular:  Normal rate, regular rhythm and normal heart sounds with no murmur. No edema.  Pulmonary/Chest:  No respiratory distress, no wheezes, no crackles, with normal breath sounds and good air movement.  Abdominal:  Soft.  Bowel sounds are normal.  No distension and no tenderness.   Musculoskeletal:  No edema, no tenderness, and no deformity.  No red or swollen joints anywhere.    Neurological: Confused.  Skin:  Skin is warm and dry. No rash noted. No pallor.     ----------------------------------------------------------------------------------------------------------------------    ----------------------------------------------------------------------------------------------------------------------  Results from last 7 days   Lab Units 02/03/19  1653 02/03/19  0453   TROPONIN I ng/mL 0.045* 0.047*         Results from last 7 days   Lab Units 02/02/19  1401   PH, ARTERIAL pH units 7.472*   PO2 ART mm Hg 76.9*   PCO2, ARTERIAL mm Hg 25.8*   HCO3 ART mmol/L 18.4*     Results from last 7 days   Lab Units 02/04/19  0451 02/03/19  1118 02/03/19  0454 02/02/19  1421 02/02/19  0652   CRP mg/dL 23.90*  --  27.36*  --   --    LACTATE mmol/L  --  1.0  --  2.1* 3.7*   WBC 10*3/mm3 14.67*  --  17.42*  --  15.86*   HEMOGLOBIN g/dL 9.1*   --  10.3*  --  10.5*   HEMATOCRIT % 30.6*  --  34.4*  --  35.1*   MCV fL 96.5*  --  96.1*  --  96.2*   MCHC g/dL 29.7*  --  29.9*  --  29.9*   PLATELETS 10*3/mm3 339  --  326  --  363     Results from last 7 days   Lab Units 02/04/19  0451 02/03/19  1653 02/03/19  0453 02/02/19  0652   SODIUM mmol/L 136  --  140 136   POTASSIUM mmol/L 4.1 3.4* 3.4* 4.2   MAGNESIUM mg/dL 2.2  --  1.4*  --    CHLORIDE mmol/L 114*  --  114* 112   CO2 mmol/L 16.2*  --  17.7* 16.2*   BUN mg/dL 12  --  20 16   CREATININE mg/dL 0.69  --  0.86 1.03   EGFR IF NONAFRICN AM mL/min/1.73 82  --  64 52*   CALCIUM mg/dL 8.0  --  7.9 7.8   GLUCOSE mg/dL 105  --  97 130*   ALBUMIN g/dL 2.50*  --  3.10* 2.60*   BILIRUBIN mg/dL 0.4  --  0.6 0.3   ALK PHOS U/L 122*  --  160* 154*   AST (SGOT) U/L 17  --  34* 41*   ALT (SGPT) U/L 15  --  36 33   Estimated Creatinine Clearance: 55.9 mL/min (by C-G formula based on SCr of 0.69 mg/dL).  No results found for: AMMONIA    No results found for: HGBA1C, POCGLU  Lab Results   Component Value Date    HGBA1C 5.90 (H) 01/15/2019     Lab Results   Component Value Date    TSH 6.470 (H) 01/15/2019    FREET4 0.96 01/17/2019       Blood Culture   Date Value Ref Range Status   02/02/2019 No growth at 2 days  Preliminary   02/02/2019 No growth at 2 days  Preliminary                 Pain Management Panel     Pain Management Panel Latest Ref Rng & Units 9/16/2018    AMPHETAMINES SCREEN, URINE Negative Negative    BARBITURATES SCREEN Negative Negative    BENZODIAZEPINE SCREEN, URINE Negative Negative    BUPRENORPHINE Negative Negative    COCAINE SCREEN, URINE Negative Negative    METHADONE SCREEN, URINE Negative Negative          I have personally reviewed the above laboratory results.   ----------------------------------------------------------------------------------------------------------------------  Imaging Results (last 24 hours)     Procedure Component Value Units Date/Time    XR Elbow 3+ View Left [[id number]]  Collected:  02/03/19 1853     Updated:  02/03/19 1855    Narrative:       EXAMINATION: XR ELBOW 3+ VW LEFT-      CLINICAL INDICATION: Fall.; J18.1-Lobar pneumonia, unspecified organism;  A41.9-Sepsis, unspecified organism          COMPARISON: None immediately available     FINDINGS: 3 views of the left elbow show no acute fracture or  dislocation. There are no blastic or lytic lesions       Impression:       No acute bony abnormality      This report was finalized on 2/3/2019 6:53 PM by Dr. Ulysses Rojas MD.       XR Shoulder 2+ View Left [[id number]] Collected:  02/03/19 1852     Updated:  02/03/19 1855    Narrative:       EXAMINATION: XR SHOULDER 2+ VW LEFT-      CLINICAL INDICATION: Fall.; J18.1-Lobar pneumonia, unspecified organism;  A41.9-Sepsis, unspecified organism          COMPARISON: None available     FINDINGS: 2 views of the left shoulder reveal arthritic change, but no  acute fracture or dislocation.       Impression:       No acute fracture      This report was finalized on 2/3/2019 6:53 PM by Dr. Ulysses Rojas MD.           I have personally reviewed the above radiology results.   ----------------------------------------------------------------------------------------------------------------------    Assessment/Plan       Assessment/Plan     ASSESSMENT:    1. Severe sepsis with lactic acid greater than 2 on admission  2. Aspiration pneumonia    PLAN:    Patient comfortable. Sleeping this morning. Sitter at bedside. WBC improving at 14.67. CRP improving at 23.90.     Urinalysis unremarkable. Influenza negative.  Mycoplasma negative. Legionella negative. Recent E.Coli bacteremia in 1/2018. Chest x-ray reveals findings consistent with right sided pneumonia likely from chronic aspiration.     Recommend to continue Cefepime 2gm IV Q12H to continue, Flagyl 500mg IV Q8H, and Doxycycline 100mg IV Q12H for MRSA coverage and anaerobic coverage.  Will continue to follow closely and adjust antibiotic therapy as  needed. CRP in AM.     Current Antimicrobials:  Cefepime 2gm IV Q12H  Doxycycline 100mg IV Q12H  Flagyl 500mg IV Q8H       Code Status:   Code Status and Medical Interventions:   Ordered at: 02/02/19 0413     Limited Support to NOT Include:    Intubation     Code Status:    No CPR     Medical Interventions (Level of Support Prior to Arrest):    Limited       AMELIE Reis  02/04/19  10:24 AM

## 2019-02-04 NOTE — PHARMACY PATIENT ASSISTANCE
Pharmacy checking on price of patient's home medication Eliquis. Patient is a [facility] patient, but does not have a bed hold. Facility states patient is able to return if bed is available.    Pharmacy will continue to follow to determine bed status.     Ivanna Morales, Pharmacy Intern  02/04/19  4:18 PM

## 2019-02-05 LAB
ALBUMIN SERPL-MCNC: 2.4 G/DL (ref 3.4–4.8)
ALBUMIN/GLOB SERPL: 0.8 G/DL (ref 1.5–2.5)
ALP SERPL-CCNC: 106 U/L (ref 35–104)
ALT SERPL W P-5'-P-CCNC: 17 U/L (ref 10–36)
ANION GAP SERPL CALCULATED.3IONS-SCNC: 7.2 MMOL/L (ref 3.6–11.2)
AST SERPL-CCNC: 21 U/L (ref 10–30)
BASOPHILS # BLD AUTO: 0.03 10*3/MM3 (ref 0–0.3)
BASOPHILS # BLD AUTO: 0.05 10*3/MM3 (ref 0–0.3)
BASOPHILS NFR BLD AUTO: 0.3 % (ref 0–2)
BASOPHILS NFR BLD AUTO: 0.4 % (ref 0–2)
BILIRUB SERPL-MCNC: 0.4 MG/DL (ref 0.2–1.8)
BUN BLD-MCNC: 9 MG/DL (ref 7–21)
BUN/CREAT SERPL: 13.4 (ref 7–25)
CALCIUM SPEC-SCNC: 7.7 MG/DL (ref 7.7–10)
CHLORIDE SERPL-SCNC: 111 MMOL/L (ref 99–112)
CO2 SERPL-SCNC: 16.8 MMOL/L (ref 24.3–31.9)
CREAT BLD-MCNC: 0.67 MG/DL (ref 0.43–1.29)
CRP SERPL-MCNC: 15.65 MG/DL (ref 0–0.99)
DEPRECATED RDW RBC AUTO: 61 FL (ref 37–54)
DEPRECATED RDW RBC AUTO: 61.9 FL (ref 37–54)
EOSINOPHIL # BLD AUTO: 0.19 10*3/MM3 (ref 0–0.7)
EOSINOPHIL # BLD AUTO: 0.19 10*3/MM3 (ref 0–0.7)
EOSINOPHIL NFR BLD AUTO: 1.6 % (ref 0–7)
EOSINOPHIL NFR BLD AUTO: 1.6 % (ref 0–7)
ERYTHROCYTE [DISTWIDTH] IN BLOOD BY AUTOMATED COUNT: 18.1 % (ref 11.5–14.5)
ERYTHROCYTE [DISTWIDTH] IN BLOOD BY AUTOMATED COUNT: 18.3 % (ref 11.5–14.5)
GFR SERPL CREATININE-BSD FRML MDRD: 85 ML/MIN/1.73
GLOBULIN UR ELPH-MCNC: 2.9 GM/DL
GLUCOSE BLD-MCNC: 112 MG/DL (ref 70–110)
HCT VFR BLD AUTO: 29.4 % (ref 37–47)
HCT VFR BLD AUTO: 32 % (ref 37–47)
HGB BLD-MCNC: 10.2 G/DL (ref 12–16)
HGB BLD-MCNC: 8.7 G/DL (ref 12–16)
IMM GRANULOCYTES # BLD AUTO: 0.03 10*3/MM3 (ref 0–0.03)
IMM GRANULOCYTES # BLD AUTO: 0.05 10*3/MM3 (ref 0–0.03)
IMM GRANULOCYTES NFR BLD AUTO: 0.3 % (ref 0–0.5)
IMM GRANULOCYTES NFR BLD AUTO: 0.4 % (ref 0–0.5)
LYMPHOCYTES # BLD AUTO: 4.06 10*3/MM3 (ref 1–3)
LYMPHOCYTES # BLD AUTO: 4.08 10*3/MM3 (ref 1–3)
LYMPHOCYTES NFR BLD AUTO: 33.8 % (ref 16–46)
LYMPHOCYTES NFR BLD AUTO: 34.3 % (ref 16–46)
MAGNESIUM SERPL-MCNC: 1.5 MG/DL (ref 1.7–2.6)
MCH RBC QN AUTO: 28.9 PG (ref 27–33)
MCH RBC QN AUTO: 29 PG (ref 27–33)
MCHC RBC AUTO-ENTMCNC: 29.6 G/DL (ref 33–37)
MCHC RBC AUTO-ENTMCNC: 31.9 G/DL (ref 33–37)
MCV RBC AUTO: 90.7 FL (ref 80–94)
MCV RBC AUTO: 98 FL (ref 80–94)
MONOCYTES # BLD AUTO: 0.84 10*3/MM3 (ref 0.1–0.9)
MONOCYTES # BLD AUTO: 1.38 10*3/MM3 (ref 0.1–0.9)
MONOCYTES NFR BLD AUTO: 11.5 % (ref 0–12)
MONOCYTES NFR BLD AUTO: 7.1 % (ref 0–12)
NEUTROPHILS # BLD AUTO: 6.28 10*3/MM3 (ref 1.4–6.5)
NEUTROPHILS # BLD AUTO: 6.73 10*3/MM3 (ref 1.4–6.5)
NEUTROPHILS NFR BLD AUTO: 52.3 % (ref 40–75)
NEUTROPHILS NFR BLD AUTO: 56.4 % (ref 40–75)
OSMOLALITY SERPL CALC.SUM OF ELEC: 269.5 MOSM/KG (ref 273–305)
PHOSPHATE SERPL-MCNC: 3.2 MG/DL (ref 2.7–4.5)
PHOSPHATE SERPL-MCNC: 3.3 MG/DL (ref 2.7–4.5)
PLATELET # BLD AUTO: 315 10*3/MM3 (ref 130–400)
PLATELET # BLD AUTO: 351 10*3/MM3 (ref 130–400)
PMV BLD AUTO: 9.2 FL (ref 6–10)
PMV BLD AUTO: 9.5 FL (ref 6–10)
POTASSIUM BLD-SCNC: 3.5 MMOL/L (ref 3.5–5.3)
PROT SERPL-MCNC: 5.3 G/DL (ref 6–8)
RBC # BLD AUTO: 3 10*6/MM3 (ref 4.2–5.4)
RBC # BLD AUTO: 3.53 10*6/MM3 (ref 4.2–5.4)
SODIUM BLD-SCNC: 135 MMOL/L (ref 135–153)
TROPONIN I SERPL-MCNC: 0.03 NG/ML
WBC NRBC COR # BLD: 11.9 10*3/MM3 (ref 4.5–12.5)
WBC NRBC COR # BLD: 12.01 10*3/MM3 (ref 4.5–12.5)

## 2019-02-05 PROCEDURE — 25010000002 CEFEPIME 2 G/NS 100 ML SOLUTION: Performed by: NURSE PRACTITIONER

## 2019-02-05 PROCEDURE — 94799 UNLISTED PULMONARY SVC/PX: CPT

## 2019-02-05 PROCEDURE — 80053 COMPREHEN METABOLIC PANEL: CPT | Performed by: INTERNAL MEDICINE

## 2019-02-05 PROCEDURE — 97167 OT EVAL HIGH COMPLEX 60 MIN: CPT

## 2019-02-05 PROCEDURE — 25010000003 POTASSIUM CHLORIDE 10 MEQ/100ML SOLUTION: Performed by: INTERNAL MEDICINE

## 2019-02-05 PROCEDURE — 84100 ASSAY OF PHOSPHORUS: CPT | Performed by: PHYSICIAN ASSISTANT

## 2019-02-05 PROCEDURE — 97530 THERAPEUTIC ACTIVITIES: CPT

## 2019-02-05 PROCEDURE — 85025 COMPLETE CBC W/AUTO DIFF WBC: CPT | Performed by: PHYSICIAN ASSISTANT

## 2019-02-05 PROCEDURE — 84484 ASSAY OF TROPONIN QUANT: CPT | Performed by: HOSPITALIST

## 2019-02-05 PROCEDURE — 86140 C-REACTIVE PROTEIN: CPT | Performed by: NURSE PRACTITIONER

## 2019-02-05 PROCEDURE — 25010000002 MAGNESIUM SULFATE 2 GM/50ML SOLUTION: Performed by: INTERNAL MEDICINE

## 2019-02-05 PROCEDURE — 97163 PT EVAL HIGH COMPLEX 45 MIN: CPT

## 2019-02-05 PROCEDURE — 85025 COMPLETE CBC W/AUTO DIFF WBC: CPT | Performed by: INTERNAL MEDICINE

## 2019-02-05 PROCEDURE — 93005 ELECTROCARDIOGRAM TRACING: CPT | Performed by: PHYSICIAN ASSISTANT

## 2019-02-05 PROCEDURE — 99232 SBSQ HOSP IP/OBS MODERATE 35: CPT | Performed by: PHYSICIAN ASSISTANT

## 2019-02-05 PROCEDURE — 83735 ASSAY OF MAGNESIUM: CPT | Performed by: INTERNAL MEDICINE

## 2019-02-05 PROCEDURE — 93010 ELECTROCARDIOGRAM REPORT: CPT | Performed by: INTERNAL MEDICINE

## 2019-02-05 RX ORDER — MAGNESIUM SULFATE HEPTAHYDRATE 40 MG/ML
2 INJECTION, SOLUTION INTRAVENOUS
Status: COMPLETED | OUTPATIENT
Start: 2019-02-05 | End: 2019-02-05

## 2019-02-05 RX ORDER — POTASSIUM CHLORIDE 7.45 MG/ML
10 INJECTION INTRAVENOUS
Status: COMPLETED | OUTPATIENT
Start: 2019-02-05 | End: 2019-02-05

## 2019-02-05 RX ADMIN — APIXABAN 5 MG: 5 TABLET, FILM COATED ORAL at 21:11

## 2019-02-05 RX ADMIN — CEFEPIME 2 G: 2 INJECTION, POWDER, FOR SOLUTION INTRAVENOUS at 13:58

## 2019-02-05 RX ADMIN — HYDROCODONE BITARTRATE AND ACETAMINOPHEN 1 TABLET: 5; 325 TABLET ORAL at 08:56

## 2019-02-05 RX ADMIN — HYDROCODONE BITARTRATE AND ACETAMINOPHEN 1 TABLET: 5; 325 TABLET ORAL at 13:59

## 2019-02-05 RX ADMIN — LEVOTHYROXINE SODIUM 100 MCG: 100 TABLET ORAL at 08:55

## 2019-02-05 RX ADMIN — APIXABAN 5 MG: 5 TABLET, FILM COATED ORAL at 08:56

## 2019-02-05 RX ADMIN — METRONIDAZOLE 500 MG: 500 INJECTION, SOLUTION INTRAVENOUS at 07:48

## 2019-02-05 RX ADMIN — POTASSIUM CHLORIDE 10 MEQ: 10 INJECTION, SOLUTION INTRAVENOUS at 04:48

## 2019-02-05 RX ADMIN — METRONIDAZOLE 500 MG: 500 INJECTION, SOLUTION INTRAVENOUS at 21:11

## 2019-02-05 RX ADMIN — MAGNESIUM SULFATE IN WATER 2 G: 40 INJECTION, SOLUTION INTRAVENOUS at 11:11

## 2019-02-05 RX ADMIN — SENNOSIDES 1 TABLET: 8.6 TABLET, FILM COATED ORAL at 08:55

## 2019-02-05 RX ADMIN — MAGNESIUM SULFATE IN WATER 2 G: 40 INJECTION, SOLUTION INTRAVENOUS at 08:58

## 2019-02-05 RX ADMIN — POTASSIUM CHLORIDE 10 MEQ: 10 INJECTION, SOLUTION INTRAVENOUS at 03:37

## 2019-02-05 RX ADMIN — DOCUSATE SODIUM 100 MG: 100 CAPSULE ORAL at 21:10

## 2019-02-05 RX ADMIN — FLUTICASONE PROPIONATE 2 SPRAY: 50 SPRAY, METERED NASAL at 08:57

## 2019-02-05 RX ADMIN — IPRATROPIUM BROMIDE 0.5 MG: 0.5 SOLUTION RESPIRATORY (INHALATION) at 00:54

## 2019-02-05 RX ADMIN — POTASSIUM CHLORIDE 10 MEQ: 10 INJECTION, SOLUTION INTRAVENOUS at 06:37

## 2019-02-05 RX ADMIN — SODIUM CHLORIDE, PRESERVATIVE FREE 3 ML: 5 INJECTION INTRAVENOUS at 21:14

## 2019-02-05 RX ADMIN — HYDROCODONE BITARTRATE AND ACETAMINOPHEN 1 TABLET: 5; 325 TABLET ORAL at 02:22

## 2019-02-05 RX ADMIN — POTASSIUM CHLORIDE 10 MEQ: 10 INJECTION, SOLUTION INTRAVENOUS at 07:51

## 2019-02-05 RX ADMIN — IPRATROPIUM BROMIDE 0.5 MG: 0.5 SOLUTION RESPIRATORY (INHALATION) at 18:57

## 2019-02-05 RX ADMIN — MONTELUKAST SODIUM 10 MG: 10 TABLET, COATED ORAL at 21:10

## 2019-02-05 RX ADMIN — DOCUSATE SODIUM 100 MG: 100 CAPSULE ORAL at 08:55

## 2019-02-05 RX ADMIN — QUETIAPINE FUMARATE 25 MG: 25 TABLET, FILM COATED ORAL at 21:11

## 2019-02-05 RX ADMIN — FAMOTIDINE 20 MG: 20 TABLET, FILM COATED ORAL at 21:11

## 2019-02-05 RX ADMIN — BUDESONIDE AND FORMOTEROL FUMARATE DIHYDRATE 2 PUFF: 160; 4.5 AEROSOL RESPIRATORY (INHALATION) at 06:13

## 2019-02-05 RX ADMIN — HYDROCODONE BITARTRATE AND ACETAMINOPHEN 1 TABLET: 5; 325 TABLET ORAL at 21:10

## 2019-02-05 RX ADMIN — VENLAFAXINE HYDROCHLORIDE 37.5 MG: 37.5 TABLET ORAL at 08:55

## 2019-02-05 RX ADMIN — MAGNESIUM SULFATE IN WATER 2 G: 40 INJECTION, SOLUTION INTRAVENOUS at 06:56

## 2019-02-05 RX ADMIN — BUDESONIDE AND FORMOTEROL FUMARATE DIHYDRATE 2 PUFF: 160; 4.5 AEROSOL RESPIRATORY (INHALATION) at 18:56

## 2019-02-05 RX ADMIN — IPRATROPIUM BROMIDE 0.5 MG: 0.5 SOLUTION RESPIRATORY (INHALATION) at 06:13

## 2019-02-05 RX ADMIN — CEFEPIME 2 G: 2 INJECTION, POWDER, FOR SOLUTION INTRAVENOUS at 00:16

## 2019-02-05 RX ADMIN — VENLAFAXINE HYDROCHLORIDE 37.5 MG: 37.5 TABLET ORAL at 16:02

## 2019-02-05 RX ADMIN — FAMOTIDINE 20 MG: 20 TABLET, FILM COATED ORAL at 08:55

## 2019-02-05 RX ADMIN — Medication 1 CAPSULE: at 08:56

## 2019-02-05 RX ADMIN — DOXYCYCLINE 100 MG: 100 INJECTION, POWDER, LYOPHILIZED, FOR SOLUTION INTRAVENOUS at 05:19

## 2019-02-05 RX ADMIN — METRONIDAZOLE 500 MG: 500 INJECTION, SOLUTION INTRAVENOUS at 13:58

## 2019-02-05 NOTE — PLAN OF CARE
Problem: Skin Injury Risk (Adult)  Goal: Identify Related Risk Factors and Signs and Symptoms  Outcome: Ongoing (interventions implemented as appropriate)    Goal: Skin Health and Integrity  Outcome: Ongoing (interventions implemented as appropriate)      Problem: Fall Risk (Adult)  Goal: Identify Related Risk Factors and Signs and Symptoms  Outcome: Ongoing (interventions implemented as appropriate)    Goal: Absence of Fall  Outcome: Ongoing (interventions implemented as appropriate)      Problem: Pneumonia (Adult)  Goal: Signs and Symptoms of Listed Potential Problems Will be Absent, Minimized or Managed (Pneumonia)  Outcome: Ongoing (interventions implemented as appropriate)      Problem: Patient Care Overview  Goal: Plan of Care Review  Outcome: Ongoing (interventions implemented as appropriate)    Goal: Discharge Needs Assessment  Outcome: Ongoing (interventions implemented as appropriate)

## 2019-02-05 NOTE — PROGRESS NOTES
Discharge Planning Assessment  [facility]     Patient Name: Jeanna Flower  MRN: [id number]  Today's Date: 2/5/2019    Admit Date: 2/2/2019      Discharge Plan     Row Name 02/05/19 1002       Plan    Plan Pt has a bed today at [facility]. SS contacted [facility] [phone] per Candido who states pt can receive IV antibiotics at the nursing home if needed and receive services from them. SS will need an order for hospice and referral will be made. SS to follow.     15:15 SS discussed pt with Dr. Quach and pt is not ready for discharge today. SS [facility] Yasmeen and pt will have a bed available on Wednesday, 2/6. SS to follow.         Destination      Service Provider Request Status Selected Services Address Phone Number Fax Number    [facility] Pending - No Request Sent N/A [address], [facility] [address] [phone]     Lakia Alatorre

## 2019-02-05 NOTE — PROGRESS NOTES
[facility] HOSPITALIST PROGRESS NOTE     After midnight admission note:    Patient Identification:  Name:  Jeanna Flower  Age:  78 y.o.  Sex:  female  :  1940  MRN:  [id number]  Visit Number:  [id number]  Primary Care Provider:  Raul Robert MD    Date of admission: 2019  Length of stay:  3    ----------------------------------------------------------------------------------------------------------------------  Subjective     Chief Complaint:   Chief Complaint   Patient presents with   • Fever     Subjective/Interval History:    Patient is a 78 y.o. female who was admitted on 2019 after presenting to the emergency department of [facility] with complaints of fever up to 102.5F. She was diagnosed with severe sepsis secondary to RML aspiration versus HCAP. She was recently admitted to our facility with E. Coli bacteremia and was found to be at high risk of aspiration. POA did not want any alternative nutrition at that time. For complete admission information, please see history and physical.     Today, the patient was having some restlessness when she was receiving IV potassium. She was able to rest after it was completed. She reports that she is feeling some better. She continues to have some light cough. No fever or chills. Discussed with RNSherita who denies any issues or events. She continues to have a bed-side sitter who is present during exam. She had some chest pains last night with unremarkable EKG and troponin. She currently denies any chest pains.     ----------------------------------------------------------------------------------------------------------------------  Objective   [facility] Meds:    apixaban 5 mg Oral Q12H   budesonide-formoterol 2 puff Inhalation BID - RT   cefepime 2 g Intravenous Q12H   docusate sodium 100 mg Oral BID   famotidine 20 mg Oral BID   fluticasone 2 spray Nasal Daily   ipratropium 0.5 mg Nebulization BID - RT    lactobacillus acidophilus 1 capsule Oral Daily   levothyroxine 100 mcg Oral Daily   metroNIDAZOLE 500 mg Intravenous Q8H   montelukast 10 mg Oral Nightly   QUEtiapine 25 mg Oral Nightly   senna 1 tablet Oral Daily   sodium chloride 3 mL Intravenous Q12H   venlafaxine 37.5 mg Oral BID With Meals       sodium chloride 75 mL/hr Last Rate: 75 mL/hr (02/05/19 0301)     ----------------------------------------------------------------------------------------------------------------------  Vital Signs:  Temp:  [98.4 °F (36.9 °C)-100.3 °F (37.9 °C)] 98.9 °F (37.2 °C)  Heart Rate:  [] 80  Resp:  [18-20] 18  BP: (135-156)/(51-78) 156/58  No data found.  SpO2 Percentage    02/05/19 0054 02/05/19 0107 02/05/19 0613   SpO2: 99% 99% 99%     SpO2:  [96 %-99 %] 99 %  on  Flow (L/min):  [3] 3;   Device (Oxygen Therapy): nasal cannula    Body mass index is 30.25 kg/m².  Wt Readings from Last 3 Encounters:   02/02/19 75 kg (165 lb 6.4 oz)   01/24/19 77.6 kg (171 lb)   01/15/19 78.5 kg (173 lb)        Intake/Output Summary (Last 24 hours) at 2/5/2019 1722  Last data filed at 2/5/2019 0300  Gross per 24 hour   Intake 360 ml   Output --   Net 360 ml     Diet Pureed; Thin  ----------------------------------------------------------------------------------------------------------------------  Physical exam:  Constitutional:  Well-developed and well-nourished.  No respiratory distress. Awake, alert, talking with myself and sitter.   HENT:  Head:  Normocephalic and atraumatic.  Mouth: moist.   Eyes:  Conjunctivae and EOM are normal. No scleral icterus. No erythema or drainage.  Neck:  Neck supple.  No JVD present.    Cardiovascular: Normal rate, regular rhythm and normal heart sounds with no murmur.  Pulmonary/Chest:  No respiratory distress. No wheezes, rhonchi, or crackles noted. Good air movement bilaterally.   Abdominal:  Soft.  Bowel sounds are normal x4  No distension and no apparent tenderness.   Musculoskeletal:  No edema, no  tenderness. She has contractures on the left side.  No red or swollen joints anywhere.    Neurological: Alert to person and place, confused to time. Answers questions appropriately. No cranial nerve deficit.  No tongue deviation.  No facial droop.  No slurred speech.   Skin:  Skin is warm and dry. No rash noted. No pallor.   Peripheral vascular: No clubbing, no cyanosis, no edema. 2+ pedal pulses bilaterally.   Genitourinary: No olivas catheter in place.   ----------------------------------------------------------------------------------------------------------------------  Tele: Sinus in the 80s--90s.    I have personally reviewed the EKG/Telemetry strips.  ----------------------------------------------------------------------------------------------------------------------  Results from last 7 days   Lab Units 02/05/19  0051 02/04/19 1954 02/03/19  1653   TROPONIN I ng/mL 0.034 0.017 0.045*         Results from last 7 days   Lab Units 02/02/19  1401   PH, ARTERIAL pH units 7.472*   PO2 ART mm Hg 76.9*   PCO2, ARTERIAL mm Hg 25.8*   HCO3 ART mmol/L 18.4*     Results from last 7 days   Lab Units 02/05/19  0051 02/04/19  0451 02/03/19  1118 02/03/19  0454 02/02/19  1421 02/02/19  0652   CRP mg/dL 15.65* 23.90*  --  27.36*  --   --    LACTATE mmol/L  --   --  1.0  --  2.1* 3.7*   WBC 10*3/mm3 11.90 14.67*  --  17.42*  --  15.86*   HEMOGLOBIN g/dL 8.7* 9.1*  --  10.3*  --  10.5*   HEMATOCRIT % 29.4* 30.6*  --  34.4*  --  35.1*   MCV fL 98.0* 96.5*  --  96.1*  --  96.2*   MCHC g/dL 29.6* 29.7*  --  29.9*  --  29.9*   PLATELETS 10*3/mm3 315 339  --  326  --  363     Results from last 7 days   Lab Units 02/05/19  0051 02/04/19  0451 02/03/19  1653 02/03/19  0453   SODIUM mmol/L 135 136  --  140   POTASSIUM mmol/L 3.5 4.1 3.4* 3.4*   MAGNESIUM mg/dL 1.5* 2.2  --  1.4*   CHLORIDE mmol/L 111 114*  --  114*   CO2 mmol/L 16.8* 16.2*  --  17.7*   BUN mg/dL 9 12  --  20   CREATININE mg/dL 0.67 0.69  --  0.86   EGFR IF NONAFRICN  AM mL/min/1.73 85 82  --  64   CALCIUM mg/dL 7.7 8.0  --  7.9   GLUCOSE mg/dL 112* 105  --  97   ALBUMIN g/dL 2.40* 2.50*  --  3.10*   BILIRUBIN mg/dL 0.4 0.4  --  0.6   ALK PHOS U/L 106* 122*  --  160*   AST (SGOT) U/L 21 17  --  34*   ALT (SGPT) U/L 17 15  --  36   Estimated Creatinine Clearance: 55.9 mL/min (by C-G formula based on SCr of 0.67 mg/dL).    Lab Results   Component Value Date    HGBA1C 5.90 (H) 01/15/2019     Lab Results   Component Value Date    TSH 6.470 (H) 01/15/2019    FREET4 0.96 01/17/2019     Pain Management Panel     Pain Management Panel Latest Ref Rng & Units 9/16/2018    AMPHETAMINES SCREEN, URINE Negative Negative    BARBITURATES SCREEN Negative Negative    BENZODIAZEPINE SCREEN, URINE Negative Negative    BUPRENORPHINE Negative Negative    COCAINE SCREEN, URINE Negative Negative    METHADONE SCREEN, URINE Negative Negative        I have personally reviewed the above laboratory results.   ----------------------------------------------------------------------------------------------------------------------  Imaging Results (last 24 hours)     ** No results found for the last 24 hours. **        I have personally reviewed the above radiology results.   ----------------------------------------------------------------------------------------------------------------------  Assessment/Plan     -Severe sepsis, present upon admission and secondary to RML aspiration versus healthcare associated pneumonia: WBC count and CRP improving. ID following and has discontinued Doxycycline, continue cefepime and flagyl. De-escalate per ID reccs soon.  Appreciate their input. Atrovent nebulizers every 6 hours. Discontinue IVF. CBC and CRP in AM.     -Moderate to severe oral dysphagia with aspiration: Continue least restrictive diet per POA request. No alternative forms of feeding desired.      -Minimal AST elevation/Alkaline phosphatase elevation: AST normal, Alk phos improving. Continue to monitor.       -Acute metabolic alkalosis with partial respiratory compensation: CO2 fluctuating, some better today. Continue to monitor.     -Acute hypokalemia/hypomagnesemia: Continue to monitor and replace per protocol as needed.     -Acute hypophosphatemia: Improved. Replace per protocol. Repeat in AM.       -Severe hypoalbuminemia: Monitor.      -Dementia: Continue fall and aspiration precautions.      -Macrocytic anemia, chronic: H/H drift noted. Possibly dilutional. No bleeding appreciated. Continue to monitor H/H. IVF discontinued. Recent folate and vitamin B12 levels within normal limits. CBC in AM.      -Fall: Continue bedside sitter with fall precautions. XRs of left elbow and shoulder reviewed with no acute abnormalities noted.     -History of recent MSSA and E.Coli bacteremia: Blood cultures repeated at admission with no growth x 3 days. Will follow up on final results. ID consult placed.     -Paroxsymal atrial fibrillation: Appears to be maintaining sinus rhythm on telemetry with baseline artifact. Continue to monitor. On Eliquis for stroke prevention.     -Hypothyroidism: Continue home levothyroxine. TSH recently mildly elevated during recent admission with normal Free T4, recommend outpatient follow up.     DVT/GI prophylaxis: Eliquis BID.      CODE: DNR/No CPR    The patient is high risk due to the following diagnoses/reasons: severe sepsis, dementia, aspiration.     I have discussed the patient's assessment and plan with the patient, RNSherita, and attending physician, Dr. Quach.     Disposition: Discharge back to the nursing home once we have final ID antibiotic recommendations. Possible to the nursing home in AM.     TAE Parker  02/05/19  5:22 PM

## 2019-02-05 NOTE — PLAN OF CARE
Problem: Skin Injury Risk (Adult)  Goal: Identify Related Risk Factors and Signs and Symptoms  Outcome: Ongoing (interventions implemented as appropriate)   02/02/19 0644   Skin Injury Risk (Adult)   Related Risk Factors (Skin Injury Risk) advanced age;cognitive impairment;infection;mobility impaired     Goal: Skin Health and Integrity  Outcome: Ongoing (interventions implemented as appropriate)      Problem: Fall Risk (Adult)  Goal: Identify Related Risk Factors and Signs and Symptoms  Outcome: Ongoing (interventions implemented as appropriate)    Goal: Absence of Fall  Outcome: Ongoing (interventions implemented as appropriate)      Problem: Pneumonia (Adult)  Goal: Signs and Symptoms of Listed Potential Problems Will be Absent, Minimized or Managed (Pneumonia)  Outcome: Ongoing (interventions implemented as appropriate)      Problem: Patient Care Overview  Goal: Plan of Care Review  Outcome: Ongoing (interventions implemented as appropriate)      Problem: Pain, Chronic (Adult)  Goal: Acceptable Pain/Comfort Level and Functional Ability  Outcome: Ongoing (interventions implemented as appropriate)

## 2019-02-05 NOTE — PROGRESS NOTES
PROGRESS NOTE         Patient Identification:  Name:  Jeanna Flower  Age:  78 y.o.  Sex:  female  :  1940  MRN:  [id number]  Visit Number:  [id number]  Primary Care Provider:  Raul Robert MD      ----------------------------------------------------------------------------------------------------------------------  Subjective       Chief Complaints:    Fever        Interval History:      Patient comfortable. Sitter at bedside. WBC normal. CRP improving at 15.65.     Review of Systems:    Unable to obtain.  ----------------------------------------------------------------------------------------------------------------------      Objective       [facility] Meds:    apixaban 5 mg Oral Q12H   budesonide-formoterol 2 puff Inhalation BID - RT   cefepime 2 g Intravenous Q12H   docusate sodium 100 mg Oral BID   famotidine 20 mg Oral BID   fluticasone 2 spray Nasal Daily   ipratropium 0.5 mg Nebulization BID - RT   lactobacillus acidophilus 1 capsule Oral Daily   levothyroxine 100 mcg Oral Daily   magnesium sulfate 2 g Intravenous Q2H   metroNIDAZOLE 500 mg Intravenous Q8H   montelukast 10 mg Oral Nightly   QUEtiapine 25 mg Oral Nightly   senna 1 tablet Oral Daily   sodium chloride 3 mL Intravenous Q12H   venlafaxine 37.5 mg Oral BID With Meals       sodium chloride 75 mL/hr Last Rate: 75 mL/hr (19 030)     ----------------------------------------------------------------------------------------------------------------------    Vital Signs:  Temp:  [98.3 °F (36.8 °C)-100.3 °F (37.9 °C)] 98.4 °F (36.9 °C)  Heart Rate:  [] 92  Resp:  [18-20] 20  BP: (135-154)/(51-78) 150/51  No data found.  SpO2 Percentage    19 0054 19 0107 19 0613   SpO2: 99% 99% 99%     SpO2:  [96 %-99 %] 99 %  on  Flow (L/min):  [3] 3;   Device (Oxygen Therapy): nasal cannula    Body mass index is 30.25 kg/m².  Wt Readings from Last 3 Encounters:   19 75 kg (165 lb 6.4 oz)   19 77.6  kg (171 lb)   01/15/19 78.5 kg (173 lb)        Intake/Output Summary (Last 24 hours) at 2/5/2019 1237  Last data filed at 2/5/2019 0300  Gross per 24 hour   Intake 480 ml   Output --   Net 480 ml     Diet Pureed; Thin  ----------------------------------------------------------------------------------------------------------------------    Physical exam:    Constitutional:  Well-developed and well-nourished.  No respiratory distress.      HENT:  Head:  Normocephalic and atraumatic.  Mouth:  Moist mucous membranes.    Eyes:  Conjunctivae and EOM are normal.  No scleral icterus.    Neck:  Neck supple.  No JVD present.    Cardiovascular:  Normal rate, regular rhythm and normal heart sounds with no murmur. No edema.  Pulmonary/Chest:  No respiratory distress, no wheezes, no crackles, with normal breath sounds and good air movement.  Abdominal:  Soft.  Bowel sounds are normal.  No distension and no tenderness.   Musculoskeletal:  No edema, no tenderness, and no deformity.  No red or swollen joints anywhere.   Neurological: Confused.  Skin:  Skin is warm and dry. No rash noted. No pallor.     ----------------------------------------------------------------------------------------------------------------------    ----------------------------------------------------------------------------------------------------------------------  Results from last 7 days   Lab Units 02/05/19  0051 02/04/19  1954 02/03/19  1653   TROPONIN I ng/mL 0.034 0.017 0.045*         Results from last 7 days   Lab Units 02/02/19  1401   PH, ARTERIAL pH units 7.472*   PO2 ART mm Hg 76.9*   PCO2, ARTERIAL mm Hg 25.8*   HCO3 ART mmol/L 18.4*     Results from last 7 days   Lab Units 02/05/19  0051 02/04/19  0451 02/03/19  1118 02/03/19  0454 02/02/19  1421 02/02/19  0652   CRP mg/dL 15.65* 23.90*  --  27.36*  --   --    LACTATE mmol/L  --   --  1.0  --  2.1* 3.7*   WBC 10*3/mm3 11.90 14.67*  --  17.42*  --  15.86*   HEMOGLOBIN g/dL 8.7* 9.1*  --  10.3*   --  10.5*   HEMATOCRIT % 29.4* 30.6*  --  34.4*  --  35.1*   MCV fL 98.0* 96.5*  --  96.1*  --  96.2*   MCHC g/dL 29.6* 29.7*  --  29.9*  --  29.9*   PLATELETS 10*3/mm3 315 339  --  326  --  363     Results from last 7 days   Lab Units 02/05/19  0051 02/04/19  0451 02/03/19  1653 02/03/19  0453   SODIUM mmol/L 135 136  --  140   POTASSIUM mmol/L 3.5 4.1 3.4* 3.4*   MAGNESIUM mg/dL 1.5* 2.2  --  1.4*   CHLORIDE mmol/L 111 114*  --  114*   CO2 mmol/L 16.8* 16.2*  --  17.7*   BUN mg/dL 9 12  --  20   CREATININE mg/dL 0.67 0.69  --  0.86   EGFR IF NONAFRICN AM mL/min/1.73 85 82  --  64   CALCIUM mg/dL 7.7 8.0  --  7.9   GLUCOSE mg/dL 112* 105  --  97   ALBUMIN g/dL 2.40* 2.50*  --  3.10*   BILIRUBIN mg/dL 0.4 0.4  --  0.6   ALK PHOS U/L 106* 122*  --  160*   AST (SGOT) U/L 21 17  --  34*   ALT (SGPT) U/L 17 15  --  36   Estimated Creatinine Clearance: 55.9 mL/min (by C-G formula based on SCr of 0.67 mg/dL).  No results found for: AMMONIA    No results found for: HGBA1C, POCGLU  Lab Results   Component Value Date    HGBA1C 5.90 (H) 01/15/2019     Lab Results   Component Value Date    TSH 6.470 (H) 01/15/2019    FREET4 0.96 01/17/2019       Blood Culture   Date Value Ref Range Status   02/02/2019 No growth at 2 days  Preliminary   02/02/2019 No growth at 2 days  Preliminary                 Pain Management Panel     Pain Management Panel Latest Ref Rng & Units 9/16/2018    AMPHETAMINES SCREEN, URINE Negative Negative    BARBITURATES SCREEN Negative Negative    BENZODIAZEPINE SCREEN, URINE Negative Negative    BUPRENORPHINE Negative Negative    COCAINE SCREEN, URINE Negative Negative    METHADONE SCREEN, URINE Negative Negative          I have personally reviewed the above laboratory results.   ----------------------------------------------------------------------------------------------------------------------  Imaging Results (last 24 hours)     ** No results found for the last 24 hours. **        I have personally  reviewed the above radiology results.   ----------------------------------------------------------------------------------------------------------------------    Assessment/Plan       Assessment/Plan     ASSESSMENT:    1. Severe sepsis with lactic acid greater than 2 on admission  2. Aspiration pneumonia    PLAN:    Patient comfortable. Sitter at bedside. WBC normal. CRP improving at 15.65.     Urinalysis unremarkable. Influenza negative.  Mycoplasma negative. Legionella negative. Recent E.Coli bacteremia in 1/2018. Chest x-ray reveals findings consistent with right sided pneumonia likely from chronic aspiration.     In the setting of significant clinical and laboratory improvement, Doxycycline was discontinued. Will continue Cefepime and Flagyl for now with hopes to de-escalate to Rocephin and Flagyl or Omnicef and Flagyl soon. Will continue to follow closely and adjust antibiotic therapy as needed. CRP in AM.     Current Antimicrobials:  Cefepime 2gm IV Q12H  Flagyl 500mg IV Q8H       Code Status:   Code Status and Medical Interventions:   Ordered at: 02/02/19 0413     Limited Support to NOT Include:    Intubation     Code Status:    No CPR     Medical Interventions (Level of Support Prior to Arrest):    Limited       AMELIE Reis  02/05/19  12:37 PM

## 2019-02-05 NOTE — NURSING NOTE
"Pt alert and confused reports \"heart burn\" and \"my heart hurts\". Obtained a stat EKG and troponin. EKG showed no change and troponin pending. Radha Zaragoza made aware. Stated she would follow up.    2100: Troponin is negative and Pt stated pain is has improved after Pepcid and belching. Will continue to monitor.   "

## 2019-02-05 NOTE — THERAPY EVALUATION
Acute Care - Occupational Therapy Initial Evaluation  [facility]     Patient Name: Jeanna Flower  : 1940  MRN: [id number]  Today's Date: 2019  Onset of Illness/Injury or Date of Surgery: 19(Admit Date)     Referring Physician: TAE Michelle    Admit Date: 2019       ICD-10-CM ICD-9-CM   1. Pneumonia of right middle lobe due to infectious organism (CMS/[facility]) J18.1 486   2. 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   • Other sleep apnea   • Severe sepsis (CMS/[facility])   • MSSA bacteremia   • E. coli UTI   • Acute pancreatitis   • Pneumonia of right middle lobe due to infectious organism (CMS/[facility])   • Aspiration pneumonia (CMS/[facility])     Past Medical History:   Diagnosis Date   • Anemia    • Closed wedge compression fracture of first lumbar vertebra (CMS/HCC)    • COPD (chronic obstructive pulmonary disease) (CMS/[facility])    • CVA (cerebral vascular accident) with left hemiparesis. 2017   • Dementia    • Diabetes mellitus (CMS/[facility])    • Dysarthria following cerebral infarction    • Dysphagia, oropharyngeal phase    • Dysphagia, pharyngoesophageal phase    • GERD (gastroesophageal reflux disease)    • Heart disease    • Hyperlipidemia    • Hypertension    • Hypothyroidism    • Insomnia    • Obesity    • Other sleep apnea 2018   • Paroxysmal atrial fibrillation (CMS/HCC) 2017     Past Surgical History:   Procedure Laterality Date   • APPENDECTOMY     • BLADDER SURGERY     • CARDIAC CATHETERIZATION     • CHOLECYSTECTOMY            OT ASSESSMENT FLOWSHEET (last 72 hours)      Occupational Therapy Evaluation     Row Name 19 0847                   OT Evaluation Time/Intention    Subjective Information  no complaints  -KH         Document Type  evaluation  -        Mode of Treatment  occupational therapy;individual therapy  -        Patient Effort  adequate  -        Comment  Nursing and pt agreeable to OT eval; Pt is poor historian; Per chart review/nursing, pt is believed to be at baseline at this time. Per case management, pt will likely discharge to extended care facility and will have necessary assistance. Thank you.    -           General Information    Patient Profile Reviewed?  yes  -        Onset of Illness/Injury or Date of Surgery  02/02/19 Admit Date  -        Referring Physician  TAE Michelle  -        Patient Observations  cooperative;agree to therapy  -        General Observations of Patient  Pt supine and awake w/ sitter present; Agreeable to OT eval  -        Prior Level of Function  max assist:;dependent:;ADL's per nursing report;   -        Equipment Currently Used at Home  bath bench;hospital bed;walker, rolling pt was facility resident  -        Pertinent History of Current Functional Problem  Pt presnts w/ PMHx of dementia, CVA, COPD, HTN, and dysphagia  -        Existing Precautions/Restrictions  fall Aspiration Precautions  -        Limitations/Impairments  safety/cognitive  -        Equipment Ordered for Patient  -- TBD  -        Risks Reviewed  patient:;LOB;nausea/vomiting;dizziness;increased discomfort;change in vital signs;increased drainage;lines disloged  -        Benefits Reviewed  patient:;improve function;increase independence;increase strength;increase balance;decrease pain;decrease risk of DVT;improve skin integrity;increase knowledge  -        Barriers to Rehab  previous functional deficit;cognitive status;physical barrier  -           Relationship/Environment    Primary Source of Support/Comfort  child(corrina)  -        Name of Support/Comfort Primary Source  Daughter: Alissa KahnAWAIS        Lives With  facility resident  -           Resource/Environmental Concerns     Current Living Arrangements  extended care facility  -           Cognitive Assessment/Intervention- PT/OT    Orientation Status (Cognition)  oriented to;person  -        Follows Commands (Cognition)  follows one step commands;verbal cues/prompting required;repetition of directions required;initiation impaired;physical/tactile prompts required;increased processing time needed;delayed response/completion  -        Safety Deficit (Cognitive)  ability to follow commands;awareness of need for assistance;insight into deficits/self awareness;safety precautions awareness;safety precautions follow-through/compliance  -           ADL Assessment/Intervention    BADL Assessment/Intervention  bathing;upper body dressing;lower body dressing;grooming;feeding;toileting  -           Bathing Assessment/Intervention    Bathing [place] Level  bathing skills  -        Comment (Bathing)  Total A  [facility]           Upper Body Dressing Assessment/Training    Upper Body Dressing [place] Level  upper body dressing skills  -        Comment (Upper Body Dressing)  Max A  [facility]           Lower Body Dressing Assessment/Training    Lower Body Dressing [place] Level  lower body dressing skills  -        Comment (Lower Body Dressing)  Total A  [facility]           Grooming Assessment/Training    [place] Level (Grooming)  grooming skills  -        Comment (Grooming)  Max A  [facility]           Self-Feeding Assessment/Training    [place] Level (Feeding)  feeding skills  -        Comment (Feeding)  Max A/Total A  [facility]           Toileting Assessment/Training    [place] Level (Toileting)  toileting skills  -        Comment (Toileting)  Total A  [facility]           General ROM    GENERAL ROM COMMENTS  RUE WFL; LUE Impaired due to PMHx of CVA; LUE Shoulder PROM ~90, Elbow Flexion WFL, Extension Impaired, Wrist/Hand Impaired   -           MMT (Manual Muscle Testing)    General MMT Comments  RUE 2+/5; LUE Not tested at this  time   -KH           Positioning and Restraints    Pre-Treatment Position  in bed  -KH        Post Treatment Position  bed  -KH        In Bed  notified nsg;supine;call light within reach;encouraged to call for assist;side rails up x3;with other staff;exit alarm on  -KH           Wound 02/04/19 0100 Right anterior;lower arm extravasation    Wound - Properties Group Date first assessed: 02/04/19  -CM Time first assessed: 0100  -CM Present On Admission : no;picture taken  -CM Side: Right  -CM Orientation: anterior;lower  -CM Location: arm  -CM Type: extravasation  -CM       Plan of Care Review    Plan of Care Reviewed With  patient  -KH           Clinical Impression (OT)    OT Diagnosis  Generalized Weakness  -        Criteria for Skilled Therapeutic Interventions Met (OT Eval)  current level of function same as previous level of function  -        Therapy Frequency (OT Eval)  evaluation only  -        Anticipated Equipment Needs at Discharge (OT)  -- TBD  -        Anticipated Discharge Disposition (OT)  extended care facility  -           Living Environment    Home Accessibility  wheelchair accessible  -          User Key  (r) = Recorded By, (t) = Taken By, (c) = Cosigned By    Initials Name Effective Dates    Ele Poon RN 06/20/16 -     Ana Lilia Barreto, OT 04/17/18 -                OT Recommendation and Plan  Outcome Summary/Treatment Plan (OT)  Anticipated Equipment Needs at Discharge (OT): (TBD)  Anticipated Discharge Disposition (OT): extended care facility  Therapy Frequency (OT Eval): evaluation only  Plan of Care Review  Plan of Care Reviewed With: patient  Plan of Care Reviewed With: patient    Outcome Measures     Row Name 02/05/19 0931             How much help from another is currently needed...    Putting on and taking off regular lower body clothing?  1  -KH      Bathing (including washing, rinsing, and drying)  1  -KH      Toileting (which includes using toilet bed pan  or urinal)  1  -KH      Putting on and taking off regular upper body clothing  2  -KH      Taking care of personal grooming (such as brushing teeth)  2  -KH      Eating meals  2  -KH      Score  9  -KH         Functional Assessment    Outcome Measure Options  AM-PAC 6 Clicks Daily Activity (OT)  -        User Key  (r) = Recorded By, (t) = Taken By, (c) = Cosigned By    Initials Name Provider Type    Ana Lilia Barreto, OT Occupational Therapist          Time Calculation:   Time Calculation- OT     Row Name 02/05/19 0932             Time Calculation- OT    Total Timed Code Minutes- OT  28 minute(s)  -AWAIS        User Key  (r) = Recorded By, (t) = Taken By, (c) = Cosigned By    Initials Name Provider Type    Ana Lilia Barreto, COURTNEY Occupational Therapist        Therapy Suggested Charges     Code   Minutes Charges    None           Therapy Charges for Today     Code Description Service Date Service Provider Modifiers Qty    [id number] HC OT EVAL HIGH COMPLEXITY 2 2/5/2019 Ana Lilia Delacruz OT GO 1               Ana Lilia Delacruz OT  2/5/2019

## 2019-02-05 NOTE — THERAPY EVALUATION
Acute Care - Physical Therapy Initial Evaluation/Treatment Note  TIO Santos     Patient Name: Jeanna Flower  : 1940  MRN: [id number]  Today's Date: 2019   Onset of Illness/Injury or Date of Surgery: 19(admit date)  Date of Referral to PT: 19  Referring Physician: Nik       Admit Date: 2019    Visit Dx:     ICD-10-CM ICD-9-CM   1. Pneumonia of right middle lobe due to infectious organism (CMS/[facility]) J18.1 486   2. 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   • Other sleep apnea   • Severe sepsis (CMS/HCC)   • MSSA bacteremia   • E. coli UTI   • Acute pancreatitis   • Pneumonia of right middle lobe due to infectious organism (CMS/HCC)   • Aspiration pneumonia (CMS/HCC)     Past Medical History:   Diagnosis Date   • Anemia    • Closed wedge compression fracture of first lumbar vertebra (CMS/HCC)    • COPD (chronic obstructive pulmonary disease) (CMS/HCC)    • CVA (cerebral vascular accident) with left hemiparesis. 2017   • Dementia    • Diabetes mellitus (CMS/HCC)    • Dysarthria following cerebral infarction    • Dysphagia, oropharyngeal phase    • Dysphagia, pharyngoesophageal phase    • GERD (gastroesophageal reflux disease)    • Heart disease    • Hyperlipidemia    • Hypertension    • Hypothyroidism    • Insomnia    • Obesity    • Other sleep apnea 2018   • Paroxysmal atrial fibrillation (CMS/HCC) 2017     Past Surgical History:   Procedure Laterality Date   • APPENDECTOMY     • BLADDER SURGERY     • CARDIAC CATHETERIZATION     • CHOLECYSTECTOMY          PT ASSESSMENT (last 12 hours)      Physical Therapy Evaluation     Row Name 19 1503          PT Evaluation Time/Intention    Subjective Information  no  complaints  -CT     Document Type  evaluation;therapy note (daily note)  -CT     Mode of Treatment  individual therapy;physical therapy  -CT     Patient Effort  adequate  -CT     Comment  Pt tolerated evaluation and treatment session well with rest breaks provided as needed. Pt is a nursing home resident and is not mobile. Pt sat EOB with physical therapy today.   -CT     Row Name 02/05/19 1503          General Information    Patient Profile Reviewed?  yes  -CT     Onset of Illness/Injury or Date of Surgery  02/02/19 admit date  -CT     Referring Physician  Nik   -CT     Patient Observations  cooperative;agree to therapy  -CT     Prior Level of Function  max assist:;dependent:;all household mobility;community mobility  -CT     Equipment Currently Used at Home  bath bench;hospital bed;walker, rolling facility resident  -CT     Existing Precautions/Restrictions  fall  -CT     Limitations/Impairments  safety/cognitive  -CT     Risks Reviewed  patient:;LOB;nausea/vomiting;dizziness;increased discomfort;change in vital signs;increased drainage;lines disloged  -CT     Benefits Reviewed  patient:;improve function;increase independence;increase strength;increase balance;decrease pain;decrease risk of DVT;improve skin integrity;increase knowledge  -CT     Barriers to Rehab  previous functional deficit;cognitive status;physical barrier  -CT     Row Name 02/05/19 1503          Relationship/Environment    Primary Source of Support/Comfort  child(corrina)  -CT     Lives With  facility resident  -CT     Row Name 02/05/19 1503          Resource/Environmental Concerns    Current Living Arrangements  extended care facility  -CT     Row Name 02/05/19 1503          Cognitive Assessment/Intervention- PT/OT    Orientation Status (Cognition)  oriented to;person  -CT     Follows Commands (Cognition)  follows one step commands;verbal cues/prompting required;repetition of directions required;initiation impaired;physical/tactile prompts  required;increased processing time needed;delayed response/completion  -CT     Safety Deficit (Cognitive)  ability to follow commands;awareness of need for assistance;safety precautions awareness;safety precautions follow-through/compliance  -CT     Row Name 02/05/19 1503          Safety Issues, Functional Mobility    Impairments Affecting Function (Mobility)  strength  -CT     Row Name 02/05/19 1503          Bed Mobility Assessment/Treatment    Bed Mobility Assessment/Treatment  bed mobility (all) activities;rolling left;rolling right;scooting/bridging;supine-sit;sit-supine  -CT     [place] Level (Bed Mobility)  maximum assist (25% patient effort);2 person assist;verbal cues;nonverbal cues (demo/gesture)  -CT     Rolling Left [place] (Bed Mobility)  maximum assist (25% patient effort);2 person assist;verbal cues;nonverbal cues (demo/gesture)  -CT     Rolling Right [place] (Bed Mobility)  maximum assist (25% patient effort);2 person assist;verbal cues;nonverbal cues (demo/gesture)  -CT     Scooting/Bridging [place] (Bed Mobility)  maximum assist (25% patient effort);dependent (less than 25% patient effort);2 person assist;verbal cues;nonverbal cues (demo/gesture)  -CT     Supine-Sit [place] (Bed Mobility)  maximum assist (25% patient effort);dependent (less than 25% patient effort);2 person assist;verbal cues;nonverbal cues (demo/gesture)  -CT     Sit-Supine [place] (Bed Mobility)  maximum assist (25% patient effort);dependent (less than 25% patient effort);2 person assist;verbal cues;nonverbal cues (demo/gesture)  -CT     Bed Mobility, Safety Issues  decreased use of arms for pushing/pulling;decreased use of legs for bridging/pushing  -CT     Assistive Device (Bed Mobility)  bed rails;draw sheet  -CT     Row Name 02/05/19 1503          Transfer Assessment/Treatment    Comment (Transfers)  unable to perform sit to stand   -CT     Row Name 02/05/19 1503          Gait/Stairs  "Assessment/Training    Comment (Gait/Stairs)  not appropriate to assess  -CT     Row Name 02/05/19 1503          General ROM    GENERAL ROM COMMENTS  BLE AROM 50%, BLE PROM WFL  -CT     Row Name 02/05/19 1503          MMT (Manual Muscle Testing)    General MMT Comments  not tested  -CT     Row Name 02/05/19 1503          Pain Assessment    Additional Documentation  -- no pain reported during treatment  -CT     Row Name             [REMOVED] Wound 02/04/19 0100 Right anterior;lower arm extravasation    Wound - Properties Group Date first assessed: 02/04/19  -CM Time first assessed: 0100  -CM Present On Admission : no;picture taken  -CM Side: Right  -CM Orientation: anterior;lower  -CM Location: arm  -CM Type: extravasation  -CM Resolution Date: 02/05/19  -MH Resolution Time: 1007  -MH Wound Outcome: Healed  -MH    Row Name 02/05/19 1503          Plan of Care Review    Plan of Care Reviewed With  patient  -CT     Row Name 02/05/19 1503          Physical Therapy Clinical Impression    Date of Referral to PT  02/04/19  -CT     PT Diagnosis (PT Clinical Impression)  impaired functional mobility  -CT     Prognosis (PT Clinical Impression)  fair  -CT     Functional Level at Time of Evaluation (PT Clinical Impression)  Max/Dep x 2  -CT     Patient/Family Goals Statement (PT Clinical Impression)  Pt goals are to \"get better\"  -CT     Criteria for Skilled Interventions Met (PT Clinical Impression)  yes;treatment indicated  -CT     Pathology/Pathophysiology Noted (Describe Specifically for Each System)  musculoskeletal;neuromuscular  -CT     Impairments Found (describe specific impairments)  aerobic capacity/endurance;gait, locomotion, and balance  -CT     Functional Limitations in Following Categories (Describe Specific Limitations)  self-care;home management  -CT     Rehab Potential (PT Clinical Summary)  fair, will monitor progress closely  -CT     Predicted Duration of Therapy (PT)  length of stay  -CT     Care Plan Review " (PT)  evaluation/treatment results reviewed;care plan/treatment goals reviewed;risks/benefits reviewed;current/potential barriers reviewed;patient/other agree to care plan  -CT     Row Name 02/05/19 1503          Physical Therapy Goals    Bed Mobility Goal Selection (PT)  bed mobility, PT goal 1  -CT     Transfer Goal Selection (PT)  transfer, PT goal 1  -CT     Row Name 02/05/19 1503          Bed Mobility Goal 1 (PT)    Activity/Assistive Device (Bed Mobility Goal 1, PT)  bed mobility activities, all  -CT     [place] Level/Cues Needed (Bed Mobility Goal 1, PT)  moderate assist (50-74% patient effort);2 person assist  -CT     Time Frame (Bed Mobility Goal 1, PT)  by discharge  -CT     Row Name 02/05/19 1504          Transfer Goal 1 (PT)    Activity/Assistive Device (Transfer Goal 1, PT)  sit-to-stand/stand-to-sit;bed-to-chair/chair-to-bed  -CT     [place] Level/Cues Needed (Transfer Goal 1, PT)  maximum assist (25-49% patient effort);2 person assist  -CT     Time Frame (Transfer Goal 1, PT)  by discharge  -CT     Row Name 02/05/19 1509          Positioning and Restraints    Pre-Treatment Position  in bed  -CT     Post Treatment Position  bed  -CT     In Bed  side lying left;call light within reach;encouraged to call for assist;exit alarm on;with family/caregiver;side rails up x3;notified nsg  -CT     Row Name 02/05/19 1505          Living Environment    Home Accessibility  wheelchair accessible  -CT       User Key  (r) = Recorded By, (t) = Taken By, (c) = Cosigned By    Initials Name Provider Type    CT Komal Perez PT Physical Therapist    Ele Poon, RN Registered Nurse    Sherita Garrido RN Registered Nurse        Physical Therapy Education     Title: PT OT SLP Therapies (In Progress)     Topic: Physical Therapy (In Progress)     Point: Mobility training (In Progress)     Learning Progress Summary           Patient Acceptance, E,TB, NR by CT at 2/5/2019  3:15 PM                    Point: Home exercise program (In Progress)     Learning Progress Summary           Patient Acceptance, E,TB, NR by CT at 2/5/2019  3:15 PM                   Point: Body mechanics (In Progress)     Learning Progress Summary           Patient Acceptance, E,TB, NR by CT at 2/5/2019  3:15 PM                   Point: Precautions (In Progress)     Learning Progress Summary           Patient Acceptance, E,TB, NR by CT at 2/5/2019  3:15 PM                               User Key     Initials Effective Dates Name Provider Type Discipline    CT 04/03/18 -  Komal Perez, PT Physical Therapist PT              PT Recommendation and Plan  Anticipated Discharge Disposition (PT): skilled nursing facility  Planned Therapy Interventions (PT Eval): balance training, bed mobility training, gait training, home exercise program, manual therapy techniques, motor coordination training, neuromuscular re-education, postural re-education, patient/family education, strengthening, transfer training  Therapy Frequency (PT Clinical Impression): 3 times/wk  Outcome Summary/Treatment Plan (PT)  Anticipated Equipment Needs at Discharge (PT): (tbd)  Anticipated Discharge Disposition (PT): skilled nursing facility  Plan of Care Reviewed With: patient  Outcome Measures     Row Name 02/05/19 1500 02/05/19 0931          How much help from another person do you currently need...    Turning from your back to your side while in flat bed without using bedrails?  2  -CT  --     Moving from lying on back to sitting on the side of a flat bed without bedrails?  2  -CT  --     Moving to and from a bed to a chair (including a wheelchair)?  1  -CT  --     Standing up from a chair using your arms (e.g., wheelchair, bedside chair)?  1  -CT  --     Climbing 3-5 steps with a railing?  1  -CT  --     To walk in hospital room?  1  -CT  --     AM-PAC 6 Clicks Score  8  -CT  --        How much help from another is currently needed...    Putting on and taking off regular  lower body clothing?  --  1  -KH     Bathing (including washing, rinsing, and drying)  --  1  -KH     Toileting (which includes using toilet bed pan or urinal)  --  1  -KH     Putting on and taking off regular upper body clothing  --  2  -KH     Taking care of personal grooming (such as brushing teeth)  --  2  -KH     Eating meals  --  2  -KH     Score  --  9  -KH        Functional Assessment    Outcome Measure Options  AM-PAC 6 Clicks Basic Mobility (PT)  -CT  AM-PAC 6 Clicks Daily Activity (OT)  -KH       User Key  (r) = Recorded By, (t) = Taken By, (c) = Cosigned By    Initials Name Provider Type    CT Komal Perez, PT Physical Therapist    Ana Lilia Barreto, OT Occupational Therapist         Time Calculation:   PT Charges     Row Name 02/05/19 1516             Time Calculation    PT Received On  02/05/19  -CT      PT - Next Appointment  02/06/19  -CT      PT Goal Re-Cert Due Date  02/19/19  -CT         Time Calculation- PT    Total Timed Code Minutes- PT  57 minute(s)  -CT         Timed Charges    03720 - PT Therapeutic Activity Minutes  23  -CT        User Key  (r) = Recorded By, (t) = Taken By, (c) = Cosigned By    Initials Name Provider Type    CT Komal Perez, PT Physical Therapist          Therapy Charges for Today     Code Description Service Date Service Provider Modifiers Qty    56843850424 HC PT EVAL HIGH COMPLEXITY 2 2/5/2019 Komal Perez, PT GP 1    68381386405 HC PT THERAPEUTIC ACT EA 15 MIN 2/5/2019 Komal Perez, PT GP 2    29682168113 HC PT THER SUPP EA 15 MIN 2/5/2019 Komal Perez, PT GP 4          PT G-Codes  Outcome Measure Options: AM-PAC 6 Clicks Basic Mobility (PT)  AM-PAC 6 Clicks Score: 8  Score: 9      Komal Perez PT  2/5/2019

## 2019-02-06 VITALS
BODY MASS INDEX: 30.44 KG/M2 | HEART RATE: 87 BPM | WEIGHT: 165.4 LBS | OXYGEN SATURATION: 98 % | SYSTOLIC BLOOD PRESSURE: 138 MMHG | RESPIRATION RATE: 28 BRPM | HEIGHT: 62 IN | DIASTOLIC BLOOD PRESSURE: 55 MMHG | TEMPERATURE: 98.7 F

## 2019-02-06 PROBLEM — R53.81 DEBILITY: Status: ACTIVE | Noted: 2019-02-06

## 2019-02-06 LAB
ALBUMIN SERPL-MCNC: 2.4 G/DL (ref 3.4–4.8)
ALBUMIN/GLOB SERPL: 0.8 G/DL (ref 1.5–2.5)
ALP SERPL-CCNC: 99 U/L (ref 35–104)
ALT SERPL W P-5'-P-CCNC: 10 U/L (ref 10–36)
ANION GAP SERPL CALCULATED.3IONS-SCNC: 7.5 MMOL/L (ref 3.6–11.2)
AST SERPL-CCNC: 16 U/L (ref 10–30)
BASOPHILS # BLD AUTO: 0.06 10*3/MM3 (ref 0–0.3)
BASOPHILS NFR BLD AUTO: 0.6 % (ref 0–2)
BILIRUB SERPL-MCNC: 0.5 MG/DL (ref 0.2–1.8)
BUN BLD-MCNC: <5 MG/DL (ref 7–21)
BUN/CREAT SERPL: ABNORMAL (ref 7–25)
CALCIUM SPEC-SCNC: 7.8 MG/DL (ref 7.7–10)
CHLORIDE SERPL-SCNC: 109 MMOL/L (ref 99–112)
CO2 SERPL-SCNC: 16.5 MMOL/L (ref 24.3–31.9)
CREAT BLD-MCNC: 0.63 MG/DL (ref 0.43–1.29)
CRP SERPL-MCNC: 15.17 MG/DL (ref 0–0.99)
DEPRECATED RDW RBC AUTO: 57.8 FL (ref 37–54)
EOSINOPHIL # BLD AUTO: 0.31 10*3/MM3 (ref 0–0.7)
EOSINOPHIL NFR BLD AUTO: 2.9 % (ref 0–7)
ERYTHROCYTE [DISTWIDTH] IN BLOOD BY AUTOMATED COUNT: 17.7 % (ref 11.5–14.5)
GFR SERPL CREATININE-BSD FRML MDRD: 92 ML/MIN/1.73
GLOBULIN UR ELPH-MCNC: 3.2 GM/DL
GLUCOSE BLD-MCNC: 105 MG/DL (ref 70–110)
HCT VFR BLD AUTO: 30.7 % (ref 37–47)
HGB BLD-MCNC: 9.4 G/DL (ref 12–16)
IMM GRANULOCYTES # BLD AUTO: 0.04 10*3/MM3 (ref 0–0.03)
IMM GRANULOCYTES NFR BLD AUTO: 0.4 % (ref 0–0.5)
LYMPHOCYTES # BLD AUTO: 4.58 10*3/MM3 (ref 1–3)
LYMPHOCYTES NFR BLD AUTO: 42.3 % (ref 16–46)
MAGNESIUM SERPL-MCNC: 2.1 MG/DL (ref 1.7–2.6)
MCH RBC QN AUTO: 28.7 PG (ref 27–33)
MCHC RBC AUTO-ENTMCNC: 30.6 G/DL (ref 33–37)
MCV RBC AUTO: 93.6 FL (ref 80–94)
MONOCYTES # BLD AUTO: 1.33 10*3/MM3 (ref 0.1–0.9)
MONOCYTES NFR BLD AUTO: 12.3 % (ref 0–12)
NEUTROPHILS # BLD AUTO: 4.51 10*3/MM3 (ref 1.4–6.5)
NEUTROPHILS NFR BLD AUTO: 41.5 % (ref 40–75)
OSMOLALITY SERPL CALC.SUM OF ELEC: NORMAL MOSM/KG (ref 273–305)
PLATELET # BLD AUTO: 373 10*3/MM3 (ref 130–400)
PMV BLD AUTO: 8.9 FL (ref 6–10)
POTASSIUM BLD-SCNC: 3.6 MMOL/L (ref 3.5–5.3)
PROT SERPL-MCNC: 5.6 G/DL (ref 6–8)
RBC # BLD AUTO: 3.28 10*6/MM3 (ref 4.2–5.4)
SODIUM BLD-SCNC: 133 MMOL/L (ref 135–153)
WBC NRBC COR # BLD: 10.83 10*3/MM3 (ref 4.5–12.5)

## 2019-02-06 PROCEDURE — 97110 THERAPEUTIC EXERCISES: CPT

## 2019-02-06 PROCEDURE — 83735 ASSAY OF MAGNESIUM: CPT | Performed by: PHYSICIAN ASSISTANT

## 2019-02-06 PROCEDURE — 86140 C-REACTIVE PROTEIN: CPT | Performed by: NURSE PRACTITIONER

## 2019-02-06 PROCEDURE — 94799 UNLISTED PULMONARY SVC/PX: CPT

## 2019-02-06 PROCEDURE — 94640 AIRWAY INHALATION TREATMENT: CPT

## 2019-02-06 PROCEDURE — 85025 COMPLETE CBC W/AUTO DIFF WBC: CPT | Performed by: PHYSICIAN ASSISTANT

## 2019-02-06 PROCEDURE — 80053 COMPREHEN METABOLIC PANEL: CPT | Performed by: PHYSICIAN ASSISTANT

## 2019-02-06 PROCEDURE — 97530 THERAPEUTIC ACTIVITIES: CPT

## 2019-02-06 PROCEDURE — 25010000002 CEFEPIME 2 G/NS 100 ML SOLUTION: Performed by: NURSE PRACTITIONER

## 2019-02-06 RX ORDER — METRONIDAZOLE 250 MG/1
500 TABLET ORAL EVERY 8 HOURS SCHEDULED
Status: DISCONTINUED | OUTPATIENT
Start: 2019-02-06 | End: 2019-02-06 | Stop reason: HOSPADM

## 2019-02-06 RX ORDER — CEFDINIR 300 MG/1
300 CAPSULE ORAL EVERY 12 HOURS SCHEDULED
Status: DISCONTINUED | OUTPATIENT
Start: 2019-02-06 | End: 2019-02-06 | Stop reason: HOSPADM

## 2019-02-06 RX ORDER — IPRATROPIUM BROMIDE AND ALBUTEROL SULFATE 2.5; .5 MG/3ML; MG/3ML
3 SOLUTION RESPIRATORY (INHALATION) EVERY 4 HOURS PRN
Qty: 360 ML
Start: 2019-02-06

## 2019-02-06 RX ORDER — METRONIDAZOLE 500 MG/1
500 TABLET ORAL EVERY 8 HOURS SCHEDULED
Qty: 30 TABLET | Refills: 0 | Status: SHIPPED | OUTPATIENT
Start: 2019-02-06 | End: 2019-02-16

## 2019-02-06 RX ORDER — HYDROCODONE BITARTRATE AND ACETAMINOPHEN 5; 325 MG/1; MG/1
1 TABLET ORAL EVERY 6 HOURS PRN
Qty: 12 TABLET | Refills: 0 | Status: SHIPPED | OUTPATIENT
Start: 2019-02-06

## 2019-02-06 RX ORDER — CEFDINIR 300 MG/1
300 CAPSULE ORAL EVERY 12 HOURS SCHEDULED
Qty: 20 CAPSULE | Refills: 0 | Status: SHIPPED | OUTPATIENT
Start: 2019-02-06 | End: 2019-02-16

## 2019-02-06 RX ORDER — POTASSIUM CHLORIDE 1.5 G/1.77G
40 POWDER, FOR SOLUTION ORAL EVERY 4 HOURS
Status: COMPLETED | OUTPATIENT
Start: 2019-02-06 | End: 2019-02-06

## 2019-02-06 RX ADMIN — POTASSIUM CHLORIDE 40 MEQ: 1.5 POWDER, FOR SOLUTION ORAL at 11:22

## 2019-02-06 RX ADMIN — VENLAFAXINE HYDROCHLORIDE 37.5 MG: 37.5 TABLET ORAL at 08:03

## 2019-02-06 RX ADMIN — POTASSIUM CHLORIDE 40 MEQ: 1.5 POWDER, FOR SOLUTION ORAL at 14:19

## 2019-02-06 RX ADMIN — SENNOSIDES 1 TABLET: 8.6 TABLET, FILM COATED ORAL at 08:03

## 2019-02-06 RX ADMIN — LEVOTHYROXINE SODIUM 100 MCG: 100 TABLET ORAL at 08:03

## 2019-02-06 RX ADMIN — IPRATROPIUM BROMIDE 0.5 MG: 0.5 SOLUTION RESPIRATORY (INHALATION) at 06:44

## 2019-02-06 RX ADMIN — SODIUM CHLORIDE, PRESERVATIVE FREE 3 ML: 5 INJECTION INTRAVENOUS at 08:04

## 2019-02-06 RX ADMIN — BUDESONIDE AND FORMOTEROL FUMARATE DIHYDRATE 2 PUFF: 160; 4.5 AEROSOL RESPIRATORY (INHALATION) at 06:45

## 2019-02-06 RX ADMIN — CEFDINIR 300 MG: 300 CAPSULE ORAL at 13:25

## 2019-02-06 RX ADMIN — METRONIDAZOLE 500 MG: 500 INJECTION, SOLUTION INTRAVENOUS at 05:00

## 2019-02-06 RX ADMIN — HYDROCODONE BITARTRATE AND ACETAMINOPHEN 1 TABLET: 5; 325 TABLET ORAL at 02:50

## 2019-02-06 RX ADMIN — METRONIDAZOLE 500 MG: 250 TABLET ORAL at 13:25

## 2019-02-06 RX ADMIN — APIXABAN 5 MG: 5 TABLET, FILM COATED ORAL at 08:04

## 2019-02-06 RX ADMIN — FLUTICASONE PROPIONATE 2 SPRAY: 50 SPRAY, METERED NASAL at 08:04

## 2019-02-06 RX ADMIN — FAMOTIDINE 20 MG: 20 TABLET, FILM COATED ORAL at 08:03

## 2019-02-06 RX ADMIN — CEFEPIME 2 G: 2 INJECTION, POWDER, FOR SOLUTION INTRAVENOUS at 01:20

## 2019-02-06 RX ADMIN — HYDROCODONE BITARTRATE AND ACETAMINOPHEN 1 TABLET: 5; 325 TABLET ORAL at 09:06

## 2019-02-06 RX ADMIN — Medication 1 CAPSULE: at 08:03

## 2019-02-06 NOTE — PLAN OF CARE
Problem: Skin Injury Risk (Adult)  Goal: Skin Health and Integrity  Outcome: Ongoing (interventions implemented as appropriate)   02/05/19 2300   Skin Injury Risk (Adult)   Skin Health and Integrity making progress toward outcome       Problem: Fall Risk (Adult)  Goal: Absence of Fall  Outcome: Ongoing (interventions implemented as appropriate)   02/05/19 2300   Fall Risk (Adult)   Absence of Fall making progress toward outcome       Problem: Pneumonia (Adult)  Goal: Signs and Symptoms of Listed Potential Problems Will be Absent, Minimized or Managed (Pneumonia)  Outcome: Ongoing (interventions implemented as appropriate)   02/05/19 2300   Goal/Outcome Evaluation   Problems Assessed (Pneumonia) all   Problems Present (Pneumonia) infection progression       Problem: Patient Care Overview  Goal: Plan of Care Review  Outcome: Ongoing (interventions implemented as appropriate)   02/05/19 2300   Coping/Psychosocial   Plan of Care Reviewed With patient   Plan of Care Review   Progress no change       Problem: Pain, Chronic (Adult)  Goal: Acceptable Pain/Comfort Level and Functional Ability  Outcome: Ongoing (interventions implemented as appropriate)   02/05/19 2300   Pain, Chronic (Adult)   Acceptable Pain/Comfort Level and Functional Ability making progress toward outcome

## 2019-02-06 NOTE — PLAN OF CARE
Problem: Patient Care Overview  Goal: Plan of Care Review  Outcome: Ongoing (interventions implemented as appropriate)   02/06/19 6380   Coping/Psychosocial   Plan of Care Reviewed With patient   Plan of Care Review   Progress no change

## 2019-02-06 NOTE — PROGRESS NOTES
PROGRESS NOTE         Patient Identification:  Name:  Jeanna Flower  Age:  78 y.o.  Sex:  female  :  1940  MRN:  [id number]  Visit Number:  [id number]  Primary Care Provider:  Raul Robert MD      ----------------------------------------------------------------------------------------------------------------------  Subjective       Chief Complaints:    Fever        Interval History:      Patient comfortable. Sitter at bedside. WBC normal. CRP improving at 15.17. POA wishes to take patient back to nursing home on hospice. Plans for discharge today.    Review of Systems:    Unable to obtain.  ----------------------------------------------------------------------------------------------------------------------      Objective       Current [facility] Meds:    apixaban 5 mg Oral Q12H   budesonide-formoterol 2 puff Inhalation BID - RT   cefdinir 300 mg Oral Q12H   docusate sodium 100 mg Oral BID   famotidine 20 mg Oral BID   fluticasone 2 spray Nasal Daily   ipratropium 0.5 mg Nebulization BID - RT   lactobacillus acidophilus 1 capsule Oral Daily   levothyroxine 100 mcg Oral Daily   metroNIDAZOLE 500 mg Oral Q8H   montelukast 10 mg Oral Nightly   potassium chloride 40 mEq Oral Q4H   QUEtiapine 25 mg Oral Nightly   senna 1 tablet Oral Daily   sodium chloride 3 mL Intravenous Q12H   venlafaxine 37.5 mg Oral BID With Meals        ----------------------------------------------------------------------------------------------------------------------    Vital Signs:  Temp:  [97.5 °F (36.4 °C)-98.9 °F (37.2 °C)] 98.3 °F (36.8 °C)  Heart Rate:  [80-98] 90  Resp:  [18-22] 20  BP: (131-177)/(58-87) 133/71  No data found.  SpO2 Percentage    19 0610 19 0644 19 0655   SpO2: 98% 98% 99%     SpO2:  [95 %-99 %] 99 %  on  Flow (L/min):  [3] 3;   Device (Oxygen Therapy): nasal cannula    Body mass index is 30.25 kg/m².  Wt Readings from Last 3 Encounters:   19 75 kg (165 lb 6.4 oz)   19  77.6 kg (171 lb)   01/15/19 78.5 kg (173 lb)        Intake/Output Summary (Last 24 hours) at 2/6/2019 1209  Last data filed at 2/6/2019 0244  Gross per 24 hour   Intake 240 ml   Output --   Net 240 ml     Diet Pureed; Thin  ----------------------------------------------------------------------------------------------------------------------    Physical exam:    Constitutional:  Well-developed and well-nourished.  No respiratory distress.      HENT:  Head:  Normocephalic and atraumatic.  Mouth:  Moist mucous membranes.    Eyes:  Conjunctivae and EOM are normal.  No scleral icterus.    Neck:  Neck supple.  No JVD present.    Cardiovascular:  Normal rate, regular rhythm and normal heart sounds with no murmur. No edema.  Pulmonary/Chest:  No respiratory distress, no wheezes, no crackles, with normal breath sounds and good air movement.  Abdominal:  Soft.  Bowel sounds are normal.  No distension and no tenderness.   Musculoskeletal:  No edema, no tenderness, and no deformity.  No red or swollen joints anywhere.   Neurological: Confused.  Skin:  Skin is warm and dry. No rash noted. No pallor.     ----------------------------------------------------------------------------------------------------------------------    ----------------------------------------------------------------------------------------------------------------------  Results from last 7 days   Lab Units 02/05/19  0051 02/04/19  1954 02/03/19  1653   TROPONIN I ng/mL 0.034 0.017 0.045*         Results from last 7 days   Lab Units 02/02/19  1401   PH, ARTERIAL pH units 7.472*   PO2 ART mm Hg 76.9*   PCO2, ARTERIAL mm Hg 25.8*   HCO3 ART mmol/L 18.4*     Results from last 7 days   Lab Units 02/06/19  0656 02/05/19  1842 02/05/19  0051 02/04/19  0451 02/03/19  1118  02/02/19  1421 02/02/19  0652   CRP mg/dL 15.17*  --  15.65* 23.90*  --    < >  --   --    LACTATE mmol/L  --   --   --   --  1.0  --  2.1* 3.7*   WBC 10*3/mm3 10.83 12.01 11.90 14.67*  --    < >   --  15.86*   HEMOGLOBIN g/dL 9.4* 10.2* 8.7* 9.1*  --    < >  --  10.5*   HEMATOCRIT % 30.7* 32.0* 29.4* 30.6*  --    < >  --  35.1*   MCV fL 93.6 90.7 98.0* 96.5*  --    < >  --  96.2*   MCHC g/dL 30.6* 31.9* 29.6* 29.7*  --    < >  --  29.9*   PLATELETS 10*3/mm3 373 351 315 339  --    < >  --  363    < > = values in this interval not displayed.     Results from last 7 days   Lab Units 02/06/19  0656 02/05/19  0051 02/04/19  0451   SODIUM mmol/L 133* 135 136   POTASSIUM mmol/L 3.6 3.5 4.1   MAGNESIUM mg/dL 2.1 1.5* 2.2   CHLORIDE mmol/L 109 111 114*   CO2 mmol/L 16.5* 16.8* 16.2*   BUN mg/dL <5* 9 12   CREATININE mg/dL 0.63 0.67 0.69   EGFR IF NONAFRICN AM mL/min/1.73 92 85 82   CALCIUM mg/dL 7.8 7.7 8.0   GLUCOSE mg/dL 105 112* 105   ALBUMIN g/dL 2.40* 2.40* 2.50*   BILIRUBIN mg/dL 0.5 0.4 0.4   ALK PHOS U/L 99 106* 122*   AST (SGOT) U/L 16 21 17   ALT (SGPT) U/L 10 17 15   Estimated Creatinine Clearance: 55.9 mL/min (by C-G formula based on SCr of 0.63 mg/dL).  No results found for: AMMONIA    No results found for: HGBA1C, POCGLU  Lab Results   Component Value Date    HGBA1C 5.90 (H) 01/15/2019     Lab Results   Component Value Date    TSH 6.470 (H) 01/15/2019    FREET4 0.96 01/17/2019       Blood Culture   Date Value Ref Range Status   02/02/2019 No growth at 2 days  Preliminary   02/02/2019 No growth at 2 days  Preliminary                 Pain Management Panel     Pain Management Panel Latest Ref Rng & Units 9/16/2018    AMPHETAMINES SCREEN, URINE Negative Negative    BARBITURATES SCREEN Negative Negative    BENZODIAZEPINE SCREEN, URINE Negative Negative    BUPRENORPHINE Negative Negative    COCAINE SCREEN, URINE Negative Negative    METHADONE SCREEN, URINE Negative Negative          I have personally reviewed the above laboratory results.   ----------------------------------------------------------------------------------------------------------------------  Imaging Results (last 24 hours)     ** No results  found for the last 24 hours. **        I have personally reviewed the above radiology results.   ----------------------------------------------------------------------------------------------------------------------    Assessment/Plan       Assessment/Plan     ASSESSMENT:    1. Severe sepsis with lactic acid greater than 2 on admission  2. Aspiration pneumonia    PLAN:    Patient comfortable. Sitter at bedside. WBC normal. CRP improving at 15.17. POA wishes to take patient back to nursing home on hospice. Plans for discharge today.    Urinalysis unremarkable. Influenza negative.  Mycoplasma negative. Legionella negative. Recent E.Coli bacteremia in 1/2018. Chest x-ray reveals findings consistent with right sided pneumonia likely from chronic aspiration.     In the setting of clinical and laboratory improvement, patient was de-escalated to Omnicef 300mg PO BID and Flagyl 500mg PO TID to continued through 2/16/19 for discharge to nursing home with hospice. Patient stable from ID standpoint. Okay to discharge from ID standpoint.     Current Antimicrobials:  Omnicef 300mg PO BID through 2/16/19  Flagyl 500mg PO Q8H through 2/16/19       Code Status:   Code Status and Medical Interventions:   Ordered at: 02/02/19 0413     Limited Support to NOT Include:    Intubation     Code Status:    No CPR     Medical Interventions (Level of Support Prior to Arrest):    Limited       Sanam Archer, APRN  02/06/19  12:09 PM

## 2019-02-06 NOTE — DISCHARGE PLACEMENT REQUEST
"Jeanna Flower (78 y.o. Female)     Date of Birth Social Security Number Address Home Phone MRN    1940  [facility]  [address]  [place] [id number] [phone] [id number]    Jehovah's witness Marital Status          Non-Spiritism Single       Admission Date Admission Type Admitting Provider Attending Provider Department, Room/Bed    2/2/19 Emergency Anna Marie Livingston, Chalino Saucedo,  [address], 3328/1P    Discharge Date Discharge Disposition Discharge Destination         Skilled Nursing Facility (DC - External)              Attending Provider:  Chalino Quach DO    Allergies:  Bactrim [Sulfamethoxazole-trimethoprim], Codeine, Penicillins    Isolation:  None   Infection:  None   Code Status:  No CPR    Ht:  157.5 cm (62\")   Wt:  75 kg (165 lb 6.4 oz)    Admission Cmt:  None   Principal Problem:  None                Active Insurance as of 2/2/2019     Primary Coverage     Payor Plan Insurance Group Employer/Plan Group    MEDICARE MEDICARE A & B      Payor Plan Address Payor Plan Phone Number Payor Plan Fax Number Effective Dates    [address] [phone]  1/1/2006 - None Entered    [facility] [id number]       Subscriber Name Subscriber Birth Date Member ID       JEANNA FLOWER 1940 [id number]           Secondary Coverage     Payor Plan Insurance Group Employer/Plan Group    [place]      Payor Plan Address Payor Plan Phone Number Payor Plan Fax Number Effective Dates    [address] [phone]  10/17/2017 - None Entered    [place] [address]       Subscriber Name Subscriber Birth Date Member ID       JEANNA FLOWER 1940 [id number]                 Emergency Contacts      (Rel.) Home Phone Work Phone Mobile Phone    Ernestina Chamberlain (Power of ) [phone] -- --        [address]  1 [facility] [address]  Phone:  [phone]  Fax:   Date: Feb 6, 2019    "   Ambulatory Referral to Home Hospice     Patient:  Jeanna Flower MRN:  [id number]   [facility]  [address] [place] [address] :  1940  SSN:    Phone: [phone] Sex:  F      INSURANCE PAYOR PLAN GROUP # SUBSCRIBER ID   Primary:  Secondary:    [place]ICARE  [place] [id number]  [id number]      [id number]  [id number]      Referring Provider Information:  RODRIGUEZ ZARAGOZA Phone: [phone] Fax:       Referral Information:   # Visits:  1 Referral Type: Hospice [45]   Urgency:  Routine Referral Reason: Specialty Services Required   Start Date: 2019 End Date:  To be determined by Insurer   Diagnosis: Debility (R53.81 [ICD-10-CM] 799.3 [ICD-9-CM])  Severe sepsis (CMS/HCC) (A41.9,R65.20 [ICD-10-CM] 038.9,995.92 [ICD-9-CM])  Paroxysmal atrial fibrillation (CMS/HCC) (I48.0 [ICD-10-CM] 427.31 [ICD-9-CM])  Other sleep apnea (G47.39 [ICD-10-CM] 327.29 [ICD-9-CM])      Refer to Dept:   Refer to Provider:   Refer to Facility:    Please evaluate Jeanna Flower for admission to Hospice.     Patient/Family aware of referral: Yes, discussed with MAGGI Do.      Services to provide: Strengthening Exercises and Evaluate     Physician to follow patient's care (PCP, nursing home provider)     Referring Provider Call-back Phone Number: [phone].     Requested Start of Care Date: 19     Special Instructions: None.        This document serves as a request of services and does not constitute Insurance authorization or approval of services.  To determine eligibility, please contact the members Insurance carrier to verify and review coverage.     If you have medical questions regarding this request for services. Please contact [address] at [phone] between the hours of 8:00am - 5:00pm (Mon-Fri).       Authorizing Provider:Rodriguez Zaragoza PA  Authorizing Provider's NPI: [id number]  Order Entered By: Rodriguez Zaragoza PA 2019 12:24 PM      Electronically signed by: Radha Zaragoza PA 2019 12:24 PM               History & Physical      Anna Marie Livingston DO at 2019  6:09 AM          Hospitalist History and Physical    Patient Identification:  Name: Jeanna Flower  Age/Sex: 78 y.o. female  :  1940  MRN: [id number]         Primary Care Physician: Raul Robert MD    Chief Complaint   Patient presents with   • Fever       History of Present Illness  Patient is a 78 y.o. female presents with the following: fever    The patient is a 78-year-old nursing home resident with past history significant for dementia, CVA, COPD, hypertension and dysphasia presents from the nursing home with fever.    The patient's fever was approximately 102.5° axillary at the nursing home.  She was tachycardic to the 130s with respiratory rate in the 40s.  Nursing home documentation also reported that the patient was vomiting a yellow emesis.  Her oxygen saturation was documented to be 90% the 3 L per nasal cannula.  The patient is on a puréed diet with thin liquids at the nursing home.    The patient was recently on our service from  through  were she was treated for septic shock, Escherichia coli bacteremia with UTI.  She was found to have moderate to severe oral dysphagia with aspiration.  He also had acute hypomagnesemia, hypokalemia and paroxysmal atrial fibrillation.    In the emergency department, the patient's temperature was 102.5°F, her maximum heart rate was 130 and her respiratory rate was 20.     CBC was remarkable for a lactate of 2.6, Phosphatase 208, ALT 45 and AST 53.  Portable chest x-ray was performed and appears to be suggestive of a right middle lobe pneumonia.  The patient has been admitted to the medical surgical floor with telemetry.    Past History:  Past Medical History:   Diagnosis Date   • Anemia    • Closed wedge compression fracture of first lumbar vertebra (CMS/HCC)    • COPD (chronic obstructive  pulmonary disease) (CMS/[facility])    • CVA (cerebral vascular accident) with left hemiparesis. 12/6/2017   • Dementia    • Diabetes mellitus (CMS/[facility])    • Dysarthria following cerebral infarction    • Dysphagia, oropharyngeal phase    • Dysphagia, pharyngoesophageal phase    • GERD (gastroesophageal reflux disease)    • Heart disease    • Hyperlipidemia    • Hypertension    • Hypothyroidism    • Insomnia    • Obesity    • Other sleep apnea 11/13/2018   • Paroxysmal atrial fibrillation (CMS/[facility]) 12/6/2017     Past Surgical History:   Procedure Laterality Date   • APPENDECTOMY     • BLADDER SURGERY     • CARDIAC CATHETERIZATION     • CHOLECYSTECTOMY       Family History   Problem Relation Age of Onset   • Heart disease Mother    • Heart attack Mother    • Heart disease Father    • Heart attack Father    • Heart attack Brother    • Heart attack Brother      Social History     Tobacco Use   • Smoking status: Never Smoker   • Smokeless tobacco: Never Used   Substance Use Topics   • Alcohol use: No   • Drug use: No     Medications Prior to Admission   Medication Sig Dispense Refill Last Dose   • apixaban (ELIQUIS) 5 MG tablet tablet Take 1 tablet by mouth Every 12 (Twelve) Hours. 60 tablet 5 2/1/2019 at 0800   • budesonide (PULMICORT) 0.5 MG/2ML nebulizer solution Take 0.5 mg by nebulization Every 12 (Twelve) Hours.   2/1/2019 at 0800   • cycloSPORINE (RESTASIS) 0.05 % ophthalmic emulsion Administer 1 drop to both eyes 2 (Two) Times a Day.   2/1/2019 at 0800   • docusate sodium (COLACE) 100 MG capsule Take 100 mg by mouth 2 (Two) Times a Day.   2/1/2019 at 0800   • famotidine (PEPCID) 20 MG tablet Take 20 mg by mouth 2 (Two) Times a Day.   2/1/2019 at 0800   • fluticasone (FLONASE) 50 MCG/ACT nasal spray 2 sprays into the nostril(s) as directed by provider Daily.   2/1/2019 at 0800   • fluticasone-salmeterol (ADVAIR DISKUS) 250-50 MCG/DOSE DISKUS Inhale 1 puff 2 (Two) Times a Day.   2/1/2019 at 0800   •  HYDROcodone-acetaminophen (NORCO) 5-325 MG per tablet Take 1 tablet by mouth Every 6 (Six) Hours As Needed for Moderate Pain . 12 tablet 0 2/1/2019 at Unknown time   • Lactobacillus Acid-Pectin (ACIDOPHILUS/CITRUS PECTIN) tablet tablet Take 1 tablet by mouth 3 (Three) Times a Day.   2/1/2019 at 0800   • levothyroxine (SYNTHROID, LEVOTHROID) 100 MCG tablet Take 100 mcg by mouth Daily.   2/1/2019 at 0800   • montelukast (SINGULAIR) 10 MG tablet Take 10 mg by mouth Every Night.   1/31/2019 at 2000   • QUEtiapine (SEROquel) 25 MG tablet Take 1 tablet by mouth Every Night.   1/31/2019 at 2000   • QUEtiapine (SEROquel) 25 MG tablet Take 1 tablet by mouth 2 (Two) Times a Day As Needed (Agitation, halllucinations).   2/1/2019 at Unknown time   • venlafaxine (EFFEXOR) 37.5 MG tablet Take 37.5 mg by mouth 2 (Two) Times a Day.   2/1/2019 at Unknown time   • albuterol sulfate  (90 Base) MCG/ACT inhaler Inhale 2 puffs Every 4 (Four) Hours As Needed for Wheezing or Shortness of Air.   Unknown at Unknown time   • senna (SENNA-LAX) 8.6 MG tablet Take 1 tablet by mouth Daily.   Unknown at Unknown time     Allergies: Bactrim [sulfamethoxazole-trimethoprim]; Codeine; and Penicillins    Review of Systems:  Review of Systems   Unable to perform ROS: Dementia      Vital Signs  Temp:  [99.3 °F (37.4 °C)-102.5 °F (39.2 °C)] 99.3 °F (37.4 °C)  Heart Rate:  [102-130] 107  Resp:  [20] 20  BP: ()/(65-70) 90/66  Body mass index is 30.25 kg/m².    Physical Exam:  Physical Exam   Constitutional: She appears well-developed and well-nourished. She appears lethargic. She is sleeping. No distress. Nasal cannula in place.   HENT:   Head: Normocephalic and atraumatic.   Mouth/Throat: Oropharynx is clear and moist. Mucous membranes are dry.   Eyes: Conjunctivae and EOM are normal. Pupils are equal, round, and reactive to light.   Neck: Neck supple. No tracheal deviation present. No thyromegaly present.   Cardiovascular: Normal rate and  regular rhythm. Exam reveals no gallop and no friction rub.   No murmur heard.  Pulmonary/Chest: Breath sounds normal. No respiratory distress. She has no wheezes. She has no rales.   Coarse bilateral breath sounds.    Abdominal: Soft. Bowel sounds are normal. She exhibits no distension. There is no tenderness. There is no guarding.   Genitourinary:   Genitourinary Comments: Hendrix catheter in place.   Musculoskeletal: She exhibits no tenderness.   Neurological: She appears lethargic.   Skin: Skin is warm and dry. No rash noted. No erythema.      Results Review:    Results from last 7 days   Lab Units 02/02/19 0216   WBC 10*3/mm3 9.70   HEMOGLOBIN g/dL 11.7*   HEMATOCRIT % 37.5   PLATELETS 10*3/mm3 397     Results from last 7 days   Lab Units 02/02/19  0216   SODIUM mmol/L 135   POTASSIUM mmol/L 3.5   CHLORIDE mmol/L 104   CO2 mmol/L 20.2*   BUN mg/dL 18   CREATININE mg/dL 1.04   CALCIUM mg/dL 8.6   GLUCOSE mg/dL 166*     Results from last 7 days   Lab Units 02/02/19  0216   BILIRUBIN mg/dL 0.5   ALK PHOS U/L 208*   AST (SGOT) U/L 53*   ALT (SGPT) U/L 45*     Imaging:    Imaging Results (most recent)     Procedure Component Value Units Date/Time    XR Chest 1 View [[id number]] Updated:  02/02/19 0223        *Pending official radiology interpretation, however, per my view, patient has a RML pneumonia.     Assessment/Plan      -Severe sepsis, present upon admission and secondary to RML aspiration versus healthcare associated pneumonia: Admit to the medical surgical floor with telemetry.  Keep nothing by mouth.  Place on broad-spectrum IV antibiotics to include pharmacy to dose vancomycin, Azactam and Flagyl. Continue with oxygen per nasal cannula and titrate for saturations greater than 90%. Trend lactic acid levels. Patient received her sepsis bolus. Continue IV fluid hydration. Follow blood culture results obtained in ED. Follow up official CXR results.    -Minimal AST elevation/Alkaline phosphatase elevation:  Continue to monitor.     -Decreased CO2 level. Repeat later today.     -Severe hypoalbuminemia.     -Dementia.     -Macrocytic anemia, chronic: Continue to monitor H/H.     DVT/GI prophylaxis: Subcutaneous heparin/PPI    Estimated Length of Stay: > 2 MNs    CODE: DNR/No CPR    The patient is a high risk patient due to: severe sepsis, dementia, aspiration.       I discussed the patients findings and my recommendations with nursing staff (RUFUS Chang)      Anna Marie Livingston DO  02/02/19  6:09 AM    Electronically signed by Anna Marie Livingston DO at 2/2/2019  8:42 AM              Orders (last 24 hrs)     Start     Ordered    02/06/19 1400  metroNIDAZOLE (FLAGYL) tablet 500 mg  Every 8 Hours Scheduled      02/06/19 1209    02/06/19 1400  cefdinir (OMNICEF) capsule 300 mg  Every 12 Hours Scheduled      02/06/19 1209    02/06/19 1219  Discharge patient  Once      02/06/19 1224    02/06/19 1219  Discontinue IV  Once      02/06/19 1224    02/06/19 1219  Discontinue Telemetry  Once      02/06/19 1224    02/06/19 1219  Discharge Prescription Status  Continuous      02/06/19 1224    02/06/19 1100  potassium chloride (KLOR-CON) packet 40 mEq  Every 4 Hours      02/06/19 0927    02/06/19 0931  Elevate Heels Off of Bed  Until Discontinued      02/06/19 0931    02/06/19 0931  Turn Patient  Now Then Every 2 Hours      02/06/19 0931    02/06/19 0931  Use Repositioning Wedge to Position Patient  Continuous      02/06/19 0931    02/06/19 0743  Osmolality, Calculated  Once      02/06/19 0742    02/06/19 0600  C-reactive Protein  Morning Draw      02/05/19 1240    02/06/19 0600  Comprehensive Metabolic Panel  Morning Draw      02/05/19 1733    02/06/19 0600  Magnesium  Morning Draw      02/05/19 1733    02/06/19 0600  CBC Auto Differential  PROCEDURE ONCE      02/06/19 0002    02/06/19 0000  ipratropium-albuterol (DUO-NEB) 0.5-2.5 mg/3 ml nebulizer  Every 4 Hours PRN      02/06/19 1224    02/06/19 0000  cefdinir (OMNICEF) 300 MG  capsule  Every 12 Hours Scheduled      02/06/19 1224    02/06/19 0000  metroNIDAZOLE (FLAGYL) 500 MG tablet  Every 8 Hours Scheduled      02/06/19 1224    02/06/19 0000  Discharge Follow-up with PCP      02/06/19 1224    02/06/19 0000  Call MD With Problems / Concerns     Comments:  Instructions: Contact PCP or return to ED with concerns or recurrent symptoms.    02/06/19 1224    02/06/19 0000  Discharge Activity Restrictions     Comments:  Bedrest with fall precautions. Continue PT/OT.    02/06/19 1224    02/06/19 0000  Diet: Dysphagia; Thin Liquids, No Restrictions; Pureed      02/06/19 1224    02/06/19 0000  Ambulatory Referral to Home Hospice     Comments:  Please evaluate Jeanna Flower for admission to Hospice.    Patient/Family aware of referral: Yes, discussed with MAGGI Do.     Services to provide: Strengthening Exercises and Evaluate    Physician to follow patient's care (PCP, nursing home provider)    Referring Provider Call-back Phone Number: [phone].    Requested Start of Care Date: 02/06/19    Special Instructions: None.    02/06/19 1224    02/06/19 0000  HYDROcodone-acetaminophen (NORCO) 5-325 MG per tablet  Every 6 Hours PRN      02/06/19 1225    02/05/19 2100  ECG 12 Lead  STAT      02/05/19 2059    02/05/19 1900  ipratropium (ATROVENT) nebulizer solution 0.5 mg  2 Times Daily - RT      02/05/19 1137    02/05/19 1734  CBC & Differential  Daily      02/05/19 1733    02/05/19 1734  CBC Auto Differential  PROCEDURE ONCE      02/05/19 1733    02/05/19 1733  Phosphorus  Once      02/05/19 1733    02/05/19 1139  ipratropium (ATROVENT) nebulizer solution 0.5 mg  Every 6 Hours PRN      02/05/19 1139    02/04/19 0100  cefepime (MAXIPIME) 2 g/100 mL 0.9% NS (mbp)  Every 12 Hours,   Status:  Discontinued      02/03/19 1534    02/03/19 2100  apixaban (ELIQUIS) tablet 5 mg  Every 12 Hours Scheduled      02/03/19 1103    02/03/19 2100  famotidine (PEPCID) tablet 20 mg  2 Times Daily      02/03/19 1101     02/03/19 2100  montelukast (SINGULAIR) tablet 10 mg  Nightly      02/03/19 1103    02/03/19 2100  QUEtiapine (SEROquel) tablet 25 mg  Nightly      02/03/19 1103    02/03/19 1800  venlafaxine (EFFEXOR) tablet 37.5 mg  2 Times Daily With Meals      02/03/19 1103    02/03/19 1400  metroNIDAZOLE (FLAGYL) IVPB 500 mg  Every 8 Hours,   Status:  Discontinued      02/03/19 1026    02/03/19 1200  lactobacillus acidophilus (RISAQUAD) capsule 1 capsule  Daily      02/03/19 1003    02/03/19 1200  docusate sodium (COLACE) capsule 100 mg  2 Times Daily      02/03/19 1103    02/03/19 1200  levothyroxine (SYNTHROID, LEVOTHROID) tablet 100 mcg  Daily      02/03/19 1103    02/03/19 1200  QUEtiapine (SEROquel) tablet 25 mg  2 Times Daily PRN      02/03/19 1103    02/03/19 1200  senna (SENOKOT) tablet 1 tablet  Daily      02/03/19 1103    02/03/19 1103  HYDROcodone-acetaminophen (NORCO) 5-325 MG per tablet 1 tablet  Every 6 Hours PRN      02/03/19 1104    02/03/19 1021  potassium phosphate 45 mmol in sodium chloride 0.9 % 500 mL infusion  As Needed      02/03/19 1022    02/03/19 1021  potassium phosphate 30 mmol in sodium chloride 0.9 % 250 mL infusion  As Needed      02/03/19 1022    02/03/19 1021  potassium phosphate 15 mmol in sodium chloride 0.9 % 100 mL infusion  As Needed      02/03/19 1022    02/03/19 1021  sodium phosphates 45 mmol in sodium chloride 0.9 % 500 mL IVPB  As Needed      02/03/19 1022    02/03/19 1021  sodium phosphates 30 mmol in sodium chloride 0.9 % 250 mL IVPB  As Needed      02/03/19 1022    02/03/19 1021  sodium phosphates 15 mmol in sodium chloride 0.9 % 250 mL IVPB  As Needed      02/03/19 1022    02/03/19 1021  Magnesium Sulfate 2 gram Bolus, followed by 8 gram infusion (total Mg dose 10 grams)- Mg less than or equal to 1mg/dL  As Needed      02/03/19 1022    02/03/19 1021  Magnesium Sulfate 2 gram / 50mL Infusion (GIVE X 3 BAGS TO EQUAL 6GM TOTAL DOSE) - Mg 1.1 - 1.5 mg/dl  As Needed      02/03/19 1022     02/03/19 1021  Magnesium Sulfate 4 gram infusion- Mg 1.6-1.9 mg/dL  As Needed      02/03/19 1022    02/03/19 1021  potassium chloride (MICRO-K) CR capsule 40 mEq  As Needed      02/03/19 1022    02/03/19 1021  potassium chloride (KLOR-CON) packet 40 mEq  As Needed      02/03/19 1022    02/03/19 1021  potassium chloride 10 mEq in 100 mL IVPB  Every 1 Hour PRN      02/03/19 1022    02/02/19 1030  budesonide (PULMICORT) nebulizer solution 0.5 mg  2 Times Daily PRN      02/02/19 1020    02/02/19 1000  budesonide-formoterol (SYMBICORT) 160-4.5 MCG/ACT inhaler 2 puff  2 Times Daily - RT      02/02/19 0845    02/02/19 1000  fluticasone (FLONASE) 50 MCG/ACT nasal spray 2 spray  Daily      02/02/19 0845    02/02/19 0930  sodium chloride 0.9 % infusion  Continuous,   Status:  Discontinued      02/02/19 0834    02/02/19 0900  sodium chloride 0.9 % flush 3 mL  Every 12 Hours Scheduled      02/02/19 0602    02/02/19 0848  acetaminophen (TYLENOL) suppository 650 mg  Every 6 Hours PRN      02/02/19 0848    02/02/19 0602  sodium chloride 0.9 % flush 3-10 mL  As Needed      02/02/19 0602    02/02/19 0602  nitroglycerin (NITROSTAT) SL tablet 0.4 mg  Every 5 Minutes PRN      02/02/19 0602    02/02/19 0201  sodium chloride 0.9 % flush 10 mL  As Needed      02/02/19 0201    Unscheduled  ECG 12 Lead  As Needed     Comments:  Nurse to Release if Patient Expericences Acute Chest Pain or Dysrhythmias    02/02/19 0602    Unscheduled  Potassium  As Needed     Comments:  For Ventricular Arrhythmias      02/02/19 0602    Unscheduled  Magnesium  As Needed     Comments:  For Ventricular Arrhythmias      02/02/19 0602    Unscheduled  Troponin  As Needed     Comments:  For Chest Pain      02/02/19 0602    Unscheduled  Digoxin Level  As Needed     Comments:  For Atrial Arrhythmias      02/02/19 0602    Unscheduled  Blood Gas, Arterial  As Needed     Comments:  Per [employer]2 PolicyNotify Physician      02/02/19 0602    Unscheduled  Magnesium  As Needed       19 1005    Unscheduled  Potassium  As Needed      19 1005    Unscheduled  Magnesium  As Needed      19 1022    Unscheduled  Potassium  As Needed      19 1022    Unscheduled  Extravasation Protocol - Encourage Active Range of Motion After 48 Hours  As Needed      19 0040    Unscheduled  Skin Care  As Needed      19 0931    --  Lactobacillus Acid-Pectin (ACIDOPHILUS/CITRUS PECTIN) tablet tablet  3 Times Daily      19 0502    --  senna (SENNA-LAX) 8.6 MG tablet  Daily      19 050          Operative/Procedure Notes (most recent note)     No notes of this type exist for this encounter.           Physician Progress Notes (most recent note)      Sanam Archer APRN at 2019 12:09 PM                     PROGRESS NOTE         Patient Identification:  Name:  Jeanna Flower  Age:  78 y.o.  Sex:  female  :  1940  MRN:  [id number]  Visit Number:  [id number]  Primary Care Provider:  Raul Robert MD      ----------------------------------------------------------------------------------------------------------------------  Subjective       Chief Complaints:    Fever        Interval History:      Patient comfortable. Sitter at bedside. WBC normal. CRP improving at 15.17. POA wishes to take patient back to nursing home on hospice. Plans for discharge today.    Review of Systems:    Unable to obtain.  ----------------------------------------------------------------------------------------------------------------------      Objective       Current [facility] Meds:    apixaban 5 mg Oral Q12H   budesonide-formoterol 2 puff Inhalation BID - RT   cefdinir 300 mg Oral Q12H   docusate sodium 100 mg Oral BID   famotidine 20 mg Oral BID   fluticasone 2 spray Nasal Daily   ipratropium 0.5 mg Nebulization BID - RT   lactobacillus acidophilus 1 capsule Oral Daily   levothyroxine 100 mcg Oral Daily   metroNIDAZOLE 500 mg Oral Q8H   montelukast 10 mg Oral Nightly   potassium chloride  40 mEq Oral Q4H   QUEtiapine 25 mg Oral Nightly   senna 1 tablet Oral Daily   sodium chloride 3 mL Intravenous Q12H   venlafaxine 37.5 mg Oral BID With Meals        ----------------------------------------------------------------------------------------------------------------------    Vital Signs:  Temp:  [97.5 °F (36.4 °C)-98.9 °F (37.2 °C)] 98.3 °F (36.8 °C)  Heart Rate:  [80-98] 90  Resp:  [18-22] 20  BP: (131-177)/(58-87) 133/71  No data found.  SpO2 Percentage    02/06/19 0610 02/06/19 0644 02/06/19 0655   SpO2: 98% 98% 99%     SpO2:  [95 %-99 %] 99 %  on  Flow (L/min):  [3] 3;   Device (Oxygen Therapy): nasal cannula    Body mass index is 30.25 kg/m².  Wt Readings from Last 3 Encounters:   02/02/19 75 kg (165 lb 6.4 oz)   01/24/19 77.6 kg (171 lb)   01/15/19 78.5 kg (173 lb)        Intake/Output Summary (Last 24 hours) at 2/6/2019 1209  Last data filed at 2/6/2019 0249  Gross per 24 hour   Intake 240 ml   Output --   Net 240 ml     Diet Pureed; Thin  ----------------------------------------------------------------------------------------------------------------------    Physical exam:    Constitutional:  Well-developed and well-nourished.  No respiratory distress.      HENT:  Head:  Normocephalic and atraumatic.  Mouth:  Moist mucous membranes.    Eyes:  Conjunctivae and EOM are normal.  No scleral icterus.    Neck:  Neck supple.  No JVD present.    Cardiovascular:  Normal rate, regular rhythm and normal heart sounds with no murmur. No edema.  Pulmonary/Chest:  No respiratory distress, no wheezes, no crackles, with normal breath sounds and good air movement.  Abdominal:  Soft.  Bowel sounds are normal.  No distension and no tenderness.   Musculoskeletal:  No edema, no tenderness, and no deformity.  No red or swollen joints anywhere.   Neurological: Confused.  Skin:  Skin is warm and dry. No rash noted. No pallor.      ----------------------------------------------------------------------------------------------------------------------    ----------------------------------------------------------------------------------------------------------------------  Results from last 7 days   Lab Units 02/05/19  0051 02/04/19  1954 02/03/19  1653   TROPONIN I ng/mL 0.034 0.017 0.045*         Results from last 7 days   Lab Units 02/02/19  1401   PH, ARTERIAL pH units 7.472*   PO2 ART mm Hg 76.9*   PCO2, ARTERIAL mm Hg 25.8*   HCO3 ART mmol/L 18.4*     Results from last 7 days   Lab Units 02/06/19  0656 02/05/19  1842 02/05/19  0051 02/04/19  0451 02/03/19  1118  02/02/19  1421 02/02/19  0652   CRP mg/dL 15.17*  --  15.65* 23.90*  --    < >  --   --    LACTATE mmol/L  --   --   --   --  1.0  --  2.1* 3.7*   WBC 10*3/mm3 10.83 12.01 11.90 14.67*  --    < >  --  15.86*   HEMOGLOBIN g/dL 9.4* 10.2* 8.7* 9.1*  --    < >  --  10.5*   HEMATOCRIT % 30.7* 32.0* 29.4* 30.6*  --    < >  --  35.1*   MCV fL 93.6 90.7 98.0* 96.5*  --    < >  --  96.2*   MCHC g/dL 30.6* 31.9* 29.6* 29.7*  --    < >  --  29.9*   PLATELETS 10*3/mm3 373 351 315 339  --    < >  --  363    < > = values in this interval not displayed.     Results from last 7 days   Lab Units 02/06/19  0656 02/05/19  0051 02/04/19  0451   SODIUM mmol/L 133* 135 136   POTASSIUM mmol/L 3.6 3.5 4.1   MAGNESIUM mg/dL 2.1 1.5* 2.2   CHLORIDE mmol/L 109 111 114*   CO2 mmol/L 16.5* 16.8* 16.2*   BUN mg/dL <5* 9 12   CREATININE mg/dL 0.63 0.67 0.69   EGFR IF NONAFRICN AM mL/min/1.73 92 85 82   CALCIUM mg/dL 7.8 7.7 8.0   GLUCOSE mg/dL 105 112* 105   ALBUMIN g/dL 2.40* 2.40* 2.50*   BILIRUBIN mg/dL 0.5 0.4 0.4   ALK PHOS U/L 99 106* 122*   AST (SGOT) U/L 16 21 17   ALT (SGPT) U/L 10 17 15   Estimated Creatinine Clearance: 55.9 mL/min (by C-G formula based on SCr of 0.63 mg/dL).  No results found for: AMMONIA    No results found for: HGBA1C, POCGLU  Lab Results   Component Value Date    HGBA1C 5.90 (H)  01/15/2019     Lab Results   Component Value Date    TSH 6.470 (H) 01/15/2019    FREET4 0.96 01/17/2019       Blood Culture   Date Value Ref Range Status   02/02/2019 No growth at 2 days  Preliminary   02/02/2019 No growth at 2 days  Preliminary                 Pain Management Panel     Pain Management Panel Latest Ref Rng & Units 9/16/2018    AMPHETAMINES SCREEN, URINE Negative Negative    BARBITURATES SCREEN Negative Negative    BENZODIAZEPINE SCREEN, URINE Negative Negative    BUPRENORPHINE Negative Negative    COCAINE SCREEN, URINE Negative Negative    METHADONE SCREEN, URINE Negative Negative          I have personally reviewed the above laboratory results.   ----------------------------------------------------------------------------------------------------------------------  Imaging Results (last 24 hours)     ** No results found for the last 24 hours. **        I have personally reviewed the above radiology results.   ----------------------------------------------------------------------------------------------------------------------    Assessment/Plan       Assessment/Plan     ASSESSMENT:    1. Severe sepsis with lactic acid greater than 2 on admission  2. Aspiration pneumonia    PLAN:    Patient comfortable. Sitter at bedside. WBC normal. CRP improving at 15.17. POA wishes to take patient back to nursing home on hospice. Plans for discharge today.    Urinalysis unremarkable. Influenza negative.  Mycoplasma negative. Legionella negative. Recent E.Coli bacteremia in 1/2018. Chest x-ray reveals findings consistent with right sided pneumonia likely from chronic aspiration.     In the setting of clinical and laboratory improvement, patient was de-escalated to Omnicef 300mg PO BID and Flagyl 500mg PO TID to continued through 2/16/19 for discharge to nursing home with hospice. Patient stable from ID standpoint. Okay to discharge from ID standpoint.     Current Antimicrobials:  Omnicef 300mg PO BID through  19  Flagyl 500mg PO Q8H through 19       Code Status:   Code Status and Medical Interventions:   Ordered at: 19 0413     Limited Support to NOT Include:    Intubation     Code Status:    No CPR     Medical Interventions (Level of Support Prior to Arrest):    AMELIE Cadena  19  12:09 PM    Electronically signed by Sanam Archer APRN at 2019 12:13 PM          Consult Notes (most recent note)      Sanam Archer APRN at 2/3/2019  3:34 PM      Consult Orders    1. Inpatient Infectious Diseases Consult [[id number]] ordered by Chalino Quach DO at 19 1005           Attestation signed by Juan Cardoza MD at 2019  4:18 AM    I have reviewed the documentation above and agree.                            INFECTIOUS DISEASE CONSULTATION REPORT        Patient Identification:  Name:  Jeanna Flower  Age:  78 y.o.  Sex:  female  :  1940  MRN:  [id number]   Visit Number:  [id number]  Primary Care Physician:  Raul Robert MD         Subjective       Subjective     History of present illness:      Thank you Dr. Quach for allowing us to participate in the care of your patient.  As you well know, Ms. Jeanna Flower is a 78 y.o. female nursing home resident with past medical history significant for CVA, COPD, Hypertension, Diabetes, A Fib, who presented to  Emergency Department on 2019 for fever. Urinalysis unremarkable. Influenza negative. WBC 17.42. CRP 27.36. Mycoplasma negative. Legionella negative. Lactic acid 2.1 on admission, now normalized. Recent E.Coli bacteremia in 2018. Chest x-ray reveals findings consistent with right sided pneumonia likely from chronic aspiration.     Infectious Disease consultation was requested for antimicrobial management.      ---------------------------------------------------------------------------------------------------------------------     Review Of Systems:    Unable to obtain.  Confused.    ---------------------------------------------------------------------------------------------------------------------     Past Medical History    Past Medical History:   Diagnosis Date   • Anemia    • Closed wedge compression fracture of first lumbar vertebra (CMS/[facility])    • COPD (chronic obstructive pulmonary disease) (CMS/HCC)    • CVA (cerebral vascular accident) with left hemiparesis. 12/6/2017   • Dementia    • Diabetes mellitus (CMS/HCC)    • Dysarthria following cerebral infarction    • Dysphagia, oropharyngeal phase    • Dysphagia, pharyngoesophageal phase    • GERD (gastroesophageal reflux disease)    • Heart disease    • Hyperlipidemia    • Hypertension    • Hypothyroidism    • Insomnia    • Obesity    • Other sleep apnea 11/13/2018   • Paroxysmal atrial fibrillation (CMS/HCC) 12/6/2017       Past Surgical History    Past Surgical History:   Procedure Laterality Date   • APPENDECTOMY     • BLADDER SURGERY     • CARDIAC CATHETERIZATION     • CHOLECYSTECTOMY         Family History    Family History   Problem Relation Age of Onset   • Heart disease Mother    • Heart attack Mother    • Heart disease Father    • Heart attack Father    • Heart attack Brother    • Heart attack Brother          Social History    Social History     Tobacco Use   • Smoking status: Never Smoker   • Smokeless tobacco: Never Used   Substance Use Topics   • Alcohol use: No   • Drug use: No       Allergies    Bactrim [sulfamethoxazole-trimethoprim]; Codeine; and Penicillins  ---------------------------------------------------------------------------------------------------------------------     Home Medications:    Prior to Admission Medications     Prescriptions Last Dose Informant Patient Reported? Taking?    apixaban (ELIQUIS) 5 MG tablet tablet 2/1/2019 Nursing Home No Yes    Take 1 tablet by mouth Every 12 (Twelve) Hours.    budesonide (PULMICORT) 0.5 MG/2ML nebulizer solution 2/1/2019 Nursing Home Yes Yes    Take 0.5 mg  by nebulization Every 12 (Twelve) Hours.    cycloSPORINE (RESTASIS) 0.05 % ophthalmic emulsion 2/1/2019 Nursing Home Yes Yes    Administer 1 drop to both eyes 2 (Two) Times a Day.    docusate sodium (COLACE) 100 MG capsule 2/1/2019 Nursing Home Yes Yes    Take 100 mg by mouth 2 (Two) Times a Day.    famotidine (PEPCID) 20 MG tablet 2/1/2019 Nursing Home Yes Yes    Take 20 mg by mouth 2 (Two) Times a Day.    fluticasone (FLONASE) 50 MCG/ACT nasal spray 2/1/2019 Nursing Home Yes Yes    2 sprays into the nostril(s) as directed by provider Daily.    fluticasone-salmeterol (ADVAIR DISKUS) 250-50 MCG/DOSE DISKUS 2/1/2019 Nursing Home Yes Yes    Inhale 1 puff 2 (Two) Times a Day.    HYDROcodone-acetaminophen (NORCO) 5-325 MG per tablet 2/1/2019 Nursing Home No Yes    Take 1 tablet by mouth Every 6 (Six) Hours As Needed for Moderate Pain .    Lactobacillus Acid-Pectin (ACIDOPHILUS/CITRUS PECTIN) tablet tablet 2/1/2019 Nursing Home Yes Yes    Take 1 tablet by mouth 3 (Three) Times a Day.    levothyroxine (SYNTHROID, LEVOTHROID) 100 MCG tablet 2/1/2019 Nursing Home Yes Yes    Take 100 mcg by mouth Daily.    montelukast (SINGULAIR) 10 MG tablet 1/31/2019 Nursing Home Yes Yes    Take 10 mg by mouth Every Night.    QUEtiapine (SEROquel) 25 MG tablet 1/31/2019 Nursing Home No Yes    Take 1 tablet by mouth Every Night.    QUEtiapine (SEROquel) 25 MG tablet 2/1/2019 Nursing Home No Yes    Take 1 tablet by mouth 2 (Two) Times a Day As Needed (Agitation, halllucinations).    venlafaxine (EFFEXOR) 37.5 MG tablet 2/1/2019 Nursing Home Yes Yes    Take 37.5 mg by mouth 2 (Two) Times a Day.    albuterol sulfate  (90 Base) MCG/ACT inhaler Unknown Nursing Home Yes No    Inhale 2 puffs Every 4 (Four) Hours As Needed for Wheezing or Shortness of Air.    senna (SENNA-LAX) 8.6 MG tablet Unknown Nursing Home Yes No    Take 1 tablet by mouth Daily.         ---------------------------------------------------------------------------------------------------------------------    Objective       Objective     Hospital Scheduled Meds:    apixaban 5 mg Oral Q12H   budesonide-formoterol 2 puff Inhalation BID - RT   [START ON 2/4/2019] cefepime 2 g Intravenous Q12H   docusate sodium 100 mg Oral BID   doxycycline 100 mg Intravenous Q12H   famotidine 20 mg Oral BID   fluticasone 2 spray Nasal Daily   ipratropium 0.5 mg Nebulization 4x Daily - RT   lactobacillus acidophilus 1 capsule Oral Daily   levothyroxine 100 mcg Oral Daily   magnesium sulfate 2 g Intravenous Q2H   metroNIDAZOLE 500 mg Intravenous Q8H   montelukast 10 mg Oral Nightly   QUEtiapine 25 mg Oral Nightly   senna 1 tablet Oral Daily   sodium chloride 3 mL Intravenous Q12H   venlafaxine 37.5 mg Oral BID With Meals       Pharmacy Consult - Pharmacy to dose     Pharmacy Consult - Pharmacy to dose     Pharmacy to dose vancomycin     sodium chloride 75 mL/hr Last Rate: 75 mL/hr (02/03/19 0856)     ---------------------------------------------------------------------------------------------------------------------   Vital Signs:  Temp:  [98 °F (36.7 °C)-100.9 °F (38.3 °C)] 99.3 °F (37.4 °C)  Heart Rate:  [] 103  Resp:  [18-20] 20  BP: (115-150)/(40-73) 142/73  No data found.  SpO2 Percentage    02/03/19 0740 02/03/19 1259 02/03/19 1308   SpO2: 97% 97% 98%     SpO2:  [95 %-98 %] 98 %  on  Flow (L/min):  [3] 3;   Device (Oxygen Therapy): nasal cannula    Body mass index is 30.25 kg/m².  Wt Readings from Last 3 Encounters:   02/02/19 75 kg (165 lb 6.4 oz)   01/24/19 77.6 kg (171 lb)   01/15/19 78.5 kg (173 lb)     ---------------------------------------------------------------------------------------------------------------------     Physical Exam:    Constitutional:  Well-developed and well-nourished.  No respiratory distress. Confused.      HENT:  Head: Normocephalic and atraumatic.  Mouth:  Moist mucous  membranes.    Eyes:  Conjunctivae and EOM are normal.  No scleral icterus.  Neck:  Neck supple.  No JVD present.    Cardiovascular:  Normal rate, regular rhythm and normal heart sounds with no murmur. No edema.  Pulmonary/Chest: Scattered rhonchi throughout.  Abdominal:  Soft.  Bowel sounds are normal.  No distension and no tenderness.   Musculoskeletal:  No edema, no tenderness, and no deformity.  No swelling or redness of joints.  Neurological:   Confused.     Skin:  Skin is warm and dry.  No rash noted.  No pallor.   Psychiatric:  Normal mood and affect.  Behavior is normal.    ---------------------------------------------------------------------------------------------------------------------    Results from last 7 days   Lab Units 02/03/19  0453   TROPONIN I ng/mL 0.047*         Results from last 7 days   Lab Units 02/02/19  1401   PH, ARTERIAL pH units 7.472*   PO2 ART mm Hg 76.9*   PCO2, ARTERIAL mm Hg 25.8*   HCO3 ART mmol/L 18.4*     Results from last 7 days   Lab Units 02/03/19  1118 02/03/19  0454 02/02/19  1421 02/02/19  0652 02/02/19  0216   CRP mg/dL  --  27.36*  --   --   --    LACTATE mmol/L 1.0  --  2.1* 3.7* 2.6*   WBC 10*3/mm3  --  17.42*  --  15.86* 9.70   HEMOGLOBIN g/dL  --  10.3*  --  10.5* 11.7*   HEMATOCRIT %  --  34.4*  --  35.1* 37.5   MCV fL  --  96.1*  --  96.2* 94.5*   MCHC g/dL  --  29.9*  --  29.9* 31.2*   PLATELETS 10*3/mm3  --  326  --  363 397     Results from last 7 days   Lab Units 02/03/19  0453 02/02/19  0652 02/02/19  0216   SODIUM mmol/L 140 136 135   POTASSIUM mmol/L 3.4* 4.2 3.5   MAGNESIUM mg/dL 1.4*  --   --    CHLORIDE mmol/L 114* 112 104   CO2 mmol/L 17.7* 16.2* 20.2*   BUN mg/dL 20 16 18   CREATININE mg/dL 0.86 1.03 1.04   EGFR IF NONAFRICN AM mL/min/1.73 64 52* 51*   CALCIUM mg/dL 7.9 7.8 8.6   GLUCOSE mg/dL 97 130* 166*   ALBUMIN g/dL 3.10* 2.60* 3.40   BILIRUBIN mg/dL 0.6 0.3 0.5   ALK PHOS U/L 160* 154* 208*   AST (SGOT) U/L 34* 41* 53*   ALT (SGPT) U/L 36 33 45*    Estimated Creatinine Clearance: 52 mL/min (by C-G formula based on SCr of 0.86 mg/dL).  No results found for: AMMONIA    No results found for: HGBA1C, POCGLU  Lab Results   Component Value Date    HGBA1C 5.90 (H) 01/15/2019     Lab Results   Component Value Date    TSH 6.470 (H) 01/15/2019    FREET4 0.96 01/17/2019       Blood Culture   Date Value Ref Range Status   02/02/2019 No growth at 24 hours  Preliminary   02/02/2019 No growth at 24 hours  Preliminary                 Pain Management Panel     Pain Management Panel Latest Ref Rng & Units 9/16/2018    AMPHETAMINES SCREEN, URINE Negative Negative    BARBITURATES SCREEN Negative Negative    BENZODIAZEPINE SCREEN, URINE Negative Negative    BUPRENORPHINE Negative Negative    COCAINE SCREEN, URINE Negative Negative    METHADONE SCREEN, URINE Negative Negative        I have personally reviewed the above laboratory results.   ---------------------------------------------------------------------------------------------------------------------  Imaging Results (last 7 days)     Procedure Component Value Units Date/Time    XR Chest 1 View [[id number]] Collected:  02/02/19 0927     Updated:  02/02/19 0930    Narrative:       EXAMINATION:  XR CHEST 1 VW-      CLINICAL INDICATION:     Fever     TECHNIQUE:  XR CHEST 1 VW-       COMPARISON: NONE      FINDINGS:   Coarse interstitial markings suggestive of chronic interstitial lung  disease.   Slightly increased nodularity right lung base may represent some chronic  aspiration.   No pneumothorax.   No pleural effusion.   Bony and soft tissue structures are unremarkable.            Impression:       Slightly increased nodularity right lung base may represent  some chronic aspiration.      This report was finalized on 2/2/2019 9:28 AM by Dr. Giovanni Martin MD.           I have personally reviewed the above radiology results.    ---------------------------------------------------------------------------------------------------------------------      Assessment & Plan        Assessment/Plan       ASSESSMENT:    1. Severe sepsis with lactic acid greater than 2 on admission  2. Aspiration pneumonia    PLAN:    Patient presents with fever. Urinalysis unremarkable. Influenza negative. WBC 17.42. CRP 27.36. Mycoplasma negative. Legionella negative. Lactic acid 2.1 on admission, now normalized. Recent E.Coli bacteremia in 2018. Chest x-ray reveals findings consistent with right sided pneumonia likely from chronic aspiration.     In the setting of chronic aspiration, COPD, and nursing home facility resident, Cefepime was increased to 2gm IV Q12H to continue with Flagyl 500mg IV Q8H. Doxycycline 100mg IV Q12H to allow for COPD and MRSA coverage. Vancomycin was discontinued for now. Will continue to follow closely and adjust antibiotic therapy as needed. CRP in AM.    Current Antimicrobials:  Cefepime 2gm IV Q12H  Doxycycline 100mg IV Q12H  Flagyl 500mg IV Q8H    Code Status:   Code Status and Medical Interventions:   Ordered at: 19 0413     Limited Support to NOT Include:    Intubation     Code Status:    No CPR     Medical Interventions (Level of Support Prior to Arrest):    Limited           AMELIE Reis  19  3:34 PM    Electronically signed by Juan Cardoza MD at 2019  4:18 AM       Nutrition Notes (most recent note)     No notes of this type exist for this encounter.           Physical Therapy Notes (most recent note)      Komal Perez, PT at 2019  3:17 PM  Version 1 of 1         Acute Care - Physical Therapy Initial Evaluation/Treatment Note  TIO [facility]     Patient Name: Jeanna Flower  : 1940  MRN: [id number]  Today's Date: 2019   Onset of Illness/Injury or Date of Surgery: 19(admit date)  Date of Referral to PT: 19  Referring Physician: Nik       Admit Date: 2019    Visit  Dx:     ICD-10-CM ICD-9-CM   1. Pneumonia of right middle lobe due to infectious organism (CMS/[facility]) J18.1 486   2. Sepsis, due to unspecified organism (CMS/[facility]) A41.9 038.9     995.91     Patient Active Problem List   Diagnosis   • Paroxysmal atrial fibrillation (CMS/[facility])   • Essential hypertension   • Type 2 diabetes mellitus without complication (CMS/[facility])   • CVA (cerebral vascular accident) with left hemiparesis.   • Other sleep apnea   • Severe sepsis (CMS/[facility])   • MSSA bacteremia   • E. coli UTI   • Acute pancreatitis   • Pneumonia of right middle lobe due to infectious organism (CMS/[facility])   • Aspiration pneumonia (CMS/[facility])     Past Medical History:   Diagnosis Date   • Anemia    • Closed wedge compression fracture of first lumbar vertebra (CMS/[facility])    • COPD (chronic obstructive pulmonary disease) (CMS/[facility])    • CVA (cerebral vascular accident) with left hemiparesis. 12/6/2017   • Dementia    • Diabetes mellitus (CMS/[facility])    • Dysarthria following cerebral infarction    • Dysphagia, oropharyngeal phase    • Dysphagia, pharyngoesophageal phase    • GERD (gastroesophageal reflux disease)    • Heart disease    • Hyperlipidemia    • Hypertension    • Hypothyroidism    • Insomnia    • Obesity    • Other sleep apnea 11/13/2018   • Paroxysmal atrial fibrillation (CMS/[facility]) 12/6/2017     Past Surgical History:   Procedure Laterality Date   • APPENDECTOMY     • BLADDER SURGERY     • CARDIAC CATHETERIZATION     • CHOLECYSTECTOMY          PT ASSESSMENT (last 12 hours)      Physical Therapy Evaluation     Row Name 02/05/19 1503          PT Evaluation Time/Intention    Subjective Information  no complaints  -CT     Document Type  evaluation;therapy note (daily note)  -CT     Mode of Treatment  individual therapy;physical therapy  -CT     Patient Effort  adequate  -CT     Comment  Pt tolerated evaluation and treatment session well with rest breaks provided as needed. Pt is a nursing home resident and is not mobile. Pt sat EOB  with physical therapy today.   -CT     Row Name 02/05/19 1503          General Information    Patient Profile Reviewed?  yes  -CT     Onset of Illness/Injury or Date of Surgery  02/02/19 admit date  -CT     Referring Physician  Nik   -CT     Patient Observations  cooperative;agree to therapy  -CT     Prior Level of Function  max assist:;dependent:;all household mobility;community mobility  -CT     Equipment Currently Used at Home  bath bench;hospital bed;walker, rolling facility resident  -CT     Existing Precautions/Restrictions  fall  -CT     Limitations/Impairments  safety/cognitive  -CT     Risks Reviewed  patient:;LOB;nausea/vomiting;dizziness;increased discomfort;change in vital signs;increased drainage;lines disloged  -CT     Benefits Reviewed  patient:;improve function;increase independence;increase strength;increase balance;decrease pain;decrease risk of DVT;improve skin integrity;increase knowledge  -CT     Barriers to Rehab  previous functional deficit;cognitive status;physical barrier  -CT     Row Name 02/05/19 1503          Relationship/Environment    Primary Source of Support/Comfort  child(corrina)  -CT     Lives With  facility resident  -CT     Row Name 02/05/19 1503          Resource/Environmental Concerns    Current Living Arrangements  extended care facility  -CT     Row Name 02/05/19 1503          Cognitive Assessment/Intervention- PT/OT    Orientation Status (Cognition)  oriented to;person  -CT     Follows Commands (Cognition)  follows one step commands;verbal cues/prompting required;repetition of directions required;initiation impaired;physical/tactile prompts required;increased processing time needed;delayed response/completion  -CT     Safety Deficit (Cognitive)  ability to follow commands;awareness of need for assistance;safety precautions awareness;safety precautions follow-through/compliance  -CT     Row Name 02/05/19 1503          Safety Issues, Functional Mobility    Impairments  Affecting Function (Mobility)  strength  -CT     Row Name 02/05/19 1503          Bed Mobility Assessment/Treatment    Bed Mobility Assessment/Treatment  bed mobility (all) activities;rolling left;rolling right;scooting/bridging;supine-sit;sit-supine  -CT     [place] Level (Bed Mobility)  maximum assist (25% patient effort);2 person assist;verbal cues;nonverbal cues (demo/gesture)  -CT     Rolling Left [place] (Bed Mobility)  maximum assist (25% patient effort);2 person assist;verbal cues;nonverbal cues (demo/gesture)  -CT     Rolling Right [place] (Bed Mobility)  maximum assist (25% patient effort);2 person assist;verbal cues;nonverbal cues (demo/gesture)  -CT     Scooting/Bridging [place] (Bed Mobility)  maximum assist (25% patient effort);dependent (less than 25% patient effort);2 person assist;verbal cues;nonverbal cues (demo/gesture)  -CT     Supine-Sit [place] (Bed Mobility)  maximum assist (25% patient effort);dependent (less than 25% patient effort);2 person assist;verbal cues;nonverbal cues (demo/gesture)  -CT     Sit-Supine [place] (Bed Mobility)  maximum assist (25% patient effort);dependent (less than 25% patient effort);2 person assist;verbal cues;nonverbal cues (demo/gesture)  -CT     Bed Mobility, Safety Issues  decreased use of arms for pushing/pulling;decreased use of legs for bridging/pushing  -CT     Assistive Device (Bed Mobility)  bed rails;draw sheet  -CT     Row Name 02/05/19 1503          Transfer Assessment/Treatment    Comment (Transfers)  unable to perform sit to stand   -CT     Row Name 02/05/19 1503          Gait/Stairs Assessment/Training    Comment (Gait/Stairs)  not appropriate to assess  -CT     Row Name 02/05/19 1503          General ROM    GENERAL ROM COMMENTS  BLE AROM 50%, BLE PROM WFL  -CT     Row Name 02/05/19 1503          MMT (Manual Muscle Testing)    General MMT Comments  not tested  -CT     Row Name 02/05/19 1503          Pain Assessment     "Additional Documentation  -- no pain reported during treatment  -CT     Row Name             [REMOVED] Wound 02/04/19 0100 Right anterior;lower arm extravasation    Wound - Properties Group Date first assessed: 02/04/19  -CM Time first assessed: 0100  -CM Present On Admission : no;picture taken  -CM Side: Right  -CM Orientation: anterior;lower  -CM Location: arm  -CM Type: extravasation  -CM Resolution Date: 02/05/19  - Resolution Time: 1007  -MH Wound Outcome: Healed  -MH    Row Name 02/05/19 1503          Plan of Care Review    Plan of Care Reviewed With  patient  -CT     Row Name 02/05/19 1503          Physical Therapy Clinical Impression    Date of Referral to PT  02/04/19  -CT     PT Diagnosis (PT Clinical Impression)  impaired functional mobility  -CT     Prognosis (PT Clinical Impression)  fair  -CT     Functional Level at Time of Evaluation (PT Clinical Impression)  Max/Dep x 2  -CT     Patient/Family Goals Statement (PT Clinical Impression)  Pt goals are to \"get better\"  -CT     Criteria for Skilled Interventions Met (PT Clinical Impression)  yes;treatment indicated  -CT     Pathology/Pathophysiology Noted (Describe Specifically for Each System)  musculoskeletal;neuromuscular  -CT     Impairments Found (describe specific impairments)  aerobic capacity/endurance;gait, locomotion, and balance  -CT     Functional Limitations in Following Categories (Describe Specific Limitations)  self-care;home management  -CT     Rehab Potential (PT Clinical Summary)  fair, will monitor progress closely  -CT     Predicted Duration of Therapy (PT)  length of stay  -CT     Care Plan Review (PT)  evaluation/treatment results reviewed;care plan/treatment goals reviewed;risks/benefits reviewed;current/potential barriers reviewed;patient/other agree to care plan  -CT     Row Name 02/05/19 1502          Physical Therapy Goals    Bed Mobility Goal Selection (PT)  bed mobility, PT goal 1  -CT     Transfer Goal Selection (PT)  " transfer, PT goal 1  -CT     Row Name 02/05/19 1503          Bed Mobility Goal 1 (PT)    Activity/Assistive Device (Bed Mobility Goal 1, PT)  bed mobility activities, all  -CT     [place]reek Level/Cues Needed (Bed Mobility Goal 1, PT)  moderate assist (50-74% patient effort);2 person assist  -CT     Time Frame (Bed Mobility Goal 1, PT)  by discharge  -CT     Row Name 02/05/19 1503          Transfer Goal 1 (PT)    Activity/Assistive Device (Transfer Goal 1, PT)  sit-to-stand/stand-to-sit;bed-to-chair/chair-to-bed  -CT     [place]reek Level/Cues Needed (Transfer Goal 1, PT)  maximum assist (25-49% patient effort);2 person assist  -CT     Time Frame (Transfer Goal 1, PT)  by discharge  -CT     Row Name 02/05/19 1503          Positioning and Restraints    Pre-Treatment Position  in bed  -CT     Post Treatment Position  bed  -CT     In Bed  side lying left;call light within reach;encouraged to call for assist;exit alarm on;with family/caregiver;side rails up x3;notified nsg  -CT     Row Name 02/05/19 1506          Living Environment    Home Accessibility  wheelchair accessible  -CT       User Key  (r) = Recorded By, (t) = Taken By, (c) = Cosigned By    Initials Name Provider Type    CT Komal Perez PT Physical Therapist    Ele Poon, RN Registered Nurse    Sherita Garrido RN Registered Nurse        Physical Therapy Education     Title: PT OT SLP Therapies (In Progress)     Topic: Physical Therapy (In Progress)     Point: Mobility training (In Progress)     Learning Progress Summary           Patient Acceptance, E,TB, NR by CT at 2/5/2019  3:15 PM                   Point: Home exercise program (In Progress)     Learning Progress Summary           Patient Acceptance, E,TB, NR by CT at 2/5/2019  3:15 PM                   Point: Body mechanics (In Progress)     Learning Progress Summary           Patient Acceptance, E,TB, NR by CT at 2/5/2019  3:15 PM                   Point: Precautions (In  Progress)     Learning Progress Summary           Patient Acceptance, E,TB, NR by CT at 2/5/2019  3:15 PM                               User Key     Initials Effective Dates Name Provider Type Discipline    CT 04/03/18 -  Komal Perez, PT Physical Therapist PT              PT Recommendation and Plan  Anticipated Discharge Disposition (PT): skilled nursing facility  Planned Therapy Interventions (PT Eval): balance training, bed mobility training, gait training, home exercise program, manual therapy techniques, motor coordination training, neuromuscular re-education, postural re-education, patient/family education, strengthening, transfer training  Therapy Frequency (PT Clinical Impression): 3 times/wk  Outcome Summary/Treatment Plan (PT)  Anticipated Equipment Needs at Discharge (PT): (tbd)  Anticipated Discharge Disposition (PT): skilled nursing facility  Plan of Care Reviewed With: patient  Outcome Measures     Row Name 02/05/19 1500 02/05/19 0931          How much help from another person do you currently need...    Turning from your back to your side while in flat bed without using bedrails?  2  -CT  --     Moving from lying on back to sitting on the side of a flat bed without bedrails?  2  -CT  --     Moving to and from a bed to a chair (including a wheelchair)?  1  -CT  --     Standing up from a chair using your arms (e.g., wheelchair, bedside chair)?  1  -CT  --     Climbing 3-5 steps with a railing?  1  -CT  --     To walk in hospital room?  1  -CT  --     AM-PAC 6 Clicks Score  8  -CT  --        How much help from another is currently needed...    Putting on and taking off regular lower body clothing?  --  1  -KH     Bathing (including washing, rinsing, and drying)  --  1  -KH     Toileting (which includes using toilet bed pan or urinal)  --  1  -KH     Putting on and taking off regular upper body clothing  --  2  -KH     Taking care of personal grooming (such as brushing teeth)  --  2  -KH     Eating  meals  --  2  -KH     Score  --  9  -KH        Functional Assessment    Outcome Measure Options  AM-PAC 6 Clicks Basic Mobility (PT)  -CT  AM-PAC 6 Clicks Daily Activity (OT)  -KH       User Key  (r) = Recorded By, (t) = Taken By, (c) = Cosigned By    Initials Name Provider Type    CT Komal Perez, PT Physical Therapist    Ana Lilia Barreto, OT Occupational Therapist         Time Calculation:   PT Charges     Row Name 19 1516             Time Calculation    PT Received On  19  -CT      PT - Next Appointment  19  -CT      PT Goal Re-Cert Due Date  19  -CT         Time Calculation- PT    Total Timed Code Minutes- PT  57 minute(s)  -CT         Timed Charges    20197 - PT Therapeutic Activity Minutes  23  -CT        User Key  (r) = Recorded By, (t) = Taken By, (c) = Cosigned By    Initials Name Provider Type    CT Komal Perez, PT Physical Therapist          Therapy Charges for Today     Code Description Service Date Service Provider Modifiers Qty    61481810121 HC PT EVAL HIGH COMPLEXITY 2 2019 Komal Perez, PT GP 1    89341792450 HC PT THERAPEUTIC ACT EA 15 MIN 2019 Komal Perez, PT GP 2    08385571683 HC PT THER SUPP EA 15 MIN 2019 Komal Perez, PT GP 4          PT G-Codes  Outcome Measure Options: AM-PAC 6 Clicks Basic Mobility (PT)  AM-PAC 6 Clicks Score: 8  Score: 9      Komal Perez PT  2019         Electronically signed by Komal Perez PT at 2019  3:18 PM          Occupational Therapy Notes (most recent note)      Ana Lilia Delacruz, OT at 2019  9:32 AM          Acute Care - Occupational Therapy Initial Evaluation   Tom     Patient Name: Jeanna Flower  : 1940  MRN: [id number]  Today's Date: 2019  Onset of Illness/Injury or Date of Surgery: 19(Admit Date)     Referring Physician: TAE Michelle    Admit Date: 2019       ICD-10-CM ICD-9-CM   1. Pneumonia of right middle lobe due to infectious  organism (CMS/HCC) J18.1 486   2. 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   • Other sleep apnea   • Severe sepsis (CMS/[facility])   • MSSA bacteremia   • E. coli UTI   • Acute pancreatitis   • Pneumonia of right middle lobe due to infectious organism (CMS/[facility])   • Aspiration pneumonia (CMS/[facility])     Past Medical History:   Diagnosis Date   • Anemia    • Closed wedge compression fracture of first lumbar vertebra (CMS/[facility])    • COPD (chronic obstructive pulmonary disease) (CMS/[facility])    • CVA (cerebral vascular accident) with left hemiparesis. 12/6/2017   • Dementia    • Diabetes mellitus (CMS/[facility])    • Dysarthria following cerebral infarction    • Dysphagia, oropharyngeal phase    • Dysphagia, pharyngoesophageal phase    • GERD (gastroesophageal reflux disease)    • Heart disease    • Hyperlipidemia    • Hypertension    • Hypothyroidism    • Insomnia    • Obesity    • Other sleep apnea 11/13/2018   • Paroxysmal atrial fibrillation (CMS/[facility]) 12/6/2017     Past Surgical History:   Procedure Laterality Date   • APPENDECTOMY     • BLADDER SURGERY     • CARDIAC CATHETERIZATION     • CHOLECYSTECTOMY            OT ASSESSMENT FLOWSHEET (last 72 hours)      Occupational Therapy Evaluation     Row Name 02/05/19 0847                   OT Evaluation Time/Intention    Subjective Information  no complaints  -        Document Type  evaluation  -        Mode of Treatment  occupational therapy;individual therapy  -        Patient Effort  adequate  -        Comment  Nursing and pt agreeable to OT eval; Pt is poor historian; Per chart review/nursing, pt is believed to be at baseline at this time. Per case management, pt will likely discharge to extended care facility and will  have necessary assistance. Thank you.    -           General Information    Patient Profile Reviewed?  yes  -        Onset of Illness/Injury or Date of Surgery  02/02/19 Admit Date  -        Referring Physician  TAE Michelle  -        Patient Observations  cooperative;agree to therapy  -        General Observations of Patient  Pt supine and awake w/ sitter present; Agreeable to OT eval  -        Prior Level of Function  max assist:;dependent:;ADL's per nursing report;   -KH        Equipment Currently Used at Home  bath bench;hospital bed;walker, rolling pt was facility resident  -        Pertinent History of Current Functional Problem  Pt presnts w/ PMHx of dementia, CVA, COPD, HTN, and dysphagia  -        Existing Precautions/Restrictions  fall Aspiration Precautions  -        Limitations/Impairments  safety/cognitive  -        Equipment Ordered for Patient  -- TBD  -        Risks Reviewed  patient:;LOB;nausea/vomiting;dizziness;increased discomfort;change in vital signs;increased drainage;lines disloged  -        Benefits Reviewed  patient:;improve function;increase independence;increase strength;increase balance;decrease pain;decrease risk of DVT;improve skin integrity;increase knowledge  -        Barriers to Rehab  previous functional deficit;cognitive status;physical barrier  -KH           Relationship/Environment    Primary Source of Support/Comfort  child(corrina)  -        Name of Support/Comfort Primary Source  Daughter: Alissa PORTILLO        Lives With  facility resident  -           Resource/Environmental Concerns    Current Living Arrangements  extended care facility  -           Cognitive Assessment/Intervention- PT/OT    Orientation Status (Cognition)  oriented to;person  -KH        Follows Commands (Cognition)  follows one step commands;verbal cues/prompting required;repetition of directions required;initiation impaired;physical/tactile prompts required;increased  processing time needed;delayed response/completion  -        Safety Deficit (Cognitive)  ability to follow commands;awareness of need for assistance;insight into deficits/self awareness;safety precautions awareness;safety precautions follow-through/compliance  -           ADL Assessment/Intervention    BADL Assessment/Intervention  bathing;upper body dressing;lower body dressing;grooming;feeding;toileting  -           Bathing Assessment/Intervention    Bathing [place] Level  bathing skills  -        Comment (Bathing)  Total [facility]           Upper Body Dressing Assessment/Training    Upper Body Dressing [place] Level  upper body dressing skills  -        Comment (Upper Body Dressing)  Max A  [facility]           Lower Body Dressing Assessment/Training    Lower Body Dressing [place] Level  lower body dressing skills  -        Comment (Lower Body Dressing)  Total [facility]           Grooming Assessment/Training    [place] Level (Grooming)  grooming skills  -        Comment (Grooming)  Max [facility]           Self-Feeding Assessment/Training    [place] Level (Feeding)  feeding skills  -        Comment (Feeding)  Max A/Total A  [facility]           Toileting Assessment/Training    [place] Level (Toileting)  toileting skills  -        Comment (Toileting)  Total A  [facility]           General ROM    GENERAL ROM COMMENTS  RUE WFL; LUE Impaired due to PMHx of CVA; LUE Shoulder PROM ~90, Elbow Flexion WFL, Extension Impaired, Wrist/Hand Impaired   -           MMT (Manual Muscle Testing)    General MMT Comments  RUE 2+/5; LUE Not tested at this  time  -           Positioning and Restraints    Pre-Treatment Position  in bed  -        Post Treatment Position  bed  -        In Bed  notified nsg;supine;call light within reach;encouraged to call for assist;side rails up x3;with other staff;exit alarm on  -           Wound 02/04/19 0100 Right anterior;lower arm extravasation    Wound - Properties  Group Date first assessed: 02/04/19  -CM Time first assessed: 0100  -CM Present On Admission : no;picture taken  -CM Side: Right  -CM Orientation: anterior;lower  -CM Location: arm  -CM Type: extravasation  -CM       Plan of Care Review    Plan of Care Reviewed With  patient  -KH           Clinical Impression (OT)    OT Diagnosis  Generalized Weakness  -        Criteria for Skilled Therapeutic Interventions Met (OT Eval)  current level of function same as previous level of function  -        Therapy Frequency (OT Eval)  evaluation only  -        Anticipated Equipment Needs at Discharge (OT)  -- TBD  -KH        Anticipated Discharge Disposition (OT)  extended care facility  -           Living Environment    Home Accessibility  wheelchair accessible  -          User Key  (r) = Recorded By, (t) = Taken By, (c) = Cosigned By    Initials Name Effective Dates    Ele Poon RN 06/20/16 -     Ana Lilia Barreto, OT 04/17/18 -                OT Recommendation and Plan  Outcome Summary/Treatment Plan (OT)  Anticipated Equipment Needs at Discharge (OT): (TBD)  Anticipated Discharge Disposition (OT): extended care facility  Therapy Frequency (OT Eval): evaluation only  Plan of Care Review  Plan of Care Reviewed With: patient  Plan of Care Reviewed With: patient    Outcome Measures     Row Name 02/05/19 0931             How much help from another is currently needed...    Putting on and taking off regular lower body clothing?  1  -KH      Bathing (including washing, rinsing, and drying)  1  -KH      Toileting (which includes using toilet bed pan or urinal)  1  -KH      Putting on and taking off regular upper body clothing  2  -KH      Taking care of personal grooming (such as brushing teeth)  2  -KH      Eating meals  2  -KH      Score  9  -KH         Functional Assessment    Outcome Measure Options  AM-PAC 6 Clicks Daily Activity (OT)  -        User Key  (r) = Recorded By, (t) = Taken By,  (c) = Cosigned By    Initials Name Provider Type    Ana Lilia Barreto OT Occupational Therapist          Time Calculation:   Time Calculation- OT     Row Name 02/05/19 0932             Time Calculation- OT    Total Timed Code Minutes- OT  28 minute(s)  -AWAIS        User Key  (r) = Recorded By, (t) = Taken By, (c) = Cosigned By    Initials Name Provider Type    Ana Lilia Barreto OT Occupational Therapist        Therapy Suggested Charges     Code   Minutes Charges    None           Therapy Charges for Today     Code Description Service Date Service Provider Modifiers Qty    27329603543 HC OT EVAL HIGH COMPLEXITY 2 2/5/2019 Ana Lilia Delacruz OT GO 1               Ana Lilia Delacruz OT  2/5/2019    Electronically signed by Ana Lilia Delacruz OT at 2/5/2019  9:32 AM          Speech Language Pathology Notes (most recent note)      Ivanna Kuo MS CCC-SLP at 2/2/2019 10:10 AM        Consultation received, chart reviewed. Ms. Flower is well known to SLP from previous admission to [facility] during the month of January 2019. She is s/p multiple clinical dysphagia assessments, as well as instrumental MBS 1/16/19 to objectively evaluate swallowing fnx.     Ms. Flower,unfortunately, suffers from advanced dementia w/ established fluctuating swallowing safety, resulting in moderate oropharyngeal dysphagia w/ observed silent aspiration of nectar thick and thin liquids during the MBS. Secondary to overall poor oropharyngeal bolus control, inability to clear significant pharyngeal residue, she was felt to be at HIGH RISK for intermittent aspiration w/ all po intake. Specifically in consideration of her advanced dementia, fluctuating a/a status.     MBS results were d/w pt's family and Dr. Livingston at that time. Pt's grandchildren very adamantly declined consideration of alternative method of nutrition/hydration. They were educated on observed aspiration, silent aspiration, risks of aspiration.  "Family continued to decline alternative nutrition. Per their request and in their understanding of risks of aspiration, Ms. Flower was recommended least restrictive po diet of puree and thin liquids, medications crushed in puree. This was her discharge diet.    Per chart review this am, Ms. Flower continues w/ \"No CPR\" code status w/ \"Limited Interventions.\" She is seen at bedside w/ RN present. She continues nonverbal and fluctuating in a/a status. She continues at risk for intermittent aspiration w/ any po consistency.     D/w RNYessenia, pt status, h/o oropharyngeal dysphagia w/ family declinig alternative nutrition, requesting least restrictive po diet w/ understanding of risks. D/w her MD agreement at that time w/ least restrictive po diet puree/thins initiated. D/w her contraindication of repeat exposure to radiation for MBS if we are aware of pt's family requests to defer alternative nutrition and given that her AMS is persistent/progressive dementia w/ potential for aspiration w/ any po intake w/o ability to functionally participate in tx tasks. This is her baseline. RN agrees.     D/w MD deferral of re-evaluation at this time, given known history.     Please continue NPO w/ MD to initiate least restrictive po diet puree/thins at his discretion.     Thank you for allowing me to participating in the care of your patient-  Ivanna Kuo M.S., CCC/SLP    Electronically signed by Ivanna Kuo, MS CCC-SLP at 2/2/2019 10:22 AM       Respiratory Therapy Notes (most recent note)     No notes of this type exist for this encounter.        ADL Documentation (most recent)      Most Recent Value   Presence of Pain  complains of pain/discomfort   Transferring  4 - completely dependent   Toileting  4 - completely dependent   Bathing  4 - completely dependent   Dressing  4 - completely dependent   Eating  2 - assistive person   Communication  2 - difficulty understanding (not related to language barrier)   Swallowing  " 2 - difficulty swallowing liquids/foods   Equipment Currently Used at Home  none               Discharge Summary      Radha Zaragoza PA at 2019 12:27 PM              [facility] HOSPITALIST DISCHARGE SUMMARY    Patient Identification:  Name:  Jeanna Flower  Age:  78 y.o.  Sex:  female  :  1940  MRN:  [id number]  Visit Number:  [id number]    Date of Admission: 2019  Date of Discharge: 2019    PCP: Raul Robert MD    Discharging Provider: TAE Parker    Discharge Diagnoses     Discharge Diagnoses:  **    Secondary Diagnoses:  **    Consults/Procedures     Consults:   Consults     Date and Time Order Name Status Description    2/3/2019 1005 Inpatient Infectious Diseases Consult Completed     2019 0409 Hospitalist (on-call MD unless specified)      2019 1053 Inpatient Infectious Diseases Consult Completed     2019 0030 Inpatient Infectious Diseases Consult Completed     2019 0614 Inpatient Palliative Care MD Consult Completed         Procedures Performed:         History of Presenting Illness     Patient is a 78 y.o. female [facility] of **.  Please see the admitting history and physical for further details.      Hospital Course     Patient was admitted to the **     Omnicef and Flagyl x 10 days. Discussed with MAGGI Do over the the phone and she would like Ms. Flower to be discharged back to the nursing home under hospice care.     Discharge Vitals/Physical Examination     Vital Signs:  Temp:  [97.5 °F (36.4 °C)-98.9 °F (37.2 °C)] 98.4 °F (36.9 °C)  Heart Rate:  [80-98] 89  Resp:  [18-28] 28  BP: (124-177)/(58-87) 124/62  No data found.  SpO2 Percentage    19 0644 19 0655 19 1102   SpO2: 98% 99% 98%     SpO2:  [95 %-99 %] 98 %  on  Flow (L/min):  [3] 3;   Device (Oxygen Therapy): nasal cannula    Body mass index is 30.25 kg/m².  Wt Readings from Last 3 Encounters:   19 75 kg (165 lb 6.4  oz)   01/24/19 77.6 kg (171 lb)   01/15/19 78.5 kg (173 lb)       Physical Exam:  Physical Exam   Constitutional: She appears well-developed and well-nourished. No distress.   HENT:   Head: Normocephalic and atraumatic.   Eyes: Conjunctivae are normal. Right eye exhibits no discharge. Left eye exhibits no discharge. No scleral icterus.   Neck: Normal range of motion. Neck supple. No JVD present. Carotid bruit is not present.   Cardiovascular: Normal rate, regular rhythm and normal heart sounds. Exam reveals no gallop and no friction rub.   No murmur heard.  Pulmonary/Chest: Effort normal. No respiratory distress. She has no wheezes. She has rales (Few rales right base. ). She exhibits no tenderness.   Abdominal: Soft. Bowel sounds are normal. She exhibits no distension and no mass. There is no tenderness. There is no rebound and no guarding. No hernia.   Musculoskeletal: Normal range of motion. She exhibits no edema.   Neurological: She is alert.   Alert to person and place. Confused to time.    Skin: Skin is warm and dry. No rash noted. She is not diaphoretic. No erythema. No pallor.   Psychiatric: She has a normal mood and affect. Her behavior is normal.   Nursing note and vitals reviewed.      Pertinent Laboratory/Radiology Results     Pertinent Laboratory Results:  Results from last 7 days   Lab Units 02/05/19  0051 02/04/19  1954 02/03/19  1653   TROPONIN I ng/mL 0.034 0.017 0.045*         Results from last 7 days   Lab Units 02/02/19  1401   PH, ARTERIAL pH units 7.472*   PO2 ART mm Hg 76.9*   PCO2, ARTERIAL mm Hg 25.8*   HCO3 ART mmol/L 18.4*     Results from last 7 days   Lab Units 02/06/19  0656 02/05/19  1842 02/05/19  0051 02/04/19  0451 02/03/19  1118  02/02/19  1421 02/02/19  0652   CRP mg/dL 15.17*  --  15.65* 23.90*  --    < >  --   --    LACTATE mmol/L  --   --   --   --  1.0  --  2.1* 3.7*   WBC 10*3/mm3 10.83 12.01 11.90 14.67*  --    < >  --  15.86*   HEMOGLOBIN g/dL 9.4* 10.2* 8.7* 9.1*  --    <  >  --  10.5*   HEMATOCRIT % 30.7* 32.0* 29.4* 30.6*  --    < >  --  35.1*   MCV fL 93.6 90.7 98.0* 96.5*  --    < >  --  96.2*   MCHC g/dL 30.6* 31.9* 29.6* 29.7*  --    < >  --  29.9*   PLATELETS 10*3/mm3 373 351 315 339  --    < >  --  363    < > = values in this interval not displayed.     Results from last 7 days   Lab Units 02/06/19  0656 02/05/19  0051 02/04/19  0451   SODIUM mmol/L 133* 135 136   POTASSIUM mmol/L 3.6 3.5 4.1   MAGNESIUM mg/dL 2.1 1.5* 2.2   CHLORIDE mmol/L 109 111 114*   CO2 mmol/L 16.5* 16.8* 16.2*   BUN mg/dL <5* 9 12   CREATININE mg/dL 0.63 0.67 0.69   EGFR IF NONAFRICN AM mL/min/1.73 92 85 82   CALCIUM mg/dL 7.8 7.7 8.0   GLUCOSE mg/dL 105 112* 105   ALBUMIN g/dL 2.40* 2.40* 2.50*   BILIRUBIN mg/dL 0.5 0.4 0.4   ALK PHOS U/L 99 106* 122*   AST (SGOT) U/L 16 21 17   ALT (SGPT) U/L 10 17 15   Estimated Creatinine Clearance: 55.9 mL/min (by C-G formula based on SCr of 0.63 mg/dL).      Lab Results   Component Value Date    HGBA1C 5.90 (H) 01/15/2019     Lab Results   Component Value Date    TSH 6.470 (H) 01/15/2019    FREET4 0.96 01/17/2019       Blood Culture   Date Value Ref Range Status   02/02/2019 No growth at 4 days  Preliminary   02/02/2019 No growth at 4 days  Preliminary        Pain Management Panel     Pain Management Panel Latest Ref Rng & Units 9/16/2018    AMPHETAMINES SCREEN, URINE Negative Negative    BARBITURATES SCREEN Negative Negative    BENZODIAZEPINE SCREEN, URINE Negative Negative    BUPRENORPHINE Negative Negative    COCAINE SCREEN, URINE Negative Negative    METHADONE SCREEN, URINE Negative Negative          Pertinent Radiology Results:  Imaging Results (all)     Procedure Component Value Units Date/Time    XR Elbow 3+ View Left [[id number]] Collected:  02/03/19 1853     Updated:  02/03/19 1855    Narrative:       EXAMINATION: XR ELBOW 3+ VW LEFT-      CLINICAL INDICATION: Fall.; J18.1-Lobar pneumonia, unspecified organism;  A41.9-Sepsis, unspecified organism           COMPARISON: None immediately available     FINDINGS: 3 views of the left elbow show no acute fracture or  dislocation. There are no blastic or lytic lesions       Impression:       No acute bony abnormality      This report was finalized on 2/3/2019 6:53 PM by Dr. Ulysses Rojas MD.       XR Shoulder 2+ View Left [[id number]] Collected:  02/03/19 1852     Updated:  02/03/19 1855    Narrative:       EXAMINATION: XR SHOULDER 2+ VW LEFT-      CLINICAL INDICATION: Fall.; J18.1-Lobar pneumonia, unspecified organism;  A41.9-Sepsis, unspecified organism          COMPARISON: None available     FINDINGS: 2 views of the left shoulder reveal arthritic change, but no  acute fracture or dislocation.       Impression:       No acute fracture      This report was finalized on 2/3/2019 6:53 PM by Dr. Ulysses Rojas MD.       XR Chest 1 View [[id number]] Collected:  02/02/19 0927     Updated:  02/02/19 0930    Narrative:       EXAMINATION:  XR CHEST 1 VW-      CLINICAL INDICATION:     Fever     TECHNIQUE:  XR CHEST 1 VW-       COMPARISON: NONE      FINDINGS:   Coarse interstitial markings suggestive of chronic interstitial lung  disease.   Slightly increased nodularity right lung base may represent some chronic  aspiration.   No pneumothorax.   No pleural effusion.   Bony and soft tissue structures are unremarkable.            Impression:       Slightly increased nodularity right lung base may represent  some chronic aspiration.      This report was finalized on 2/2/2019 9:28 AM by Dr. Giovanni Martin MD.             Test Results Pending at Discharge:   Order Current Status    Blood Culture - Blood, Arm, Right Preliminary result    Blood Culture - Blood, Arm, Right Preliminary result          Discharge Disposition/Discharge Medications/Discharge Appointments     Discharge Disposition:   Skilled Nursing Facility (DC - External) with hospice care.     Condition at Discharge:  Stable     Discharge Diet:  Diet Instructions     Diet:  Dysphagia; Thin Liquids, No Restrictions; Pureed      Discharge Diet:  Dysphagia    Fluid Consistency:  Thin Liquids, No Restrictions    Pureed Options:  Pureed    Aspiration precautions.          Discharge Activity:  Activity Instructions     Discharge Activity Restrictions      Bedrest with fall precautions. Continue PT/OT.          Code Status While Inpatient:  Code Status and Medical Interventions:   Ordered at: 02/02/19 0413     Limited Support to NOT Include:    Intubation     Code Status:    No CPR     Medical Interventions (Level of Support Prior to Arrest):    Limited       Discharge Medications:     Discharge Medications      New Medications      Instructions Start Date   cefdinir 300 MG capsule  Commonly known as:  OMNICEF   300 mg, Oral, Every 12 Hours Scheduled      ipratropium-albuterol 0.5-2.5 mg/3 ml nebulizer  Commonly known as:  DUO-NEB   3 mL, Nebulization, Every 4 Hours PRN      metroNIDAZOLE 500 MG tablet  Commonly known as:  FLAGYL   500 mg, Oral, Every 8 Hours Scheduled         Continue These Medications      Instructions Start Date   ACIDOPHILUS/CITRUS PECTIN tablet tablet   1 tablet, Oral, 3 Times Daily      ADVAIR DISKUS 250-50 MCG/DOSE DISKUS  Generic drug:  fluticasone-salmeterol   1 puff, Inhalation, 2 Times Daily - RT      albuterol sulfate  (90 Base) MCG/ACT inhaler  Commonly known as:  PROVENTIL HFA;VENTOLIN HFA;PROAIR HFA   2 puffs, Inhalation, Every 4 Hours PRN      apixaban 5 MG tablet tablet  Commonly known as:  ELIQUIS   5 mg, Oral, Every 12 Hours Scheduled      budesonide 0.5 MG/2ML nebulizer solution  Commonly known as:  PULMICORT   0.5 mg, Nebulization, Every 12 Hours      cycloSPORINE 0.05 % ophthalmic emulsion  Commonly known as:  RESTASIS   1 drop, Both Eyes, 2 Times Daily      docusate sodium 100 MG capsule  Commonly known as:  COLACE   100 mg, Oral, 2 Times Daily      famotidine 20 MG tablet  Commonly known as:  PEPCID   20 mg, Oral, 2 Times Daily      fluticasone  50 MCG/ACT nasal spray  Commonly known as:  FLONASE   2 sprays, Nasal, Daily      HYDROcodone-acetaminophen 5-325 MG per tablet  Commonly known as:  NORCO   1 tablet, Oral, Every 6 Hours PRN      levothyroxine 100 MCG tablet  Commonly known as:  SYNTHROID, LEVOTHROID   100 mcg, Oral, Daily      montelukast 10 MG tablet  Commonly known as:  SINGULAIR   10 mg, Oral, Nightly      QUEtiapine 25 MG tablet  Commonly known as:  SEROquel   25 mg, Oral, Nightly      QUEtiapine 25 MG tablet  Commonly known as:  SEROquel   25 mg, Oral, 2 Times Daily PRN      SENNA-LAX 8.6 MG tablet  Generic drug:  senna   1 tablet, Oral, Daily      venlafaxine 37.5 MG tablet  Commonly known as:  EFFEXOR   37.5 mg, Oral, 2 Times Daily           Discharge Appointments:  Your Scheduled Appointments    Feb 28, 2019  3:00 PM EST  Follow Up with Ta Garcia PA-C  [facility] CARDIOLOGY (--) [address] [place] [id number] [phone]   Arrive 15 minutes prior to appointment.          Additional Instructions for the Follow-ups that You Need to Schedule     Ambulatory Referral to Home Hospice   As directed      Please evaluate Jeanna Flower for admission to Hospice.    Patient/Family aware of referral: Yes, discussed with MAGGI Do.     Services to provide: Strengthening Exercises and Evaluate    Physician to follow patient's care (PCP, nursing home provider)    Referring Provider Call-back Phone Number: [phone].    Requested Start of Care Date: 02/06/19    Special Instructions: None.    Order Comments:  Please evaluate Jeanna Flower for admission to Hospice. Patient/Family aware of referral: Yes, discussed with MAGGI Do. Services to provide: Strengthening Exercises and Evaluate Physician to follow patient's care (PCP, nursing home provider) Referring Provider Call-back Phone Number: [phone]. Requested Start of Care Date: 02/06/19 Special Instructions: None.          Call MD With Problems / Concerns   As  directed      Instructions: Contact PCP or return to ED with concerns or recurrent symptoms.    Order Comments:  Instructions: Contact PCP or return to ED with concerns or recurrent symptoms.          Discharge Follow-up with PCP   As directed       Currently Documented PCP:    Raul Robert MD    PCP Phone Number:    [phone]     Follow Up Details:  Nursing home PCP.           Follow-up Information     Raul Robert MD .    Specialty:  Internal Medicine  Why:  Nursing home PCP.  Contact information:  [address] [facility] [place] [address]  [phone]                   Additional Instructions for the Follow-ups that You Need to Schedule     Ambulatory Referral to Home Hospice   As directed      Please evaluate Jeanna Flower for admission to Hospice.    Patient/Family aware of referral: Yes, discussed with MAGGI Do.     Services to provide: Strengthening Exercises and Evaluate    Physician to follow patient's care (PCP, nursing home provider)    Referring Provider Call-back Phone Number: [phone].    Requested Start of Care Date: 02/06/19    Special Instructions: None.    Order Comments:  Please evaluate Jeanna Flower for admission to Hospice. Patient/Family aware of referral: Yes, discussed with MAGGI Do. Services to provide: Strengthening Exercises and Evaluate Physician to follow patient's care (PCP, nursing home provider) Referring Provider Call-back Phone Number: [phone]. Requested Start of Care Date: 02/06/19 Special Instructions: None.          Call MD With Problems / Concerns   As directed      Instructions: Contact PCP or return to ED with concerns or recurrent symptoms.    Order Comments:  Instructions: Contact PCP or return to ED with concerns or recurrent symptoms.          Discharge Follow-up with PCP   As directed       Currently Documented PCP:    Raul Robert MD    PCP Phone Number:    [phone]     Follow Up Details:  Nursing home PCP.             Attestation: I  personally discussed the patient's hospital course, disposition, discharge planning, and discharge medications with attending physician, Chalino Medina, *, prior to time of discharge.     TAE Parker  02/06/19  12:27 PM    Time discharge orders placed: 12:24 PM.     Time to complete discharge: >30 minutes.     Please send a copy of this dictation to the following providers:  Raul Robert MD      Electronically signed by Radha Zaragoza PA at 2/6/2019 12:30 PM       Discharge Order (From admission, onward)    Start     Ordered    02/06/19 1219  Discharge patient  Once     Expected Discharge Date:  02/06/19    Expected Discharge Time:  Afternoon    Discharge Disposition:  Skilled Nursing Facility (DC - External)    Physician of Record for Attribution - Please select from Treatment Team:  CHALINO BARNETT [[id number]]    Review needed by CMO to determine Physician of Record:  No       Question Answer Comment   Physician of Record for Attribution - Please select from Treatment Team CHALINO BARNETT    Review needed by CMO to determine Physician of Record No        02/06/19 1224

## 2019-02-06 NOTE — THERAPY TREATMENT NOTE
Acute Care - Physical Therapy Treatment Note   Tom     Patient Name: Jeanna Flower  : 1940  MRN: [id number]  Today's Date: 2019  Onset of Illness/Injury or Date of Surgery: 19(admit date)  Date of Referral to PT: 19  Referring Physician: Nik     Admit Date: 2019    Visit Dx:    ICD-10-CM ICD-9-CM   1. Pneumonia of right middle lobe due to infectious organism (CMS/HCC) J18.1 486   2. Sepsis, due to unspecified organism (CMS/HCC) A41.9 038.9     995.91   3. Debility R53.81 799.3   4. Severe sepsis (CMS/HCC) A41.9 038.9    R65.20 995.92   5. Paroxysmal atrial fibrillation (CMS/HCC) I48.0 427.31   6. 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   • Other sleep apnea   • Severe sepsis (CMS/HCC)   • MSSA bacteremia   • E. coli UTI   • Acute pancreatitis   • Pneumonia of right middle lobe due to infectious organism (CMS/HCC)   • Aspiration pneumonia (CMS/HCC)   • Debility       Therapy Treatment    Rehabilitation Treatment Summary     Row Name 19 1356             Treatment Time/Intention    Discipline  physical therapist  -CT      Document Type  therapy note (daily note)  -CT      Subjective Information  no complaints  -CT      Mode of Treatment  individual therapy;physical therapy  -CT      Therapy Frequency (PT Clinical Impression)  3 times/wk  -CT      Patient Effort  adequate  -CT      Existing Precautions/Restrictions  fall  -CT      Patient Response to Treatment  Pt tolerated treatment session well with rest breaks provided as needed.   -CT      Recorded by [CT] Komal Perez, PT 19 8954      Row Name 19 6194             Cognitive Assessment/Intervention- PT/OT    Orientation Status (Cognition)  oriented to;person  -CT      Follows Commands  (Cognition)  follows one step commands;verbal cues/prompting required;repetition of directions required;initiation impaired;physical/tactile prompts required;increased processing time needed;delayed response/completion  -CT      Recorded by [CT] ChrisKomal, PT 02/06/19 1358      Row Name 02/06/19 1356             Safety Issues, Functional Mobility    Impairments Affecting Function (Mobility)  strength  -CT      Recorded by [CT] Komal Perez, PT 02/06/19 1358      Row Name 02/06/19 1356             Bed Mobility Assessment/Treatment    Bed Mobility Assessment/Treatment  bed mobility (all) activities;rolling left;rolling right;scooting/bridging;supine-sit;sit-supine  -CT      Creek Level (Bed Mobility)  maximum assist (25% patient effort);2 person assist;verbal cues;nonverbal cues (demo/gesture)  -CT      Rolling Left Creek (Bed Mobility)  maximum assist (25% patient effort);2 person assist;verbal cues;nonverbal cues (demo/gesture)  -CT      Rolling Right Creek (Bed Mobility)  maximum assist (25% patient effort);2 person assist;verbal cues;nonverbal cues (demo/gesture)  -CT      Scooting/Bridging Creek (Bed Mobility)  maximum assist (25% patient effort);dependent (less than 25% patient effort);2 person assist;verbal cues;nonverbal cues (demo/gesture)  -CT      Supine-Sit Creek (Bed Mobility)  maximum assist (25% patient effort);dependent (less than 25% patient effort);2 person assist;verbal cues;nonverbal cues (demo/gesture)  -CT      Sit-Supine Creek (Bed Mobility)  maximum assist (25% patient effort);dependent (less than 25% patient effort);2 person assist;verbal cues;nonverbal cues (demo/gesture)  -CT      Bed Mobility, Safety Issues  decreased use of arms for pushing/pulling;decreased use of legs for bridging/pushing  -CT      Assistive Device (Bed Mobility)  bed rails;draw sheet  -CT      Comment (Bed Mobility)  Pt sat EOB for aprox 20 minutes and performed BLE  ther ex  -CT      Recorded by [CT] Komal Perez, PT 02/06/19 1358      Row Name 02/06/19 1356             Positioning and Restraints    Pre-Treatment Position  in bed  -CT      Post Treatment Position  bed  -CT      In Bed  supine;call light within reach;encouraged to call for assist;with family/caregiver;patient within staff view;with nsg;side rails up x3  -CT      Recorded by [CT] Komal Perez, PT 02/06/19 1358      Row Name 02/06/19 1356             Plan of Care Review    Plan of Care Reviewed With  patient  -CT      Recorded by [CT] ChrisJamesKomal, PT 02/06/19 1358      Row Name 02/06/19 1356             Outcome Summary/Treatment Plan (PT)    Daily Summary of Progress (PT)  progress towards functional goals is fair  -CT      Anticipated Equipment Needs at Discharge (PT)  -- tbd  -CT      Anticipated Discharge Disposition (PT)  skilled nursing facility  -CT      Recorded by [CT] Chris Komal, PT 02/06/19 1358        User Key  (r) = Recorded By, (t) = Taken By, (c) = Cosigned By    Initials Name Effective Dates Discipline    CT Komal Perez, PT 04/03/18 -  PT                   Physical Therapy Education     Title: PT OT SLP Therapies (In Progress)     Topic: Physical Therapy (In Progress)     Point: Mobility training (In Progress)     Learning Progress Summary           Patient Acceptance, E,TB, NR by CT at 2/6/2019  1:58 PM    Acceptance, E,TB, NR by CT at 2/5/2019  3:15 PM                   Point: Home exercise program (In Progress)     Learning Progress Summary           Patient Acceptance, E,TB, NR by CT at 2/6/2019  1:58 PM    Acceptance, E,TB, NR by CT at 2/5/2019  3:15 PM                   Point: Body mechanics (In Progress)     Learning Progress Summary           Patient Acceptance, E,TB, NR by CT at 2/6/2019  1:58 PM    Acceptance, E,TB, NR by CT at 2/5/2019  3:15 PM                   Point: Precautions (In Progress)     Learning Progress Summary           Patient Acceptance, E,TB, NR by CT  at 2/6/2019  1:58 PM    Acceptance, E,TB, NR by CT at 2/5/2019  3:15 PM                               User Key     Initials Effective Dates Name Provider Type Discipline    CT 04/03/18 -  Komal Perez, PT Physical Therapist PT                PT Recommendation and Plan  Anticipated Discharge Disposition (PT): skilled nursing facility  Planned Therapy Interventions (PT Eval): balance training, bed mobility training, gait training, home exercise program, manual therapy techniques, motor coordination training, neuromuscular re-education, postural re-education, patient/family education, strengthening, transfer training  Therapy Frequency (PT Clinical Impression): 3 times/wk  Outcome Summary/Treatment Plan (PT)  Daily Summary of Progress (PT): progress towards functional goals is fair  Anticipated Equipment Needs at Discharge (PT): (tbd)  Anticipated Discharge Disposition (PT): skilled nursing facility  Plan of Care Reviewed With: patient  Progress: no change  Outcome Measures     Row Name 02/06/19 1300 02/05/19 1500 02/05/19 0931       How much help from another person do you currently need...    Turning from your back to your side while in flat bed without using bedrails?  2  -CT  2  -CT  --    Moving from lying on back to sitting on the side of a flat bed without bedrails?  2  -CT  2  -CT  --    Moving to and from a bed to a chair (including a wheelchair)?  1  -CT  1  -CT  --    Standing up from a chair using your arms (e.g., wheelchair, bedside chair)?  1  -CT  1  -CT  --    Climbing 3-5 steps with a railing?  1  -CT  1  -CT  --    To walk in hospital room?  1  -CT  1  -CT  --    AM-PAC 6 Clicks Score  8  -CT  8  -CT  --       How much help from another is currently needed...    Putting on and taking off regular lower body clothing?  --  --  1  -KH    Bathing (including washing, rinsing, and drying)  --  --  1  -KH    Toileting (which includes using toilet bed pan or urinal)  --  --  1  -KH    Putting on and taking  off regular upper body clothing  --  --  2  -KH    Taking care of personal grooming (such as brushing teeth)  --  --  2  -KH    Eating meals  --  --  2  -KH    Score  --  --  9  -KH       Functional Assessment    Outcome Measure Options  AM-PAC 6 Clicks Basic Mobility (PT)  -CT  AM-PAC 6 Clicks Basic Mobility (PT)  -CT  AM-PAC 6 Clicks Daily Activity (OT)  -KH      User Key  (r) = Recorded By, (t) = Taken By, (c) = Cosigned By    Initials Name Provider Type    CT Komal Perez, PT Physical Therapist    Ana Lilia Barreto, OT Occupational Therapist         Time Calculation:   PT Charges     Row Name 02/06/19 1358             Time Calculation    PT Received On  02/06/19  -CT      PT - Next Appointment  02/07/19  -CT      PT Goal Re-Cert Due Date  02/19/19  -CT         Time Calculation- PT    Total Timed Code Minutes- PT  30 minute(s)  -CT         Timed Charges    43104 - PT Therapeutic Exercise Minutes  15  -CT      54289 - PT Therapeutic Activity Minutes  15  -CT        User Key  (r) = Recorded By, (t) = Taken By, (c) = Cosigned By    Initials Name Provider Type    CT Komal Perez, PT Physical Therapist          Therapy Charges for Today     Code Description Service Date Service Provider Modifiers Qty    95689765154 HC PT EVAL HIGH COMPLEXITY 2 2/5/2019 Komal Perez, PT GP 1    80845490188 HC PT THERAPEUTIC ACT EA 15 MIN 2/5/2019 Komal Perez, PT GP 2    41579974202 HC PT THER SUPP EA 15 MIN 2/5/2019 Komal Perez, PT GP 4    34183204697 HC PT THERAPEUTIC ACT EA 15 MIN 2/6/2019 Komal Perez, PT GP 1    78471404665 HC PT THER PROC EA 15 MIN 2/6/2019 Komal Perez, PT GP 1    01843699264 HC PT THER SUPP EA 15 MIN 2/6/2019 Komal Perez, PT GP 2          PT G-Codes  Outcome Measure Options: AM-PAC 6 Clicks Basic Mobility (PT)  AM-PAC 6 Clicks Score: 8  Score: 9    Komal Perez, PT  2/6/2019

## 2019-02-06 NOTE — PROGRESS NOTES
Discharge Planning Assessment  [facility]     Patient Name: Jeanna Flower  MRN: [id number]  Today's Date: 2/6/2019    Admit Date: 2/2/2019      Discharge Plan     Row Name 02/06/19 1752       Plan    Final Discharge Disposition Code  03 - skilled nursing facility (SNF)    Final Note Pt was discharged back to [facility] today. SS contacted pt's POA/granddaughter, Ernestina Chamberlain at [phone] and she is agreeable for pt to be discharged back to [facility] with hospice [facility]. [facility] and pt has a bed available today and can return with hospice [facility] SS faxed information including prescription and AVS to [facility]. Nurse to call report to NH. SS contacted [facility] [phone] per Boo with referral. SS faxed referral including order to [facility] [phone]. SS received call from Tali ROME who states she will contact pt's POA regarding referral. SS was informed by Yasmeen at [facility] that pt's POA plans to meet hospice at the NH at 17:30 today to complete admission papers. SS completed EMS PCS form and EMS per Halina was notified. No other needs identified.         Destination - Selection Complete      Service Provider Request Status Selected Services Address Phone Number Fax Number    [facility] Selected Skilled Nursing [address] [place] [facility] [address] [phone]      Home Medical Care - Selection Complete      Service Provider Request Status Selected Services Address Phone Number Fax Number    [facility] [employer] [facility] [address] [facility] [address] [phone]     Lakia Alatorre

## 2019-02-06 NOTE — DISCHARGE SUMMARY
[facility] HOSPITALIST DISCHARGE SUMMARY    Patient Identification:  Name:  Jeanna Flower  Age:  78 y.o.  Sex:  female  :  1940  MRN:  [id number]  Visit Number:  [id number]    Date of Admission: 2019  Date of Discharge: 2019    PCP: Raul Robert MD    Discharging Provider: TAE Parker    Discharge Diagnoses     Discharge Diagnoses:  1. Severe sepsis  2. Acute RML aspiration pneumonia   3. Moderate to severe oral dysphagia with known chronic aspiration  4. Minimal AST/Alkaline phosphatase elevation  5. Acute metabolic alkalosis with partial respiratory compensation   6. Acute hypokalemia/hypomagnesemia/hypophosphatemia   7. Severe hypoalbuminemia   8. Fall, XR left elbow and shoulder unremarkable  9. Generalized debility     Secondary Diagnoses:  1. Baseline dementia   2. Chronic macrocytic anemia   3. Recent MSSA and E. Coli bacteremia   4. Paroxysmal atrial fibrillation  5. Chronic oral anticoagulation with Eliquis  6. Hypothyroidism   7. Obesity, BMI 30.25    Consults/Procedures     Consults:   1. Infectious Disease     Procedures Performed:  None.     History of Presenting Illness     Ms. Flower is a 78 y.o. female who is a resident of a local nursing home who was brought to [facility] on 2019 due to complaints of fever. ED workup was positive for severe sepsis secondary to acute RML versus healthcare associated pneumonia. Please see the admitting history and physical for further details.      Hospital Course     She was admitted to the medical/surgery unit for further evaluation and therapy. She was started on broad-spectrum antibiotics with vancomycin, Azactam, and Flagyl, as well as IV fluids, and nebulizer treatments. She has known chronic aspiration and this was discussed once again with her POAErnestina, over the phone who wants Ms. Flower to continue to have the least restrictive diet with no alternative routes of feeding. She understands the  aspiration risk and these are her wishes. Once she became less lethargic, she was started on pureed diet with thin liquids. Electrolytes were replaced per protocol with improvement noted. She did suffer a fall from her bed and a bed side sitter was added. XRs of the left shoulder and elbow were unremarkable. She was briefly changed over to IV clindamycin, however, inflammatory markers were noted to be increasing. She was changed back to vancomycin and flagyl with addition of cefepime. ID was consulted with discontinuation of vancomycin and addition of doxycycline, along with continuing cefepime and flagyl. Atypicals were negative. Inflammatory markers were noted to be improving. Blood cultures have been negative x4 days. PT/OT were consulted and she did well working with them. On 02/06/19, it was felt that she was stable for discharge back to the nursing home and a bed was available. ID recommended transitioning to oral antibiotics with Omnicef and Flagyl x 10 days. I discussed with MAGGI Do over the phone and she would like Ms. Flower to be discharged back to the nursing home under hospice care. Order placed for hospice at discharge. She was monitored off of supplemental O2 with oxygen saturations remaining >90% and this was discontinued.     Discharge Vitals/Physical Examination     Vital Signs:  Temp:  [97.5 °F (36.4 °C)-98.9 °F (37.2 °C)] 98.4 °F (36.9 °C)  Heart Rate:  [80-98] 89  Resp:  [18-28] 28  BP: (124-177)/(58-87) 124/62  No data found.  SpO2 Percentage    02/06/19 0644 02/06/19 0655 02/06/19 1102   SpO2: 98% 99% 98%     SpO2:  [95 %-99 %] 98 %  on  Flow (L/min):  [3] 3;   Device (Oxygen Therapy): nasal cannula    Body mass index is 30.25 kg/m².  Wt Readings from Last 3 Encounters:   02/02/19 75 kg (165 lb 6.4 oz)   01/24/19 77.6 kg (171 lb)   01/15/19 78.5 kg (173 lb)       Physical Exam   Constitutional: She appears well-developed and well-nourished. No distress.   HENT:   Head: Normocephalic and  atraumatic.   Eyes: Conjunctivae are normal. Right eye exhibits no discharge. Left eye exhibits no discharge. No scleral icterus.   Neck: Normal range of motion. Neck supple. No JVD present. Carotid bruit is not present.   Cardiovascular: Normal rate, regular rhythm and normal heart sounds. Exam reveals no gallop and no friction rub.   No murmur heard.  Pulmonary/Chest: Effort normal. No respiratory distress. She has no wheezes. She has rales (Few rales right base. ). She exhibits no tenderness.   Abdominal: Soft. Bowel sounds are normal. She exhibits no distension and no mass. There is no tenderness. There is no rebound and no guarding. No hernia.   Musculoskeletal: Normal range of motion. She exhibits no edema.   Neurological: She is alert.   Alert to person and place. Confused to time.    Skin: Skin is warm and dry. No rash noted. She is not diaphoretic. No erythema. No pallor.   Psychiatric: She has a normal mood and affect. Her behavior is normal.   Nursing note and vitals reviewed.    Pertinent Laboratory/Radiology Results     Pertinent Laboratory Results:  Results from last 7 days   Lab Units 02/05/19  0051 02/04/19  1954 02/03/19  1653   TROPONIN I ng/mL 0.034 0.017 0.045*         Results from last 7 days   Lab Units 02/02/19  1401   PH, ARTERIAL pH units 7.472*   PO2 ART mm Hg 76.9*   PCO2, ARTERIAL mm Hg 25.8*   HCO3 ART mmol/L 18.4*     Results from last 7 days   Lab Units 02/06/19  0656 02/05/19  1842 02/05/19  0051 02/04/19  0451 02/03/19  1118  02/02/19  1421 02/02/19  0652   CRP mg/dL 15.17*  --  15.65* 23.90*  --    < >  --   --    LACTATE mmol/L  --   --   --   --  1.0  --  2.1* 3.7*   WBC 10*3/mm3 10.83 12.01 11.90 14.67*  --    < >  --  15.86*   HEMOGLOBIN g/dL 9.4* 10.2* 8.7* 9.1*  --    < >  --  10.5*   HEMATOCRIT % 30.7* 32.0* 29.4* 30.6*  --    < >  --  35.1*   MCV fL 93.6 90.7 98.0* 96.5*  --    < >  --  96.2*   MCHC g/dL 30.6* 31.9* 29.6* 29.7*  --    < >  --  29.9*   PLATELETS 10*3/mm3 373  351 315 339  --    < >  --  363    < > = values in this interval not displayed.     Results from last 7 days   Lab Units 02/06/19  0656 02/05/19  0051 02/04/19  0451   SODIUM mmol/L 133* 135 136   POTASSIUM mmol/L 3.6 3.5 4.1   MAGNESIUM mg/dL 2.1 1.5* 2.2   CHLORIDE mmol/L 109 111 114*   CO2 mmol/L 16.5* 16.8* 16.2*   BUN mg/dL <5* 9 12   CREATININE mg/dL 0.63 0.67 0.69   EGFR IF NONAFRICN AM mL/min/1.73 92 85 82   CALCIUM mg/dL 7.8 7.7 8.0   GLUCOSE mg/dL 105 112* 105   ALBUMIN g/dL 2.40* 2.40* 2.50*   BILIRUBIN mg/dL 0.5 0.4 0.4   ALK PHOS U/L 99 106* 122*   AST (SGOT) U/L 16 21 17   ALT (SGPT) U/L 10 17 15   Estimated Creatinine Clearance: 55.9 mL/min (by C-G formula based on SCr of 0.63 mg/dL).      Lab Results   Component Value Date    HGBA1C 5.90 (H) 01/15/2019     Lab Results   Component Value Date    TSH 6.470 (H) 01/15/2019    FREET4 0.96 01/17/2019       Blood Culture   Date Value Ref Range Status   02/02/2019 No growth at 4 days  Preliminary   02/02/2019 No growth at 4 days  Preliminary        Pain Management Panel     Pain Management Panel Latest Ref Rng & Units 9/16/2018    AMPHETAMINES SCREEN, URINE Negative Negative    BARBITURATES SCREEN Negative Negative    BENZODIAZEPINE SCREEN, URINE Negative Negative    BUPRENORPHINE Negative Negative    COCAINE SCREEN, URINE Negative Negative    METHADONE SCREEN, URINE Negative Negative          Pertinent Radiology Results:  Imaging Results (all)     Procedure Component Value Units Date/Time    XR Elbow 3+ View Left [[id number]] Collected:  02/03/19 1853     Updated:  02/03/19 1855    Narrative:       EXAMINATION: XR ELBOW 3+ VW LEFT-      CLINICAL INDICATION: Fall.; J18.1-Lobar pneumonia, unspecified organism;  A41.9-Sepsis, unspecified organism          COMPARISON: None immediately available     FINDINGS: 3 views of the left elbow show no acute fracture or  dislocation. There are no blastic or lytic lesions       Impression:       No acute bony abnormality       This report was finalized on 2/3/2019 6:53 PM by Dr. Ulysses Rojas MD.       XR Shoulder 2+ View Left [[id number]] Collected:  02/03/19 1852     Updated:  02/03/19 1855    Narrative:       EXAMINATION: XR SHOULDER 2+ VW LEFT-      CLINICAL INDICATION: Fall.; J18.1-Lobar pneumonia, unspecified organism;  A41.9-Sepsis, unspecified organism          COMPARISON: None available     FINDINGS: 2 views of the left shoulder reveal arthritic change, but no  acute fracture or dislocation.       Impression:       No acute fracture      This report was finalized on 2/3/2019 6:53 PM by Dr. Ulysses Rojas MD.       XR Chest 1 View [[id number]] Collected:  02/02/19 0927     Updated:  02/02/19 0930    Narrative:       EXAMINATION:  XR CHEST 1 VW-      CLINICAL INDICATION:     Fever     TECHNIQUE:  XR CHEST 1 VW-       COMPARISON: NONE      FINDINGS:   Coarse interstitial markings suggestive of chronic interstitial lung  disease.   Slightly increased nodularity right lung base may represent some chronic  aspiration.   No pneumothorax.   No pleural effusion.   Bony and soft tissue structures are unremarkable.            Impression:       Slightly increased nodularity right lung base may represent  some chronic aspiration.      This report was finalized on 2/2/2019 9:28 AM by Dr. Giovanni Martin MD.             Test Results Pending at Discharge:   Order Current Status    Blood Culture - Blood, Arm, Right Preliminary result    Blood Culture - Blood, Arm, Right Preliminary result          Discharge Disposition/Discharge Medications/Discharge Appointments     Discharge Disposition:   Skilled Nursing Facility (DC - External) with hospice care.     Condition at Discharge:  Stable     Discharge Diet:  Diet Instructions     Diet: Dysphagia; Thin Liquids, No Restrictions; Pureed      Discharge Diet:  Dysphagia    Fluid Consistency:  Thin Liquids, No Restrictions    Pureed Options:  Pureed    Aspiration precautions.          Discharge  Activity:  Activity Instructions     Discharge Activity Restrictions      Bedrest with fall precautions. Continue PT/OT.          Code Status While Inpatient:  Code Status and Medical Interventions:   Ordered at: 02/02/19 0413     Limited Support to NOT Include:    Intubation     Code Status:    No CPR     Medical Interventions (Level of Support Prior to Arrest):    Limited       Discharge Medications:     Discharge Medications      New Medications      Instructions Start Date   cefdinir 300 MG capsule  Commonly known as:  OMNICEF   300 mg, Oral, Every 12 Hours Scheduled      ipratropium-albuterol 0.5-2.5 mg/3 ml nebulizer  Commonly known as:  DUO-NEB   3 mL, Nebulization, Every 4 Hours PRN      metroNIDAZOLE 500 MG tablet  Commonly known as:  FLAGYL   500 mg, Oral, Every 8 Hours Scheduled         Continue These Medications      Instructions Start Date   ACIDOPHILUS/CITRUS PECTIN tablet tablet   1 tablet, Oral, 3 Times Daily      ADVAIR DISKUS 250-50 MCG/DOSE DISKUS  Generic drug:  fluticasone-salmeterol   1 puff, Inhalation, 2 Times Daily - RT      albuterol sulfate  (90 Base) MCG/ACT inhaler  Commonly known as:  PROVENTIL HFA;VENTOLIN HFA;PROAIR HFA   2 puffs, Inhalation, Every 4 Hours PRN      apixaban 5 MG tablet tablet  Commonly known as:  ELIQUIS   5 mg, Oral, Every 12 Hours Scheduled      budesonide 0.5 MG/2ML nebulizer solution  Commonly known as:  PULMICORT   0.5 mg, Nebulization, Every 12 Hours      cycloSPORINE 0.05 % ophthalmic emulsion  Commonly known as:  RESTASIS   1 drop, Both Eyes, 2 Times Daily      docusate sodium 100 MG capsule  Commonly known as:  COLACE   100 mg, Oral, 2 Times Daily      famotidine 20 MG tablet  Commonly known as:  PEPCID   20 mg, Oral, 2 Times Daily      fluticasone 50 MCG/ACT nasal spray  Commonly known as:  FLONASE   2 sprays, Nasal, Daily      HYDROcodone-acetaminophen 5-325 MG per tablet  Commonly known as:  NORCO   1 tablet, Oral, Every 6 Hours PRN       levothyroxine 100 MCG tablet  Commonly known as:  SYNTHROID, LEVOTHROID   100 mcg, Oral, Daily      montelukast 10 MG tablet  Commonly known as:  SINGULAIR   10 mg, Oral, Nightly      QUEtiapine 25 MG tablet  Commonly known as:  SEROquel   25 mg, Oral, Nightly      QUEtiapine 25 MG tablet  Commonly known as:  SEROquel   25 mg, Oral, 2 Times Daily PRN      SENNA-LAX 8.6 MG tablet  Generic drug:  senna   1 tablet, Oral, Daily      venlafaxine 37.5 MG tablet  Commonly known as:  EFFEXOR   37.5 mg, Oral, 2 Times Daily           Discharge Appointments:  Your Scheduled Appointments    Feb 28, 2019  3:00 PM EST  Follow Up with Ta Garcia PA-C  [facility] CARDIOLOGY (--) [address]  [place] [address] [phone]   Arrive 15 minutes prior to appointment.          Additional Instructions for the Follow-ups that You Need to Schedule     Ambulatory Referral to Home Hospice   As directed      Please evaluate Jeanna Flower for admission to Hospice.    Patient/Family aware of referral: Yes, discussed with MAGGI Do.     Services to provide: Strengthening Exercises and Evaluate    Physician to follow patient's care (PCP, nursing home provider)    Referring Provider Call-back Phone Number: [phone].    Requested Start of Care Date: 02/06/19    Special Instructions: None.    Order Comments:  Please evaluate Jeanna Flower for admission to Hospice. Patient/Family aware of referral: Yes, discussed with MAGGI Do. Services to provide: Strengthening Exercises and Evaluate Physician to follow patient's care (PCP, nursing home provider) Referring Provider Call-back Phone Number: [phone]. Requested Start of Care Date: 02/06/19 Special Instructions: None.          Call MD With Problems / Concerns   As directed      Instructions: Contact PCP or return to ED with concerns or recurrent symptoms.    Order Comments:  Instructions: Contact PCP or return to ED with concerns or recurrent symptoms.           Discharge Follow-up with PCP   As directed       Currently Documented PCP:    Raul Robert MD    PCP Phone Number:    [phone]     Follow Up Details:  Nursing home PCP.           Follow-up Information     Raul Robert MD .    Specialty:  Internal Medicine  Why:  Nursing home PCP.  Contact information:  [address] [place] [address]  [phone]                   Additional Instructions for the Follow-ups that You Need to Schedule     Ambulatory Referral to Home Hospice   As directed      Please evaluate Jeanna Flower for admission to Hospice.    Patient/Family aware of referral: Yes, discussed with MAGGI Do.     Services to provide: Strengthening Exercises and Evaluate    Physician to follow patient's care (PCP, nursing home provider)    Referring Provider Call-back Phone Number: [phone].    Requested Start of Care Date: 02/06/19    Special Instructions: None.    Order Comments:  Please evaluate Jeanna Flower for admission to Hospice. Patient/Family aware of referral: Yes, discussed with MAGGI Do. Services to provide: Strengthening Exercises and Evaluate Physician to follow patient's care (PCP, nursing home provider) Referring Provider Call-back Phone Number: [phone]. Requested Start of Care Date: 02/06/19 Special Instructions: None.          Call MD With Problems / Concerns   As directed      Instructions: Contact PCP or return to ED with concerns or recurrent symptoms.    Order Comments:  Instructions: Contact PCP or return to ED with concerns or recurrent symptoms.          Discharge Follow-up with PCP   As directed       Currently Documented PCP:    Raul Robert MD    PCP Phone Number:    [phone]     Follow Up Details:  Nursing home PCP.             Attestation: I personally discussed the patient's hospital course, disposition, discharge planning, and discharge medications with attending physician, Chalino Medina, *, prior to time of discharge.      TAE Parker  02/06/19  12:27 PM    Time discharge orders placed: 12:24 PM.     Time to complete discharge: >30 minutes.     Please send a copy of this dictation to the following providers:  Raul Robert MD

## 2019-02-07 ENCOUNTER — PATIENT OUTREACH (OUTPATIENT)
Dept: CASE MANAGEMENT | Facility: OTHER | Age: 79
End: 2019-02-07

## 2019-02-07 LAB
BACTERIA SPEC AEROBE CULT: NORMAL
BACTERIA SPEC AEROBE CULT: NORMAL

## 2019-02-07 NOTE — OUTREACH NOTE
Skilled Nursing Facility Discharge Flowsheet:     Skilled Nursing Facility Discharge Assessment 2/7/2019   Acute Facility Discharged From [facility]   Acute Discharge Date 2/6/2019   Name of the Skilled Nursing Facility? [facility]   Tier Level of the Skilled Nursing Facility 3   Purpose of SNF Admission Other (Comment)   Who is the insurance provider or payor of patient stay? Medicaid   Progression of Patient? Spoke with Reyna, pt returned to facility under Medicaid Hospice.

## 2019-02-20 ENCOUNTER — LAB REQUISITION (OUTPATIENT)
Dept: LAB | Facility: HOSPITAL | Age: 79
End: 2019-02-20

## 2019-02-20 DIAGNOSIS — R50.81 FEVER PRESENTING WITH CONDITIONS CLASSIFIED ELSEWHERE: ICD-10-CM

## 2019-02-20 LAB
FLUAV AG NPH QL: POSITIVE
FLUBV AG NPH QL IA: NEGATIVE

## 2019-02-20 PROCEDURE — 87804 INFLUENZA ASSAY W/OPTIC: CPT | Performed by: INTERNAL MEDICINE

## 2019-07-19 NOTE — ED NOTES
[facility] here to take patient to nursing home. Report given to EMS. Patient VS stable.      Radha Mcdermott, RN  01/24/19 9338     noted

## 2019-08-07 NOTE — PROGRESS NOTES
Discharge Planning Assessment  [facility]     Patient Name: Jeanna Flower  MRN: [id number]  Today's Date: 1/8/2019    Admit Date: 1/6/2019      Discharge Plan     Row Name 01/08/19 1027       Plan    Plan  SS spoke with [facility] on this date per Geetha. Geetha states that they will have female beds available later this week. Geetha had questions regarding medications and diet. SS spoke with pt's nurse. Pt has been taking her medications by mouth. Pt has also been eating. SS contacted Geetha at [facility] to inform. Geetha states that she will pass it along and let SS know if they will be able to accept pt when bed becomes available. SS will follow.    SS spoke with [facility], who states they do not have a female bed that will be available until the 28th on this month, planned. SS spoke with [facility] who is agreeable to look at pt's information. SS faxed pt's information on this date. SS will follow.     Patient/Family in Agreement with Plan  yes        Expected Discharge Date and Time     Expected Discharge Date Expected Discharge Time    Billy 10, 2019             Maribell Varela     -Please follow up with Dr. Bhatti on 8/27/19 at 11:15am.  The office is located at [facility], 4th floor. Call us with any questions, #[phone].  -Please follow up with Dr. Jasvir Faulkner (referring MD) on 8/26/19 at 10:00am.   -Please follow up with our [facility] for your diabetes, within 1 week of you leaving [facility]. You can schedule an appointment by calling [phone]. Address: [address] [place] [address]. Phone: [phone].

## 2021-07-29 NOTE — PLAN OF CARE
Problem: Patient Care Overview  Goal: Plan of Care Review  Outcome: Outcome(s) achieved Date Met: 01/16/19 01/16/19 9223   Coping/Psychosocial   Plan of Care Reviewed With patient   OTHER   Outcome Summary MBS this pm w/ aspiration of nectar thick and thin liquids. High risk of aspiration w/ any po consistency 2/2 advanced dementia, fluent wernicke's like verbalizations, interfering w/ swallow function. D/w Dr. Livingston who agrees pt least restrictive puree diet, thin liquids to be continued 2/2 DNR/DNI, family w/o wishes for alternative nutrition. Family understands risks of aspiration. Plan: Puree diet, thin liquids.           [Initial Consultation] : an initial consultation for [Mother] : mother [Presurgical Evaluation] : presurgical evaluation [Family History] : family history [FreeTextEntry1] : Hypertrophic Cardiomyopathy

## 2022-04-05 NOTE — PROGRESS NOTES
Discharge Planning Assessment   [facility]     Patient Name: Jeanna Flower  MRN: [id number]  Today's Date: 1/7/2019    Admit Date: 1/6/2019    Discharge Needs Assessment     Row Name 01/07/19 1337       Living Environment    Lives With  grandchild(corrina)    Current Living Arrangements  home/apartment/condo    Primary Care Provided by  other (see comments)    Family Caregiver if Needed  grandparent(s)    Quality of Family Relationships  helpful;involved;supportive       Transition Planning    Patient/Family Anticipates Transition to  home    Transportation Anticipated  family or friend will provide       Discharge Needs Assessment    Equipment Currently Used at Home  hospital bed;bath bench;walker, rolling    Equipment Needed After Discharge  none        Discharge Plan     Row Name 01/07/19 1336       Plan    Plan  SS spoke with pt's granddaughter on this date. Pt lives at home with her granddaughter. Pt does not have home health services at this time. Pt's granddaughter had questions regarding nursing home placement with hospice. SS spoke with [facility] on this date per Geetha. Geetha states they are agreeable to look at pt's information. SS faxed the pt's information on this date. SS spoke with [facility], who does not have beds available at this time. SS will follow and assist as needed.     SS followed up with pt's granddaughter Ernestina on this date. Ernestina states that the Medicaid office is coming to the pt's home on Tuesday. Ernestina is agreeable to placement at [facility]. SS will follow up with [facility] for decision.     Patient/Family in Agreement with Plan  yes        Maribell Varela     ED

## 2024-07-07 NOTE — DISCHARGE PLACEMENT REQUEST
Patient ID: [id number]     Chief Complaint: Wellness Exam    HPI:     Kelly Levin is a 56 y.o. female here today for an annual wellness visit. Exercise consist of stationary bike. Diet is described as balanced, reports weight gain. Discussed weight loss medication and referral to dietician. Patient declined weight loss medication due to insurance and declined dietician referral. She denies alcohol use, denies caffeine use, denies tobacco/vape use and denies drug use. Seatbelt use always.     Cervical Cancer Screening - Last Pap 2024 with Dr Tammie Lundberg. Will request updated result.    Breast Cancer Screening - Last Mammogram 2024. Normal. Follow up in 1 year.   Colon Cancer Screening - Colonoscopy 4/20/2021, polyp removal, Dr. Bell. Follow up in 7 years.  Eye Exam - Last eye exam 7/2024. She wear glassess and contacts.    Dental Exam -Last dental exam patient not sure. She has dental insurance and will schedule an exam soon.  Vaccinations - UTD  Immunization History   Administered Date(s) Administered    COVID-19, MRNA, LN-S, PF (Pfizer) (Gray Cap) 05/27/2022    COVID-19, MRNA, LN-S, PF (Pfizer) (Purple Cap) 03/07/2021, 03/28/2021, 01/06/2022    DT (Pediatric) 02/27/1975, 05/10/1985    DTP 1969, 1969, 1969, 08/25/1970    Influenza 10/27/2010, 10/14/2011, 10/02/2017    Influenza - Quadrivalent 09/21/2016    Influenza - Quadrivalent - MDCK - PF 10/17/2019, 01/06/2022    Influenza - Quadrivalent - PF (6-35 months) 10/02/2017    Influenza - Quadrivalent - PF *Preferred* (6 months and older) 09/28/2020    Measles 03/05/1970    Measles / Rubella 06/27/1974, 03/19/1976    Poliovirus 1969, 1969, 08/25/1970, 06/27/1974, 03/19/1976    Td - PF (ADULT) 11/03/2008    Tdap 12/12/2017    Varicella 03/19/1976    Zoster Recombinant 01/06/2022, 04/11/2022     -Generalized anxiety is mildly controlled. She reports increased stressors at work. She reports symptoms of worry, easily annoyed,  "Jeanna Flower (78 y.o. Female)     Date of Birth Social Security Number Address Home Phone MRN    1940  [address] [phone] [id number]    Anabaptist Marital Status          Non-Buddhist Single       Admission Date Admission Type Admitting Provider Attending Provider Department, Room/Bed    1/6/19 Emergency Enoc Medel MD Troxell, Christopher A, DO [address], 3327/1P    Discharge Date Discharge Disposition Discharge Destination                       Attending Provider:  Chalino Quach DO    Allergies:  Bactrim [Sulfamethoxazole-trimethoprim], Codeine, Penicillins    Isolation:  None   Infection:  None   Code Status:  No CPR    Ht:  160 cm (62.99\")   Wt:  76.8 kg (169 lb 6.4 oz)    Admission Cmt:  None   Principal Problem:  Sepsis (CMS/[facility]) [A41.9]                 Active Insurance as of 1/6/2019     Primary Coverage     Payor Plan Insurance Group Employer/Plan Group    MEDICARE MEDICARE A & B      Payor Plan Address Payor Plan Phone Number Payor Plan Fax Number Effective Dates    [address] [phone]  1/1/2006 - None Entered    [facility] [id number]       Subscriber Name Subscriber Birth Date Member ID       JEANNA FLOWER 1940 [id number]           Secondary Coverage     Payor Plan Insurance Group Employer/Plan Group    [place]      Payor Plan Address Payor Plan Phone Number Payor Plan Fax Number Effective Dates    [address] [phone]  10/17/2017 - None Entered    [facility] [id number]       Subscriber Name Subscriber Birth Date Member ID       JEANNA FLOWER 1940 [id number]                 Emergency Contacts      (Rel.) Home Phone Work Phone Mobile Phone    Ernestina Chamberlain (Power of ) [phone] -- --            Emergency Contact Information     Name Relation Home Work Mobile    Ernestina Chamberlain Power of  [phone]            Insurance Information                " MEDICARE/MEDICARE A & B Phone: [phone]    Subscriber: Jeanna Flower Subscriber#: [id number]    Group#:  Precert#:         [place] Phone: [phone]    Subscriber: Jeanna Flower Subscriber#: [id number]    Group#:  Precert#:              History & Physical      Enoc Medel MD at 2019  4:10 AM              [facility] HOSPITALIST HISTORY AND PHYSICAL    Patient Identification:  Name:  Jeanna Flower  Age:  78 y.o.  Sex:  female  :  1940  MRN:  [id number]   Visit Number:  [id number]  Room number:  3327/1P  Primary Care Physician:  Adela Mathew MD     Subjective     Chief complaint:    Chief Complaint   Patient presents with   • Fatigue   • Neck Swelling     History of presenting illness:  78 y.o. female who presented to [facility] from home with redness and swelling of the right side of the neck.  The patient has dementia and as a result I am not able to obtain any history from her.  The POA, which is also the patient's granddaughter, is at bedside.  Also at bedside is a great grandson.  The area in question seemed to appear overnight.  She has not been on antibiotics for this yet.  The family has not noticed a fever.  They suspect that the patient's memory problems are worsening and that this is why she is not wanting to eat; she is not even drinking water that much.  The patient no longer knows the names of the family in the room with her currently.    The patient had told the granddaughter when she had recovered from her stroke that she did not want resuscitation and did not want a feeding tube.  The family has been trying since May of 2018 to get the patient into a nursing home and no one had a bed.  They have also talked to hospice but this was not enough to help the family as the granddaughter has to work.  Home health is set up but despite this the patient continues to have so much healthcare needs that it is difficult for the  overwhelmed. She would like to increase buspirion which she admits to taking twice a day. She denies medication side effects. Discussed counseling, she declined. She denies feeling depressed, denies SI/HI.   -Hypertension is at goal. She is compliant with BP medications and denies medication side effects. Father had MI at the age of 56. Patient would like a referral to Cardiology for further evaluation.  -HLD is controlled with current Rx. Denies myalgias or abdominal pain.   -Migraine headaches controlled with current Rx. She takes medication only as needed.  -Reflux is controlled with Rx. She is taking the medication daily with no side effects.  -Reviewed and discussed lab results. TSH level borderline below normal. Discussed TSH level and re-evaluation. Patient denies the need for medication refills today.    Advance Care Planning  Date: 7/8/2024  Patient did not wish or was not able to name a surrogate decision maker or provide an Advance Care Plan.    Past Medical History:   Diagnosis Date    Essential hypertension 04/27/2023    Gastric reflux 04/27/2023    Menstrual migraine without status migrainosus, not intractable 04/27/2023    Mixed hyperlipidemia 04/27/2023    Seasonal allergies 04/27/2023        Past Surgical History:   Procedure Laterality Date    COLONOSCOPY  04/20/2021    REDUCTION OF BOTH BREASTS Bilateral 02/22/2022        Social History     Socioeconomic History    Marital status: Single    Number of children: 3   Occupational History    Occupation: [employer]:    Tobacco Use    Smoking status: Never    Smokeless tobacco: Never   Substance and Sexual Activity    Alcohol use: Not Currently    Drug use: Never    Sexual activity: Not Currently     Social Determinants of Health     Financial Resource Strain: Low Risk  (7/8/2024)    Overall Financial Resource Strain (CARDIA)     Difficulty of Paying Living Expenses: Not hard at all   Food Insecurity: No Food Insecurity (7/8/2024)    Hunger Vital  Sign     Worried About Running Out of Food in the Last Year: Never true     Ran Out of Food in the Last Year: Never true   Transportation Needs: No Transportation Needs (7/8/2024)    TRANSPORTATION NEEDS     Transportation : No   Physical Activity: Insufficiently Active (7/8/2024)    Exercise Vital Sign     Days of Exercise per Week: 2 days     Minutes of Exercise per Session: 30 min   Stress: Stress Concern Present (7/8/2024)    [employer] - Occupational Stress Questionnaire     Feeling of Stress : To some extent   Housing Stability: Low Risk  (7/8/2024)    Housing Stability Vital Sign     Unable to Pay for Housing in the Last Year: No     Homeless in the Last Year: No        Current Outpatient Medications   Medication Instructions    busPIRone (BUSPAR) 10 mg, Oral, 2 times daily    fluticasone propionate (FLONASE) 50 mcg/actuation nasal spray 1 spray, Each Nostril, Daily    loratadine (CLARITIN) 10 mg, Oral, Daily    losartan (COZAAR) 100 mg, Oral, Daily    pantoprazole (PROTONIX) 40 mg, Oral, Daily    pravastatin (PRAVACHOL) 40 mg, Oral, Daily    rizatriptan (MAXALT-MLT) 10 mg, Oral    ZEPBOUND 2.5 mg, Subcutaneous, Every 7 days       Review of patient's allergies indicates:   Allergen Reactions    Ace inhibitors      Other reaction(s): Cough    Amlodipine Edema        Patient Care Team:  Gladys Hernandez MD as PCP - General (Family Medicine)  Tammie Dawn MD as Consulting Physician (Obstetrics and Gynecology)  JUDITH Bell MD as Consulting Physician (Gastroenterology)  Raul Will Jr., MD as Consulting Physician (Otolaryngology)     Subjective:     Review of Systems  Constitutional: Denies Change in appetite. Denies Chills. Denies Fever. Denies Night sweats.  Eye: Denies Blurred vision. Denies Discharge. Denies Eye pain.  ENT: Denies Decreased hearing. Denies Sore throat. Denies Swollen glands.  Respiratory: Denies Cough. Denies Shortness of breath. Denies  family to keep up with all of it and work full time.  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 12/6/2017   • Diabetes mellitus (CMS/[facility])    • Heart disease    • Hyperlipidemia    • Hypertension    • Other sleep apnea 11/13/2018   • Paroxysmal atrial fibrillation (CMS/[facility]) 12/6/2017     Past Surgical History:   Procedure Laterality Date   • APPENDECTOMY     • BLADDER SURGERY     • CARDIAC CATHETERIZATION     • CHOLECYSTECTOMY       Family History   Problem Relation Age of Onset   • Heart disease Mother    • Heart attack Mother    • Heart disease Father    • Heart attack Father    • Heart attack Brother    • Heart attack Brother      Socioeconomic History   • Marital status: Single   Tobacco Use   • Smoking status: Never Smoker   • Smokeless tobacco: Never Used   Substance and Sexual Activity   • Alcohol use: No   • Drug use: No   • Sexual activity: Defer   ---------------------------------------------------------------------------------------------------------------------   Allergies:  Bactrim [sulfamethoxazole-trimethoprim]; Codeine; and Penicillins  ---------------------------------------------------------------------------------------------------------------------   Medications below are reported home medications pulling from within the system; at this time, these medications have not been reconciled unless otherwise specified and are in the verification process for further verifcation as current home medications.    Prior to Admission Medications     Prescriptions Last Dose Informant Patient Reported? Taking?    albuterol sulfate  (90 Base) MCG/ACT inhaler  "Shortness of breath with exertion. Denies Wheezing.  Cardiovascular: Denies Chest pain at rest. Denies Chest pain with exertion. Denies Irregular heartbeat. Denies Palpitations.  Gastrointestinal: Denies Abdominal pain. Denies Diarrhea. Denies Nausea. Denies Vomiting.   Genitourinary: Denies Dysuria. Denies Urinary frequency. Denies Urinary urgency. Denies Blood in urine.  Endocrine: Denies Cold intolerance. Denies Excessive thirst. Denies Heat intolerance. Denies Weight loss. Denies Weight gain.  Musculoskeletal: Denies Painful joints. Denies Weakness.  Integumentary: Denies Rash. Denies Itching. Denies Dry skin.  Neurologic: Denies Dizziness. Denies Fainting. Denies Headache.  Psychiatric: Denies Depression. Reports increased anxiety. Denies Suicidal/Homicidal ideations.    12 point review of systems conducted, negative except as stated in the history of present illness. See HPI for details.    Objective:     Visit Vitals  /84 (BP Location: Left arm, Patient Position: Sitting, BP Method: Large (Automatic))   Pulse 73   Ht 5' 4" (1.626 m)   Wt 87.5 kg (193 lb)   SpO2 99%   BMI 33.13 kg/m²       Physical Exam  General: Alert and oriented, No acute distress. Obese.  Eye: Pupils are equal, round and reactive to light, Normal conjunctiva.   HENT: Normocephalic, Normal hearing, Oral mucosa is moist, No pharyngeal erythema.   Throat: Pharynx ( Not edematous, No exudate ).   Neck: Supple, Non-tender, No lymphadenopathy.   Respiratory: Lungs are clear to auscultation, Respirations are non-labored, Breath sounds are equal, Symmetrical chest wall expansion, No chest wall tenderness.   Cardiovascular: Normal rate, Regular rhythm, No murmur, Good pulses equal in all extremities, No edema.   Gastrointestinal: Soft, Non-tender, Non-distended, Normal bowel sounds, No organomegaly.   Genitourinary: No costovertebral angle tenderness.   Musculoskeletal: Normal range of motion, Normal gait.   Neurologic: No focal deficits, " 1/6/2019 Care Giver Yes Yes    Inhale 2 puffs Every 4 (Four) Hours As Needed for Wheezing or Shortness of Air.    amantadine (SYMMETREL) 50 MG/5ML solution 1/6/2019 Care Giver Yes Yes    100 mg by Per G Tube route Every 12 (Twelve) Hours.    apixaban (ELIQUIS) 5 MG tablet tablet 1/6/2019 Care Giver No Yes    Take 1 tablet by mouth Every 12 (Twelve) Hours.    atorvastatin (LIPITOR) 40 MG tablet 1/5/2019 Care Giver Yes Yes    40 mg by Per G Tube route Every Night.    budesonide (PULMICORT) 0.5 MG/2ML nebulizer solution 1/6/2019 Care Giver Yes Yes    Take 0.5 mg by nebulization 2 (Two) Times a Day.    castor oil-balsam peru (VENELEX) ointment 1/6/2019 Care Giver Yes Yes    Apply 1 each topically to the appropriate area as directed 2 (Two) Times a Day.    cycloSPORINE (RESTASIS) 0.05 % ophthalmic emulsion 1/6/2019 Care Giver Yes Yes    1 drop 2 (Two) Times a Day.    dantrolene (DANTRIUM) 25 MG capsule 1/5/2019 Care Giver Yes Yes    25 mg by Per G Tube route Every Night.    docusate sodium (COLACE) 150 MG/15ML liquid 1/6/2019 Care Giver Yes Yes    100 mg by Per G Tube route 2 (Two) Times a Day.    famotidine (PEPCID) 20 MG tablet 1/6/2019 Care Giver Yes Yes    20 mg by Per G Tube route 2 (Two) Times a Day.    fluticasone (FLONASE) 50 MCG/ACT nasal spray 1/6/2019 Care Giver Yes Yes    2 sprays into the nostril(s) as directed by provider Daily.    fluticasone-salmeterol (ADVAIR DISKUS) 250-50 MCG/DOSE DISKUS 1/6/2019 Care Giver Yes Yes    Inhale 1 puff 2 (Two) Times a Day.    glipiZIDE (GLUCOTROL XL) 2.5 MG 24 hr tablet 1/6/2019 Care Giver Yes Yes    Take 2.5 mg by mouth Daily.    levothyroxine (SYNTHROID, LEVOTHROID) 100 MCG tablet 1/6/2019 Care Giver Yes Yes    Take 100 mcg by mouth Daily.    loratadine (CLARITIN) 10 MG tablet 1/6/2019 Care Giver Yes Yes    10 mg by Per G Tube route Daily.    losartan (COZAAR) 100 MG tablet 1/6/2019 Care Giver Yes Yes    100 mg by Per J Tube route Daily.    melatonin 3 MG tablet 1/5/2019  Care Giver Yes Yes    3 mg by Per G Tube route Every Night.    metoprolol tartrate (LOPRESSOR) 25 MG tablet 1/6/2019 Care Giver Yes Yes    25 mg by Per G Tube route Every 6 (Six) Hours.    miconazole (MICOTIN) 2 % powder 1/6/2019 Care Giver Yes Yes    Apply 1 application topically to the appropriate area as directed 3 (Three) Times a Day As Needed for Itching (skin folds).    montelukast (SINGULAIR) 10 MG tablet 1/5/2019 Care Giver Yes Yes    Take 10 mg by mouth Every Night.    venlafaxine (EFFEXOR) 37.5 MG tablet 1/6/2019 Care Giver Yes Yes    37.5 mg by Per G Tube route 2 (Two) Times a Day.     Objective     Vital Signs:  Temp:  [97.6 °F (36.4 °C)-99.2 °F (37.3 °C)] 99.2 °F (37.3 °C)  Heart Rate:  [80-93] 92  Resp:  [18] 18  BP: ()/(41-82) 146/56    Mean Arterial Pressure (Non-Invasive) for the past 24 hrs (Last 3 readings):   Noninvasive MAP (mmHg)   01/07/19 0231 92   01/07/19 0116 75   01/07/19 0046 71     SpO2:  [78 %-96 %] 90 %  on  Flow (L/min):  [2.5] 2.5;   Device (Oxygen Therapy): nasal cannula  Body mass index is 30.02 kg/m².    Wt Readings from Last 3 Encounters:   01/07/19 76.8 kg (169 lb 6.4 oz)   12/05/18 87.1 kg (192 lb)   11/30/18 84.4 kg (186 lb)               ---------------------------------------------------------------------------------------------------------------------   Physical Exam:  Constitutional:  Well-developed and well-nourished.  No respiratory distress.  Confused, looking blankly at the ceiling.      HENT:  Head: Normocephalic and atraumatic.  Mouth:  Moist mucous membranes.  Right jaw line with edema and erythema and warmth without any skin lesions around the area.  Eyes:  Conjunctivae and EOM are normal.  Pupils are equal, round, and reactive to light.  No scleral icterus.  Neck:  Neck supple.  No JVD present.    Cardiovascular:  Normal rate, regular rhythm and normal heart sounds with no murmur.  Pulmonary/Chest:  No respiratory distress, no wheezes, no crackles, with  Cranial Nerves II-XII are grossly intact.   Psychiatric: Cooperative, Appropriate mood & affect, Normal judgment, No-suicidal/No homicidal ideations  Mood and affect: Calm.   Behavior: Anxious.      Labs Reviewed:     Chemistry:  Lab Results   Component Value Date     07/01/2024    K 4.4 07/01/2024    BUN 13.4 07/01/2024    CREATININE 0.86 07/01/2024    EGFRNORACEVR >60 07/01/2024    GLUCOSE 82 07/01/2024    CALCIUM 10.7 (H) 07/01/2024    ALKPHOS 76 07/01/2024    LABPROT 7.4 07/01/2024    ALBUMIN 4.3 07/01/2024    BILIDIR 0.1 06/15/2020    IBILI 0.3 06/15/2020    AST 15 07/01/2024    ALT 14 07/01/2024    MG 2.3 07/15/2019    PHOS 2.1 (L) 07/15/2019    QKFRYQWH91DH 60 07/01/2024    TSH 0.360 07/01/2024        Lab Results   Component Value Date    HGBA1C 5.4 07/01/2024        Hematology:  Lab Results   Component Value Date    WBC 3.71 (L) 07/01/2024    HGB 13.2 07/01/2024    HCT 40.6 07/01/2024     07/01/2024       Lipid Panel:  Lab Results   Component Value Date    CHOL 190 07/01/2024    HDL 80 (H) 07/01/2024    LDL 97.00 07/01/2024    TRIG 66 07/01/2024    TOTALCHOLEST 2 07/01/2024        Urine:  Lab Results   Component Value Date    APPEARANCEUA Clear 07/01/2024    SGUA 1.015 07/01/2024    PROTEINUA Negative 07/01/2024    KETONESUA Negative 07/01/2024    LEUKOCYTESUR Negative 07/01/2024    RBCUA 0-2 07/01/2024    WBCUA None Seen 07/01/2024    BACTERIA None Seen 07/01/2024    SQEPUA  (A) 04/04/2018    HYALINECASTS 0-2 (A) 04/04/2018    CREATRANDUR 40.0 06/15/2020        Assessment:       ICD-10-CM ICD-9-CM   1. Wellness examination  Z00.00 V70.0   2. Class 1 obesity due to excess calories with serious comorbidity and body mass index (BMI) of 33.0 to 33.9 in adult  E66.09 278.00    Z68.33 V85.33   3. Generalized anxiety disorder  F41.1 300.02   4. Essential hypertension  I10 401.9   5. Mixed hyperlipidemia  E78.2 272.2   6. Gastric reflux  K21.9 530.81   7. Menstrual migraine without status  migrainosus, not intractable  G43.829 346.40        Plan:       1. Wellness examination  Assessment & Plan:  Monthly breast self exam recommended.  Diet, exercise and 10% weight loss encouraged.  Continue annual PAP and MMG annually.  Recommend biannual dental exam.        2. Class 1 obesity due to excess calories with serious comorbidity and body mass index (BMI) of 33.0 to 33.9 in adult  Assessment & Plan:  Body mass index is 33.13 kg/m².  Goal BMI <30.  Exercise 5 times a week for 30 minutes per day. Consider incorporating kettle bell weights as tolerated.   Avoid soda, simple sugars, excessive rice, potatoes or bread. Limit fast foods and fried foods.  Choose complex carbs in moderation (example: green vegetables, beans, oatmeal). Eat plenty of fresh fruits and vegetables with lean meats daily.  Do not skip meals. Prepare more meals at home. Eat a balanced portion size.  Avoid fad diets. Consider permanent healthy life style changes.       Orders:  -     CBC Auto Differential; Future; Expected date: 07/08/2024  -     TSH; Future; Expected date: 07/08/2024  -     T4, free; Future; Expected date: 07/08/2024    3. Generalized anxiety disorder  Assessment & Plan:  Rx trail of dose increase buspirone 10 mg twice a day.Notify Provider of medication side effects.   Stressed incorporating relaxation techniques and coping strategies such as deep breathing, exercise, meditation, etc.  Follow up in 3-4 weeks.  Seek immediate medical treatment for SOB, persistent panic attack, chest pain, suicidal thoughts or hallucinations.      Orders:  -     busPIRone (BUSPAR) 10 MG tablet; Take 1 tablet (10 mg total) by mouth 2 (two) times daily.  Dispense: 60 tablet; Refill: 11  -     TSH; Future; Expected date: 07/08/2024  -     T4, free; Future; Expected date: 07/08/2024    4. Essential hypertension  Assessment & Plan:  BP is at goal. Continue BP medications as prescribed. Will titrate BP Rx until BP is <140/90   Monitor blood  normal breath sounds and good air movement.  Abdominal:  Soft.  Bowel sounds are normal.  No distension and no tenderness.   Musculoskeletal:  No edema, no tenderness, and contracted left arm and hand.  No red or swollen joints anywhere.    Neurological:  Alert but not able to answer orientation questions; she seems to talk incoherently at times with some words that are understandable.  She has contracture of the left arm and hand.  She can move the right arm and leg.  Skin:  Skin is warm and dry.  No rash noted.  No pallor.   Peripheral vascular:  No edema and strong pulses on all 4 extremities.  Genitourinary:  No olivas catheter in place.  ---------------------------------------------------------------------------------------------------------------------  EKG:  NS with heart rate 92, QTc of 413 ms, flipped T waves in V1-V5 and the inferior leads.  Telemetry:  None  I have personally looked at both the EKG and the telemetry strips.  --------------------------------------------------------------------------------------------------------------------    Results from last 7 days   Lab Units  01/07/19   0155  01/06/19   2156   CRP mg/dL   --   9.85*   LACTATE mmol/L  1.2  2.5*   WBC 10*3/mm3   --   18.17*   HEMOGLOBIN g/dL   --   13.2   HEMATOCRIT %   --   43.9   MCV fL   --   93.0   MCHC g/dL   --   30.1*   PLATELETS 10*3/mm3   --   238   INR    --   1.36*     Results from last 7 days   Lab Units  01/06/19   2156   SODIUM mmol/L  137   POTASSIUM mmol/L  4.6   CHLORIDE mmol/L  103   CO2 mmol/L  20.8*   BUN mg/dL  25*   CREATININE mg/dL  1.18   EGFR IF NONAFRICN AM mL/min/1.73  44*   CALCIUM mg/dL  8.9   GLUCOSE mg/dL  157*   ALBUMIN g/dL  3.60   BILIRUBIN mg/dL  0.8   ALK PHOS U/L  99   AST (SGOT) U/L  29   ALT (SGPT) U/L  32   Estimated Creatinine Clearance: 39.2 mL/min (by C-G formula based on SCr of 1.18 mg/dL).        I have personally looked at the labs and they are summarized  above.  ----------------------------------------------------------------------------------------------------------------------  Imaging Results (last 24 hours)     Procedure Component Value Units Date/Time    CT Soft Tissue Neck With Contrast [[id number]] Resulted:  01/07/19 0112       Updated:  01/07/19 0113        I have personally reviewed the radiology images and read the available preliminary radiology report.    Assessment & Plan     -Sepsis that was present on admission (heart rate 93, white blood cell count 18,170, CRP 9.85, LA 2.5) due to a urinary tract infection +/- Suppurative parotitis   -Vascular dementia with metabolic encephalopathy due to the above  -Hypothyroidism  -Chronic kidney disease stage III Baseline creatinine 0.9-1.15  -Paroxysmal atrial fibrillation, currently normal sinus rhythm  -Type 2 diabetes mellitus  -Essential hypertension  -Prior CVA with left sided hemiplegia   -Functional quadriplegia    Per the sepsis protocol, we will start the patient on vancomycin and Zosyn for the urinary tract infection and possible suppurative parotitis.  We have asked that the urine obtained in the emergency department be sent for culture.  I will consult palliative care to discuss goals of care with the POA.  I will consult social work as the family needs help with patient's medical care.  I have consulted speech therapy, PT, and OT for now in case the patient is able to go to a nursing home.  The patient does not desire a feeding tube so I'm not sure if she will take her medication; I have tried to stop a lot of her home medication so that she is less to take.  I will stop all diabetes medications as she is not eating and do random glucose measurements.  I will hold all antihypertensives that she is not eating.  I have continued her home Eliquis for the A. fib and if she is unable to take this when May asked the family if we need to change this over to IV heparin or subcutaneous Lovenox.    Enoc ALMANZAR  pressure daily and log. Report consistent numbers greater than 138/88.  Stressed low sodium diet (DASH Diet - Less than 2 grams of sodium per day).  Goal BMI <30. Exercise 30 minutes per day, 5 days per week.   Notify M.D. or ER if BP >170/100 or <90/60, chest pain, palpitations, headache, SOB, temp greater than 100.4, or any acute illness.    Orders:  -     Ambulatory referral/consult to Cardiology; Future; Expected date: 07/15/2024  -     CBC Auto Differential; Future; Expected date: 07/08/2024    5. Mixed hyperlipidemia  Assessment & Plan:  Continue pravastatin as prescribed every evening.  Notify the provider if you experience abdominal pain, muscle aches or cramps.  Stressed following a low cholesterol, low fat diet. Avoid fried foods and high saturated fats.  Increase fiber intake. Foods high in soluble fiber, such as oats, beans, lentils, fruits, and vegetables, can help lower LDL cholesterol levels.   Exercise/walk 5 times per week for 30 minutes a day. Regular physical activity can help raise HDL (high-density lipoprotein) cholesterol and lower LDL cholesterol.           Orders:  -     Ambulatory referral/consult to Cardiology; Future; Expected date: 07/15/2024    6. Gastric reflux  Assessment & Plan:  Stable. Continue pantoprazole 40 mg daily.  Avoid spicy, acidic, fried foods and alcohol.  Eat 2-3 hours before going to bed.  Avoid tight clothing, chew food thoroughly.  Reduce caffeine intake, avoid soda.          7. Menstrual migraine without status migrainosus, not intractable  Assessment & Plan:  Stable. Continue maxalt as needed. Present to ED if if headache worsen or does not response to treatment.            Follow up Virtual visit in 1 month for anxiety. Follow up in 3 mths for blood work. In addition to their scheduled follow up, the patient has also been instructed to follow up on as needed basis.     Future Appointments   Date Time Provider Department Center   7/9/2025  8:30 AM Manjula  ITALIA [facility] ITALIA Arceo   MD Gianfranco  [facility] Medicine Team  01/07/19  4:10 AM      Electronically signed by Enoc Medel MD at 1/7/2019  7:43 AM       Hospital Medications (active)       Dose Frequency Start End    albuterol (PROVENTIL) nebulizer solution 0.083% 2.5 mg/3mL 2.5 mg Every 4 Hours PRN 1/7/2019     Sig - Route: Take 2.5 mg by nebulization Every 4 (Four) Hours As Needed for Wheezing or Shortness of Air. - Nebulization    apixaban (ELIQUIS) tablet 5 mg 5 mg Every 12 Hours Scheduled 1/7/2019     Sig - Route: Take 1 tablet by mouth Every 12 (Twelve) Hours. - Oral    atorvastatin (LIPITOR) tablet 40 mg 40 mg Nightly 1/7/2019     Sig - Route: 1 tablet by Per G Tube route Every Night. - Per G Tube    aztreonam (AZACTAM) 2 g/100 mL 0.9% NS (mbp) 2 g Once 1/6/2019 1/7/2019    Sig - Route: Infuse 100 mL into a venous catheter 1 (One) Time. - Intravenous    aztreonam (AZACTAM) 2 g/100 mL 0.9% NS (mbp) 2 g Every 8 Hours 1/7/2019 1/14/2019    Sig - Route: Infuse 100 mL into a venous catheter Every 8 (Eight) Hours. - Intravenous    budesonide (PULMICORT) nebulizer solution 0.5 mg 0.5 mg 2 Times Daily - RT 1/7/2019     Sig - Route: Take 2 mL by nebulization 2 (Two) Times a Day. - Nebulization    castor oil-balsam peru (VENELEX) ointment 5 g 1 each Every 12 Hours Scheduled 1/7/2019     Sig - Route: Apply 5 g topically to the appropriate area as directed Every 12 (Twelve) Hours. - Topical    clindamycin (CLEOCIN) 600 mg in dextrose 5% 50 mL IVPB (premix) 600 mg Once 1/6/2019 1/6/2019    Sig - Route: Infuse 50 mL into a venous catheter 1 (One) Time. - Intravenous    cycloSPORINE (RESTASIS) 0.05 % ophthalmic emulsion 1 drop 1 drop 2 Times Daily 1/7/2019     Sig - Route: Administer 1 drop to both eyes 2 (Two) Times a Day. - Both Eyes    dantrolene (DANTRIUM) capsule 25 mg 25 mg Nightly 1/7/2019     Sig - Route: 1 capsule by Per G Tube route Every Night. - Per G Tube    docusate sodium (COLACE) liquid 100 mg 100 mg 2 Times Daily 1/7/2019      "Sig - Route: 10 mL by Per G Tube route 2 (Two) Times a Day. - Per G Tube    Non-formulary Exception Code: Patient supplied medication    famotidine (PEPCID) tablet 20 mg 20 mg 2 Times Daily 1/7/2019     Sig - Route: 1 tablet by Per G Tube route 2 (Two) Times a Day. - Per G Tube    fluticasone (FLONASE) 50 MCG/ACT nasal spray 2 spray 2 spray Daily 1/7/2019     Sig - Route: 2 sprays into the nostril(s) as directed by provider Daily. - Nasal    iopamidol (ISOVUE-300) 61 % injection 100 mL 100 mL Once in Imaging 1/7/2019 1/7/2019    Sig - Route: Infuse 100 mL into a venous catheter Once. - Intravenous    levothyroxine (SYNTHROID, LEVOTHROID) tablet 100 mcg 100 mcg Daily 1/7/2019     Sig - Route: Take 1 tablet by mouth Daily. - Oral    sodium chloride 0.9 % flush 10 mL 10 mL As Needed 1/6/2019     Sig - Route: Infuse 10 mL into a venous catheter As Needed for Line Care. - Intravenous    Linked Group 1:  \"And\" Linked Group Details        sodium chloride 0.9 % flush 3 mL 3 mL Every 12 Hours Scheduled 1/7/2019     Sig - Route: Infuse 3 mL into a venous catheter Every 12 (Twelve) Hours. - Intravenous    sodium chloride 0.9 % flush 3-10 mL 3-10 mL As Needed 1/7/2019     Sig - Route: Infuse 3-10 mL into a venous catheter As Needed for Line Care. - Intravenous    sodium chloride 0.9% - IBW for BMI > 30 bolus 1,434 mL 30 mL/kg × 47.8 kg (Ideal) Once 1/6/2019 1/6/2019    Sig - Route: Infuse 1,434 mL into a venous catheter 1 (One) Time. - Intravenous    sodium chloride 0.9% - IBW for BMI > 30 bolus 1,434 mL 30 mL/kg × 47.8 kg (Ideal) Once 1/6/2019 1/7/2019    Sig - Route: Infuse 1,434 mL into a venous catheter 1 (One) Time. - Intravenous    vancomycin (VANCOCIN) 1,000 mg in Sodium chloride 0.9 % 250 mL IVPB 1,000 mg Every 24 Hours 1/7/2019 1/10/2019    Sig - Route: Infuse 1,000 mg into a venous catheter Daily. - Intravenous    vancomycin (VANCOCIN) 1,750 mg in Sodium chloride 0.9 % 500 mL IVPB 20 mg/kg × 86.2 kg Once 1/6/2019 " "1/7/2019    Sig - Route: Infuse 1,750 mg into a venous catheter 1 (One) Time. - Intravenous    venlafaxine (EFFEXOR) tablet 37.5 mg 37.5 mg 2 Times Daily With Meals 1/7/2019     Sig - Route: 1 tablet by Per G Tube route 2 (Two) Times a Day With Meals. - Per G Tube    aztreonam (AZACTAM) 2 g/100 mL 0.9% NS (mbp) (Discontinued) 2 g Once 1/7/2019 1/7/2019    Sig - Route: Infuse 100 mL into a venous catheter 1 (One) Time. - Intravenous    Reason for Discontinue: Duplicate order    Pharmacy to dose vancomycin (Discontinued)  Continuous PRN 1/7/2019 1/7/2019    Sig - Route: Continuous As Needed for Consult. - Does not apply    Reason for Discontinue: Dose adjustment    Linked Group 2:  \"And\" Linked Group Details        Sodium chloride 0.9 % infusion (Discontinued) 125 mL/hr Continuous 1/6/2019 1/6/2019    Sig - Route: Infuse 125 mL/hr into a venous catheter Continuous. - Intravenous    vancomycin (VANCOCIN) 1,500 mg in Sodium chloride 0.9 % 500 mL IVPB (Discontinued) 20 mg/kg × 76.8 kg Once 1/7/2019 1/7/2019    Sig - Route: Infuse 1,500 mg into a venous catheter 1 (One) Time. - Intravenous    Reason for Discontinue: Duplicate order    Linked Group 2:  \"And\" Linked Group Details        vancomycin (VANCOCIN) 1,750 mg in Sodium chloride 0.9 % 500 mL IVPB (Discontinued) 20 mg/kg × 86.2 kg Once 1/6/2019 1/6/2019    Sig - Route: Infuse 1,750 mg into a venous catheter 1 (One) Time. - Intravenous            Lab Results (last 24 hours)     Procedure Component Value Units Date/Time    Blood Culture - Blood, Arm, Right [[id number]] Collected:  01/06/19 2156    Specimen:  Blood from Arm, Right Updated:  01/07/19 1045     Blood Culture No growth at less than 24 hours    Blood Culture - Blood, Arm, Left [[id number]] Collected:  01/06/19 2156    Specimen:  Blood from Arm, Left Updated:  01/07/19 1045     Blood Culture No growth at less than 24 hours    Urine Culture - Urine, Urine, Clean Catch [[id number]] Collected:  01/07/19 0631    " Specimen:  Urine, Clean Catch Updated:  01/07/19 0639    Lactic Acid, Reflex [[id number]]  (Normal) Collected:  01/07/19 0155    Specimen:  Blood from Arm, Right Updated:  01/07/19 0214     Lactate 1.2 mmol/L     Lactic Acid, Reflex Timer (This will reflex a repeat order 3-3:15 hours after ordered.) [[id number]] Collected:  01/06/19 2156    Specimen:  Blood from Arm, Right Updated:  01/07/19 0130     Extra Tube Hold for add-ons.     Comment: Auto resulted.       Mumps (IgG / M) [[id number]] Collected:  01/07/19 0110    Specimen:  Blood Updated:  01/07/19 0115    Urinalysis With Microscopic If Indicated (No Culture) - Urine, Catheter [[id number]]  (Abnormal) Collected:  01/06/19 2223    Specimen:  Urine, Catheter Updated:  01/06/19 2310     Color, UA Yellow     Appearance, UA Turbid     pH, UA 7.0     Specific Gravity, UA 1.020     Glucose, UA Negative     Ketones, UA Negative     Bilirubin, UA Small (1+)     Blood, UA Moderate (2+)     Protein, UA >=300 mg/dL (3+)     Leuk Esterase, UA Moderate (2+)     Nitrite, UA Positive     Urobilinogen, UA 1.0 E.U./dL    Urinalysis, Microscopic Only - Urine, Catheter [[id number]]  (Abnormal) Collected:  01/06/19 2223    Specimen:  Urine, Catheter Updated:  01/06/19 2310     RBC, UA 6-12 /HPF      WBC, UA Too Numerous to Count /HPF      Bacteria, UA 4+ /HPF      Squamous Epithelial Cells, UA 0-2 /HPF      Hyaline Casts, UA None Seen /LPF      Methodology Manual Light Microscopy    Comprehensive Metabolic Panel [[id number]]  (Abnormal) Collected:  01/06/19 2156    Specimen:  Blood Updated:  01/06/19 2245     Glucose 157 mg/dL      BUN 25 mg/dL      Creatinine 1.18 mg/dL      Sodium 137 mmol/L      Potassium 4.6 mmol/L      Chloride 103 mmol/L      CO2 20.8 mmol/L      Calcium 8.9 mg/dL      Total Protein 6.9 g/dL      Albumin 3.60 g/dL      ALT (SGPT) 32 U/L      AST (SGOT) 29 U/L      Alkaline Phosphatase 99 U/L      Comment: Note New Reference Ranges        Total Bilirubin 0.8 mg/dL       eGFR Non African Amer 44 mL/min/1.73      Globulin 3.3 gm/dL      A/G Ratio 1.1 g/dL      BUN/Creatinine Ratio 21.2     Anion Gap 13.2 mmol/L     Narrative:       The MDRD GFR formula is only valid for adults with stable renal function between ages 18 and 70.    Osmolality, Calculated [[id number]]  (Normal) Collected:  01/06/19 2156    Specimen:  Blood Updated:  01/06/19 2245     Osmolality Calc 281.5 mOsm/kg     Lactic Acid, Plasma [[id number]]  (Abnormal) Collected:  01/06/19 2156    Specimen:  Blood from Arm, Right Updated:  01/06/19 2228     Lactate 2.5 mmol/L     Valproic Acid Level, Total [[id number]]  (Abnormal) Collected:  01/06/19 2148    Specimen:  Blood Updated:  01/06/19 2224     Valproic Acid 45.7 mcg/mL     C-reactive Protein [[id number]]  (Abnormal) Collected:  01/06/19 2156    Specimen:  Blood from Arm, Right Updated:  01/06/19 2222     C-Reactive Protein 9.85 mg/dL     Protime-INR [[id number]]  (Abnormal) Collected:  01/06/19 2156    Specimen:  Blood from Arm, Right Updated:  01/06/19 2215     Protime 17.0 Seconds      Comment: Note new Reference Range        INR 1.36    Narrative:       Suggested INR therapeutic range for stable oral anticoagulant therapy:    Low Intensity therapy:   1.5-2.0  Moderate Intensity therapy:   2.0-3.0  High Intensity therapy:   2.5-4.0    aPTT [[id number]]  (Normal) Collected:  01/06/19 2156    Specimen:  Blood from Arm, Right Updated:  01/06/19 2215     PTT 27.1 seconds      Comment: Note new Reference Range       Narrative:       PTT Heparin Therapeutic Range:  59 - 95 seconds    CBC & Differential [[id number]] Collected:  01/06/19 2156    Specimen:  Blood Updated:  01/06/19 2209    Narrative:       The following orders were created for panel order CBC & Differential.  Procedure                               Abnormality         Status                     ---------                               -----------         ------                     CBC Auto  Differential[[id number]]        Abnormal            Final result                 Please view results for these tests on the individual orders.    CBC Auto Differential [[id number]]  (Abnormal) Collected:  01/06/19 2156    Specimen:  Blood from Arm, Right Updated:  01/06/19 2209     WBC 18.17 10*3/mm3      RBC 4.72 10*6/mm3      Hemoglobin 13.2 g/dL      Hematocrit 43.9 %      MCV 93.0 fL      MCH 28.0 pg      MCHC 30.1 g/dL      RDW 18.8 %      RDW-SD 62.5 fl      MPV 10.7 fL      Platelets 238 10*3/mm3      Neutrophil % 68.0 %      Lymphocyte % 21.6 %      Monocyte % 9.4 %      Eosinophil % 0.3 %      Basophil % 0.3 %      Immature Grans % 0.4 %      Neutrophils, Absolute 12.37 10*3/mm3      Lymphocytes, Absolute 3.92 10*3/mm3      Monocytes, Absolute 1.70 10*3/mm3      Eosinophils, Absolute 0.05 10*3/mm3      Basophils, Absolute 0.06 10*3/mm3      Immature Grans, Absolute 0.07 10*3/mm3         Imaging Results (last 24 hours)     Procedure Component Value Units Date/Time    CT Soft Tissue Neck With Contrast [[id number]] Collected:  01/07/19 0724     Updated:  01/07/19 0739    Narrative:       EXAMINATION: CT SOFT TISSUE NECK W CONTRAST-      Technique: multiple CT axial images obtained through the neck, following  IV contrast administration. Coronal and sagittal reformatted images  obtained from the original axial data set to facilitate diagnosis and  surgical planning.     Radiation dose reduction techniques were utilized per ALARA protocol.  Automated exposure control was initiated through either or [employer] or  D[employer]Right software packages by  protocol.       225.34 mGy.cm     CLINICAL INDICATION:     Neck mass, nonpulsatile, solitary      COMPARISON:    None.     FINDINGS:      There is motion artifact. The right-sided parotid gland is slightly more  hyperdense some prominent in compared to the left side that may  represent parotid gland inflammation..  No definite lymphadenopathy. Incidentally imaged  blood vessels are  unremarkable. Bony structures are unremarkable.    Impression:       There is motion artifact. The right-sided parotid gland is  slightly more hyperdense some prominent in compared to the left side  that may represent parotid gland inflammation..     This report was finalized on 1/7/2019 7:37 AM by Dr. Giovanni Martin MD.           ECG/EMG Results (last 24 hours)     Procedure Component Value Units Date/Time    ECG 12 Lead [[id number]] Collected:  01/07/19 0409     Updated:  01/07/19 0412    Narrative:       Test Reason : septic  Blood Pressure : **/** mmHG  Vent. Rate : 092 BPM     Atrial Rate : 092 BPM     P-R Int : 234 ms          QRS Dur : 096 ms      QT Int : 334 ms       P-R-T Axes : 062 072 248 degrees     QTc Int : 413 ms    Sinus rhythm with 1st degree AV block  ST & T wave abnormality, consider inferior ischemia  ST & T wave abnormality, consider anterolateral ischemia  Abnormal ECG  When compared with ECG of 05-DEC-2018 18:39,  Previous ECG has undetermined rhythm, needs review  ST no longer elevated in Inferior leads  ST more depressed in Lateral leads  T wave inversion now evident in Lateral leads    Referred By:  ALICIA           Confirmed By:           Physician Progress Notes (last 24 hours) (Notes from 1/6/2019 12:23 PM through 1/7/2019 12:23 PM)     No notes of this type exist for this encounter.        Medical Student Notes (last 24 hours) (Notes from 1/6/2019 12:23 PM through 1/7/2019 12:23 PM)     No notes of this type exist for this encounter.        Consult Notes (last 24 hours) (Notes from 1/6/2019 12:23 PM through 1/7/2019 12:23 PM)     No notes of this type exist for this encounter.        Nutrition Notes (last 24 hours) (Notes from 1/6/2019 12:23 PM through 1/7/2019 12:23 PM)     No notes of this type exist for this encounter.        Physical Therapy Notes (last 24 hours) (Notes from 1/6/2019 12:23 PM through 1/7/2019 12:23 PM)     No notes of this type exist for this  encounter.        Occupational Therapy Notes (last 24 hours) (Notes from 1/6/2019 12:23 PM through 1/7/2019 12:23 PM)     No notes of this type exist for this encounter.        Speech Language Pathology Notes (last 24 hours) (Notes from 1/6/2019 12:23 PM through 1/7/2019 12:23 PM)     No notes of this type exist for this encounter.        Respiratory Therapy Notes (last 24 hours) (Notes from 1/6/2019 12:23 PM through 1/7/2019 12:23 PM)     No notes of this type exist for this encounter.